# Patient Record
Sex: FEMALE | Race: BLACK OR AFRICAN AMERICAN | Employment: OTHER | URBAN - METROPOLITAN AREA
[De-identification: names, ages, dates, MRNs, and addresses within clinical notes are randomized per-mention and may not be internally consistent; named-entity substitution may affect disease eponyms.]

---

## 2019-10-18 ENCOUNTER — OFFICE VISIT (OUTPATIENT)
Dept: FAMILY MEDICINE CLINIC | Age: 79
End: 2019-10-18
Payer: MEDICARE

## 2019-10-18 VITALS
BODY MASS INDEX: 36.03 KG/M2 | RESPIRATION RATE: 20 BRPM | WEIGHT: 167 LBS | DIASTOLIC BLOOD PRESSURE: 94 MMHG | HEART RATE: 96 BPM | SYSTOLIC BLOOD PRESSURE: 160 MMHG | TEMPERATURE: 98.7 F | HEIGHT: 57 IN

## 2019-10-18 DIAGNOSIS — I10 ESSENTIAL HYPERTENSION: ICD-10-CM

## 2019-10-18 DIAGNOSIS — E11.40 TYPE 2 DIABETES MELLITUS WITH DIABETIC NEUROPATHY, WITHOUT LONG-TERM CURRENT USE OF INSULIN (HCC): Primary | ICD-10-CM

## 2019-10-18 DIAGNOSIS — F41.9 ANXIETY: ICD-10-CM

## 2019-10-18 DIAGNOSIS — I69.359 HISTORY OF HEMORRHAGIC STROKE WITH RESIDUAL HEMIPARESIS (HCC): ICD-10-CM

## 2019-10-18 DIAGNOSIS — E78.5 HYPERLIPIDEMIA, UNSPECIFIED HYPERLIPIDEMIA TYPE: ICD-10-CM

## 2019-10-18 LAB
ALBUMIN SERPL-MCNC: 4.2 G/DL (ref 3.5–5.1)
ALP BLD-CCNC: 57 U/L (ref 38–126)
ALT SERPL-CCNC: 11 U/L (ref 11–66)
ANION GAP SERPL CALCULATED.3IONS-SCNC: 14 MEQ/L (ref 8–16)
AST SERPL-CCNC: 14 U/L (ref 5–40)
AVERAGE GLUCOSE: 150 MG/DL (ref 70–126)
BASOPHILS # BLD: 1 %
BASOPHILS ABSOLUTE: 0.1 THOU/MM3 (ref 0–0.1)
BILIRUB SERPL-MCNC: 0.4 MG/DL (ref 0.3–1.2)
BUN BLDV-MCNC: 14 MG/DL (ref 7–22)
CALCIUM SERPL-MCNC: 9.7 MG/DL (ref 8.5–10.5)
CHLORIDE BLD-SCNC: 105 MEQ/L (ref 98–111)
CHOLESTEROL, TOTAL: 148 MG/DL (ref 100–199)
CO2: 22 MEQ/L (ref 23–33)
CREAT SERPL-MCNC: 1.3 MG/DL (ref 0.4–1.2)
EOSINOPHIL # BLD: 0.6 %
EOSINOPHILS ABSOLUTE: 0 THOU/MM3 (ref 0–0.4)
ERYTHROCYTE [DISTWIDTH] IN BLOOD BY AUTOMATED COUNT: 14.9 % (ref 11.5–14.5)
ERYTHROCYTE [DISTWIDTH] IN BLOOD BY AUTOMATED COUNT: 43 FL (ref 35–45)
GLUCOSE BLD-MCNC: 152 MG/DL (ref 70–108)
HBA1C MFR BLD: 7 % (ref 4.4–6.4)
HCT VFR BLD CALC: 40.8 % (ref 37–47)
HDLC SERPL-MCNC: 49 MG/DL
HEMOGLOBIN: 12.2 GM/DL (ref 12–16)
IMMATURE GRANS (ABS): 0.01 THOU/MM3 (ref 0–0.07)
IMMATURE GRANULOCYTES: 0.2 %
LDL CHOLESTEROL CALCULATED: 73 MG/DL
LYMPHOCYTES # BLD: 31.3 %
LYMPHOCYTES ABSOLUTE: 1.6 THOU/MM3 (ref 1–4.8)
MCH RBC QN AUTO: 24 PG (ref 26–33)
MCHC RBC AUTO-ENTMCNC: 29.9 GM/DL (ref 32.2–35.5)
MCV RBC AUTO: 80.3 FL (ref 81–99)
MONOCYTES # BLD: 10.8 %
MONOCYTES ABSOLUTE: 0.6 THOU/MM3 (ref 0.4–1.3)
NUCLEATED RED BLOOD CELLS: 0 /100 WBC
PLATELET # BLD: 79 THOU/MM3 (ref 130–400)
PLATELET ESTIMATE: ABNORMAL
PMV BLD AUTO: 10.9 FL (ref 9.4–12.4)
POTASSIUM SERPL-SCNC: 4.3 MEQ/L (ref 3.5–5.2)
RBC # BLD: 5.08 MILL/MM3 (ref 4.2–5.4)
SEG NEUTROPHILS: 56.1 %
SEGMENTED NEUTROPHILS ABSOLUTE COUNT: 2.9 THOU/MM3 (ref 1.8–7.7)
SODIUM BLD-SCNC: 141 MEQ/L (ref 135–145)
TOTAL PROTEIN: 8.3 G/DL (ref 6.1–8)
TRIGL SERPL-MCNC: 129 MG/DL (ref 0–199)
WBC # BLD: 5.1 THOU/MM3 (ref 4.8–10.8)

## 2019-10-18 PROCEDURE — G8400 PT W/DXA NO RESULTS DOC: HCPCS | Performed by: FAMILY MEDICINE

## 2019-10-18 PROCEDURE — 1036F TOBACCO NON-USER: CPT | Performed by: FAMILY MEDICINE

## 2019-10-18 PROCEDURE — 1090F PRES/ABSN URINE INCON ASSESS: CPT | Performed by: FAMILY MEDICINE

## 2019-10-18 PROCEDURE — 1123F ACP DISCUSS/DSCN MKR DOCD: CPT | Performed by: FAMILY MEDICINE

## 2019-10-18 PROCEDURE — G8417 CALC BMI ABV UP PARAM F/U: HCPCS | Performed by: FAMILY MEDICINE

## 2019-10-18 PROCEDURE — 90653 IIV ADJUVANT VACCINE IM: CPT | Performed by: FAMILY MEDICINE

## 2019-10-18 PROCEDURE — 99203 OFFICE O/P NEW LOW 30 MIN: CPT | Performed by: FAMILY MEDICINE

## 2019-10-18 PROCEDURE — G8427 DOCREV CUR MEDS BY ELIG CLIN: HCPCS | Performed by: FAMILY MEDICINE

## 2019-10-18 PROCEDURE — G8482 FLU IMMUNIZE ORDER/ADMIN: HCPCS | Performed by: FAMILY MEDICINE

## 2019-10-18 PROCEDURE — G0008 ADMIN INFLUENZA VIRUS VAC: HCPCS | Performed by: FAMILY MEDICINE

## 2019-10-18 PROCEDURE — 4040F PNEUMOC VAC/ADMIN/RCVD: CPT | Performed by: FAMILY MEDICINE

## 2019-10-18 RX ORDER — GLIPIZIDE 10 MG/1
10 TABLET ORAL DAILY
COMMUNITY
End: 2020-10-05 | Stop reason: SDUPTHER

## 2019-10-18 RX ORDER — BENAZEPRIL HYDROCHLORIDE 20 MG/1
20 TABLET ORAL DAILY
COMMUNITY
End: 2020-02-10 | Stop reason: SDUPTHER

## 2019-10-18 RX ORDER — GABAPENTIN 300 MG/1
300 CAPSULE ORAL 3 TIMES DAILY
Qty: 90 CAPSULE | Refills: 2 | Status: SHIPPED | OUTPATIENT
Start: 2019-10-18 | End: 2019-10-18 | Stop reason: SDUPTHER

## 2019-10-18 RX ORDER — GABAPENTIN 300 MG/1
300 CAPSULE ORAL 3 TIMES DAILY
COMMUNITY
End: 2019-10-18 | Stop reason: SDUPTHER

## 2019-10-18 RX ORDER — AMLODIPINE BESYLATE 5 MG/1
5 TABLET ORAL DAILY
COMMUNITY
End: 2020-02-10 | Stop reason: SDUPTHER

## 2019-10-18 RX ORDER — DULOXETIN HYDROCHLORIDE 60 MG/1
60 CAPSULE, DELAYED RELEASE ORAL DAILY
COMMUNITY
End: 2020-02-10 | Stop reason: SDUPTHER

## 2019-10-18 RX ORDER — CLOPIDOGREL BISULFATE 75 MG/1
75 TABLET ORAL DAILY
COMMUNITY
End: 2021-11-16 | Stop reason: SDUPTHER

## 2019-10-18 RX ORDER — CELECOXIB 100 MG/1
100 CAPSULE ORAL 2 TIMES DAILY
Qty: 60 CAPSULE | Refills: 0 | Status: SHIPPED | OUTPATIENT
Start: 2019-10-18 | End: 2019-12-03 | Stop reason: SDUPTHER

## 2019-10-18 RX ORDER — PRAVASTATIN SODIUM 40 MG
40 TABLET ORAL NIGHTLY
COMMUNITY
End: 2021-11-16 | Stop reason: SDUPTHER

## 2019-10-18 RX ORDER — GABAPENTIN 300 MG/1
300 CAPSULE ORAL 3 TIMES DAILY
Qty: 270 CAPSULE | Refills: 1 | Status: SHIPPED | OUTPATIENT
Start: 2019-10-18 | End: 2020-04-02

## 2019-10-18 SDOH — HEALTH STABILITY: MENTAL HEALTH: HOW OFTEN DO YOU HAVE A DRINK CONTAINING ALCOHOL?: NEVER

## 2019-10-21 ASSESSMENT — ENCOUNTER SYMPTOMS
EYES NEGATIVE: 1
DIARRHEA: 0
CHEST TIGHTNESS: 0
VOMITING: 0
BACK PAIN: 1
SHORTNESS OF BREATH: 0
RHINORRHEA: 0
SORE THROAT: 0
NAUSEA: 0
COUGH: 0
ABDOMINAL PAIN: 0

## 2019-11-04 ENCOUNTER — OFFICE VISIT (OUTPATIENT)
Dept: FAMILY MEDICINE CLINIC | Age: 79
End: 2019-11-04
Payer: MEDICARE

## 2019-11-04 VITALS
WEIGHT: 169.6 LBS | SYSTOLIC BLOOD PRESSURE: 126 MMHG | HEART RATE: 100 BPM | TEMPERATURE: 98 F | DIASTOLIC BLOOD PRESSURE: 76 MMHG | BODY MASS INDEX: 36.7 KG/M2 | OXYGEN SATURATION: 98 %

## 2019-11-04 DIAGNOSIS — I10 ESSENTIAL HYPERTENSION: ICD-10-CM

## 2019-11-04 DIAGNOSIS — E11.40 TYPE 2 DIABETES MELLITUS WITH DIABETIC NEUROPATHY, WITHOUT LONG-TERM CURRENT USE OF INSULIN (HCC): Primary | ICD-10-CM

## 2019-11-04 DIAGNOSIS — N18.30 CKD (CHRONIC KIDNEY DISEASE) STAGE 3, GFR 30-59 ML/MIN (HCC): ICD-10-CM

## 2019-11-04 DIAGNOSIS — R06.02 SOB (SHORTNESS OF BREATH): ICD-10-CM

## 2019-11-04 PROCEDURE — 1036F TOBACCO NON-USER: CPT | Performed by: FAMILY MEDICINE

## 2019-11-04 PROCEDURE — 4040F PNEUMOC VAC/ADMIN/RCVD: CPT | Performed by: FAMILY MEDICINE

## 2019-11-04 PROCEDURE — G8482 FLU IMMUNIZE ORDER/ADMIN: HCPCS | Performed by: FAMILY MEDICINE

## 2019-11-04 PROCEDURE — 1123F ACP DISCUSS/DSCN MKR DOCD: CPT | Performed by: FAMILY MEDICINE

## 2019-11-04 PROCEDURE — G8427 DOCREV CUR MEDS BY ELIG CLIN: HCPCS | Performed by: FAMILY MEDICINE

## 2019-11-04 PROCEDURE — G8400 PT W/DXA NO RESULTS DOC: HCPCS | Performed by: FAMILY MEDICINE

## 2019-11-04 PROCEDURE — 1090F PRES/ABSN URINE INCON ASSESS: CPT | Performed by: FAMILY MEDICINE

## 2019-11-04 PROCEDURE — G8417 CALC BMI ABV UP PARAM F/U: HCPCS | Performed by: FAMILY MEDICINE

## 2019-11-04 PROCEDURE — 99213 OFFICE O/P EST LOW 20 MIN: CPT | Performed by: FAMILY MEDICINE

## 2019-11-04 ASSESSMENT — PATIENT HEALTH QUESTIONNAIRE - PHQ9
2. FEELING DOWN, DEPRESSED OR HOPELESS: 0
1. LITTLE INTEREST OR PLEASURE IN DOING THINGS: 0
SUM OF ALL RESPONSES TO PHQ QUESTIONS 1-9: 0
SUM OF ALL RESPONSES TO PHQ9 QUESTIONS 1 & 2: 0
SUM OF ALL RESPONSES TO PHQ QUESTIONS 1-9: 0

## 2019-11-09 ASSESSMENT — ENCOUNTER SYMPTOMS
EYES NEGATIVE: 1
ABDOMINAL PAIN: 0
RHINORRHEA: 0
BACK PAIN: 0
SHORTNESS OF BREATH: 0
DIARRHEA: 0
VOMITING: 0
COUGH: 0
NAUSEA: 0
CHEST TIGHTNESS: 0
SORE THROAT: 0

## 2019-11-13 ENCOUNTER — HOSPITAL ENCOUNTER (OUTPATIENT)
Dept: GENERAL RADIOLOGY | Age: 79
Discharge: HOME | End: 2019-11-13
Payer: MEDICARE

## 2019-11-13 ENCOUNTER — HOSPITAL ENCOUNTER (OUTPATIENT)
Age: 79
Discharge: HOME | End: 2019-11-13
Payer: MEDICARE

## 2019-11-13 DIAGNOSIS — R06.02 SOB (SHORTNESS OF BREATH): ICD-10-CM

## 2019-11-13 LAB — GFR SERPL CREATININE-BSD FRML MDRD: 48 ML/MIN/1.73M2

## 2019-11-13 PROCEDURE — 71046 X-RAY EXAM CHEST 2 VIEWS: CPT

## 2019-12-03 RX ORDER — CELECOXIB 100 MG/1
CAPSULE ORAL
Qty: 60 CAPSULE | Refills: 2 | Status: SHIPPED | OUTPATIENT
Start: 2019-12-03 | End: 2020-03-16

## 2020-01-22 ENCOUNTER — TELEPHONE (OUTPATIENT)
Dept: FAMILY MEDICINE CLINIC | Age: 80
End: 2020-01-22

## 2020-02-10 ENCOUNTER — OFFICE VISIT (OUTPATIENT)
Dept: FAMILY MEDICINE CLINIC | Age: 80
End: 2020-02-10
Payer: MEDICARE

## 2020-02-10 VITALS
SYSTOLIC BLOOD PRESSURE: 136 MMHG | DIASTOLIC BLOOD PRESSURE: 84 MMHG | TEMPERATURE: 98.7 F | BODY MASS INDEX: 37.87 KG/M2 | RESPIRATION RATE: 20 BRPM | HEART RATE: 84 BPM | WEIGHT: 175 LBS

## 2020-02-10 PROCEDURE — 99214 OFFICE O/P EST MOD 30 MIN: CPT | Performed by: FAMILY MEDICINE

## 2020-02-10 PROCEDURE — 1123F ACP DISCUSS/DSCN MKR DOCD: CPT | Performed by: FAMILY MEDICINE

## 2020-02-10 PROCEDURE — G8417 CALC BMI ABV UP PARAM F/U: HCPCS | Performed by: FAMILY MEDICINE

## 2020-02-10 PROCEDURE — 4040F PNEUMOC VAC/ADMIN/RCVD: CPT | Performed by: FAMILY MEDICINE

## 2020-02-10 PROCEDURE — 1036F TOBACCO NON-USER: CPT | Performed by: FAMILY MEDICINE

## 2020-02-10 PROCEDURE — G8482 FLU IMMUNIZE ORDER/ADMIN: HCPCS | Performed by: FAMILY MEDICINE

## 2020-02-10 PROCEDURE — G8427 DOCREV CUR MEDS BY ELIG CLIN: HCPCS | Performed by: FAMILY MEDICINE

## 2020-02-10 PROCEDURE — G8400 PT W/DXA NO RESULTS DOC: HCPCS | Performed by: FAMILY MEDICINE

## 2020-02-10 PROCEDURE — 1090F PRES/ABSN URINE INCON ASSESS: CPT | Performed by: FAMILY MEDICINE

## 2020-02-10 RX ORDER — AMLODIPINE BESYLATE 5 MG/1
5 TABLET ORAL DAILY
Qty: 90 TABLET | Refills: 3 | Status: SHIPPED | OUTPATIENT
Start: 2020-02-10 | End: 2021-11-16 | Stop reason: SDUPTHER

## 2020-02-10 RX ORDER — DULOXETIN HYDROCHLORIDE 60 MG/1
60 CAPSULE, DELAYED RELEASE ORAL DAILY
Qty: 90 CAPSULE | Refills: 3 | Status: SHIPPED | OUTPATIENT
Start: 2020-02-10 | End: 2021-11-10 | Stop reason: SDUPTHER

## 2020-02-10 RX ORDER — BENAZEPRIL HYDROCHLORIDE 20 MG/1
20 TABLET ORAL DAILY
Qty: 90 TABLET | Refills: 3 | Status: SHIPPED | OUTPATIENT
Start: 2020-02-10 | End: 2021-11-16 | Stop reason: SDUPTHER

## 2020-02-16 PROBLEM — M15.9 PRIMARY OSTEOARTHRITIS INVOLVING MULTIPLE JOINTS: Status: ACTIVE | Noted: 2020-02-16

## 2020-02-16 PROBLEM — G89.4 CHRONIC PAIN SYNDROME: Status: ACTIVE | Noted: 2020-02-16

## 2020-02-16 ASSESSMENT — ENCOUNTER SYMPTOMS
DIARRHEA: 0
NAUSEA: 0
RHINORRHEA: 0
COUGH: 0
ABDOMINAL PAIN: 0
BACK PAIN: 1
EYES NEGATIVE: 1
CHEST TIGHTNESS: 0
SHORTNESS OF BREATH: 0
VOMITING: 0
SORE THROAT: 0

## 2020-02-19 ENCOUNTER — HOSPITAL ENCOUNTER (OUTPATIENT)
Age: 80
Discharge: HOME | End: 2020-02-19
Payer: MEDICARE

## 2020-02-19 ENCOUNTER — HOSPITAL ENCOUNTER (OUTPATIENT)
Dept: GENERAL RADIOLOGY | Age: 80
Discharge: HOME | End: 2020-02-19
Payer: MEDICARE

## 2020-02-19 DIAGNOSIS — E78.5 HYPERLIPIDEMIA, UNSPECIFIED HYPERLIPIDEMIA TYPE: ICD-10-CM

## 2020-02-19 DIAGNOSIS — E11.40 TYPE 2 DIABETES MELLITUS WITH DIABETIC NEUROPATHY, WITHOUT LONG-TERM CURRENT USE OF INSULIN (HCC): ICD-10-CM

## 2020-02-19 LAB
ALBUMIN SERPL-MCNC: 4.2 G/DL (ref 3.5–5.1)
ALP BLD-CCNC: 59 U/L (ref 38–126)
ALT SERPL-CCNC: 12 U/L (ref 11–66)
ANION GAP SERPL CALCULATED.3IONS-SCNC: 15 MEQ/L (ref 8–16)
AST SERPL-CCNC: 16 U/L (ref 5–40)
AVERAGE GLUCOSE: 192 MG/DL (ref 70–126)
BILIRUB SERPL-MCNC: 0.4 MG/DL (ref 0.3–1.2)
BUN BLDV-MCNC: 19 MG/DL (ref 7–22)
CALCIUM SERPL-MCNC: 9.5 MG/DL (ref 8.5–10.5)
CHLORIDE BLD-SCNC: 104 MEQ/L (ref 98–111)
CHOLESTEROL, TOTAL: 170 MG/DL (ref 100–199)
CO2: 22 MEQ/L (ref 23–33)
CREAT SERPL-MCNC: 1.5 MG/DL (ref 0.4–1.2)
GFR SERPL CREATININE-BSD FRML MDRD: 41 ML/MIN/1.73M2
GLUCOSE BLD-MCNC: 174 MG/DL (ref 70–108)
HBA1C MFR BLD: 8.4 % (ref 4.4–6.4)
HDLC SERPL-MCNC: 44 MG/DL
LDL CHOLESTEROL CALCULATED: 93 MG/DL
POTASSIUM SERPL-SCNC: 4.8 MEQ/L (ref 3.5–5.2)
SODIUM BLD-SCNC: 141 MEQ/L (ref 135–145)
TOTAL PROTEIN: 8.6 G/DL (ref 6.1–8)
TRIGL SERPL-MCNC: 164 MG/DL (ref 0–199)

## 2020-02-19 PROCEDURE — 73564 X-RAY EXAM KNEE 4 OR MORE: CPT

## 2020-02-19 PROCEDURE — 80061 LIPID PANEL: CPT

## 2020-02-19 PROCEDURE — 36415 COLL VENOUS BLD VENIPUNCTURE: CPT

## 2020-02-19 PROCEDURE — 80053 COMPREHEN METABOLIC PANEL: CPT

## 2020-02-19 PROCEDURE — 83036 HEMOGLOBIN GLYCOSYLATED A1C: CPT

## 2020-03-03 ENCOUNTER — TELEPHONE (OUTPATIENT)
Dept: FAMILY MEDICINE CLINIC | Age: 80
End: 2020-03-03

## 2020-03-03 NOTE — TELEPHONE ENCOUNTER
Xray shows severe degen disease. rec appt with ortho. Blood sugars very high, and renal function is increased. rec fu appt to discuss adjusting medication to control sugars.

## 2020-03-16 RX ORDER — CELECOXIB 100 MG/1
CAPSULE ORAL
Qty: 60 CAPSULE | Refills: 2 | Status: SHIPPED | OUTPATIENT
Start: 2020-03-16 | End: 2021-10-12

## 2020-03-30 ENCOUNTER — TELEPHONE (OUTPATIENT)
Dept: FAMILY MEDICINE CLINIC | Age: 80
End: 2020-03-30

## 2020-03-31 NOTE — TELEPHONE ENCOUNTER
Cant order cpap without knowing the settings.   Can we find out previous sleep doctor or find records

## 2020-03-31 NOTE — TELEPHONE ENCOUNTER
Daughter called back and stated sleep study and CPAP is from [place]. Daughter does not now who prescribed the machine and Annabelle Nabil does not remember. She has had the same machine for 7-8 years.  They are requesting an order for new CPAP sent to [facility]

## 2020-04-01 NOTE — TELEPHONE ENCOUNTER
Received a call back Dr Madison Castillo in [place] was who had ordered the CPAP.   The phone # [phone]

## 2020-04-02 RX ORDER — GABAPENTIN 300 MG/1
300 CAPSULE ORAL 3 TIMES DAILY
Qty: 90 CAPSULE | Refills: 2 | Status: SHIPPED | OUTPATIENT
Start: 2020-04-02 | End: 2020-04-13 | Stop reason: SDUPTHER

## 2020-04-02 NOTE — TELEPHONE ENCOUNTER
Trina Umana needs refill of   Requested Prescriptions     Pending Prescriptions Disp Refills    gabapentin (NEURONTIN) 300 MG capsule [Pharmacy Med Name: GABAPENTIN 300 MG CAPSULE] 90 capsule 2     Sig: Take 1 capsule by mouth 3 times daily for 90 days. Last Filled on:  10-18-19 #270 R-1 sent to [facility].     Last Visit Date:  2/10/2020    Next Visit Date:  5/4/2020

## 2020-04-13 RX ORDER — GABAPENTIN 300 MG/1
300 CAPSULE ORAL 3 TIMES DAILY
Qty: 90 CAPSULE | Refills: 2 | Status: SHIPPED | OUTPATIENT
Start: 2020-04-13 | End: 2020-06-30

## 2020-05-26 ENCOUNTER — TELEPHONE (OUTPATIENT)
Dept: ADMINISTRATIVE | Age: 80
End: 2020-05-26

## 2020-05-26 NOTE — TELEPHONE ENCOUNTER
Judge Lambert called from [facility], [address] [place] [address] on Aging. They are trying to get Passport started for this pt and need the document signed. It was faxed to us on the 20th. Please advise if we have not received or if there is an issue with signing it.

## 2020-06-25 ENCOUNTER — TELEPHONE (OUTPATIENT)
Dept: PHYSICAL MEDICINE AND REHAB | Age: 80
End: 2020-06-25

## 2020-06-25 ENCOUNTER — OFFICE VISIT (OUTPATIENT)
Dept: PHYSICAL MEDICINE AND REHAB | Age: 80
End: 2020-06-25
Payer: MEDICARE

## 2020-06-25 VITALS
HEART RATE: 92 BPM | BODY MASS INDEX: 407.51 KG/M2 | HEIGHT: 55 IN | DIASTOLIC BLOOD PRESSURE: 72 MMHG | SYSTOLIC BLOOD PRESSURE: 148 MMHG

## 2020-06-25 PROCEDURE — G8400 PT W/DXA NO RESULTS DOC: HCPCS | Performed by: PHYSICAL MEDICINE & REHABILITATION

## 2020-06-25 PROCEDURE — 1090F PRES/ABSN URINE INCON ASSESS: CPT | Performed by: PHYSICAL MEDICINE & REHABILITATION

## 2020-06-25 PROCEDURE — 1123F ACP DISCUSS/DSCN MKR DOCD: CPT | Performed by: PHYSICAL MEDICINE & REHABILITATION

## 2020-06-25 PROCEDURE — 99204 OFFICE O/P NEW MOD 45 MIN: CPT | Performed by: PHYSICAL MEDICINE & REHABILITATION

## 2020-06-25 PROCEDURE — 1036F TOBACCO NON-USER: CPT | Performed by: PHYSICAL MEDICINE & REHABILITATION

## 2020-06-25 PROCEDURE — G8427 DOCREV CUR MEDS BY ELIG CLIN: HCPCS | Performed by: PHYSICAL MEDICINE & REHABILITATION

## 2020-06-25 PROCEDURE — G8417 CALC BMI ABV UP PARAM F/U: HCPCS | Performed by: PHYSICAL MEDICINE & REHABILITATION

## 2020-06-25 PROCEDURE — 4040F PNEUMOC VAC/ADMIN/RCVD: CPT | Performed by: PHYSICAL MEDICINE & REHABILITATION

## 2020-06-25 RX ORDER — LIDOCAINE 50 MG/G
1 PATCH TOPICAL DAILY
Qty: 10 PATCH | Refills: 0 | Status: SHIPPED | OUTPATIENT
Start: 2020-06-25 | End: 2020-07-05

## 2020-06-25 NOTE — TELEPHONE ENCOUNTER
PA started on Lidocaine patches 5%.  Key: [id number]    ID: [id number]  BIN: [id number]  Group: [id number]  PCN: [id number]

## 2020-06-25 NOTE — PROGRESS NOTES
colon    Diabetes Father     Stroke Father        Review of Systems : All systems reviewed, all unremarkable other than HPI/subjective. Denies  fever, chills, infection or non healing wound. Physical Exam  Constitutional:       Appearance: Normal appearance. HENT:      Head: Atraumatic. Pulmonary:      Effort: Pulmonary effort is normal. No respiratory distress. Breath sounds: Normal breath sounds. Musculoskeletal:      Comments: Tenderness along the knee joint lines, right side   Neurological:      Mental Status: She is alert and oriented to person, place, and time. Psychiatric:         Behavior: Behavior normal.           Assessment and Plan:      Diagnosis Orders   1. Arthritis of knee  [place] INJECTION AA&/STRD OTHER PERIPHERAL NERVE/BRANCH       Her pain is consistent with DJD. Schedule for right genicular nerve block. Schedule injection in 2 weeks     Schedule ff up post injection virtually 2 weeks post op       I have reviewed the chief complaint and HPI including the [facility] and Vital documentation by my staff and I agree with their documentation and have added where applicable. Time spent with patient was 45  minutes. More than 50% was spent counseling/coordinating the patient's care.        Guanako Lowery MD   Spine Medicine/PM&R

## 2020-06-30 RX ORDER — GABAPENTIN 300 MG/1
300 CAPSULE ORAL 3 TIMES DAILY
Qty: 270 CAPSULE | Refills: 0 | Status: SHIPPED | OUTPATIENT
Start: 2020-06-30 | End: 2020-10-01

## 2020-06-30 NOTE — TELEPHONE ENCOUNTER
Seda [place]le (granddaughter on HIPAA) calls asking the status of the pain patches. The pharmacy told her it needed authorized. Please advise.

## 2020-06-30 NOTE — TELEPHONE ENCOUNTER
Notified that waiting on physician to respond on what to do with denial. Told granddaughter that she could get some lidocaine gels and patch over the counter at the pharmacy for the mean time. She does state that patient used to be on Fentanyl patches in the other state that she used to live in. Please advise.

## 2020-07-09 ENCOUNTER — APPOINTMENT (OUTPATIENT)
Dept: CT IMAGING | Age: 80
End: 2020-07-09
Payer: MEDICARE

## 2020-07-09 ENCOUNTER — HOSPITAL ENCOUNTER (EMERGENCY)
Age: 80
Discharge: HOME | End: 2020-07-10
Attending: EMERGENCY MEDICINE
Payer: MEDICARE

## 2020-07-09 ENCOUNTER — APPOINTMENT (OUTPATIENT)
Dept: GENERAL RADIOLOGY | Age: 80
End: 2020-07-09
Payer: MEDICARE

## 2020-07-09 LAB
ALBUMIN SERPL-MCNC: 4.2 G/DL (ref 3.5–5.1)
ALP BLD-CCNC: 51 U/L (ref 38–126)
ALT SERPL-CCNC: 11 U/L (ref 11–66)
ANION GAP SERPL CALCULATED.3IONS-SCNC: 12 MEQ/L (ref 8–16)
APTT: 29 SECONDS (ref 22–38)
AST SERPL-CCNC: 17 U/L (ref 5–40)
BILIRUB SERPL-MCNC: 0.4 MG/DL (ref 0.3–1.2)
BUN BLDV-MCNC: 30 MG/DL (ref 7–22)
CALCIUM SERPL-MCNC: 9.2 MG/DL (ref 8.5–10.5)
CHLORIDE BLD-SCNC: 106 MEQ/L (ref 98–111)
CO2: 19 MEQ/L (ref 23–33)
CREAT SERPL-MCNC: 1.8 MG/DL (ref 0.4–1.2)
EKG ATRIAL RATE: 83 BPM
EKG P AXIS: 66 DEGREES
EKG P-R INTERVAL: 142 MS
EKG Q-T INTERVAL: 400 MS
EKG QRS DURATION: 92 MS
EKG QTC CALCULATION (BAZETT): 470 MS
EKG R AXIS: -4 DEGREES
EKG T AXIS: 64 DEGREES
EKG VENTRICULAR RATE: 83 BPM
GLUCOSE BLD-MCNC: 128 MG/DL (ref 70–108)
INR BLD: 1.02 (ref 0.85–1.13)
MAGNESIUM: 1.8 MG/DL (ref 1.6–2.4)
OSMOLALITY CALCULATION: 281.6 MOSMOL/KG (ref 275–300)
POTASSIUM REFLEX MAGNESIUM: 4.9 MEQ/L (ref 3.5–5.2)
SODIUM BLD-SCNC: 137 MEQ/L (ref 135–145)
TOTAL PROTEIN: 7.9 G/DL (ref 6.1–8)
TROPONIN T: < 0.01 NG/ML

## 2020-07-09 PROCEDURE — 90715 TDAP VACCINE 7 YRS/> IM: CPT | Performed by: EMERGENCY MEDICINE

## 2020-07-09 PROCEDURE — 72170 X-RAY EXAM OF PELVIS: CPT

## 2020-07-09 PROCEDURE — 93005 ELECTROCARDIOGRAM TRACING: CPT | Performed by: EMERGENCY MEDICINE

## 2020-07-09 PROCEDURE — 90471 IMMUNIZATION ADMIN: CPT | Performed by: EMERGENCY MEDICINE

## 2020-07-09 PROCEDURE — 99285 EMERGENCY DEPT VISIT HI MDM: CPT

## 2020-07-09 PROCEDURE — 6360000002 HC RX W HCPCS: Performed by: EMERGENCY MEDICINE

## 2020-07-09 PROCEDURE — 70450 CT HEAD/BRAIN W/O DYE: CPT

## 2020-07-09 PROCEDURE — 6820000001 HC L2 TRAUMA SURGERY EVALUATION: Performed by: SURGERY

## 2020-07-09 PROCEDURE — 83735 ASSAY OF MAGNESIUM: CPT

## 2020-07-09 PROCEDURE — 85730 THROMBOPLASTIN TIME PARTIAL: CPT

## 2020-07-09 PROCEDURE — 80053 COMPREHEN METABOLIC PANEL: CPT

## 2020-07-09 PROCEDURE — 71045 X-RAY EXAM CHEST 1 VIEW: CPT

## 2020-07-09 PROCEDURE — 2580000003 HC RX 258: Performed by: EMERGENCY MEDICINE

## 2020-07-09 PROCEDURE — 6370000000 HC RX 637 (ALT 250 FOR IP): Performed by: EMERGENCY MEDICINE

## 2020-07-09 PROCEDURE — 36415 COLL VENOUS BLD VENIPUNCTURE: CPT

## 2020-07-09 PROCEDURE — 85025 COMPLETE CBC W/AUTO DIFF WBC: CPT

## 2020-07-09 PROCEDURE — 85610 PROTHROMBIN TIME: CPT

## 2020-07-09 PROCEDURE — 84484 ASSAY OF TROPONIN QUANT: CPT

## 2020-07-09 PROCEDURE — 72125 CT NECK SPINE W/O DYE: CPT

## 2020-07-09 PROCEDURE — APPSS180 APP SPLIT SHARED TIME > 60 MINUTES: Performed by: PHYSICIAN ASSISTANT

## 2020-07-09 RX ORDER — ACETAMINOPHEN 325 MG/1
650 TABLET ORAL ONCE
Status: COMPLETED | OUTPATIENT
Start: 2020-07-09 | End: 2020-07-09

## 2020-07-09 RX ORDER — 0.9 % SODIUM CHLORIDE 0.9 %
500 INTRAVENOUS SOLUTION INTRAVENOUS ONCE
Status: COMPLETED | OUTPATIENT
Start: 2020-07-09 | End: 2020-07-10

## 2020-07-09 RX ORDER — LIDOCAINE HYDROCHLORIDE AND EPINEPHRINE 10; 10 MG/ML; UG/ML
INJECTION, SOLUTION INFILTRATION; PERINEURAL
Status: COMPLETED
Start: 2020-07-09 | End: 2020-07-10

## 2020-07-09 RX ADMIN — ACETAMINOPHEN 650 MG: 325 TABLET ORAL at 22:58

## 2020-07-09 RX ADMIN — SODIUM CHLORIDE 500 ML: 9 INJECTION, SOLUTION INTRAVENOUS at 22:59

## 2020-07-09 RX ADMIN — TETANUS TOXOID, REDUCED DIPHTHERIA TOXOID AND ACELLULAR PERTUSSIS VACCINE, ADSORBED 0.5 ML: 5; 2.5; 8; 8; 2.5 SUSPENSION INTRAMUSCULAR at 22:59

## 2020-07-09 ASSESSMENT — ENCOUNTER SYMPTOMS
RHINORRHEA: 0
SHORTNESS OF BREATH: 1
DIARRHEA: 0
BACK PAIN: 0
ABDOMINAL DISTENTION: 0
NAUSEA: 0
EYE REDNESS: 0
EYE PAIN: 0
COUGH: 0
SORE THROAT: 0
CHEST TIGHTNESS: 0
EYE ITCHING: 0
STRIDOR: 0
VOMITING: 0
EYE DISCHARGE: 0
PHOTOPHOBIA: 0
WHEEZING: 0
ABDOMINAL PAIN: 0
CONSTIPATION: 0

## 2020-07-09 ASSESSMENT — PAIN SCALES - GENERAL
PAINLEVEL_OUTOF10: 7
PAINLEVEL_OUTOF10: 7

## 2020-07-09 ASSESSMENT — PAIN DESCRIPTION - PAIN TYPE: TYPE: ACUTE PAIN

## 2020-07-09 ASSESSMENT — PAIN DESCRIPTION - DESCRIPTORS: DESCRIPTORS: ACHING

## 2020-07-09 ASSESSMENT — PAIN DESCRIPTION - LOCATION: LOCATION: HEAD

## 2020-07-10 VITALS
HEIGHT: 59 IN | DIASTOLIC BLOOD PRESSURE: 105 MMHG | SYSTOLIC BLOOD PRESSURE: 136 MMHG | TEMPERATURE: 97.9 F | BODY MASS INDEX: 34.68 KG/M2 | RESPIRATION RATE: 20 BRPM | OXYGEN SATURATION: 93 % | HEART RATE: 83 BPM | WEIGHT: 172 LBS

## 2020-07-10 LAB
BASOPHILS # BLD: 1.1 %
BASOPHILS ABSOLUTE: 0 THOU/MM3 (ref 0–0.1)
EOSINOPHIL # BLD: 0.6 %
EOSINOPHILS ABSOLUTE: 0 THOU/MM3 (ref 0–0.4)
ERYTHROCYTE [DISTWIDTH] IN BLOOD BY AUTOMATED COUNT: 14.6 % (ref 11.5–14.5)
ERYTHROCYTE [DISTWIDTH] IN BLOOD BY AUTOMATED COUNT: 42.5 FL (ref 35–45)
HCT VFR BLD CALC: 38.5 % (ref 37–47)
HEMOGLOBIN: 11.5 GM/DL (ref 12–16)
IMMATURE GRANS (ABS): 0.01 THOU/MM3 (ref 0–0.07)
IMMATURE GRANULOCYTES: 0.3 %
LYMPHOCYTES # BLD: 36.8 %
LYMPHOCYTES ABSOLUTE: 1.3 THOU/MM3 (ref 1–4.8)
MCH RBC QN AUTO: 24.2 PG (ref 26–33)
MCHC RBC AUTO-ENTMCNC: 29.9 GM/DL (ref 32.2–35.5)
MCV RBC AUTO: 81.1 FL (ref 81–99)
MONOCYTES # BLD: 14.1 %
MONOCYTES ABSOLUTE: 0.5 THOU/MM3 (ref 0.4–1.3)
NUCLEATED RED BLOOD CELLS: 0 /100 WBC
PLATELET # BLD: 66 THOU/MM3 (ref 130–400)
PMV BLD AUTO: 11.9 FL (ref 9.4–12.4)
RBC # BLD: 4.75 MILL/MM3 (ref 4.2–5.4)
SCAN OF BLOOD SMEAR: NORMAL
SEG NEUTROPHILS: 47.1 %
SEGMENTED NEUTROPHILS ABSOLUTE COUNT: 1.6 THOU/MM3 (ref 1.8–7.7)
WBC # BLD: 3.5 THOU/MM3 (ref 4.8–10.8)

## 2020-07-10 PROCEDURE — 2500000003 HC RX 250 WO HCPCS

## 2020-07-10 RX ADMIN — LIDOCAINE HYDROCHLORIDE,EPINEPHRINE BITARTRATE: 10; .01 INJECTION, SOLUTION INFILTRATION; PERINEURAL at 00:45

## 2020-07-10 ASSESSMENT — ENCOUNTER SYMPTOMS
EYE REDNESS: 0
NAUSEA: 0
FACIAL SWELLING: 0
ROS SKIN COMMENTS: POSTERIOR SCALP LACERATION
BACK PAIN: 0
WHEEZING: 0
SHORTNESS OF BREATH: 0
STRIDOR: 0
ABDOMINAL PAIN: 0
VOMITING: 0

## 2020-07-10 ASSESSMENT — PAIN SCALES - GENERAL: PAINLEVEL_OUTOF10: 7

## 2020-07-10 NOTE — CONSULTS
films images reviewed. Patient did not sustain any traumatic injuries that warrants admission under trauma surgery. CT head and cervical spine with plain films of the chest and pelvis were all negative for acute traumatic injuries. Posterior scalp laceration closed in ED with 2 staples, see procedure note below. Tetanus updated in ED. Labs and vital signs reviewed. Patient remains stable from a trauma perspective for discharge home with family. Patient can follow up with primary care physician for staple removal and follow up care. Patient to have staples removed in 7-14 days. Local wound care discussed with patient at bedside. Discharge per ED physician. Case discussed with ED physician and trauma surgeon, Dr. Noel Rhodes. Review of Systems:   Review of Systems   Constitutional: Negative for diaphoresis and fever. HENT: Negative for facial swelling, mouth sores and nosebleeds. Eyes: Negative for redness and visual disturbance. Respiratory: Negative for shortness of breath, wheezing and stridor. Cardiovascular: Negative for chest pain and palpitations. Gastrointestinal: Negative for abdominal pain, nausea and vomiting. Musculoskeletal: Negative for arthralgias, back pain and neck pain. Skin: Positive for wound. Negative for rash. Posterior scalp laceration   Neurological: Positive for headaches. Negative for dizziness, light-headedness and numbness. Hematological: Bruises/bleeds easily. On plavix   Psychiatric/Behavioral: Negative for agitation, behavioral problems and confusion. Patient has no known allergies.   Past Surgical History:   Procedure Laterality Date    APPENDECTOMY      CHOLECYSTECTOMY      JOINT REPLACEMENT  1997    Left total hip    OVARY REMOVAL       Past Medical History:   Diagnosis Date    CVA [facility]cerebral vascular accident) [facility] [address] 10/2012    Depression     Diabetes mellitus ([facility] [address]     Hyperlipidemia     Hypertension     Neuropathy     Sleep apnea     Cpap /100 wbc   Comprehensive Metabolic Panel w/ Reflex to MG   Result Value Ref Range    Glucose 128 (H) 70 - 108 mg/dL    CREATININE 1.8 (H) 0.4 - 1.2 mg/dL    BUN 30 (H) 7 - 22 mg/dL    Sodium 137 135 - 145 meq/L    Potassium reflex Magnesium 4.9 3.5 - 5.2 meq/L    Chloride 106 98 - 111 meq/L    CO2 19 (L) 23 - 33 meq/L    Calcium 9.2 8.5 - 10.5 mg/dL    AST 17 5 - 40 U/L    Alkaline Phosphatase 51 38 - 126 U/L    Total Protein 7.9 6.1 - 8.0 g/dL    Alb 4.2 3.5 - 5.1 g/dL    Total Bilirubin 0.4 0.3 - 1.2 mg/dL    ALT 11 11 - 66 U/L   Troponin   Result Value Ref Range    Troponin T < 0.010 ng/ml   APTT   Result Value Ref Range    aPTT 29.0 22.0 - 38.0 seconds   Protime-INR   Result Value Ref Range    INR 1.02 0.85 - 1.13   Magnesium   Result Value Ref Range    Magnesium 1.8 1.6 - 2.4 mg/dL   Anion Gap   Result Value Ref Range    Anion Gap 12.0 8.0 - 16.0 meq/L   Glomerular Filtration Rate, Estimated   Result Value Ref Range    Est, Glom Filt Rate 33 (A) ml/min/1.73m2   Osmolality   Result Value Ref Range    Osmolality Calc 281.6 275.0 - 300 mOsmol/kg   Scan of Blood Smear   Result Value Ref Range    SCAN OF BLOOD SMEAR see below    EKG 12 Lead   Result Value Ref Range    Ventricular Rate 83 BPM    Atrial Rate 83 BPM    P-R Interval 142 ms    QRS Duration 92 ms    Q-T Interval 400 ms    QTc Calculation (Bazett) 470 ms    P Axis 66 degrees    R Axis -4 degrees    T Axis 64 degrees       Physical Exam:  Patient Vitals for the past 24 hrs:   BP Temp Temp src Pulse Resp SpO2 Height Weight   07/10/20 0003 (!) 136/105 -- -- 83 20 93 % -- --   07/09/20 2253 (!) 117/40 -- -- 78 20 95 % -- --   07/09/20 2142 (!) 150/85 97.9 °F (36.6 °C) Oral 80 20 95 % 4' 11\" (1.499 m) 172 lb (78 kg)     Primary Assessment:  Airway: Patent, trachea midline  Breathing: Breath sounds present and equal bilaterally, spontaneous, and unlabored  Circulation: Hemodynamically stable, 2+ central and peripheral pulses.   Disability: DUNAWAY x 4, following commands. GCS =15    Secondary Assessment:  General: Alert, NAD, cooperative and very pleasant on exam.  Head: Normocephalic, mid face stable, Nares patent bilaterally, no epistaxis. Mouth clear of foreign bodies, no lacerations or abrasions. 1 cm linear scalp laceration noted in the posterior left parietal scalp with minimal active blooding  Eyes: PERRLA EOMI, Nontraumatic  Neurologic: A & O x3. Following commands. Conversing appropriately. Slurred speech and minor right-sided weakness from previous CVA, at baseline per family. Neck: No cervical collar, trachea midline. Cervical spines NTTP midline, without step-offs, crepitus or deformity. Back:TL spines are NTTP midline, without step-offs, crepitus or deformity. No abrasions, contusions, or ecchymosis noted. Lungs: Clear to auscultation bilaterally. Chest Wall: Chest rise symmetrical.  Chest wall without tenderness to palpation. No crepitus, deformities, lacerations, or abrasions. Heart: RRR. Normal S1/S2. No obvious M/G/R. Abdomen:  Soft, NTTP. No guarding. Non-peritoneal.  Pelvis:  NTTP, stable to compression. Extremities: No gross deformities. PMS intact. Radial /DP/PT pulses 2+ bilaterally. Patient able to flex and extend joints of bilateral upper and lower extremities without complication. Chronic right sided weakness noted from history of CVA. No joint tenderness to palpation noted in extremities. Skin: Skin warm and dry. Normal for ethnicity. Laceration Repair  Verbal consent obtained from patient at bedside. Discussed risk, benefits, and alternatives to laceration repair. Patient would like to proceed with repair. 1 cm linear scalp laceration noted in the posterior left parietal scalp with minimal active bleeding. Laceration was cleansed with sterile saline and antiseptic spray. Wound explored, no foreign bodies visualized. Local anesthesia obtained using 1% lidocaine with epi. Laceration was closed with 2 staples.   Wound edges closely approximated. Bleeding well controlled. Patient tolerated procedure well. No immediate complications noted. Discussed wound care with patient and staples to be removed in 7-14 days. Monitor for signs and symptoms of infection. Patient updated on tetanus in ED. Radiology:     XR PELVIS (1-2 VIEWS)   Final Result    No acute fracture or dislocation. Status post left acetabular and left ischial ORIF and status post left total hip arthroplasty. Right hip joint DJD. **This report has been created using voice recognition software. It may contain minor errors which are inherent in voice recognition technology. **      Final report electronically signed by Dr. Shyam Payan on 7/10/2020 12:14 AM      XR CHEST 1 VW   Final Result      Interstitial prominence, acute versus chronic. See discussion above and correlate clinically. Moderate elevation of the right hemidiaphragm. Mild cardiomegaly. **This report has been created using voice recognition software. It may contain minor errors which are inherent in voice recognition technology. **      Final report electronically signed by Dr. Shyam Payan on 7/10/2020 12:15 AM      CT HEAD WO CONTRAST   Final Result      No acute ischemic infarct, hemorrhage, or mass effect. Moderate deep white matter hypodensities, nonspecific in nature but probably representing small vessel chronic ischemic changes. **This report has been created using voice recognition software. It may contain minor errors which are inherent in voice recognition technology. **      Final report electronically signed by Dr. Shyam Payan on 7/10/2020 12:00 AM      CT Cervical Spine WO Contrast   Final Result    No fracture. 3 mm anterolisthesis of C4 on C5 and 2 mm anterolisthesis of C5 on C6, likely degenerative. Multilevel degenerative disc disease and DJD. **This report has been created using voice recognition software.  It may contain minor errors which are inherent in voice recognition technology. **      Final report electronically signed by Dr. Mary Feliz on 7/10/2020 12:07 AM        Fast Exam: No    Electronically signed by Jimena Griggs PA-C on 7/10/2020 at 6:52 AM

## 2020-07-10 NOTE — ED TRIAGE NOTES
Pt presents to the ED from home c/o fall from a walker. Pt states she was sitting on her walker and she forgot to put the brakes on and she fell backwards onto her head. Pt is on plavix. Pt has hematoma to the back of the head with bleeding. Bleeding is controlled. Pt states she does feel dizzy. Vital signs are stable. EKG complete. PT denies chest pain. Pt pupils are equal, round and reactive bilaterally. Pt is alert and oriented x4.

## 2020-07-10 NOTE — ED NOTES
Pamela Khan RN attempting to draw blood. IV access maintained. Pt laying on cot with family at bedside. Pt respirations easy and unlabored.       Brigette Guan RN  07/09/20 [address], RN  07/09/20 [id number]

## 2020-07-10 NOTE — ED NOTES
PT and family updated on POC. Pt respirations easy and unlabored.       Kalli Belcher RN  07/10/20 0004

## 2020-07-10 NOTE — ED NOTES
[facility] at bedside for head abrasion.       Aaliyah Laboy RN  07/10/20 [address], RN  07/10/20 0004

## 2020-07-10 NOTE — ED PROVIDER NOTES
[facility] 67 [address] COMPLAINT       Chief Complaint   Patient presents with    Fall       Nurses Notes reviewed and I agreeexcept as noted in the HPI. HISTORY OF PRESENT ILLNESS    Dexter Argueta is a 68 y.o. female who presents to Emergency Department with Fall    74-year-old female with past medical history of CVA and residual weakness on the right side presents to ED after she fell when she walked on her walker. Of note patient is taking Plavix. Patient fell backwards with some bleeding from the scalp notice on back of head. She complains of moderate headache at 6/10. No LOC. Bleeding is controlled when she arrived. She has no chest pain. No abdominal pain. She has no pelvic pain. REVIEW OF SYSTEMS     Review of Systems   Constitutional: Negative for activity change, appetite change, chills, fatigue, fever and unexpected weight change. HENT: Negative for congestion, ear discharge, ear pain, hearing loss, nosebleeds, rhinorrhea and sore throat. Eyes: Negative for photophobia, pain, discharge, redness and itching. Respiratory: Positive for shortness of breath. Negative for cough, chest tightness, wheezing and stridor. Chronic SOB on CPAP at night. Cardiovascular: Negative for chest pain, palpitations and leg swelling. Gastrointestinal: Negative for abdominal distention, abdominal pain, constipation, diarrhea, nausea and vomiting. Endocrine: Negative for cold intolerance, heat intolerance, polydipsia and polyphagia. Genitourinary: Negative for dysuria, flank pain, frequency and hematuria. Musculoskeletal: Negative for arthralgias, back pain, gait problem, myalgias, neck pain and neck stiffness. Skin: Negative for pallor, rash and wound. Allergic/Immunologic: Negative for environmental allergies and food allergies. Neurological: Negative for dizziness, tremors, syncope, weakness and headaches. Psychiatric/Behavioral: Negative for agitation, behavioral problems, confusion, self-injury, sleep disturbance and suicidal ideas. PAST MEDICAL HISTORY    has a past medical history of CVA (cerebral vascular accident) ([facility] Depression, Diabetes mellitus ([facility] [address] Hyperlipidemia, Hypertension, Neuropathy, and Sleep apnea. SURGICAL HISTORY      has a past surgical history that includes joint replacement (); Appendectomy; Cholecystectomy; and Ovary removal.    CURRENT MEDICATIONS       Discharge Medication List as of 7/10/2020 12:34 AM      CONTINUE these medications which have NOT CHANGED    Details   Lidocaine 4 % GEL Apply 1 gram BID sparingly, Disp-60 g, R-3Normal      gabapentin (NEURONTIN) 300 MG capsule Take 1 capsule by mouth 3 times daily for 90 days. , Disp-270 capsule, R-0Normal      celecoxib (CELEBREX) 100 MG capsule TAKE 1 CAPSULE BY MOUTH TWICE A DAY, Disp-60 capsule, R-2Normal      benazepril (LOTENSIN) 20 MG tablet Take 1 tablet by mouth daily, Disp-90 tablet, R-3Normal      amLODIPine (NORVASC) 5 MG tablet Take 1 tablet by mouth daily, Disp-90 tablet, R-3Normal      DULoxetine (CYMBALTA) 60 MG extended release capsule Take 1 capsule by mouth daily, Disp-90 capsule, R-3Normal      clopidogrel (PLAVIX) 75 MG tablet Take 75 mg by mouth dailyHistorical Med      metFORMIN (GLUCOPHAGE) 500 MG tablet Take 500 mg by mouth daily (with breakfast)Historical Med      glipiZIDE (GLUCOTROL) 10 MG tablet Take 10 mg by mouth dailyHistorical Med      pravastatin (PRAVACHOL) 40 MG tablet Take 40 mg by mouth nightlyHistorical Med      SITagliptin (JANUVIA) 100 MG tablet Take 100 mg by mouth dailyHistorical Med             ALLERGIES     has No Known Allergies. FAMILY HISTORY     She indicated that her mother is . She indicated that her father is . family history includes Cancer in her mother; Diabetes in her father; Stroke in her father.     SOCIAL HISTORY      reports that she has never smoked. She has never used smokeless tobacco. She reports that she does not drink alcohol or use drugs. PHYSICAL EXAM     INITIAL VITALS:  height is 4' 11\" (1.499 m) and weight is 172 lb (78 kg). Her oral temperature is 97.9 °F (36.6 °C). Her blood pressure is 136/105 (abnormal) and her pulse is 83. Her respiration is 20 and oxygen saturation is 93%. Physical Exam  Vitals signs and nursing note reviewed. Constitutional:       Appearance: She is well-developed. She is not diaphoretic. HENT:      Head: Normocephalic and atraumatic. Comments: Small 1 cm posterior parietal scalp laceration no active bleeding. Nose: Nose normal.   Eyes:      General: No scleral icterus. Right eye: No discharge. Left eye: No discharge. Conjunctiva/sclera: Conjunctivae normal.      Pupils: Pupils are equal, round, and reactive to light. Neck:      Musculoskeletal: Normal range of motion and neck supple. Vascular: No JVD. Trachea: No tracheal deviation. Cardiovascular:      Rate and Rhythm: Normal rate and regular rhythm. Heart sounds: Normal heart sounds. No murmur. No friction rub. No gallop. Pulmonary:      Effort: Pulmonary effort is normal. No respiratory distress. Breath sounds: Normal breath sounds. No stridor. No wheezing or rales. Chest:      Chest wall: No tenderness. Abdominal:      General: Bowel sounds are normal. There is no distension. Palpations: Abdomen is soft. There is no mass. Tenderness: There is no abdominal tenderness. There is no guarding or rebound. Hernia: No hernia is present. Musculoskeletal:         General: No tenderness or deformity. Lymphadenopathy:      Cervical: No cervical adenopathy. Skin:     General: Skin is warm and dry. Capillary Refill: Capillary refill takes less than 2 seconds. Coloration: Skin is not pale. Findings: No erythema or rash.    Neurological:      Mental Status: She is alert and oriented to person, place, and time. Cranial Nerves: No cranial nerve deficit. Sensory: Sensory deficit present. Motor: No abnormal muscle tone. Coordination: Coordination abnormal.      Deep Tendon Reflexes: Reflexes abnormal.      Comments: Slurred speech and residual weakness from right side from previous CVA, but her base line. Psychiatric:         Behavior: Behavior normal.         Thought Content: Thought content normal.         Judgment: Judgment normal.           DIFFERENTIAL DIAGNOSIS:   Mechanical fall, scalp laceration, rule out intracranial injuries    DIAGNOSTIC RESULTS     EKG: All EKG's are interpreted by the Emergency Department Physician who either signs or Co-signsthis chart in the absence of a cardiologist.  Interpreted by me  No acute ischemic changes    RADIOLOGY: non-plain film images(s) such as CT, Ultrasound and MRI are read by the radiologist.    XR PELVIS (1-2 VIEWS)   Final Result    No acute fracture or dislocation. Status post left acetabular and left ischial ORIF and status post left total hip arthroplasty. Right hip joint DJD. **This report has been created using voice recognition software. It may contain minor errors which are inherent in voice recognition technology. **      Final report electronically signed by Dr. Nicolette Hargrove on 7/10/2020 12:14 AM      XR CHEST 1 VW   Final Result      Interstitial prominence, acute versus chronic. See discussion above and correlate clinically. Moderate elevation of the right hemidiaphragm. Mild cardiomegaly. **This report has been created using voice recognition software. It may contain minor errors which are inherent in voice recognition technology. **      Final report electronically signed by Dr. Nicolette Hargrove on 7/10/2020 12:15 AM      CT HEAD WO CONTRAST   Final Result      No acute ischemic infarct, hemorrhage, or mass effect.     Moderate deep white matter hypodensities, nonspecific in nature but probably representing small vessel chronic ischemic changes. **This report has been created using voice recognition software. It may contain minor errors which are inherent in voice recognition technology. **      Final report electronically signed by Dr. Karley Dover on 7/10/2020 12:00 AM      CT Cervical Spine WO Contrast   Final Result    No fracture. 3 mm anterolisthesis of C4 on C5 and 2 mm anterolisthesis of C5 on C6, likely degenerative. Multilevel degenerative disc disease and DJD. **This report has been created using voice recognition software. It may contain minor errors which are inherent in voice recognition technology. **      Final report electronically signed by Dr. Karley Dover on 7/10/2020 12:07 AM          []Visualized and interpreted by me   [] Radiologist's Wet Read Report Reviewed   [] Discussed with Radiologist.    Magdaleno Troy:   Results for orders placed or performed during the hospital encounter of 07/09/20   CBC Auto Differential   Result Value Ref Range    WBC 3.5 (L) 4.8 - 10.8 thou/mm3    RBC 4.75 4.20 - 5.40 mill/mm3    Hemoglobin 11.5 (L) 12.0 - 16.0 gm/dl    Hematocrit 38.5 37.0 - 47.0 %    MCV 81.1 81.0 - 99.0 fL    MCH 24.2 (L) 26.0 - 33.0 pg    MCHC 29.9 (L) 32.2 - 35.5 gm/dl    RDW-CV 14.6 (H) 11.5 - 14.5 %    RDW-SD 42.5 35.0 - 45.0 fL    Platelets 66 (L) 416 - 400 thou/mm3    MPV 11.9 9.4 - 12.4 fL    Seg Neutrophils 47.1 %    Lymphocytes 36.8 %    Monocytes 14.1 %    Eosinophils 0.6 %    Basophils 1.1 %    Immature Granulocytes 0.3 %    Segs Absolute 1.6 (L) 1.8 - 7.7 thou/mm3    Lymphocytes Absolute 1.3 1.0 - 4.8 thou/mm3    Monocytes Absolute 0.5 0.4 - 1.3 thou/mm3    Eosinophils Absolute 0.0 0.0 - 0.4 thou/mm3    Basophils Absolute 0.0 0.0 - 0.1 thou/mm3    Immature Grans (Abs) 0.01 0.00 - 0.07 thou/mm3    nRBC 0 /100 wbc   Comprehensive Metabolic Panel w/ Reflex to MG   Result Value Ref Range    Glucose 128 (H) 70 - 108 mg/dL    CREATININE 1.8 (H) 0.4 - 1.2 mg/dL    BUN 30 (H) 7 - 22 mg/dL    Sodium 137 135 - 145 meq/L    Potassium reflex Magnesium 4.9 3.5 - 5.2 meq/L    Chloride 106 98 - 111 meq/L    CO2 19 (L) 23 - 33 meq/L    Calcium 9.2 8.5 - 10.5 mg/dL    AST 17 5 - 40 U/L    Alkaline Phosphatase 51 38 - 126 U/L    Total Protein 7.9 6.1 - 8.0 g/dL    Alb 4.2 3.5 - 5.1 g/dL    Total Bilirubin 0.4 0.3 - 1.2 mg/dL    ALT 11 11 - 66 U/L   Troponin   Result Value Ref Range    Troponin T < 0.010 ng/ml   APTT   Result Value Ref Range    aPTT 29.0 22.0 - 38.0 seconds   Protime-INR   Result Value Ref Range    INR 1.02 0.85 - 1.13   Magnesium   Result Value Ref Range    Magnesium 1.8 1.6 - 2.4 mg/dL   Anion Gap   Result Value Ref Range    Anion Gap 12.0 8.0 - 16.0 meq/L   Glomerular Filtration Rate, Estimated   Result Value Ref Range    Est, Glom Filt Rate 33 (A) ml/min/1.73m2   Osmolality   Result Value Ref Range    Osmolality Calc 281.6 275.0 - 300 mOsmol/kg   Scan of Blood Smear   Result Value Ref Range    SCAN OF BLOOD SMEAR see below    EKG 12 Lead   Result Value Ref Range    Ventricular Rate 83 BPM    Atrial Rate 83 BPM    P-R Interval 142 ms    QRS Duration 92 ms    Q-T Interval 400 ms    QTc Calculation (Bazett) 470 ms    P Axis 66 degrees    R Axis -4 degrees    T Axis 64 degrees       EMERGENCY DEPARTMENT COURSE:   Vitals:    Vitals:    07/09/20 2142 07/09/20 2253 07/10/20 0003   BP: (!) 150/85 (!) 117/40 (!) 136/105   Pulse: 80 78 83   Resp: 20 20 20   Temp: 97.9 °F (36.6 °C)     TempSrc: Oral     SpO2: 95% 95% 93%   Weight: 172 lb (78 kg)     Height: 4' 11\" (1.499 m)         22:10 PM    Patient is seen and evaluated in a timely fashion. Action:     Trauma activation (she is 80, fell with plavix and scalp laceration). Labs  CTs head and cervical spine  X-rays of chest and pelvis    MedicalDecision Making    Reassessment: Stable    Patient is medicated with following medications during ED stay.    Medications   0.9 % sodium chloride bolus (0 mLs Intravenous Stopped 7/10/20 0045)   acetaminophen (TYLENOL) tablet 650 mg (650 mg Oral Given 7/9/20 2258)   Tetanus-Diphth-Acell Pertussis (BOOSTRIX) injection 0.5 mL (0.5 mLs Intramuscular Given 7/9/20 2259)   lidocaine-EPINEPHrine 1 percent-1:[id number] injection (  Given 7/10/20 0045)       Patient arrived as level 2 trauma activation. Patient is stable, trauma level was downgraded to trauma consult. Trauma team came down seeing the patient. Laceration is repaired by Pierre Samson trauma PA please refer to his separate procedure note. Labs are reassuring except for Creatinine 1.8 and she is given NS bolus in ED. CTs head and cervical spine do not reveal acute abnormality. Chest x-ray and pelvic series do not review acute fractures. No pneumonia. Patient and family are reassured and discharged with PCP follow-up in the week. CRITICAL CARE:   None    CONSULTS:  Trauma team    PROCEDURES:  None    FINAL IMPRESSION      1. Fall from ground level    2. Laceration of scalp, initial encounter    3. Acute on chronic renal insufficiency          DISPOSITION/PLAN   Home    PATIENT REFERRED TO:  Dania Cavazos MD  [place] [address]  [address] [id number] [phone]    In 3 days  ED visit follow up to check kidney functions, scalpe wound and fall.       DISCHARGE MEDICATIONS:  Discharge Medication List as of 7/10/2020 12:34 AM          (Please note that portions of this note were completed with a voice recognition program.  Efforts were made to edit the dictations but occasionally words aremis-transcribed.)    MD Katalina Martinez MD  07/10/20 1430

## 2020-07-14 ENCOUNTER — OFFICE VISIT (OUTPATIENT)
Dept: FAMILY MEDICINE CLINIC | Age: 80
End: 2020-07-14
Payer: MEDICARE

## 2020-07-14 VITALS
HEIGHT: 59 IN | HEART RATE: 92 BPM | SYSTOLIC BLOOD PRESSURE: 152 MMHG | TEMPERATURE: 97.9 F | BODY MASS INDEX: 34.11 KG/M2 | OXYGEN SATURATION: 96 % | RESPIRATION RATE: 20 BRPM | WEIGHT: 169.2 LBS | DIASTOLIC BLOOD PRESSURE: 80 MMHG

## 2020-07-14 PROCEDURE — G8417 CALC BMI ABV UP PARAM F/U: HCPCS | Performed by: FAMILY MEDICINE

## 2020-07-14 PROCEDURE — 1036F TOBACCO NON-USER: CPT | Performed by: FAMILY MEDICINE

## 2020-07-14 PROCEDURE — G8427 DOCREV CUR MEDS BY ELIG CLIN: HCPCS | Performed by: FAMILY MEDICINE

## 2020-07-14 PROCEDURE — G8400 PT W/DXA NO RESULTS DOC: HCPCS | Performed by: FAMILY MEDICINE

## 2020-07-14 PROCEDURE — 1090F PRES/ABSN URINE INCON ASSESS: CPT | Performed by: FAMILY MEDICINE

## 2020-07-14 PROCEDURE — 1123F ACP DISCUSS/DSCN MKR DOCD: CPT | Performed by: FAMILY MEDICINE

## 2020-07-14 PROCEDURE — 4040F PNEUMOC VAC/ADMIN/RCVD: CPT | Performed by: FAMILY MEDICINE

## 2020-07-14 PROCEDURE — 99213 OFFICE O/P EST LOW 20 MIN: CPT | Performed by: FAMILY MEDICINE

## 2020-07-14 PROCEDURE — 3052F HG A1C>EQUAL 8.0%<EQUAL 9.0%: CPT | Performed by: FAMILY MEDICINE

## 2020-07-14 ASSESSMENT — ENCOUNTER SYMPTOMS
CHEST TIGHTNESS: 0
SHORTNESS OF BREATH: 0
COUGH: 0
VOMITING: 0
SORE THROAT: 0
RHINORRHEA: 0
DIARRHEA: 0
ABDOMINAL PAIN: 0
EYES NEGATIVE: 1
NAUSEA: 0
BACK PAIN: 0

## 2020-07-14 ASSESSMENT — PATIENT HEALTH QUESTIONNAIRE - PHQ9
1. LITTLE INTEREST OR PLEASURE IN DOING THINGS: 0
SUM OF ALL RESPONSES TO PHQ QUESTIONS 1-9: 0
SUM OF ALL RESPONSES TO PHQ QUESTIONS 1-9: 0
2. FEELING DOWN, DEPRESSED OR HOPELESS: 0
SUM OF ALL RESPONSES TO PHQ9 QUESTIONS 1 & 2: 0

## 2020-07-24 ENCOUNTER — HOSPITAL ENCOUNTER (OUTPATIENT)
Age: 80
Discharge: HOME | End: 2020-07-24
Payer: MEDICARE

## 2020-07-24 LAB
ALBUMIN SERPL-MCNC: 4.1 G/DL (ref 3.5–5.1)
ALP BLD-CCNC: 55 U/L (ref 38–126)
ALT SERPL-CCNC: 10 U/L (ref 11–66)
ANION GAP SERPL CALCULATED.3IONS-SCNC: 8 MEQ/L (ref 8–16)
AST SERPL-CCNC: 15 U/L (ref 5–40)
AVERAGE GLUCOSE: 171 MG/DL (ref 70–126)
BILIRUB SERPL-MCNC: 0.4 MG/DL (ref 0.3–1.2)
BUN BLDV-MCNC: 22 MG/DL (ref 7–22)
CALCIUM SERPL-MCNC: 9.2 MG/DL (ref 8.5–10.5)
CHLORIDE BLD-SCNC: 104 MEQ/L (ref 98–111)
CHOLESTEROL, TOTAL: 161 MG/DL (ref 100–199)
CO2: 23 MEQ/L (ref 23–33)
CREAT SERPL-MCNC: 1.5 MG/DL (ref 0.4–1.2)
GFR SERPL CREATININE-BSD FRML MDRD: 33 ML/MIN/1.73M2
GLUCOSE BLD-MCNC: 134 MG/DL (ref 70–108)
HBA1C MFR BLD: 7.7 % (ref 4.4–6.4)
HDLC SERPL-MCNC: 41 MG/DL
LDL CHOLESTEROL CALCULATED: 75 MG/DL
POTASSIUM SERPL-SCNC: 4.5 MEQ/L (ref 3.5–5.2)
SODIUM BLD-SCNC: 135 MEQ/L (ref 135–145)
TOTAL PROTEIN: 8.3 G/DL (ref 6.1–8)
TRIGL SERPL-MCNC: 225 MG/DL (ref 0–199)

## 2020-07-24 PROCEDURE — 36415 COLL VENOUS BLD VENIPUNCTURE: CPT

## 2020-07-24 PROCEDURE — 80061 LIPID PANEL: CPT

## 2020-07-24 PROCEDURE — U0003 INFECTIOUS AGENT DETECTION BY NUCLEIC ACID (DNA OR RNA); SEVERE ACUTE RESPIRATORY SYNDROME CORONAVIRUS 2 (SARS-COV-2) (CORONAVIRUS DISEASE [COVID-19]), AMPLIFIED PROBE TECHNIQUE, MAKING USE OF HIGH THROUGHPUT TECHNOLOGIES AS DESCRIBED BY CMS-2020-01-R: HCPCS

## 2020-07-24 PROCEDURE — 83036 HEMOGLOBIN GLYCOSYLATED A1C: CPT

## 2020-07-24 PROCEDURE — 80053 COMPREHEN METABOLIC PANEL: CPT

## 2020-07-27 LAB — SARS-COV-2: NOT DETECTED

## 2020-07-28 ENCOUNTER — TELEPHONE (OUTPATIENT)
Dept: PHYSICAL MEDICINE AND REHAB | Age: 80
End: 2020-07-28

## 2020-07-29 ENCOUNTER — APPOINTMENT (OUTPATIENT)
Dept: GENERAL RADIOLOGY | Age: 80
End: 2020-07-29
Attending: PHYSICAL MEDICINE & REHABILITATION
Payer: MEDICARE

## 2020-07-29 ENCOUNTER — HOSPITAL ENCOUNTER (OUTPATIENT)
Age: 80
Setting detail: OUTPATIENT SURGERY
Discharge: HOME | End: 2020-07-29
Attending: PHYSICAL MEDICINE & REHABILITATION | Admitting: PHYSICAL MEDICINE & REHABILITATION
Payer: MEDICARE

## 2020-07-29 VITALS
DIASTOLIC BLOOD PRESSURE: 74 MMHG | RESPIRATION RATE: 16 BRPM | BODY MASS INDEX: 34.23 KG/M2 | SYSTOLIC BLOOD PRESSURE: 157 MMHG | HEART RATE: 90 BPM | HEIGHT: 59 IN | TEMPERATURE: 97.3 F | OXYGEN SATURATION: 91 % | WEIGHT: 169.8 LBS

## 2020-07-29 LAB — GLUCOSE BLD-MCNC: 219 MG/DL (ref 70–108)

## 2020-07-29 PROCEDURE — 99152 MOD SED SAME PHYS/QHP 5/>YRS: CPT | Performed by: PHYSICAL MEDICINE & REHABILITATION

## 2020-07-29 PROCEDURE — 64450 NJX AA&/STRD OTHER PN/BRANCH: CPT | Performed by: PHYSICAL MEDICINE & REHABILITATION

## 2020-07-29 PROCEDURE — 6360000002 HC RX W HCPCS: Performed by: PHYSICAL MEDICINE & REHABILITATION

## 2020-07-29 PROCEDURE — 2709999900 HC NON-CHARGEABLE SUPPLY: Performed by: PHYSICAL MEDICINE & REHABILITATION

## 2020-07-29 PROCEDURE — 7100000011 HC PHASE II RECOVERY - ADDTL 15 MIN: Performed by: PHYSICAL MEDICINE & REHABILITATION

## 2020-07-29 PROCEDURE — 3600000054 HC PAIN LEVEL 3 BASE: Performed by: PHYSICAL MEDICINE & REHABILITATION

## 2020-07-29 PROCEDURE — 3209999900 FLUORO FOR SURGICAL PROCEDURES

## 2020-07-29 PROCEDURE — 7100000010 HC PHASE II RECOVERY - FIRST 15 MIN: Performed by: PHYSICAL MEDICINE & REHABILITATION

## 2020-07-29 PROCEDURE — 82948 REAGENT STRIP/BLOOD GLUCOSE: CPT

## 2020-07-29 PROCEDURE — 2500000003 HC RX 250 WO HCPCS: Performed by: PHYSICAL MEDICINE & REHABILITATION

## 2020-07-29 RX ORDER — MIDAZOLAM HYDROCHLORIDE 1 MG/ML
INJECTION INTRAMUSCULAR; INTRAVENOUS PRN
Status: DISCONTINUED | OUTPATIENT
Start: 2020-07-29 | End: 2020-07-29 | Stop reason: ALTCHOICE

## 2020-07-29 RX ORDER — FENTANYL CITRATE 50 UG/ML
INJECTION, SOLUTION INTRAMUSCULAR; INTRAVENOUS PRN
Status: DISCONTINUED | OUTPATIENT
Start: 2020-07-29 | End: 2020-07-29 | Stop reason: ALTCHOICE

## 2020-07-29 RX ORDER — LIDOCAINE HYDROCHLORIDE 10 MG/ML
INJECTION, SOLUTION INFILTRATION; PERINEURAL PRN
Status: DISCONTINUED | OUTPATIENT
Start: 2020-07-29 | End: 2020-07-29 | Stop reason: ALTCHOICE

## 2020-07-29 RX ORDER — TRIAMCINOLONE ACETONIDE 40 MG/ML
INJECTION, SUSPENSION INTRA-ARTICULAR; INTRAMUSCULAR PRN
Status: DISCONTINUED | OUTPATIENT
Start: 2020-07-29 | End: 2020-07-29 | Stop reason: ALTCHOICE

## 2020-07-29 ASSESSMENT — PAIN - FUNCTIONAL ASSESSMENT: PAIN_FUNCTIONAL_ASSESSMENT: 0-10

## 2020-07-29 ASSESSMENT — PAIN SCALES - GENERAL: PAINLEVEL_OUTOF10: 0

## 2020-07-29 NOTE — H&P
Barbra Montesinos MD      Linda Garcia is a 68 y.o. female, who came in for a procedure,  Right genicular nerve block . Reports pain with right knee. Worse with standing. Moderate to severe pain,       Treatment done: physical therapy, anti-inflammatory medication and muscle relaxant    No Known Allergies    Social History     Socioeconomic History    Marital status:       Spouse name: Not on file    Number of children: Not on file    Years of education: Not on file    Highest education level: Not on file   Occupational History    Not on file   Social Needs    Financial resource strain: Not on file    Food insecurity     Worry: Not on file     Inability: Not on file    Transportation needs     Medical: Not on file     Non-medical: Not on file   Tobacco Use    Smoking status: Never Smoker    Smokeless tobacco: Never Used   Substance and Sexual Activity    Alcohol use: Never     Frequency: Never    Drug use: Never    Sexual activity: Not on file   Lifestyle    Physical activity     Days per week: Not on file     Minutes per session: Not on file    Stress: Not on file   Relationships    Social connections     Talks on phone: Not on file     Gets together: Not on file     Attends Church service: Not on file     Active member of club or organization: Not on file     Attends meetings of clubs or organizations: Not on file     Relationship status: Not on file    Intimate partner violence     Fear of current or ex partner: Not on file     Emotionally abused: Not on file     Physically abused: Not on file     Forced sexual activity: Not on file   Other Topics Concern    Not on file   Social History Narrative    Not on file       Past Medical History:   Diagnosis Date    [facility]    Depression     Diabetes mellitus ([facility]     Hyperlipidemia     Hypertension     Neuropathy     Sleep apnea     Cpap       Past Surgical History: Procedure Laterality Date    APPENDECTOMY      CHOLECYSTECTOMY      JOINT REPLACEMENT  1997    Left total hip    OVARY REMOVAL         Prior to Visit Medications    Medication Sig Taking? Authorizing Provider   Lidocaine 4 % GEL Apply 1 gram BID sparingly Yes Jesus Alberto Gaines MD   benazepril (LOTENSIN) 20 MG tablet Take 1 tablet by mouth daily Yes Dania Cavazos MD   amLODIPine (NORVASC) 5 MG tablet Take 1 tablet by mouth daily Yes Dania Cavazos MD   DULoxetine (CYMBALTA) 60 MG extended release capsule Take 1 capsule by mouth daily Yes Dania Cavazos MD   pravastatin (PRAVACHOL) 40 MG tablet Take 40 mg by mouth nightly Yes Historical Provider, MD   gabapentin (NEURONTIN) 300 MG capsule Take 1 capsule by mouth 3 times daily for 90 days. Annabelle Hall APRN - CNP   celecoxib (CELEBREX) 100 MG capsule TAKE 1 CAPSULE BY MOUTH TWICE A DAY  Dania Cavazos MD   clopidogrel (PLAVIX) 75 MG tablet Take 75 mg by mouth daily  Historical Provider, MD   metFORMIN (GLUCOPHAGE) 500 MG tablet Take 500 mg by mouth daily (with breakfast)  Historical Provider, MD   glipiZIDE (GLUCOTROL) 10 MG tablet Take 10 mg by mouth daily  Historical Provider, MD   SITagliptin (JANUVIA) 100 MG tablet Take 100 mg by mouth daily  Historical Provider, MD         Review of Systems : All systems reviewed, all unremarkable other than HPI. No change since last visit. Physical Exam  Constitutional:       General: She is not in acute distress. Appearance: Normal appearance. HENT:      Head: Normocephalic and atraumatic. Cardiovascular:      Rate and Rhythm: Normal rate and regular rhythm. Pulmonary:      Effort: Pulmonary effort is normal. No respiratory distress. Neurological:      General: No focal deficit present. Mental Status: She is alert and oriented to person, place, and time. Psychiatric:         Mood and Affect: Mood normal.         Behavior: Behavior normal.     :    Alert, oriented.      Cervical Spine Exam: Full ROM, without limitation     Access: Adequate mouth opening       Assessment:   Right Knee DJD       PLAN:     As advertised. Planned duration of treatment: 4 weeks. Further assessment and followup in  4 weeks for follow up post injection.        Electronically signed by Marily Rowell MD on 7/29/2020 at 8:52 AM

## 2020-07-29 NOTE — SEDATION DOCUMENTATION
Conscious Sedation Pre and Post Procedure Note    Indication: procedural pain management    Consent: I have discussed with the patient and/or the patient representative the indication, alternatives, and the possible risks and/or complications of the planned procedure and the anesthesia methods. The patient and/or patient representative appear to understand and agree to proceed. Physician Involvement: The attending physician was present and supervising this procedure. Pre-Sedation Documentation and Exam: I have personally completed a history, physical exam & review of systems for this patient (see notes). Airway Assessment: Mallampati Class II - (soft palate, fauces & uvula are visible)    Prior History of Anesthesia Complications: none    ASA Classification: Class 2 - A normal healthy patient with mild systemic disease    Sedation/ Anesthesia Plan: intravenous sedation    Medications Used: midazolam (Versed) intravenously and fentanyl intravenously    Monitoring and Safety: The patient was placed on a cardiac monitor and vital signs, pulse oximetry and level of consciousness were continuously evaluated throughout the procedure. The patient was closely monitored until recovery from the medications was complete and the patient had returned to baseline status. Respiratory therapy was on standby at all times during the procedure. (The following sections must be completed)    Post-Sedation Vital Signs: Vital signs were reviewed and were stable after the procedure (see flow sheet for vitals)            Post-Sedation Exam: Neurologically intact. No cardiovascular compromise post injection.              Complications: none

## 2020-07-29 NOTE — PROGRESS NOTES
1817-  Discharge instructions given in pre-op, no further questions. Call light within reach. No needs at this time.

## 2020-07-29 NOTE — PROGRESS NOTES
2225: Patient to phase 2 recovery room via cart. Patient arouses easily to name. Report received from surgical RN, Daisy Gruber. Patient's vitals obtained, see charting. 2743: Patient is denying pain and nausea at this time. Band aids are in place to right knee. 0919: Offered drink and snack provided to the patient. Bed is in the lowest position and call light is within reach. 0935: IV removed at this time- no complications and dressing applied. 0945: Patient wheeled to the car and discharged home in stable condition with her daughter.

## 2020-07-29 NOTE — OP NOTE
Operative Note      Patient: Lily Garland  YOB: 1940  MRN: [id number]    Date of Procedure: 7/29/2020    Pre-Op Diagnosis: Right osteoarthritis    Post-Op Diagnosis: Same       Procedure(s):  right , Genicular Nerve block    Surgeon(s):  Marily Rowell MD    Assistant:   * No surgical staff found *    Anesthesia: Moderate sedation using 2 mg IV versed & 50 mcg IV fentanyl. CONSENT:     The risks, benefits, alternatives, plan, complications, and personnel were discussed prior to the procedure. The patient understood and agreed to proceed. The patient was positioned supine. Sign-in and time-out was performed. Identifying the site, in this case,  right side knee joint. Sterile technique was done in the usual manner using chlorhexidine. This was followed by infiltration of a 9 ml of 1% preservative-free lidocaine. A 3.5 -inch, 22-gauge spinal needle was positioned under fluoroscopic guidance. Total of 3 needles positioned along the distal third medial and lateral superior aspect of the femur and along the proximal aspect of the medial aspection of tibia. This was followed by injecting solution of 40 mg of triamcinolone and  4ml of 1% preservative-free lidocaine was injected without difficulty. The patient tolerated the procedure well and went to the recovery room with stable vital signs. Estimated Blood Loss (mL): Minimal    Complications: None    Moderate Sedation Time: > 15 minutes      PLAN:     Home instructions were provided. The patient will follow up in 4 to 6 weeks or earlier if needed.        Electronically signed by Marily Rowell MD on 7/29/2020 at 9:11 AM

## 2020-08-20 ENCOUNTER — OFFICE VISIT (OUTPATIENT)
Dept: PHYSICAL MEDICINE AND REHAB | Age: 80
End: 2020-08-20
Payer: MEDICARE

## 2020-08-20 VITALS
WEIGHT: 169 LBS | DIASTOLIC BLOOD PRESSURE: 98 MMHG | TEMPERATURE: 96.8 F | HEIGHT: 59 IN | SYSTOLIC BLOOD PRESSURE: 160 MMHG | BODY MASS INDEX: 34.07 KG/M2

## 2020-08-20 LAB — GFR SERPL CREATININE-BSD FRML MDRD: 34 ML/MIN/1.73M2

## 2020-08-20 PROCEDURE — G8427 DOCREV CUR MEDS BY ELIG CLIN: HCPCS | Performed by: PHYSICAL MEDICINE & REHABILITATION

## 2020-08-20 PROCEDURE — 99214 OFFICE O/P EST MOD 30 MIN: CPT | Performed by: PHYSICAL MEDICINE & REHABILITATION

## 2020-08-20 PROCEDURE — G8417 CALC BMI ABV UP PARAM F/U: HCPCS | Performed by: PHYSICAL MEDICINE & REHABILITATION

## 2020-08-20 PROCEDURE — 1123F ACP DISCUSS/DSCN MKR DOCD: CPT | Performed by: PHYSICAL MEDICINE & REHABILITATION

## 2020-08-20 PROCEDURE — 1036F TOBACCO NON-USER: CPT | Performed by: PHYSICAL MEDICINE & REHABILITATION

## 2020-08-20 PROCEDURE — 4040F PNEUMOC VAC/ADMIN/RCVD: CPT | Performed by: PHYSICAL MEDICINE & REHABILITATION

## 2020-08-20 PROCEDURE — 1090F PRES/ABSN URINE INCON ASSESS: CPT | Performed by: PHYSICAL MEDICINE & REHABILITATION

## 2020-08-20 PROCEDURE — G8400 PT W/DXA NO RESULTS DOC: HCPCS | Performed by: PHYSICAL MEDICINE & REHABILITATION

## 2020-08-20 RX ORDER — TRAMADOL HYDROCHLORIDE 50 MG/1
50 TABLET ORAL EVERY 6 HOURS PRN
Qty: 21 TABLET | Refills: 0 | Status: SHIPPED | OUTPATIENT
Start: 2020-08-20 | End: 2020-09-30 | Stop reason: SDUPTHER

## 2020-08-20 NOTE — PROGRESS NOTES
FOLLOW UP APPOINTMENT:         Raul Robin MD    8/20/2020       Danielle Marquez is a 68 y.o. female, who came for a   post injection follow up    Post genicular nerve block, right side - minimal relief was noted. Cause of the symptom(s): degenerative (arthritis)    Current pain level: 7 on the scale of 0-10 ( 10 being worst)     Quality of Symptoms: aching and throbbing    Aggravating factors: activity    Relieving factors: rest and use of medication      No Known Allergies    Social History     Socioeconomic History    Marital status:       Spouse name: Not on file    Number of children: Not on file    Years of education: Not on file    Highest education level: Not on file   Occupational History    Not on file   Social Needs    Financial resource strain: Not on file    Food insecurity     Worry: Not on file     Inability: Not on file    Transportation needs     Medical: Not on file     Non-medical: Not on file   Tobacco Use    Smoking status: Never Smoker    Smokeless tobacco: Never Used   Substance and Sexual Activity    Alcohol use: Never     Frequency: Never    Drug use: Never    Sexual activity: Not on file   Lifestyle    Physical activity     Days per week: Not on file     Minutes per session: Not on file    Stress: Not on file   Relationships    Social connections     Talks on phone: Not on file     Gets together: Not on file     Attends Nondenominational service: Not on file     Active member of club or organization: Not on file     Attends meetings of clubs or organizations: Not on file     Relationship status: Not on file    Intimate partner violence     Fear of current or ex partner: Not on file     Emotionally abused: Not on file     Physically abused: Not on file     Forced sexual activity: Not on file   Other Topics Concern    Not on file   Social History Narrative    Not on file       Past Medical History:   Diagnosis Date    CVA (cerebral vascular accident) ([facility] 10/2012    Depression     Diabetes mellitus ([facility]     Hyperlipidemia     Hypertension     Neuropathy     Sleep apnea     Cpap       Past Surgical History:   Procedure Laterality Date    APPENDECTOMY      CHOLECYSTECTOMY      HIP SURGERY Right 7/29/2020    right , Genicular Nerve block performed by Mariaelena Aviles MD at [facility]    Left total hip    OVARY REMOVAL         Family History   Problem Relation Age of Onset    Cancer Mother         colon    Diabetes Father     Stroke Father         Prior to Visit Medications    Medication Sig Taking? Authorizing Provider   traMADol (ULTRAM) 50 MG tablet Take 1 tablet by mouth every 6 hours as needed for Pain for up to 7 days. Intended supply: 5 days. Take lowest dose possible to manage pain Yes Mariaelena Aviles MD   gabapentin (NEURONTIN) 300 MG capsule Take 1 capsule by mouth 3 times daily for 90 days. Yes [facility], APRN - [facility]   celecoxib (CELEBREX) 100 MG capsule TAKE 1 CAPSULE BY MOUTH TWICE A DAY Yes Kalen Prince MD   benazepril (LOTENSIN) 20 MG tablet Take 1 tablet by mouth daily Yes Kalen Prince MD   amLODIPine (NORVASC) 5 MG tablet Take 1 tablet by mouth daily Yes Kalen Prince MD   DULoxetine (CYMBALTA) 60 MG extended release capsule Take 1 capsule by mouth daily Yes Kalen Prince MD   clopidogrel (PLAVIX) 75 MG tablet Take 75 mg by mouth daily Yes Historical Provider, MD   metFORMIN (GLUCOPHAGE) 500 MG tablet Take 500 mg by mouth daily (with breakfast) Yes Historical Provider, MD   glipiZIDE (GLUCOTROL) 10 MG tablet Take 10 mg by mouth daily Yes Historical Provider, MD   pravastatin (PRAVACHOL) 40 MG tablet Take 40 mg by mouth nightly Yes Historical Provider, MD   SITagliptin (JANUVIA) 100 MG tablet Take 100 mg by mouth daily Yes Historical Provider, MD         Review of Systems :All systems reviewed, all unremarkable other than HPI/subjective. No change since last visit.   Denies fever, chills, infection or non healing wound. BP (!) 160/98   Temp 96.8 °F (36 °C)   Ht 4' 11\" (1.499 m)   Wt 169 lb (76.7 kg)   BMI 34.13 kg/m²       Physical Exam  Constitutional:       Appearance: Normal appearance. Pulmonary:      Effort: Pulmonary effort is normal. No respiratory distress. Musculoskeletal:         General: Tenderness present. Comments: Knee joint lines, right side     Skin:     General: Skin is dry. Neurological:      Mental Status: She is alert and oriented to person, place, and time. Motor: Weakness present. Gait: Gait abnormal.      Comments: Right dorsiflexor    Psychiatric:         Behavior: Behavior normal.       Assessment and Plan:      Diagnosis Orders   1. Arthritis of knee  Urine Drug Screen    traMADol (ULTRAM) 50 MG tablet       Trial of tramadol. UDS today     FF up 3 months       Time spent with patient was 25 minutes. More than 50% was spent counseling/coordinating the patient's care. Elsa Hassan MD   Spine Medicine/PM&R               [address] [place] [address]  [address] [facility]  Dept: [phone]  Dept Fax: [phone]: [phone]    Visit Date: 8/20/2020    Functionality Assessment/Goals Worksheet     On a scale of 0 (Does not Interfere) to 10 (Completely Interferes)     1. Which number describes how during the past week pain has interfered with       the following:  A. General Activity:  7  B. Mood: 9  C. Walking Ability:  6  D. Normal Work (Includes both work outside the home and housework):  10  E. Relations with Other People:   6  F. Sleep:   5  G. Enjoyment of Life:   7    2. Patient Prefers to Take their Pain Medications:     []  On a regular basis   []  Only when necessary    [x]  Does not take pain medications    3. What are the Patient's Goals/Expectations for Visiting Pain Management?      []  Learn about my pain    []  Receive Medication   [x]  Physical Therapy     []  Treat Depression   []  Receive Injections    []  Treat Sleep   []  Deal with Anxiety and Stress   []  Treat Opoid Dependence/Addiction   [x]  Other:  Right foot buckled this morning     Needs pain medication that I can afford

## 2020-08-24 ENCOUNTER — OFFICE VISIT (OUTPATIENT)
Dept: FAMILY MEDICINE CLINIC | Age: 80
End: 2020-08-24
Payer: MEDICARE

## 2020-08-24 ENCOUNTER — NURSE TRIAGE (OUTPATIENT)
Dept: OTHER | Facility: CLINIC | Age: 80
End: 2020-08-24

## 2020-08-24 ENCOUNTER — TELEPHONE (OUTPATIENT)
Dept: PHYSICAL MEDICINE AND REHAB | Age: 80
End: 2020-08-24

## 2020-08-24 VITALS
SYSTOLIC BLOOD PRESSURE: 132 MMHG | TEMPERATURE: 97.7 F | RESPIRATION RATE: 20 BRPM | DIASTOLIC BLOOD PRESSURE: 80 MMHG | WEIGHT: 168.4 LBS | HEART RATE: 84 BPM | OXYGEN SATURATION: 94 % | BODY MASS INDEX: 34.01 KG/M2

## 2020-08-24 LAB
BILIRUBIN, POC: ABNORMAL
BLOOD URINE, POC: ABNORMAL
CLARITY, POC: CLEAR
COLOR, POC: YELLOW
GLUCOSE URINE, POC: ABNORMAL
KETONES, POC: ABNORMAL
LEUKOCYTE EST, POC: ABNORMAL
NITRITE, POC: ABNORMAL
PH, POC: 5
PROTEIN, POC: 100
SPECIFIC GRAVITY, POC: 1.02
UROBILINOGEN, POC: 0.2

## 2020-08-24 PROCEDURE — G8417 CALC BMI ABV UP PARAM F/U: HCPCS | Performed by: NURSE PRACTITIONER

## 2020-08-24 PROCEDURE — 1036F TOBACCO NON-USER: CPT | Performed by: NURSE PRACTITIONER

## 2020-08-24 PROCEDURE — G8400 PT W/DXA NO RESULTS DOC: HCPCS | Performed by: NURSE PRACTITIONER

## 2020-08-24 PROCEDURE — G8427 DOCREV CUR MEDS BY ELIG CLIN: HCPCS | Performed by: NURSE PRACTITIONER

## 2020-08-24 PROCEDURE — 4040F PNEUMOC VAC/ADMIN/RCVD: CPT | Performed by: NURSE PRACTITIONER

## 2020-08-24 PROCEDURE — 1123F ACP DISCUSS/DSCN MKR DOCD: CPT | Performed by: NURSE PRACTITIONER

## 2020-08-24 PROCEDURE — 81003 URINALYSIS AUTO W/O SCOPE: CPT | Performed by: NURSE PRACTITIONER

## 2020-08-24 PROCEDURE — 1090F PRES/ABSN URINE INCON ASSESS: CPT | Performed by: NURSE PRACTITIONER

## 2020-08-24 PROCEDURE — 99215 OFFICE O/P EST HI 40 MIN: CPT | Performed by: NURSE PRACTITIONER

## 2020-08-24 RX ORDER — NITROFURANTOIN 25; 75 MG/1; MG/1
100 CAPSULE ORAL 2 TIMES DAILY
Qty: 10 CAPSULE | Refills: 0 | Status: SHIPPED | OUTPATIENT
Start: 2020-08-24 | End: 2020-08-29

## 2020-08-24 ASSESSMENT — ENCOUNTER SYMPTOMS
CONSTIPATION: 0
ALLERGIC/IMMUNOLOGIC NEGATIVE: 1
WHEEZING: 0
ABDOMINAL PAIN: 0
SHORTNESS OF BREATH: 0
COUGH: 0
EYE DISCHARGE: 0
RHINORRHEA: 0
SORE THROAT: 0
VOMITING: 0
EYE PAIN: 0
TROUBLE SWALLOWING: 0
EYE REDNESS: 0
BACK PAIN: 0
NAUSEA: 0
DIARRHEA: 0

## 2020-08-24 NOTE — TELEPHONE ENCOUNTER
Patient granddaughter (Ligia Nichols) called stating that the patient is bleeding like having a period. They were not sure if this was something that was a result of the procedure. Clarified that there is not bleeding coming from injection site from procedure. Informed Azalea Suazo to call the patients PCP and see what they advise. PJ.

## 2020-08-24 NOTE — PROGRESS NOTES
[address] [place] [address] [address] [address]  Dept: [phone]  Dept Fax: [phone]  Loc: [address]     Maura Hidalgo is a 68 y. o.female here with vaginal bleeding for almost 3 weeks. This started one week after going back on her blood thinner (Plavix) for a procedure. Has no dysuria but has urinary frequency. Has chronic low back pain that is not new. Also status of hemorrhagic stroke history and hemiparesis reported to be unchanged by patient and her daughter. Patient was queried in depth about her possible vaginal bleeding and the only thing she can say with confidence is that she has blood in her urine and it has been going on for 2 weeks. She denies any other type of vaginal discharge. She states she has seen a few blood clots and blood on the toilet paper when wiping. Patient Active Problem List   Diagnosis    Type 2 diabetes mellitus with diabetic neuropathy, without long-term current use of insulin ([facility]    Essential hypertension    History of hemorrhagic stroke with residual hemiparesis ([facility])    Anxiety    Hyperlipidemia    Primary osteoarthritis involving multiple joints    Chronic pain syndrome       Current Outpatient Medications   Medication Sig Dispense Refill    nitrofurantoin, macrocrystal-monohydrate, (MACROBID) 100 MG capsule Take 1 capsule by mouth 2 times daily for 5 days 10 capsule 0    traMADol (ULTRAM) 50 MG tablet Take 1 tablet by mouth every 6 hours as needed for Pain for up to 7 days. Intended supply: 5 days. Take lowest dose possible to manage pain 21 tablet 0    gabapentin (NEURONTIN) 300 MG capsule Take 1 capsule by mouth 3 times daily for 90 days.  270 capsule 0    celecoxib (CELEBREX) 100 MG capsule TAKE 1 CAPSULE BY MOUTH TWICE A DAY 60 capsule 2    benazepril (LOTENSIN) 20 MG tablet Take 1 tablet by mouth daily 90 tablet 3    amLODIPine (NORVASC) 5 MG tablet Take 1 tablet by mouth daily 90 tablet 3    DULoxetine (CYMBALTA) 60 MG extended release capsule Take 1 capsule by mouth daily 90 capsule 3    clopidogrel (PLAVIX) 75 MG tablet Take 75 mg by mouth daily      metFORMIN (GLUCOPHAGE) 500 MG tablet Take 500 mg by mouth daily (with breakfast)      glipiZIDE (GLUCOTROL) 10 MG tablet Take 10 mg by mouth daily      pravastatin (PRAVACHOL) 40 MG tablet Take 40 mg by mouth nightly      SITagliptin (JANUVIA) 100 MG tablet Take 100 mg by mouth daily       No current facility-administered medications for this visit. Review of Systems   Constitutional: Negative for activity change, fatigue and fever. HENT: Negative for congestion, ear pain, rhinorrhea, sore throat and trouble swallowing. Eyes: Negative for pain, discharge and redness. Respiratory: Negative for cough, shortness of breath and wheezing. Cardiovascular: Negative. Gastrointestinal: Negative for abdominal pain, constipation, diarrhea, nausea and vomiting. Endocrine: Negative. Genitourinary: Positive for frequency and vaginal bleeding. Negative for dysuria, menstrual problem, urgency and vaginal pain. Musculoskeletal: Negative for arthralgias, back pain and myalgias. Skin: Negative for rash. Allergic/Immunologic: Negative. Neurological: Negative for dizziness, tremors, weakness and headaches. Hematological: Negative. Psychiatric/Behavioral: Negative for dysphoric mood and sleep disturbance. The patient is not nervous/anxious. OBJECTIVE     /80   Pulse 84   Temp 97.7 °F (36.5 °C) (Temporal)   Resp 20   Wt 168 lb 6.4 oz (76.4 kg)   SpO2 94%   BMI 34.01 kg/m²     Wt Readings from Last 3 Encounters:   08/24/20 168 lb 6.4 oz (76.4 kg)   08/20/20 169 lb (76.7 kg)   07/29/20 169 lb 12.8 oz (77 kg)     Body mass index is 34.01 kg/m². Physical Exam  Exam conducted with a chaperone present. Constitutional:       General: She is not in acute distress. Diabetic: Yes      Tobacco smoker: No      Systolic Blood Pressure: 762 mmHg      Is BP treated: Yes      HDL Cholesterol: 41 mg/dL      Total Cholesterol: 161 mg/dL    Lab Results   Component Value Date     07/24/2020    K 4.5 07/24/2020     07/24/2020    CO2 23 07/24/2020    BUN 22 07/24/2020    CREATININE 1.5 (H) 07/24/2020    GLUCOSE 134 (H) 07/24/2020    CALCIUM 9.2 07/24/2020    PROT 8.3 (H) 07/24/2020    LABALBU 4.1 07/24/2020    BILITOT 0.4 07/24/2020    ALKPHOS 55 07/24/2020    AST 15 07/24/2020    ALT 10 (L) 07/24/2020    LABGLOM 34 (A) 07/24/2020       No results found for: LABMICR, AJSZ46DDG    No results found for: TSH, U4QPHGL, E1SKPIX, THYROIDAB    Lab Results   Component Value Date    WBC 3.5 (L) 07/09/2020    HGB 11.5 (L) 07/09/2020    HCT 38.5 07/09/2020    MCV 81.1 07/09/2020    PLT 66 (L) 07/09/2020       No results found for: PSA, PSADIA    Immunization History   Administered Date(s) Administered    Influenza, Triv, inactivated, subunit, adjuvanted, IM (Fluad 65 yrs and older) 10/18/2019    Tdap (Boostrix, Adacel) 07/09/2020       Health Maintenance   Topic Date Due    Shingles Vaccine (1 of 2) 02/09/1992    DEXA (modify frequency per FRAX score)  02/09/1997    Pneumococcal 65+ years Vaccine (1 of 1 - PPSV23) 02/09/2007    Annual Wellness Visit (AWV)  07/24/2020    Flu vaccine (1) 09/01/2020    Lipid screen  07/24/2021    Potassium monitoring  07/24/2021    Creatinine monitoring  07/24/2021    DTaP/Tdap/Td vaccine (2 - Td) 07/09/2030    Hepatitis A vaccine  Aged Out    Hib vaccine  Aged Out    Meningococcal (ACWY) vaccine  Aged Out       Future Appointments   Date Time Provider Sindhu Navarretei   9/15/2020  3:00 PM Modesta Garber MD [facility]   11/19/2020 11:45 AM Montez Fernandez MD [facility]         ASSESSMENT       Diagnosis Orders   1.  Acute cystitis with hematuria  nitrofurantoin, macrocrystal-monohydrate, (MACROBID) 100 MG capsule 2. History of hemorrhagic stroke with residual hemiparesis (HCC)  Stable           PLAN      Due to the nature of the complaint and the possibility of real vaginal bleeding, pelvic exam is conducted and chaperoned. Nurse practitioner student Jana Galvan completes the speculum visualization with myself in attendance, to find no sign of bleeding anywhere. Patient still has a cervix and her uterus. Urinalysis did show blood and protein. Patient has poor renal function and daughter states she only drinks pop during the day. Discussed with the patient that this could indeed be from her Plavix but it is not as likely due to her being on it for so many years. Patient will be treated for UTI and is to follow-up if she still sees blood in 1 week.     Electronically signed by ELLYN Henderson CNP on 8/24/2020 at 4:27 PM

## 2020-08-24 NOTE — TELEPHONE ENCOUNTER
Reason for Disposition   MODERATE vaginal bleeding (i.e., soaking 1 pad or tampon per hour and present > 6 hours)    Answer Assessment - Initial Assessment Questions  1. AMOUNT: \"Describe the bleeding that you are having. \" \"How much bleeding is there? \"     - SPOTTING: spotting, or pinkish / brownish mucous discharge; does not fill panti-liner or pad     - MILD:  less than 1 pad / hour; less than patient's  menstrual bleeding when she still had menstrual periods    - MODERATE: 1-2 pads / hour; small-medium blood clots (e.g., pea, grape, small coin)     - SEVERE: soaking 2 or more pads/hour for 2 or more hours; bleeding not contained by pads or continuous red blood from vagina; large blood clots (e.g., golf ball, large coin)       Moderate with clots   2. ONSET: \"When did the bleeding begin? \" \"Is it continuing now? \"      Last Wednesday   3. MENOPAUSE: \"When was your last menstrual period? \"       About 25 years   4. ABDOMINAL PAIN: \"Do you have any pain? \" \"How bad is the pain? \"  (e.g., Scale 1-10; mild, moderate, or severe)    - MILD (1-3): doesn't interfere with normal activities, abdomen soft and not tender to touch     - MODERATE (4-7): interferes with normal activities or awakens from sleep, tender to touch     - SEVERE (8-10): excruciating pain, doubled over, unable to do any normal activities       0/10  5. BLOOD THINNERS: \"Do you take any blood thinners? \" (e.g., Coumadin/warfarin, Pradaxa/dabigatran, aspirin)      Yes.   6. HORMONES: \"Are you taking any hormone medications, prescription or OTC? \" (e.g., birth control pills, estrogen)      No   7. CAUSE: \"What do you think is causing the bleeding? \" (e.g., recent gyn surgery, recent gyn procedure; known bleeding disorder, uterine cancer)        Unsure   8. HEMODYNAMIC STATUS: \"Are you weak or feeling lightheaded? \" If so, ask: \"Can you stand and walk normally? \"        No   9. OTHER SYMPTOMS: \"What other symptoms are you having with the bleeding? \" (e.g., back pain, burning with urination, fever)      Back pain    Protocols used: VAGINAL BLEEDING - [facility]    Care advice given. Instructed to call back or go to urgent care if symptoms worsen. Call from [facility]. Connected caller to [facility]. Please do not respond to the triage nurse through this encounter. Any subsequent communication should be directly with the patient.

## 2020-09-30 RX ORDER — TRAMADOL HYDROCHLORIDE 50 MG/1
50 TABLET ORAL EVERY 6 HOURS PRN
Qty: 21 TABLET | Refills: 0 | Status: SHIPPED | OUTPATIENT
Start: 2020-09-30 | End: 2020-10-07

## 2020-09-30 NOTE — TELEPHONE ENCOUNTER
OARRS reviewed. UDS: + for  Gabapentin consistent. Narcan offered: not offered  Last seen: 8/20/2020. Follow-up: No future appointments.

## 2020-10-01 RX ORDER — GABAPENTIN 300 MG/1
CAPSULE ORAL
Qty: 270 CAPSULE | Refills: 1 | Status: SHIPPED | OUTPATIENT
Start: 2020-10-01 | End: 2021-11-16 | Stop reason: SDUPTHER

## 2020-10-05 RX ORDER — GLIPIZIDE 10 MG/1
10 TABLET ORAL DAILY
Qty: 60 TABLET | Refills: 3 | Status: SHIPPED | OUTPATIENT
Start: 2020-10-05 | End: 2020-10-12 | Stop reason: DRUGHIGH

## 2020-10-12 RX ORDER — GLIPIZIDE 10 MG/1
10 TABLET, FILM COATED, EXTENDED RELEASE ORAL DAILY
Qty: 90 TABLET | Refills: 3
Start: 2020-10-12 | End: 2021-10-12

## 2020-10-21 ENCOUNTER — OFFICE VISIT (OUTPATIENT)
Dept: FAMILY MEDICINE CLINIC | Facility: CLINIC | Age: 80
End: 2020-10-21

## 2020-10-21 VITALS
WEIGHT: 168 LBS | TEMPERATURE: 98 F | SYSTOLIC BLOOD PRESSURE: 188 MMHG | BODY MASS INDEX: 33.87 KG/M2 | HEIGHT: 59 IN | HEART RATE: 99 BPM | DIASTOLIC BLOOD PRESSURE: 110 MMHG | OXYGEN SATURATION: 97 %

## 2020-10-21 DIAGNOSIS — M15.9 PRIMARY OSTEOARTHRITIS INVOLVING MULTIPLE JOINTS: ICD-10-CM

## 2020-10-21 DIAGNOSIS — F41.9 ANXIETY: ICD-10-CM

## 2020-10-21 DIAGNOSIS — Z11.59 ENCOUNTER FOR HEPATITIS C SCREENING TEST FOR LOW RISK PATIENT: ICD-10-CM

## 2020-10-21 DIAGNOSIS — I10 ESSENTIAL HYPERTENSION: ICD-10-CM

## 2020-10-21 DIAGNOSIS — E78.2 MIXED HYPERLIPIDEMIA: Primary | ICD-10-CM

## 2020-10-21 DIAGNOSIS — Z79.899 LONG-TERM USE OF HIGH-RISK MEDICATION: ICD-10-CM

## 2020-10-21 DIAGNOSIS — Z00.00 MEDICARE ANNUAL WELLNESS VISIT, SUBSEQUENT: ICD-10-CM

## 2020-10-21 DIAGNOSIS — Z12.31 VISIT FOR SCREENING MAMMOGRAM: ICD-10-CM

## 2020-10-21 DIAGNOSIS — G89.4 CHRONIC PAIN DISORDER: ICD-10-CM

## 2020-10-21 DIAGNOSIS — I69.359 HISTORY OF HEMORRHAGIC STROKE WITH RESIDUAL HEMIPARESIS (HCC): ICD-10-CM

## 2020-10-21 DIAGNOSIS — E11.40 TYPE 2 DIABETES MELLITUS WITH DIABETIC NEUROPATHY, WITHOUT LONG-TERM CURRENT USE OF INSULIN (HCC): ICD-10-CM

## 2020-10-21 DIAGNOSIS — R13.19 ESOPHAGEAL DYSPHAGIA: ICD-10-CM

## 2020-10-21 PROCEDURE — 90694 VACC AIIV4 NO PRSRV 0.5ML IM: CPT | Performed by: FAMILY MEDICINE

## 2020-10-21 PROCEDURE — G0439 PPPS, SUBSEQ VISIT: HCPCS | Performed by: FAMILY MEDICINE

## 2020-10-21 PROCEDURE — G0008 ADMIN INFLUENZA VIRUS VAC: HCPCS | Performed by: FAMILY MEDICINE

## 2020-10-21 PROCEDURE — 99202 OFFICE O/P NEW SF 15 MIN: CPT | Performed by: FAMILY MEDICINE

## 2020-10-21 PROCEDURE — 90670 PCV13 VACCINE IM: CPT | Performed by: FAMILY MEDICINE

## 2020-10-21 PROCEDURE — G0009 ADMIN PNEUMOCOCCAL VACCINE: HCPCS | Performed by: FAMILY MEDICINE

## 2020-10-21 RX ORDER — DULOXETIN HYDROCHLORIDE 60 MG/1
CAPSULE, DELAYED RELEASE ORAL
COMMUNITY
Start: 2020-10-12 | End: 2021-02-19 | Stop reason: SDUPTHER

## 2020-10-21 RX ORDER — CLOPIDOGREL BISULFATE 75 MG/1
75 TABLET ORAL
COMMUNITY
End: 2020-11-18 | Stop reason: SDUPTHER

## 2020-10-21 RX ORDER — GLIPIZIDE 10 MG/1
10 TABLET, FILM COATED, EXTENDED RELEASE ORAL
COMMUNITY
Start: 2020-10-12 | End: 2021-09-01 | Stop reason: SDUPTHER

## 2020-10-21 RX ORDER — TRAMADOL HYDROCHLORIDE 50 MG/1
TABLET ORAL
COMMUNITY
Start: 2020-09-30 | End: 2020-10-21

## 2020-10-21 RX ORDER — PRAVASTATIN SODIUM 40 MG
40 TABLET ORAL
COMMUNITY
End: 2021-02-19 | Stop reason: SDUPTHER

## 2020-10-21 RX ORDER — BENAZEPRIL HYDROCHLORIDE 20 MG/1
TABLET ORAL
COMMUNITY
Start: 2020-10-12 | End: 2021-02-19 | Stop reason: SDUPTHER

## 2020-10-21 RX ORDER — AMLODIPINE BESYLATE 5 MG/1
TABLET ORAL
COMMUNITY
Start: 2020-10-12 | End: 2021-02-19 | Stop reason: SDUPTHER

## 2020-10-21 RX ORDER — GABAPENTIN 300 MG/1
CAPSULE ORAL
COMMUNITY
Start: 2020-10-01 | End: 2021-09-01 | Stop reason: ALTCHOICE

## 2020-10-21 NOTE — PROGRESS NOTES
Subjective   Alice Muse is a 80 y.o. female.     Chief Complaint   Patient presents with   • Establish Care     new to [place]   • Medicare Wellness-subsequent   • Knee Pain     referral to Ortho- right knee   • Choking     when eating- coughing and choking       History of Present Illness   Diabetes-doing well on medication, no lows, does have neuropathy controlled with Cymbalta.    Anxiety-well-controlled with Cymbalta.    Hypertension-well-controlled on medicine.    Hyperlipidemia-well-controlled on medicine, due labs, no muscle aches    Has history of chronic pain and osteoarthritis as well as residual hemiparesis from a stroke.    The following portions of the patient's history were reviewed and updated as appropriate: allergies, current medications, past family history, past medical history, past social history, past surgical history and problem list.    No past medical history on file.    No past surgical history on file.    No family history on file.    Social History     Socioeconomic History   • Marital status:      Spouse name: Not on file   • Number of children: Not on file   • Years of education: Not on file   • Highest education level: Not on file       Review of Systems    Objective   There were no vitals taken for this visit.  There is no height or weight on file to calculate BMI.  Physical Exam      Assessment/Plan   Diagnoses and all orders for this visit:    1. Mixed hyperlipidemia (Primary)    2. Essential hypertension    3. Type 2 diabetes mellitus with diabetic neuropathy, without long-term current use of insulin (CMS/HCC)    4. Chronic pain disorder    5. Primary osteoarthritis involving multiple joints    6. Anxiety    7. History of hemorrhagic stroke with residual hemiparesis (CMS/HCC)    Other orders  -     Hepatitis C Antibody  -     Fluad Quad >65 years  -     Pneumococcal Conjugate Vaccine 13-[facility] All  -     Comprehensive Metabolic Panel  -     Lipid Panel  -      Hemoglobin A1c  -     Microalbumin / Creatinine Urine Ratio - Urine, Clean Catch

## 2020-10-21 NOTE — PROGRESS NOTES
Subsequent Medicare Wellness Visit   The ABC's of the Annual Wellness Visit    Chief Complaint   Patient presents with   • Establish Care     new to [place]   • Medicare Wellness-subsequent   • Knee Pain     referral to Ortho- right knee   • Choking     when eating- coughing and choking       HPI:  Alice Muse, -1940, is a 80 y.o. female who presents for a Subsequent Medicare Wellness Visit.    Diabetes-doing well on medication, no lows, does have neuropathy controlled with Cymbalta and gabapentin.    Anxiety-well-controlled with Cymbalta.    Hypertension-well-controlled on medicine.Missed meds today.     Hyperlipidemia-well-controlled on medicine, due labs, no muscle aches    Has history of chronic pain and osteoarthritis as well as residual hemiparesis from a stroke in .  Needing a referral to an orthopedist.  Pain medicine helps her do her activities of daily living but tramadol not helping anymore.    Having some choking and coughing when she eats for some time now.  Does have history of stroke.    Recent Hospitalizations:  No hospitalization(s) within the last year..    Current Medical Providers:  Patient Care Team:  Emily De Jesus MD as PCP - General (Family Medicine)    Health Habits and Functional and Cognitive Screening and Depression Screening:  Functional & Cognitive Status 10/21/2020   Do you have difficulty preparing food and eating? Yes   Do you have difficulty bathing yourself, getting dressed or grooming yourself? No   Do you have difficulty using the toilet? No   Do you have difficulty moving around from place to place? Yes   Do you have trouble with steps or getting out of a bed or a chair? Yes   Current Diet Well Balanced Diet   Dental Exam Up to date   Eye Exam Up to date   Exercise (times per week) 0 times per week   Do you need help using the phone?  No   Are you deaf or do you have serious difficulty hearing?  Yes   Do you need help with transportation? No   Do you need  help shopping? Yes   Do you need help preparing meals?  Yes   Do you need help with housework?  Yes   Do you need help with laundry? Yes   Do you need help taking your medications? No   Do you need help managing money? No   Do you ever drive or ride in a car without wearing a seat belt? No   Have you felt unusual stress, anger or loneliness in the last month? Yes   Who do you live with? Child   If you need help, do you have trouble finding someone available to you? No   Have you been bothered in the last four weeks by sexual problems? No   Do you have difficulty concentrating, remembering or making decisions? Yes       Compared to one year ago, the patient feels her physical health is the same and her mental health is the same.    Depression Screen:  PHQ-2/PHQ-9 Depression Screening 10/21/2020   Little interest or pleasure in doing things 0   Feeling down, depressed, or hopeless 1   Total Score 1         Past Medical/Family/Social History:  The following portions of the patient's history were reviewed and updated as appropriate: allergies, current medications, past family history, past medical history, past social history, past surgical history and problem list.    No Known Allergies      Current Outpatient Medications:   •  amLODIPine (NORVASC) 5 MG tablet, , Disp: , Rfl:   •  benazepril (LOTENSIN) 20 MG tablet, , Disp: , Rfl:   •  clopidogrel (PLAVIX) 75 MG tablet, Take 75 mg by mouth., Disp: , Rfl:   •  DULoxetine (CYMBALTA) 60 MG capsule, , Disp: , Rfl:   •  gabapentin (NEURONTIN) 300 MG capsule, TAKE 1 CAPSULE BY MOUTH 3 TIMES A DAY FOR 90 DAYS, Disp: , Rfl:   •  glipizide (GLUCOTROL XL) 10 MG 24 hr tablet, Take 10 mg by mouth., Disp: , Rfl:   •  metFORMIN (GLUCOPHAGE) 500 MG tablet, Take 500 mg by mouth., Disp: , Rfl:   •  pravastatin (PRAVACHOL) 40 MG tablet, Take 40 mg by mouth., Disp: , Rfl:   •  SITagliptin (JANUVIA) 100 MG tablet, Take 100 mg by mouth., Disp: , Rfl:   •  diclofenac (VOLTAREN) 1 % gel gel,  "Apply 4 g topically to the appropriate area as directed 4 (Four) Times a Day As Needed (pain)., Disp: 5 tube, Rfl: 1    Aspirin use counseling: Does not need ASA (and currently is not on it)    Current medication list contains no high risk medications.  No harmful drug interactions have been identified.     Family History   Problem Relation Age of Onset   • Arthritis Mother    • Hypertension Mother    • Stroke Father    • Diabetes Father        Social History     Tobacco Use   • Smoking status: Never Smoker   • Smokeless tobacco: Never Used   Substance Use Topics   • Alcohol use: Never     Frequency: Never       Past Surgical History:   Procedure Laterality Date   • APPENDECTOMY     • CHOLECYSTECTOMY     • JOINT REPLACEMENT     • TUBAL ABDOMINAL LIGATION         Patient Active Problem List   Diagnosis   • Mixed hyperlipidemia   • Essential hypertension   • Anxiety   • Chronic pain disorder   • History of hemorrhagic stroke with residual hemiparesis (CMS/[facility])   • Primary osteoarthritis involving multiple joints   • Type 2 diabetes mellitus with diabetic neuropathy, without long-term current use of insulin (CMS/[facility])   • Medicare annual wellness visit, subsequent   • Long-term use of high-risk medication       Review of Systems   Constitutional: Negative for fever.   Respiratory: Negative for shortness of breath.    Cardiovascular: Negative for chest pain.       Objective     Vitals:    10/21/20 1152   BP: (!) 188/110   BP Location: Right arm   Patient Position: Sitting   Cuff Size: Adult   Pulse: 99   Temp: 98 °F (36.7 °C)   TempSrc: Infrared   SpO2: 97%   Weight: 76.2 kg (168 lb)   Height: 149.9 cm (59\")   PainSc:   8   PainLoc: Knee       Patient's Body mass index is 33.93 kg/m². BMI is above normal parameters. Recommendations include: exercise counseling.      No exam data present    The patient has no evidence of cognitve impairment.  The patient has no evidence of cognitive impairment. 3/3 items were correctly " remembered at 5 minutes.     Physical Exam  Constitutional:       Appearance: Normal appearance. She is well-developed.   Cardiovascular:      Rate and Rhythm: Normal rate and regular rhythm.      Heart sounds: Normal heart sounds.   Pulmonary:      Effort: Pulmonary effort is normal.      Breath sounds: Normal breath sounds.   Musculoskeletal: Normal range of motion.         General: No swelling.   Skin:     General: Skin is warm and dry.      Findings: No rash.   Neurological:      General: No focal deficit present.      Mental Status: She is alert and oriented to person, place, and time.   Psychiatric:         Mood and Affect: Mood normal.         Behavior: Behavior normal.         Recent Lab Results:     Lab Results   Component Value Date    TRIG 225 (H) 07/24/2020    HDL 41 07/24/2020       Assessment/Plan   Age-appropriate Screening Schedule:  Refer to the list below for future screening recommendations based on patient's age, sex and/or medical conditions.      Health Maintenance   Topic Date Due   • URINE MICROALBUMIN  1940   • ZOSTER VACCINE (1 of 2) 02/09/1992   • INFLUENZA VACCINE  08/01/2020   • DIABETIC EYE EXAM  10/14/2020   • HEMOGLOBIN A1C  01/24/2021   • LIPID PANEL  07/24/2021   • TDAP/TD VACCINES (2 - Td) 07/09/2030       Medicare Risks and Personalized Health Plan:  Advance Directive Discussion      CMS-Preventive Services Quick Reference  Medicare Preventive Services Addressed:  Annual Wellness Visit (AWV)    Advance Care Planning:  ACP discussion was held with the patient during this visit. Patient does not have an advance directive, information provided.    Diagnoses and all orders for this visit:    1. Mixed hyperlipidemia (Primary)    2. Essential hypertension    3. Type 2 diabetes mellitus with diabetic neuropathy, without long-term current use of insulin (CMS/[facility])  -     Comprehensive Metabolic Panel  -     Lipid Panel  -     Hemoglobin A1c  -     Microalbumin / Creatinine Urine  Ratio - Urine, Clean Catch    4. Chronic pain disorder    5. Primary osteoarthritis involving multiple joints  -     Ambulatory Referral to Orthopedic Surgery    6. Anxiety    7. History of hemorrhagic stroke with residual hemiparesis (CMS/HCC)  -     Ambulatory Referral to Neurology    8. Esophageal dysphagia  -     Ambulatory Referral to Gastroenterology    9. Encounter for hepatitis C screening test for low risk patient  -     Hepatitis C Antibody    10. Medicare annual wellness visit, subsequent    11. Long-term use of high-risk medication  -     Compliance Drug Analysis, Ur - Urine, Clean Catch    12. Visit for screening mammogram  -     Mammo Screening Bilateral With CAD; Future    Other orders  -     Fluad Quad >65 years  -     Pneumococcal Conjugate Vaccine 13-Valent All  -     Cancel: DEXA Bone Density Axial; Future  -     diclofenac (VOLTAREN) 1 % gel gel; Apply 4 g topically to the appropriate area as directed 4 (Four) Times a Day As Needed (pain).  Dispense: 5 tube; Refill: 1        An After Visit Summary and PPPS with all of these plans were given to the patient.      Follow Up:  Return in about 4 weeks (around 11/18/2020) for Recheck.              Discussed risk factors and follow-up yearly as well as in 1 month for recheck blood pressure.  Discussed importance.  karen Samuel, U tox.    Go home and take BP meds, f/u if high after takes her meds today.

## 2020-10-22 LAB
ALBUMIN SERPL-MCNC: 4.3 G/DL (ref 3.7–4.7)
ALBUMIN/CREAT UR: 919 MG/G CREAT (ref 0–29)
ALBUMIN/GLOB SERPL: 1 {RATIO} (ref 1.2–2.2)
ALP SERPL-CCNC: 65 IU/L (ref 39–117)
ALT SERPL-CCNC: 11 IU/L (ref 0–32)
AST SERPL-CCNC: 16 IU/L (ref 0–40)
BILIRUB SERPL-MCNC: 0.4 MG/DL (ref 0–1.2)
BUN SERPL-MCNC: 20 MG/DL (ref 8–27)
BUN/CREAT SERPL: 11 (ref 12–28)
CALCIUM SERPL-MCNC: 9.5 MG/DL (ref 8.7–10.3)
CHLORIDE SERPL-SCNC: 103 MMOL/L (ref 96–106)
CHOLEST SERPL-MCNC: 153 MG/DL (ref 100–199)
CO2 SERPL-SCNC: 25 MMOL/L (ref 20–29)
CREAT SERPL-MCNC: 1.78 MG/DL (ref 0.57–1)
CREAT UR-MCNC: 100.2 MG/DL
GLOBULIN SER CALC-MCNC: 4.3 G/DL (ref 1.5–4.5)
GLUCOSE SERPL-MCNC: 151 MG/DL (ref 65–99)
HBA1C MFR BLD: 8.5 % (ref 4.8–5.6)
HCV AB S/CO SERPL IA: <0.1 S/CO RATIO (ref 0–0.9)
HDLC SERPL-MCNC: 42 MG/DL
LDLC SERPL CALC-MCNC: 80 MG/DL (ref 0–99)
MICROALBUMIN UR-MCNC: 921.1 UG/ML
POTASSIUM SERPL-SCNC: 5.1 MMOL/L (ref 3.5–5.2)
PROT SERPL-MCNC: 8.6 G/DL (ref 6–8.5)
SODIUM SERPL-SCNC: 141 MMOL/L (ref 134–144)
TRIGL SERPL-MCNC: 182 MG/DL (ref 0–149)
VLDLC SERPL CALC-MCNC: 31 MG/DL (ref 5–40)

## 2020-10-23 ENCOUNTER — RESULTS ENCOUNTER (OUTPATIENT)
Dept: FAMILY MEDICINE CLINIC | Facility: CLINIC | Age: 80
End: 2020-10-23

## 2020-10-23 DIAGNOSIS — R77.9 HIGH SERUM PROTEIN LEVEL: ICD-10-CM

## 2020-10-23 DIAGNOSIS — R77.9 HIGH SERUM PROTEIN LEVEL: Primary | ICD-10-CM

## 2020-10-26 LAB — DRUGS UR: NORMAL

## 2020-10-28 ENCOUNTER — RESULTS ENCOUNTER (OUTPATIENT)
Dept: FAMILY MEDICINE CLINIC | Facility: CLINIC | Age: 80
End: 2020-10-28

## 2020-10-28 ENCOUNTER — TELEPHONE (OUTPATIENT)
Dept: FAMILY MEDICINE CLINIC | Facility: CLINIC | Age: 80
End: 2020-10-28

## 2020-10-28 DIAGNOSIS — R77.9 HIGH SERUM PROTEIN LEVEL: ICD-10-CM

## 2020-11-03 ENCOUNTER — TELEPHONE (OUTPATIENT)
Dept: FAMILY MEDICINE CLINIC | Facility: CLINIC | Age: 80
End: 2020-11-03

## 2020-11-09 LAB
ALBUMIN SERPL ELPH-MCNC: NORMAL G/DL
ALBUMIN SERPL-MCNC: 4.4 G/DL (ref 3.5–5.2)
ALBUMIN/GLOB SERPL: 1.1 G/DL
ALP SERPL-CCNC: 64 U/L (ref 39–117)
ALPHA1 GLOB SERPL ELPH-MCNC: NORMAL G/DL
ALPHA2 GLOB SERPL ELPH-MCNC: NORMAL G/DL
ALT SERPL-CCNC: 13 U/L (ref 1–33)
AST SERPL-CCNC: 19 U/L (ref 1–32)
B-GLOBULIN SERPL ELPH-MCNC: NORMAL G/DL
BASOPHILS # BLD AUTO: ABNORMAL 10*3/UL
BILIRUB SERPL-MCNC: 0.4 MG/DL (ref 0–1.2)
BUN SERPL-MCNC: 27 MG/DL (ref 8–23)
BUN/CREAT SERPL: 16 (ref 7–25)
CALCIUM SERPL-MCNC: 9.4 MG/DL (ref 8.6–10.5)
CHLORIDE SERPL-SCNC: 105 MMOL/L (ref 98–107)
CO2 SERPL-SCNC: 23.8 MMOL/L (ref 22–29)
CREAT SERPL-MCNC: 1.69 MG/DL (ref 0.57–1)
DIFFERENTIAL COMMENT: ABNORMAL
EOSINOPHIL # BLD AUTO: ABNORMAL 10*3/UL
EOSINOPHIL # BLD MANUAL: 0.05 10*3/MM3 (ref 0–0.4)
EOSINOPHIL NFR BLD AUTO: ABNORMAL %
EOSINOPHIL NFR BLD MANUAL: 1.1 % (ref 0.3–6.2)
ERYTHROCYTE [DISTWIDTH] IN BLOOD BY AUTOMATED COUNT: 14.8 % (ref 12.3–15.4)
GAMMA GLOB SERPL ELPH-MCNC: NORMAL G/DL
GLOBULIN SER CALC-MCNC: 4.1 GM/DL
GLUCOSE SERPL-MCNC: 236 MG/DL (ref 65–99)
HCT VFR BLD AUTO: 43 % (ref 34–46.6)
HGB BLD-MCNC: 12.6 G/DL (ref 12–15.9)
LYMPHOCYTES # BLD AUTO: ABNORMAL 10*3/UL
LYMPHOCYTES # BLD MANUAL: 1.71 10*3/MM3 (ref 0.7–3.1)
LYMPHOCYTES NFR BLD AUTO: ABNORMAL %
LYMPHOCYTES NFR BLD MANUAL: 37.9 % (ref 19.6–45.3)
MCH RBC QN AUTO: 24.2 PG (ref 26.6–33)
MCHC RBC AUTO-ENTMCNC: 29.3 G/DL (ref 31.5–35.7)
MCV RBC AUTO: 82.7 FL (ref 79–97)
MONOCYTES # BLD MANUAL: 0.62 10*3/MM3 (ref 0.1–0.9)
MONOCYTES NFR BLD AUTO: ABNORMAL %
MONOCYTES NFR BLD MANUAL: 13.7 % (ref 5–12)
NEUTROPHILS # BLD MANUAL: 2.14 10*3/MM3 (ref 1.7–7)
NEUTROPHILS NFR BLD AUTO: ABNORMAL %
NEUTROPHILS NFR BLD MANUAL: 47.4 % (ref 42.7–76)
PLATELET # BLD AUTO: 44 10*3/MM3 (ref 140–450)
PLATELET BLD QL SMEAR: ABNORMAL
POTASSIUM SERPL-SCNC: 4.9 MMOL/L (ref 3.5–5.2)
PROT SERPL-MCNC: 8.5 G/DL (ref 6–8.5)
RBC # BLD AUTO: 5.2 10*6/MM3 (ref 3.77–5.28)
RBC MORPH BLD: ABNORMAL
SODIUM SERPL-SCNC: 138 MMOL/L (ref 136–145)
SPECIMEN STATUS: NORMAL
WBC # BLD AUTO: 4.52 10*3/MM3 (ref 3.4–10.8)

## 2020-11-12 NOTE — TELEPHONE ENCOUNTER
I called [facility] Medicare Clinical Appeals since I've not seen any faxes back yet, per Dionna, patient was approved on appeal for 500 grams per 30 days.  Effective 1/1/2020 through 11/5/2020.  Case # [id number]  Spoke to pharmacy with approvaal.

## 2020-11-18 RX ORDER — CLOPIDOGREL BISULFATE 75 MG/1
75 TABLET ORAL DAILY
Qty: 30 TABLET | Refills: 6 | Status: SHIPPED | OUTPATIENT
Start: 2020-11-18 | End: 2021-02-19 | Stop reason: SDUPTHER

## 2020-11-18 NOTE — TELEPHONE ENCOUNTER
PATIENT REQUESTED A REFILL ON clopidogrel (PLAVIX) 75 MG tablet    PATIENT CAN BE REACHED ON:[phone]    PHARMACY PREFERRED:[facility] - [address] [phone] [facility] [phone] FX       PATIENT STATES: that she has no pills left she would like for some to be sent to  [id number] - [place], [place] - [address] AT [facility] [address] &  - [phone] [facility] [phone] FX

## 2020-11-19 ENCOUNTER — OFFICE VISIT (OUTPATIENT)
Dept: ORTHOPEDIC SURGERY | Facility: CLINIC | Age: 80
End: 2020-11-19

## 2020-11-19 VITALS — WEIGHT: 168 LBS | BODY MASS INDEX: 33.87 KG/M2 | TEMPERATURE: 97.3 F | HEIGHT: 59 IN

## 2020-11-19 DIAGNOSIS — M25.561 RIGHT KNEE PAIN, UNSPECIFIED CHRONICITY: Primary | ICD-10-CM

## 2020-11-19 DIAGNOSIS — R52 PAIN: ICD-10-CM

## 2020-11-19 PROCEDURE — 73560 X-RAY EXAM OF KNEE 1 OR 2: CPT | Performed by: NURSE PRACTITIONER

## 2020-11-19 PROCEDURE — 99203 OFFICE O/P NEW LOW 30 MIN: CPT | Performed by: NURSE PRACTITIONER

## 2020-11-19 PROCEDURE — 20610 DRAIN/INJ JOINT/BURSA W/O US: CPT | Performed by: NURSE PRACTITIONER

## 2020-11-19 PROCEDURE — 73501 X-RAY EXAM HIP UNI 1 VIEW: CPT | Performed by: NURSE PRACTITIONER

## 2020-11-19 RX ORDER — METHYLPREDNISOLONE ACETATE 80 MG/ML
80 INJECTION, SUSPENSION INTRA-ARTICULAR; INTRALESIONAL; INTRAMUSCULAR; SOFT TISSUE
Status: COMPLETED | OUTPATIENT
Start: 2020-11-19 | End: 2020-11-19

## 2020-11-19 RX ADMIN — METHYLPREDNISOLONE ACETATE 80 MG: 80 INJECTION, SUSPENSION INTRA-ARTICULAR; INTRALESIONAL; INTRAMUSCULAR; SOFT TISSUE at 11:38

## 2020-11-19 NOTE — PROGRESS NOTES
Patient: Alice Muse  YOB: 1940 80 y.o. female  Medical Record Number: [id number]    Chief Complaints: Right hip pain    History of Present Illness:Alice Muse is a 80 y.o. female who presents in the office as a new patient both myself as well as to the practice with complaints of right knee pain also right hip pain.  Denies any injury.  Describes the pain as a severe pain the pain in her knee is the worst part, denies recent injury.  Describes it as a constant ache worse with swelling standing walking, only slightly better with rest    Allergies: No Known Allergies    Medications:   Current Outpatient Medications   Medication Sig Dispense Refill   • amLODIPine (NORVASC) 5 MG tablet      • benazepril (LOTENSIN) 20 MG tablet      • clopidogrel (PLAVIX) 75 MG tablet Take 1 tablet by mouth Daily. 30 tablet 6   • diclofenac (VOLTAREN) 1 % gel gel Apply 4 g topically to the appropriate area as directed 4 (Four) Times a Day As Needed (pain). 5 tube 1   • DULoxetine (CYMBALTA) 60 MG capsule      • gabapentin (NEURONTIN) 300 MG capsule TAKE 1 CAPSULE BY MOUTH 3 TIMES A DAY FOR 90 DAYS     • glipizide (GLUCOTROL XL) 10 MG 24 hr tablet Take 10 mg by mouth.     • metFORMIN (GLUCOPHAGE) 500 MG tablet Take 1 tablet by mouth 2 (Two) Times a Day With Meals. 180 tablet 1   • pravastatin (PRAVACHOL) 40 MG tablet Take 40 mg by mouth.     • SITagliptin (JANUVIA) 100 MG tablet Take 100 mg by mouth.       No current facility-administered medications for this visit.          The following portions of the patient's history were reviewed and updated as appropriate: allergies, current medications, past family history, past medical history, past social history, past surgical history and problem list.    Review of Systems:   A 14 point review of systems was performed. All systems negative except pertinent positives/negative listed in HPI above    Physical Exam:   Vitals:    11/19/20 1049   Temp: 97.3 °F (36.3 °C)  "  Weight: 76.2 kg (168 lb)   Height: 149.9 cm (59\")   PainSc:   8       General: A and O x 3, ASA, NAD    SCLERA:    Normal    DENTITION:   Normal  Skin clear no unusual lesions noted  Right knee the patient has no appreciable effusion limited range of motion, flexion 110 neutral in extension positive Nelda negative Lachman secondary to pain calf soft and nontender  Right hip the patient is nontender palpation has good range of motion with a negative Stinchfield negative logroll       Radiology:  Xrays 2 views right hip 2 views right knee were ordered and reviewed today secondary to pain the patient does have some mild arthritic changes noted right hip, one-on-bone end-stage osteoarthritis of the right knee.  No compared to views available      Assessment/Plan: End-stage osteoarthritis right knee  Osteoarthritis right hip    Patient I discussed treatment options, she would like to proceed with right knee cortisone injection, she was for to outpatient physical therapy and we will see her back as needed    Large Joint Arthrocentesis: R knee  Date/Time: 11/19/2020 11:38 AM  Consent given by: patient  Site marked: site marked  Timeout: Immediately prior to procedure a time out was called to verify the correct patient, procedure, equipment, support staff and site/side marked as required   Supporting Documentation  Indications: pain and joint swelling   Procedure Details  Location: knee - R knee  Preparation: Patient was prepped and draped in the usual sterile fashion  Needle size: 22 G  Approach: anterolateral  Medications administered: 80 mg methylPREDNISolone acetate 80 MG/ML; 2 mL lidocaine (cardiac)  Patient tolerance: patient tolerated the procedure well with no immediate complications            Mely Jones, APRN    11/19/2020  "

## 2020-11-23 ENCOUNTER — OFFICE VISIT (OUTPATIENT)
Dept: GASTROENTEROLOGY | Facility: CLINIC | Age: 80
End: 2020-11-23

## 2020-11-23 VITALS — WEIGHT: 164.6 LBS | BODY MASS INDEX: 33.25 KG/M2 | TEMPERATURE: 98 F

## 2020-11-23 DIAGNOSIS — R05.9 COUGH: ICD-10-CM

## 2020-11-23 DIAGNOSIS — R13.10 DYSPHAGIA, UNSPECIFIED TYPE: Primary | ICD-10-CM

## 2020-11-23 PROCEDURE — 99203 OFFICE O/P NEW LOW 30 MIN: CPT | Performed by: INTERNAL MEDICINE

## 2020-11-23 NOTE — PROGRESS NOTES
Chief Complaint   Patient presents with   • Difficulty Swallowing   • Cough        Alice Muse is a  80 y.o. female here for an initial visit for dysphagia, cough    HPI this 80-year-old -American female patient of Dr. Emily De Jesus presents with complaints of swallowing difficulty as well as persistent cough.  The symptoms have been an issue for the past year.  She does recount previous cerebrovascular accident 7 years ago.  No initial difficulties of this nature subsequent to her stroke.  She states difficulty with liquids, solids, and secretions.  She admits to postnasal drainage and seasonal allergies.  Symptoms seem to be worse at night according to her  with chronic coughing throughout the course of the night.  She admits to coughing up food particles as well as secretions.  She denies any specific reflux symptoms.  She is on clopidogrel.  She has had previous colonoscopy although it has been greater than 10 years past.  She states bowel pattern is routine usually 2-3 soft to loose stools per day.  No reported melena or bright red blood per rectum.  Family history is negative for colon cancer.    Past Medical History:   Diagnosis Date   • Diabetes mellitus (CMS/[facility])    • Hyperlipidemia    • Hypertension    • Stroke (CMS/[facility])        Current Outpatient Medications   Medication Sig Dispense Refill   • amLODIPine (NORVASC) 5 MG tablet      • benazepril (LOTENSIN) 20 MG tablet      • clopidogrel (PLAVIX) 75 MG tablet Take 1 tablet by mouth Daily. 30 tablet 6   • diclofenac (VOLTAREN) 1 % gel gel Apply 4 g topically to the appropriate area as directed 4 (Four) Times a Day As Needed (pain). 5 tube 1   • Diclofenac Sodium (VOLTAREN) 1 % gel gel APPLY 4 GRAMS AA QID PRN P.     • DULoxetine (CYMBALTA) 60 MG capsule      • gabapentin (NEURONTIN) 300 MG capsule TAKE 1 CAPSULE BY MOUTH 3 TIMES A DAY FOR 90 DAYS     • glipizide (GLUCOTROL XL) 10 MG 24 hr tablet Take 10 mg by mouth.     • metFORMIN  (GLUCOPHAGE) 500 MG tablet Take 1 tablet by mouth 2 (Two) Times a Day With Meals. 180 tablet 1   • pravastatin (PRAVACHOL) 40 MG tablet Take 40 mg by mouth.     • SITagliptin (JANUVIA) 100 MG tablet Take 100 mg by mouth.       No current facility-administered medications for this visit.        PRN Meds:.    No Known Allergies    Social History     Socioeconomic History   • Marital status:      Spouse name: Not on file   • Number of children: Not on file   • Years of education: Not on file   • Highest education level: Not on file   Tobacco Use   • Smoking status: Never Smoker   • Smokeless tobacco: Never Used   Substance and Sexual Activity   • Alcohol use: Never     Frequency: Never   • Drug use: Never   • Sexual activity: Defer       Family History   Problem Relation Age of Onset   • Arthritis Mother    • Hypertension Mother    • Stroke Father    • Diabetes Father        Review of Systems   Constitutional: Negative for activity change, appetite change, fatigue and unexpected weight change.   HENT: Negative for congestion, facial swelling, sore throat, trouble swallowing and voice change.    Eyes: Negative for photophobia and visual disturbance.   Respiratory: Positive for cough. Negative for choking.    Cardiovascular: Negative for chest pain.   Gastrointestinal: Negative for abdominal distention, abdominal pain, anal bleeding, blood in stool, constipation, diarrhea, nausea, rectal pain and vomiting.        Dysphagia   Endocrine: Negative for polyphagia.   Musculoskeletal: Negative for arthralgias, gait problem and joint swelling.   Skin: Negative for color change, pallor and rash.   Allergic/Immunologic: Negative for food allergies.   Neurological: Negative for speech difficulty and headaches.   Hematological: Does not bruise/bleed easily.   Psychiatric/Behavioral: Negative for agitation, confusion and sleep disturbance.       Vitals:    11/23/20 1337   Temp: 98 °F (36.7 °C)       Physical Exam  Vitals  signs reviewed.   Constitutional:       General: She is not in acute distress.     Appearance: She is well-developed. She is not diaphoretic.   HENT:      Head: Normocephalic.      Mouth/Throat:      Pharynx: No oropharyngeal exudate.   Eyes:      General: No scleral icterus.     Conjunctiva/sclera: Conjunctivae normal.   Neck:      Musculoskeletal: Normal range of motion.      Thyroid: No thyromegaly.   Cardiovascular:      Rate and Rhythm: Normal rate and regular rhythm.      Heart sounds: No murmur.   Pulmonary:      Effort: No respiratory distress.      Breath sounds: Normal breath sounds. No wheezing or rales.   Abdominal:      General: Bowel sounds are normal. There is no distension.      Palpations: Abdomen is soft. There is no mass.      Tenderness: There is no abdominal tenderness.   Musculoskeletal: Normal range of motion.         General: No tenderness.   Lymphadenopathy:      Cervical: No cervical adenopathy.   Skin:     General: Skin is warm and dry.      Findings: No erythema or rash.   Neurological:      Mental Status: She is alert and oriented to person, place, and time.   Psychiatric:         Behavior: Behavior normal.         ASSESSMENT   #1 dysphagia: Not clear if this is associated with neuromuscular dysfunction, previous CVA but 7 years past and only symptomatic for the past year.  #2 cough  #3 anticoagulant therapy      PLAN  Video fluoroscopy swallow study  Pending results may warrant endoscopic evaluation if able to stop clopidogrel, otherwise would offer esophagram.      ICD-10-CM ICD-9-CM   1. Dysphagia, unspecified type  R13.10 787.20   2. Cough  R05 786.2

## 2020-11-24 ENCOUNTER — TELEPHONE (OUTPATIENT)
Dept: GASTROENTEROLOGY | Facility: CLINIC | Age: 80
End: 2020-11-24

## 2020-11-24 DIAGNOSIS — R05.9 COUGH: ICD-10-CM

## 2020-11-24 DIAGNOSIS — R13.10 DYSPHAGIA, UNSPECIFIED TYPE: Primary | ICD-10-CM

## 2020-11-24 NOTE — TELEPHONE ENCOUNTER
Called Shanika at [facility] at [phone] and on vm advised that we have ordered the video swallow study.  Advised to call with questions.     Message sent to Dr Webb to cosign order.

## 2020-11-24 NOTE — TELEPHONE ENCOUNTER
----- Message from Asha Headley sent at 11/24/2020  2:20 PM EST -----  Regarding: orders  [facility] [phone], with outpt rehab with [facility] is calling for orders for video swallow that Dr Webb put in Pt chart

## 2020-12-01 ENCOUNTER — TRANSCRIBE ORDERS (OUTPATIENT)
Dept: ADMINISTRATIVE | Facility: HOSPITAL | Age: 80
End: 2020-12-01

## 2020-12-01 DIAGNOSIS — Z01.818 OTHER SPECIFIED PRE-OPERATIVE EXAMINATION: Primary | ICD-10-CM

## 2020-12-05 ENCOUNTER — LAB (OUTPATIENT)
Dept: LAB | Facility: HOSPITAL | Age: 80
End: 2020-12-05

## 2020-12-05 DIAGNOSIS — Z01.818 OTHER SPECIFIED PRE-OPERATIVE EXAMINATION: ICD-10-CM

## 2020-12-05 PROCEDURE — U0004 COV-19 TEST NON-CDC HGH THRU: HCPCS

## 2020-12-05 PROCEDURE — C9803 HOPD COVID-19 SPEC COLLECT: HCPCS

## 2020-12-07 LAB — SARS-COV-2 RNA RESP QL NAA+PROBE: NOT DETECTED

## 2020-12-08 ENCOUNTER — HOSPITAL ENCOUNTER (OUTPATIENT)
Dept: GENERAL RADIOLOGY | Facility: HOSPITAL | Age: 80
Discharge: HOME | End: 2020-12-08
Admitting: INTERNAL MEDICINE

## 2020-12-08 DIAGNOSIS — R13.10 DYSPHAGIA, UNSPECIFIED TYPE: Primary | ICD-10-CM

## 2020-12-08 PROCEDURE — 92611 MOTION FLUOROSCOPY/SWALLOW: CPT | Performed by: SPEECH-LANGUAGE PATHOLOGIST

## 2020-12-08 PROCEDURE — 74230 X-RAY XM SWLNG FUNCJ C+: CPT

## 2020-12-08 PROCEDURE — 63710000001 BARIUM SULFATE 40 % RECONSTITUTED SUSPENSION: Performed by: INTERNAL MEDICINE

## 2020-12-08 PROCEDURE — A9270 NON-COVERED ITEM OR SERVICE: HCPCS | Performed by: INTERNAL MEDICINE

## 2020-12-08 PROCEDURE — 63710000001 BARIUM SULFATE 98 % RECONSTITUTED SUSPENSION: Performed by: INTERNAL MEDICINE

## 2020-12-08 RX ADMIN — BARIUM SULFATE 55 ML: 0.81 POWDER, FOR SUSPENSION ORAL at 13:43

## 2020-12-08 RX ADMIN — BARIUM SULFATE 4 ML: 980 POWDER, FOR SUSPENSION ORAL at 13:44

## 2020-12-08 NOTE — MBS/VFSS/FEES
Outpatient Speech Language Pathology   Adult Swallow Initial Evaluation-VFSS  [facility]     Patient Name: Alice Muse  : 1940  MRN: [id number]  Today's Date: 2020         Visit Date: 2020     SPEECH-LANGUAGE PATHOLOGY EVALUTION - VFSS  Subjective: The patient was seen on this date for a VFSS(Videofluoroscopic Swallowing Study).  Patient was alert and cooperative.    Significant history: Pt c/o coughing choking during meals.  When asked if certain foods appeared to cause more coughing, pt stated spicy foods or gravies.  Objective: Risks/benefits were reviewed with the patient, and consent was obtained. The study was completed with SLP present and Radiologist review. The patient was seen in lateral view(s). Textures given included thin liquid, puree consistency, mechanical soft consistency and regular consistency.  Assessment: Patient demonstrates a mild oropharyngeal dysphagia characterized by inconsistent penetration of thin liquids.  Penetration typically trace and transient; however, on one occasion noted to be to the level of the vocal cords. No coughing observed immediately following penetration.  Patient was noted to cough after second swallow following deep penetration.  Premature spillage and swallow mistiming noted, but with good pharyngeal strength and movement.  No oral or pharyngeal residue. Esophageal scan performed without note of signfiicant abnormalities; however, strong cough noted during scan.  Once returned to pharynx, no barium tracing noted in trachea.   SLP Findings: Patient presents with mild oropharyngeal dysphagia.   Recommendations: Diet Textures: thin liquid, regular consistency food. Medications should be taken as tolerated   Recommended Strategies: Slow rate and Upright for PO. Pt to take small, single bites and sips of food and drink.  Discussed food selection by avoiding foods which cause greater problems.  Also discussed importance of reducing distractions  and attending to food/liquids in mouth when eating.   Other Recommended Evaluations: Consider dedicated esophageal assessment to r/o any structural or functional abnormalities.              Patient Active Problem List   Diagnosis   • Mixed hyperlipidemia   • Essential hypertension   • Anxiety   • Chronic pain disorder   • History of hemorrhagic stroke with residual hemiparesis (CMS/HCC)   • Primary osteoarthritis involving multiple joints   • Type 2 diabetes mellitus with diabetic neuropathy, without long-term current use of insulin (CMS/[facility])   • Medicare annual wellness visit, subsequent   • Long-term use of high-risk medication        Past Medical History:   Diagnosis Date   • Diabetes mellitus (CMS/HCC)    • Hyperlipidemia    • Hypertension    • Stroke (CMS/[facility])         Past Surgical History:   Procedure Laterality Date   • APPENDECTOMY     • CHOLECYSTECTOMY     • JOINT REPLACEMENT     • TUBAL ABDOMINAL LIGATION           Visit Dx:     ICD-10-CM ICD-9-CM   1. Dysphagia, unspecified type  R13.10 787.20           SLP Adult Swallow Evaluation     Row Name 12/08/20 1800       Rehab Evaluation    Document Type  evaluation  -SA    Subjective Information  complains of coughing/choking when eating or drinking  -SA    Patient Observations  alert;cooperative  -SA    Patient Effort  good  -SA    Symptoms Noted During/After Treatment  none  -SA       General Information    Patient Profile Reviewed  yes  -SA    Pertinent History Of Current Problem  c/o coughing w/ eating ~ 1 yr; h/o DM2, CVA  -SA    Current Method of Nutrition  regular textures;thin liquids  -SA    Precautions/Limitations, Vision  WFL;for purposes of eval  -SA    Precautions/Limitations, Hearing  WFL;for purposes of eval  -SA    Prior Level of Function-Communication  WFL  -SA    Prior Level of Function-Swallowing  no diet consistency restrictions  -SA    Plans/Goals Discussed with  patient;agreed upon  -SA    Barriers to Rehab  none identified  -SA     Patient's Goals for Discharge  eat/drink without coughing/choking  -SA       Pain    Additional Documentation  Pain Scale: Numbers Pre/Post-Treatment (Group)  -SA       Pain Scale: Numbers Pre/Post-Treatment    Pretreatment Pain Rating  0/10 - no pain  -SA    Posttreatment Pain Rating  0/10 - no pain  -SA       SLP Communication to Radiology    Summary Statement  Patient demonstrates mild oropharyngeal swallow characterized by inconsistent penetration of thin liquids.  Penetration typically trace and transient; however, on one occasion noted to be to the level of the vocal cords.  Premature spillage and swallow mistiming noted, but with good pharyngeal strength and movement.  No oral or pharyngeal residue.   -SA       Clinical Impression    SLP Swallowing Diagnosis  mild  -SA    Functional Impact  risk of aspiration/pneumonia  -SA    Rehab Potential/Prognosis, Swallowing  good, to achieve stated therapy goals  -SA    Swallow Criteria for Skilled Therapeutic Interventions Met  no problems identified which require skilled intervention  -SA       Recommendations    Therapy Frequency (Swallow)  evaluation only  -SA    Predicted Duration Therapy Intervention (Days)  until discharge  -SA    SLP Diet Recommendation  regular textures;thin liquids  -SA    Recommended Precautions and Strategies  upright posture during/after eating;small bites of food and sips of liquid  -SA    Oral Care Recommendations  Oral Care BID/PRN  -SA    SLP Rec. for Method of Medication Administration  as tolerated  -SA    Monitor for Signs of Aspiration  notify SLP if any concerns  -SA    Anticipated Discharge Disposition (SLP)  home  -SA      User Key  (r) = Recorded By, (t) = Taken By, (c) = Cosigned By    Initials Name Provider Type    Suzanne Ceballos MS CCC-SLP Speech and Language Pathologist                        OP SLP Education     Row Name 12/08/20 9043       Education    Barriers to Learning  No barriers identified  -SA    Education  Provided  Described results of evaluation;Patient expressed understanding of evaluation  -    Assessed  Learning needs;Learning motivation  -    Learning Motivation  Strong  -    Learning Method  Explanation  -    Teaching Response  Verbalized understanding  -      User Key  (r) = Recorded By, (t) = Taken By, (c) = Cosigned By    Initials Name Effective Dates    Suzanne Ceballos MS CCC-SLP 03/07/18 -               OP SLP Assessment/Plan - 12/08/20 1823        SLP Assessment    Functional Problems  Swallowing   -    Impact on Function: Swallowing  Risk of pneumonia;Risk of aspiration   -SA    Clinical Impression: Swallowing  Mild:   -SA    Prognosis  Good (comment)   -SA    Patient/caregiver participated in establishment of treatment plan and goals  Yes   -SA    Patient would benefit from skilled therapy intervention  No   -SA       SLP Plan    Frequency  eval only   -SA      User Key  (r) = Recorded By, (t) = Taken By, (c) = Cosigned By    Initials Name Provider Type    Suzanne Ceballos MS CCC-SLP Speech and Language Pathologist              SLP Outcome Measures (last 72 hours)      SLP Outcome Measures     Row Name 12/08/20 1800             SLP Outcome Measures    Outcome Measure Used?  Adult NOMS  -SA         Adult FCM Scores    FCM Chosen  Swallowing  -SA      Swallowing FCM Score  6  -SA        User Key  (r) = Recorded By, (t) = Taken By, (c) = Cosigned By    Initials Name Effective Dates    Suzanne Ceballos MS CCC-SLP 03/07/18 -                Time Calculation:   SLP Start Time: 1315    Therapy Charges for Today     Code Description Service Date Service Provider Modifiers Qty    [id number] HC ST MOTION FLUORO EVAL SWALLOW 6 12/8/2020 Suzanne Gama MS CCC-SLP GN 1                   Suzanne Gama MS CCC-SLP  12/8/2020

## 2020-12-09 RX ORDER — CLOPIDOGREL BISULFATE 75 MG/1
75 TABLET ORAL DAILY
Qty: 30 TABLET | Refills: 6 | Status: CANCELLED | OUTPATIENT
Start: 2020-12-09

## 2020-12-09 NOTE — TELEPHONE ENCOUNTER
Caller: Alice Muse    Relationship: Self    Best call back number: [phone]    Medication needed:   Requested Prescriptions     Pending Prescriptions Disp Refills   • clopidogrel (PLAVIX) 75 MG tablet 30 tablet 6     Sig: Take 1 tablet by mouth Daily.   • SITagliptin (JANUVIA) 100 MG tablet        Sig: Take 1 tablet by mouth.       When do you need the refill by: ASAP    What details did the patient provide when requesting the medication:     Does the patient have less than a 3 day supply:  [x] Yes  [] No    What is the patient's preferred pharmacy: [facility] - [address] [phone] [facility] [phone]

## 2020-12-17 ENCOUNTER — TELEPHONE (OUTPATIENT)
Dept: GASTROENTEROLOGY | Facility: CLINIC | Age: 80
End: 2020-12-17

## 2020-12-17 DIAGNOSIS — R13.10 DYSPHAGIA, UNSPECIFIED TYPE: Primary | ICD-10-CM

## 2020-12-17 NOTE — TELEPHONE ENCOUNTER
----- Message from Raphael DEVINE MD sent at 12/11/2020 12:33 PM EST -----  Regarding: VFSS results  Okay to call results, speech pathology recommendations to be followed.  If still symptomatic can offer an esophagram or if patient able to stop Plavix can perform upper endoscopy.  ----- Message -----  From: Interface, Rad Results [place] In  Sent: 12/8/2020   8:29 PM EST  To: Raphael DEVINE MD

## 2020-12-17 NOTE — TELEPHONE ENCOUNTER
Call to sonShan (see hipaa). Advise per Dr Webb note.  Verb understanding.     States pt does continue to have swallowing issues.  Would like to proceed with esophagram - advise [facility] will contact to arrange.    Order placed - message to Dr Webb.

## 2020-12-30 ENCOUNTER — TRANSCRIBE ORDERS (OUTPATIENT)
Dept: SLEEP MEDICINE | Facility: HOSPITAL | Age: 80
End: 2020-12-30

## 2020-12-30 DIAGNOSIS — Z01.818 OTHER SPECIFIED PRE-OPERATIVE EXAMINATION: Primary | ICD-10-CM

## 2021-01-05 ENCOUNTER — LAB (OUTPATIENT)
Dept: LAB | Facility: HOSPITAL | Age: 81
End: 2021-01-05

## 2021-01-05 DIAGNOSIS — Z01.818 OTHER SPECIFIED PRE-OPERATIVE EXAMINATION: ICD-10-CM

## 2021-01-05 PROCEDURE — U0004 COV-19 TEST NON-CDC HGH THRU: HCPCS

## 2021-01-05 PROCEDURE — C9803 HOPD COVID-19 SPEC COLLECT: HCPCS

## 2021-01-06 LAB — SARS-COV-2 RNA RESP QL NAA+PROBE: NOT DETECTED

## 2021-01-07 ENCOUNTER — HOSPITAL ENCOUNTER (OUTPATIENT)
Dept: GENERAL RADIOLOGY | Facility: HOSPITAL | Age: 81
Discharge: HOME | End: 2021-01-07
Admitting: INTERNAL MEDICINE

## 2021-01-07 DIAGNOSIS — R13.10 DYSPHAGIA, UNSPECIFIED TYPE: ICD-10-CM

## 2021-01-07 PROCEDURE — 63710000001 BARIUM SULFATE 96 % RECONSTITUTED SUSPENSION: Performed by: INTERNAL MEDICINE

## 2021-01-07 PROCEDURE — A9270 NON-COVERED ITEM OR SERVICE: HCPCS | Performed by: INTERNAL MEDICINE

## 2021-01-07 PROCEDURE — 74220 X-RAY XM ESOPHAGUS 1CNTRST: CPT

## 2021-01-07 RX ADMIN — BARIUM SULFATE 183 ML: 960 POWDER, FOR SUSPENSION ORAL at 10:50

## 2021-01-11 ENCOUNTER — TELEPHONE (OUTPATIENT)
Dept: GASTROENTEROLOGY | Facility: CLINIC | Age: 81
End: 2021-01-11

## 2021-01-11 NOTE — TELEPHONE ENCOUNTER
----- Message from Raphael DEVINE MD sent at 1/7/2021 12:58 PM EST -----  Regarding: Esophagram results  Okay to call results, hiatal hernia, moderate to severe dysmotility, question of esophageal wall thickening.  If patient continues to require Plavix will need to forego endoscopy, if she can be cleared by her prescribing MD to hold this we can entertain upper endoscopy to better define anatomic issues.  ----- Message -----  From: [facility] Rad Results [place] In  Sent: 1/7/2021  12:21 PM EST  To: Raphael DEVINE MD

## 2021-01-11 NOTE — TELEPHONE ENCOUNTER
Called pt and advised of Dr Webb's note. Verb understanding.   Pt reports that she is swallowing better at this time and does not want any further testing.    Advised will update Dr Webb.

## 2021-01-25 ENCOUNTER — OFFICE VISIT (OUTPATIENT)
Dept: FAMILY MEDICINE CLINIC | Facility: CLINIC | Age: 81
End: 2021-01-25

## 2021-01-25 VITALS
SYSTOLIC BLOOD PRESSURE: 180 MMHG | DIASTOLIC BLOOD PRESSURE: 96 MMHG | WEIGHT: 167 LBS | TEMPERATURE: 97.7 F | HEART RATE: 102 BPM | BODY MASS INDEX: 33.67 KG/M2 | HEIGHT: 59 IN | OXYGEN SATURATION: 98 %

## 2021-01-25 DIAGNOSIS — F41.9 ANXIETY: ICD-10-CM

## 2021-01-25 DIAGNOSIS — G89.4 CHRONIC PAIN DISORDER: ICD-10-CM

## 2021-01-25 DIAGNOSIS — E78.2 MIXED HYPERLIPIDEMIA: ICD-10-CM

## 2021-01-25 DIAGNOSIS — E88.09 HYPERPROTEINEMIA: Primary | ICD-10-CM

## 2021-01-25 DIAGNOSIS — M15.9 PRIMARY OSTEOARTHRITIS INVOLVING MULTIPLE JOINTS: ICD-10-CM

## 2021-01-25 DIAGNOSIS — I69.359 HISTORY OF HEMORRHAGIC STROKE WITH RESIDUAL HEMIPARESIS (HCC): ICD-10-CM

## 2021-01-25 DIAGNOSIS — E11.40 TYPE 2 DIABETES MELLITUS WITH DIABETIC NEUROPATHY, WITHOUT LONG-TERM CURRENT USE OF INSULIN (HCC): ICD-10-CM

## 2021-01-25 DIAGNOSIS — I10 ESSENTIAL HYPERTENSION: ICD-10-CM

## 2021-01-25 PROCEDURE — 99214 OFFICE O/P EST MOD 30 MIN: CPT | Performed by: FAMILY MEDICINE

## 2021-01-25 NOTE — PROGRESS NOTES
Subjective   Alice Muse is a 80 y.o. female.     Chief Complaint   Patient presents with   • Pain     has alot of pain       History of Present Illness   Diabetes-doing well on medication, no lows, does have neuropathy controlled with Cymbalta and gabapentin.  Metformin was increased at her last visit 3 months ago.      Anxiety-well-controlled with Cymbalta.     Hypertension-well-controlled on medicine.Missed meds again today. Does not take meds before she goes to an appointment. BP at home running ok per pt.      Hyperlipidemia-well-controlled on medicine, no muscle aches     Has history of chronic pain and osteoarthritis as well as residual hemiparesis from a stroke in 2012.   Pain medicine helps her do her activities of daily living but tramadol not helping anymore.  Has been referred to an orthopedic doctor. He advised PT but she refused. Voltaren gel did not help. Has been on fentanyl in the past and has been seeing pain management.      Having some choking and coughing when she eats for some time now.  Does have history of stroke.  Has been referred to GI and neurology. Pt and son inform me that she has had a swallow study and has been given diet recommendations. Has egd pending. Has not seen neurology yet.     Did have high protein and needed a protein electrophoresis test.    The following portions of the patient's history were reviewed and updated as appropriate: allergies, current medications, past family history, past medical history, past social history, past surgical history and problem list.    Past Medical History:   Diagnosis Date   • Diabetes mellitus (CMS/HCC)    • Hyperlipidemia    • Hypertension    • Stroke (CMS/HCC)        Past Surgical History:   Procedure Laterality Date   • APPENDECTOMY     • CHOLECYSTECTOMY     • JOINT REPLACEMENT     • TUBAL ABDOMINAL LIGATION         Family History   Problem Relation Age of Onset   • Arthritis Mother    • Hypertension Mother    • Stroke Father    •  "Diabetes Father        Social History     Socioeconomic History   • Marital status:      Spouse name: Not on file   • Number of children: Not on file   • Years of education: Not on file   • Highest education level: Not on file   Tobacco Use   • Smoking status: Never Smoker   • Smokeless tobacco: Never Used   Substance and Sexual Activity   • Alcohol use: Never     Frequency: Never   • Drug use: Never   • Sexual activity: Defer       Review of Systems   Constitutional: Negative for fever.   Respiratory: Negative for shortness of breath.        Objective   Visit Vitals  /96   Pulse 102   Temp 97.7 °F (36.5 °C) (Infrared)   Ht 149.9 cm (59\")   Wt 75.8 kg (167 lb)   SpO2 98%   BMI 33.73 kg/m²     Body mass index is 33.73 kg/m².  Physical Exam  Constitutional:       Appearance: Normal appearance. She is well-developed.   Cardiovascular:      Rate and Rhythm: Normal rate and regular rhythm.      Heart sounds: Normal heart sounds.   Pulmonary:      Effort: Pulmonary effort is normal.      Breath sounds: Normal breath sounds.   Musculoskeletal: Normal range of motion.         General: No swelling.   Skin:     General: Skin is warm and dry.      Findings: No rash.   Neurological:      General: No focal deficit present.      Mental Status: She is alert and oriented to person, place, and time.   Psychiatric:         Mood and Affect: Mood normal.         Behavior: Behavior normal.           Assessment/Plan   Diagnoses and all orders for this visit:    1. Hyperproteinemia (Primary)  -     CBC & Differential  -     Comprehensive Metabolic Panel  -     Hemoglobin A1c  -     Protein Elec + Interp, Serum    2. Type 2 diabetes mellitus with diabetic neuropathy, without long-term current use of insulin (CMS/[facility])  -     Comprehensive Metabolic Panel  -     Hemoglobin A1c    3. Mixed hyperlipidemia    4. Essential hypertension    5. Anxiety    6. Primary osteoarthritis involving multiple joints  -     Ambulatory Referral to " Pain Management    7. Chronic pain disorder  -     Ambulatory Referral to Pain Management    8. History of hemorrhagic stroke with residual hemiparesis (CMS/HCC)  -     Ambulatory Referral to Pain Management        Cont meds, f/u in 3 months.

## 2021-01-26 LAB
ALBUMIN SERPL ELPH-MCNC: 3.8 G/DL (ref 2.9–4.4)
ALBUMIN SERPL-MCNC: 4.5 G/DL (ref 3.7–4.7)
ALBUMIN/GLOB SERPL: 0.8 {RATIO} (ref 0.7–1.7)
ALBUMIN/GLOB SERPL: 1.2 {RATIO} (ref 1.2–2.2)
ALP SERPL-CCNC: 66 IU/L (ref 39–117)
ALPHA1 GLOB SERPL ELPH-MCNC: 0.2 G/DL (ref 0–0.4)
ALPHA2 GLOB SERPL ELPH-MCNC: 0.9 G/DL (ref 0.4–1)
ALT SERPL-CCNC: 29 IU/L (ref 0–32)
AST SERPL-CCNC: 27 IU/L (ref 0–40)
B-GLOBULIN SERPL ELPH-MCNC: 1.3 G/DL (ref 0.7–1.3)
BASOPHILS # BLD AUTO: 0.1 X10E3/UL (ref 0–0.2)
BASOPHILS NFR BLD AUTO: 1 %
BILIRUB SERPL-MCNC: 0.3 MG/DL (ref 0–1.2)
BUN SERPL-MCNC: 25 MG/DL (ref 8–27)
BUN/CREAT SERPL: 14 (ref 12–28)
CALCIUM SERPL-MCNC: 9.8 MG/DL (ref 8.7–10.3)
CHLORIDE SERPL-SCNC: 110 MMOL/L (ref 96–106)
CO2 SERPL-SCNC: 17 MMOL/L (ref 20–29)
CREAT SERPL-MCNC: 1.81 MG/DL (ref 0.57–1)
EOSINOPHIL # BLD AUTO: 0 X10E3/UL (ref 0–0.4)
EOSINOPHIL NFR BLD AUTO: 1 %
ERYTHROCYTE [DISTWIDTH] IN BLOOD BY AUTOMATED COUNT: 15.8 % (ref 11.7–15.4)
GAMMA GLOB SERPL ELPH-MCNC: 2.2 G/DL (ref 0.4–1.8)
GLOBULIN SER CALC-MCNC: 3.9 G/DL (ref 1.5–4.5)
GLOBULIN SER CALC-MCNC: 4.6 G/DL (ref 2.2–3.9)
GLUCOSE SERPL-MCNC: 105 MG/DL (ref 65–99)
HBA1C MFR BLD: 7.7 % (ref 4.8–5.6)
HCT VFR BLD AUTO: 40.3 % (ref 34–46.6)
HGB BLD-MCNC: 12.1 G/DL (ref 11.1–15.9)
IMM GRANULOCYTES # BLD AUTO: 0 X10E3/UL (ref 0–0.1)
IMM GRANULOCYTES NFR BLD AUTO: 0 %
LABORATORY COMMENT REPORT: ABNORMAL
LYMPHOCYTES # BLD AUTO: 1.3 X10E3/UL (ref 0.7–3.1)
LYMPHOCYTES NFR BLD AUTO: 31 %
M PROTEIN SERPL ELPH-MCNC: ABNORMAL G/DL
MCH RBC QN AUTO: 24.2 PG (ref 26.6–33)
MCHC RBC AUTO-ENTMCNC: 30 G/DL (ref 31.5–35.7)
MCV RBC AUTO: 81 FL (ref 79–97)
MONOCYTES # BLD AUTO: 0.5 X10E3/UL (ref 0.1–0.9)
MONOCYTES NFR BLD AUTO: 12 %
MORPHOLOGY BLD-IMP: ABNORMAL
NEUTROPHILS # BLD AUTO: 2.2 X10E3/UL (ref 1.4–7)
NEUTROPHILS NFR BLD AUTO: 55 %
PLATELET # BLD AUTO: 87 X10E3/UL (ref 150–450)
POTASSIUM SERPL-SCNC: 4.7 MMOL/L (ref 3.5–5.2)
PROT PATTERN SERPL ELPH-IMP: ABNORMAL
PROT SERPL-MCNC: 8.4 G/DL (ref 6–8.5)
RBC # BLD AUTO: 5 X10E6/UL (ref 3.77–5.28)
SODIUM SERPL-SCNC: 143 MMOL/L (ref 134–144)
WBC # BLD AUTO: 4 X10E3/UL (ref 3.4–10.8)

## 2021-01-27 ENCOUNTER — TELEPHONE (OUTPATIENT)
Dept: ONCOLOGY | Facility: CLINIC | Age: 81
End: 2021-01-27

## 2021-01-27 DIAGNOSIS — D69.6 THROMBOCYTOPENIA (HCC): Primary | ICD-10-CM

## 2021-01-27 DIAGNOSIS — D89.0 POLYCLONAL GAMMOPATHY DETERMINED BY SERUM PROTEIN ELECTROPHORESIS: ICD-10-CM

## 2021-01-27 NOTE — TELEPHONE ENCOUNTER
Caller: Shan Muse    Relationship to patient: son    Best call back number: [phone]    Type of visit: New pt appt    If rescheduling, when is the original appointment: 2/1/21    Additional notes: Son has asked to reschedule to a later time. Pt cannot make an early appt.     Appt has been rescheduled on 2/22/21    Son is also requesting to come to the appt. Pt had a stroke and has trouble speaking and walking.

## 2021-02-01 ENCOUNTER — APPOINTMENT (OUTPATIENT)
Dept: LAB | Facility: HOSPITAL | Age: 81
End: 2021-02-01
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
## 2021-02-18 ENCOUNTER — TELEPHONE (OUTPATIENT)
Dept: ORTHOPEDIC SURGERY | Facility: CLINIC | Age: 81
End: 2021-02-18

## 2021-02-18 ENCOUNTER — OFFICE VISIT (OUTPATIENT)
Dept: PAIN MEDICINE | Facility: CLINIC | Age: 81
End: 2021-02-18

## 2021-02-18 ENCOUNTER — HOSPITAL ENCOUNTER (OUTPATIENT)
Dept: GENERAL RADIOLOGY | Facility: HOSPITAL | Age: 81
Discharge: HOME | End: 2021-02-18
Admitting: ANESTHESIOLOGY

## 2021-02-18 VITALS
RESPIRATION RATE: 16 BRPM | OXYGEN SATURATION: 93 % | TEMPERATURE: 98.4 F | HEART RATE: 93 BPM | BODY MASS INDEX: 33.95 KG/M2 | HEIGHT: 59 IN | DIASTOLIC BLOOD PRESSURE: 94 MMHG | SYSTOLIC BLOOD PRESSURE: 178 MMHG | WEIGHT: 168.4 LBS

## 2021-02-18 DIAGNOSIS — G89.29 CHRONIC BILATERAL LOW BACK PAIN WITH RIGHT-SIDED SCIATICA: ICD-10-CM

## 2021-02-18 DIAGNOSIS — Z79.02 LONG TERM (CURRENT) USE OF ANTITHROMBOTICS/ANTIPLATELETS: ICD-10-CM

## 2021-02-18 DIAGNOSIS — G89.29 CHRONIC BILATERAL LOW BACK PAIN WITH RIGHT-SIDED SCIATICA: Primary | ICD-10-CM

## 2021-02-18 DIAGNOSIS — M15.9 PRIMARY OSTEOARTHRITIS INVOLVING MULTIPLE JOINTS: ICD-10-CM

## 2021-02-18 DIAGNOSIS — G89.29 CHRONIC PAIN OF RIGHT ANKLE: ICD-10-CM

## 2021-02-18 DIAGNOSIS — G89.4 CHRONIC PAIN DISORDER: ICD-10-CM

## 2021-02-18 DIAGNOSIS — M25.551 CHRONIC RIGHT HIP PAIN: ICD-10-CM

## 2021-02-18 DIAGNOSIS — M54.41 CHRONIC BILATERAL LOW BACK PAIN WITH RIGHT-SIDED SCIATICA: Primary | ICD-10-CM

## 2021-02-18 DIAGNOSIS — D69.6 THROMBOCYTOPENIA (HCC): ICD-10-CM

## 2021-02-18 DIAGNOSIS — M54.41 CHRONIC BILATERAL LOW BACK PAIN WITH RIGHT-SIDED SCIATICA: ICD-10-CM

## 2021-02-18 DIAGNOSIS — G89.29 CHRONIC PAIN OF RIGHT KNEE: ICD-10-CM

## 2021-02-18 DIAGNOSIS — M25.571 CHRONIC PAIN OF RIGHT ANKLE: ICD-10-CM

## 2021-02-18 DIAGNOSIS — M25.561 CHRONIC PAIN OF RIGHT KNEE: ICD-10-CM

## 2021-02-18 DIAGNOSIS — G89.29 CHRONIC RIGHT HIP PAIN: ICD-10-CM

## 2021-02-18 PROCEDURE — 99204 OFFICE O/P NEW MOD 45 MIN: CPT | Performed by: ANESTHESIOLOGY

## 2021-02-18 PROCEDURE — 72100 X-RAY EXAM L-S SPINE 2/3 VWS: CPT

## 2021-02-18 RX ORDER — HYDROCODONE BITARTRATE AND ACETAMINOPHEN 5; 325 MG/1; MG/1
1 TABLET ORAL EVERY 4 HOURS PRN
Qty: 18 TABLET | Refills: 0 | Status: SHIPPED | OUTPATIENT
Start: 2021-02-18 | End: 2021-04-02 | Stop reason: ALTCHOICE

## 2021-02-18 RX ORDER — CELECOXIB 100 MG/1
100 CAPSULE ORAL 2 TIMES DAILY PRN
COMMUNITY
End: 2021-02-19 | Stop reason: SDUPTHER

## 2021-02-18 NOTE — PROGRESS NOTES
Can you let Ms. Muse or her son know that her x-ray shows wear and tear, a small amount of compression at L2 (likely chronic), and arthritis changes in the low back?

## 2021-02-19 RX ORDER — BENAZEPRIL HYDROCHLORIDE 20 MG/1
20 TABLET ORAL DAILY
Qty: 90 TABLET | Refills: 1 | Status: SHIPPED | OUTPATIENT
Start: 2021-02-19 | End: 2021-05-10 | Stop reason: SDUPTHER

## 2021-02-19 RX ORDER — CLOPIDOGREL BISULFATE 75 MG/1
75 TABLET ORAL DAILY
Qty: 90 TABLET | Refills: 1 | Status: SHIPPED | OUTPATIENT
Start: 2021-02-19 | End: 2021-09-07 | Stop reason: SDUPTHER

## 2021-02-19 RX ORDER — PRAVASTATIN SODIUM 40 MG
40 TABLET ORAL DAILY
Qty: 90 TABLET | Refills: 1 | Status: SHIPPED | OUTPATIENT
Start: 2021-02-19 | End: 2021-08-02

## 2021-02-19 RX ORDER — AMLODIPINE BESYLATE 5 MG/1
5 TABLET ORAL DAILY
Qty: 90 TABLET | Refills: 1 | Status: SHIPPED | OUTPATIENT
Start: 2021-02-19 | End: 2021-09-07 | Stop reason: SDUPTHER

## 2021-02-19 RX ORDER — CELECOXIB 100 MG/1
100 CAPSULE ORAL 2 TIMES DAILY PRN
Qty: 180 CAPSULE | Refills: 1 | Status: SHIPPED | OUTPATIENT
Start: 2021-02-19 | End: 2021-09-01

## 2021-02-19 RX ORDER — DULOXETIN HYDROCHLORIDE 60 MG/1
60 CAPSULE, DELAYED RELEASE ORAL DAILY
Qty: 90 CAPSULE | Refills: 0 | Status: SHIPPED | OUTPATIENT
Start: 2021-02-19 | End: 2021-06-16

## 2021-02-19 NOTE — PROGRESS NOTES
I spoke with Ms. Muse's son Shan and he wanted to know if you recommend anything that could help with the pain.

## 2021-02-19 NOTE — PROGRESS NOTES
For the time being, lets try the medication I prescribed for her.  She may benefit from treatment for her arthritis, but we'll have to discuss that at her clinic visit.

## 2021-02-19 NOTE — TELEPHONE ENCOUNTER
Caller: CALDERON     Relationship to patient:     Best call back number: [phone]    Patient is needing: PTS DAUGHTER IN LAW CALLED BACK STATING PHARMACY DOES NOT HAVE THIS REFILL REQUEST. STATES PT IS NEEDING THIS CALLED IN TODAY

## 2021-02-19 NOTE — TELEPHONE ENCOUNTER
PATIENT'S DAUGHTER IN LAW CALDERON RAYMOND(ON [facility]) CALLED FOR MED REFILL OF     SITagliptin (JANUVIA) 100 MG tablet    SHE IS OUT OF MEDICATION FOR A WEEK. SHE WOULD LIKE A SHORT TERM SUPPLY TO GO TO     [id number] - [place], [place] - [facility] [facility] [address] - [phone] PH - [phone] FX  [phone]    SHE WANTS A 90 DAYS SUPPLY TO GO TO     [facility] - [place], [place] - [address] [place] [address] - [phone]  - [phone] FX  [phone]    IT IS MUCH CHEAPER FOR HER TO HAVE A 90 DAY SUPPLY.   SHE IS REQUESTING THAT ALL OF HER MEDICATION GO TO [facility] 90 DAY SUPPLY  SHE NEEDS   pravastatin (PRAVACHOL) 40 MG tablet    DULoxetine (CYMBALTA) 60 MG capsule    clopidogrel (PLAVIX) 75 MG tablet    celecoxib (CeleBREX) 100 MG capsule    benazepril (LOTENSIN) 20 MG tablet    amLODIPine (NORVASC) 5 MG tablet    PLEASE CALL   [facility] NUMBER [phone] FOR [facility]    CALDERON'S CALL BACK NUMBER [phone]

## 2021-02-22 ENCOUNTER — TELEPHONE (OUTPATIENT)
Dept: FAMILY MEDICINE CLINIC | Facility: CLINIC | Age: 81
End: 2021-02-22

## 2021-02-22 NOTE — TELEPHONE ENCOUNTER
PA approved Case ID  [id number]  For celecoxib 100 mg capsules from 1/20/21 - 2/19/2022.  Scanned in chart

## 2021-02-24 ENCOUNTER — LAB (OUTPATIENT)
Dept: LAB | Facility: HOSPITAL | Age: 81
End: 2021-02-24

## 2021-02-24 ENCOUNTER — CONSULT (OUTPATIENT)
Dept: ONCOLOGY | Facility: CLINIC | Age: 81
End: 2021-02-24

## 2021-02-24 VITALS
WEIGHT: 166.8 LBS | RESPIRATION RATE: 18 BRPM | HEART RATE: 100 BPM | HEIGHT: 57 IN | SYSTOLIC BLOOD PRESSURE: 172 MMHG | TEMPERATURE: 97.5 F | BODY MASS INDEX: 35.99 KG/M2 | DIASTOLIC BLOOD PRESSURE: 94 MMHG | OXYGEN SATURATION: 91 %

## 2021-02-24 DIAGNOSIS — E61.1 IRON DEFICIENCY: ICD-10-CM

## 2021-02-24 DIAGNOSIS — D89.2 HYPERGAMMAGLOBULINEMIA: ICD-10-CM

## 2021-02-24 DIAGNOSIS — D69.6 THROMBOCYTOPENIA (HCC): Primary | ICD-10-CM

## 2021-02-24 DIAGNOSIS — R53.82 CHRONIC FATIGUE: ICD-10-CM

## 2021-02-24 DIAGNOSIS — D89.0 POLYCLONAL GAMMOPATHY DETERMINED BY SERUM PROTEIN ELECTROPHORESIS: Primary | ICD-10-CM

## 2021-02-24 LAB
ALBUMIN SERPL-MCNC: 3.9 G/DL (ref 3.5–5.2)
ALBUMIN/GLOB SERPL: 1 G/DL (ref 1.1–2.4)
ALP SERPL-CCNC: 67 U/L (ref 38–116)
ALT SERPL W P-5'-P-CCNC: 17 U/L (ref 0–33)
ANION GAP SERPL CALCULATED.3IONS-SCNC: 9.9 MMOL/L (ref 5–15)
AST SERPL-CCNC: 16 U/L (ref 0–32)
BASOPHILS # BLD AUTO: 0.03 10*3/MM3 (ref 0–0.2)
BASOPHILS NFR BLD AUTO: 0.7 % (ref 0–1.5)
BILIRUB SERPL-MCNC: 0.4 MG/DL (ref 0.2–1.2)
BUN SERPL-MCNC: 23 MG/DL (ref 6–20)
BUN/CREAT SERPL: 14.1 (ref 7.3–30)
CALCIUM SPEC-SCNC: 9.8 MG/DL (ref 8.5–10.2)
CHLORIDE SERPL-SCNC: 104 MMOL/L (ref 98–107)
CHROMATIN AB SERPL-ACNC: <10 IU/ML (ref 0–14)
CO2 SERPL-SCNC: 24.1 MMOL/L (ref 22–29)
CREAT SERPL-MCNC: 1.63 MG/DL (ref 0.6–1.1)
DEPRECATED RDW RBC AUTO: 42.1 FL (ref 37–54)
EOSINOPHIL # BLD AUTO: 0.03 10*3/MM3 (ref 0–0.4)
EOSINOPHIL NFR BLD AUTO: 0.7 % (ref 0.3–6.2)
ERYTHROCYTE [DISTWIDTH] IN BLOOD BY AUTOMATED COUNT: 14.6 % (ref 12.3–15.4)
FERRITIN SERPL-MCNC: 275.3 NG/ML (ref 13–150)
FOLATE SERPL-MCNC: >20 NG/ML (ref 4.78–24.2)
GFR SERPL CREATININE-BSD FRML MDRD: 30 ML/MIN/1.73
GLOBULIN UR ELPH-MCNC: 4.1 GM/DL (ref 1.8–3.5)
GLUCOSE SERPL-MCNC: 120 MG/DL (ref 74–124)
HCT VFR BLD AUTO: 38.4 % (ref 34–46.6)
HGB BLD-MCNC: 12.1 G/DL (ref 12–15.9)
IMM GRANULOCYTES # BLD AUTO: 0.02 10*3/MM3 (ref 0–0.05)
IMM GRANULOCYTES NFR BLD AUTO: 0.5 % (ref 0–0.5)
IRON 24H UR-MRATE: 89 MCG/DL (ref 37–145)
IRON SATN MFR SERPL: 31 % (ref 14–48)
LDH SERPL-CCNC: 221 U/L (ref 99–259)
LYMPHOCYTES # BLD AUTO: 1.58 10*3/MM3 (ref 0.7–3.1)
LYMPHOCYTES NFR BLD AUTO: 35.9 % (ref 19.6–45.3)
MCH RBC QN AUTO: 25.1 PG (ref 26.6–33)
MCHC RBC AUTO-ENTMCNC: 31.5 G/DL (ref 31.5–35.7)
MCV RBC AUTO: 79.7 FL (ref 79–97)
MONOCYTES # BLD AUTO: 0.58 10*3/MM3 (ref 0.1–0.9)
MONOCYTES NFR BLD AUTO: 13.2 % (ref 5–12)
NEUTROPHILS NFR BLD AUTO: 2.16 10*3/MM3 (ref 1.7–7)
NEUTROPHILS NFR BLD AUTO: 49 % (ref 42.7–76)
NRBC BLD AUTO-RTO: 0 /100 WBC (ref 0–0.2)
PLATELET # BLD AUTO: 75 10*3/MM3 (ref 140–450)
PLATELETS.RETICULATED NFR BLD AUTO: 7 % (ref 0.9–6.5)
POTASSIUM SERPL-SCNC: 4.5 MMOL/L (ref 3.5–4.7)
PROT SERPL-MCNC: 8 G/DL (ref 6.3–8)
RBC # BLD AUTO: 4.82 10*6/MM3 (ref 3.77–5.28)
SODIUM SERPL-SCNC: 138 MMOL/L (ref 134–145)
T4 FREE SERPL-MCNC: 1.15 NG/DL (ref 0.93–1.7)
TIBC SERPL-MCNC: 284 MCG/DL (ref 249–505)
TRANSFERRIN SERPL-MCNC: 203 MG/DL (ref 200–360)
TSH SERPL DL<=0.05 MIU/L-ACNC: 1.18 UIU/ML (ref 0.27–4.2)
VIT B12 BLD-MCNC: 213 PG/ML (ref 211–946)
WBC # BLD AUTO: 4.4 10*3/MM3 (ref 3.4–10.8)

## 2021-02-24 PROCEDURE — 36415 COLL VENOUS BLD VENIPUNCTURE: CPT

## 2021-02-24 PROCEDURE — 82746 ASSAY OF FOLIC ACID SERUM: CPT | Performed by: INTERNAL MEDICINE

## 2021-02-24 PROCEDURE — 84443 ASSAY THYROID STIM HORMONE: CPT | Performed by: INTERNAL MEDICINE

## 2021-02-24 PROCEDURE — 83615 LACTATE (LD) (LDH) ENZYME: CPT | Performed by: INTERNAL MEDICINE

## 2021-02-24 PROCEDURE — 84466 ASSAY OF TRANSFERRIN: CPT | Performed by: INTERNAL MEDICINE

## 2021-02-24 PROCEDURE — 82728 ASSAY OF FERRITIN: CPT | Performed by: INTERNAL MEDICINE

## 2021-02-24 PROCEDURE — 82607 VITAMIN B-12: CPT | Performed by: INTERNAL MEDICINE

## 2021-02-24 PROCEDURE — 85055 RETICULATED PLATELET ASSAY: CPT | Performed by: INTERNAL MEDICINE

## 2021-02-24 PROCEDURE — 99204 OFFICE O/P NEW MOD 45 MIN: CPT | Performed by: INTERNAL MEDICINE

## 2021-02-24 PROCEDURE — 80053 COMPREHEN METABOLIC PANEL: CPT | Performed by: INTERNAL MEDICINE

## 2021-02-24 PROCEDURE — 84439 ASSAY OF FREE THYROXINE: CPT | Performed by: INTERNAL MEDICINE

## 2021-02-24 PROCEDURE — 85025 COMPLETE CBC W/AUTO DIFF WBC: CPT

## 2021-02-24 PROCEDURE — 86431 RHEUMATOID FACTOR QUANT: CPT | Performed by: INTERNAL MEDICINE

## 2021-02-24 PROCEDURE — 83540 ASSAY OF IRON: CPT | Performed by: INTERNAL MEDICINE

## 2021-02-24 NOTE — PROGRESS NOTES
.     REASON FOR CONSULTATION:     Provide an opinion on any further workup or treatment of elevated serum polyclonal hypergammaglobulinemia..                             REQUESTING PHYSICIAN: Emily De Jesus MD     RECORDS OBTAINED:  Records of the patient's history including those obtained from the referring provider were reviewed and summarized in detail.    HISTORY OF PRESENT ILLNESS:  The patient is a 81 y.o. year old female with history of hypertension, type 2 diabetes, stage III chronic renal insufficiency, obstructive sleep apnea who presented today for initial evaluation because of hypergammaglobulinemia discovered recently. The patient is here accompanied by her son who helped with the history. This patient previously lived in [place] and [place], and moved to [place] since 2010 to live with her son.     This patient had recent laboratory study on 01/25/2021 reported elevated serum globulin 4.6 g/dL, gamma globulin 2.2 g/dL and betaglobulin 1.3 g/dL and albumin 3.8. there was no M spike. However no immunofixation study. She had other labs on the same day reporting hemoglobin 12.1, MCV 81, MCHC 30.0, platelets 87,000 and WBC 4000 including ANC 2200, lymphocytes 1300. Chemistry lab reported hemoglobin A1C 7.7%, glucose 105, creatinine 1.81, unremarkable electrolytes including calcium 9.8 and normal liver function panel, total protein 8.4 and albumin 4.5. This patient had laboratory study in the [facility] here at [facility] dating back to 10/21/2020 which reported total protein 8.6, and albumin 4.3, hemoglobin A1C 8.5%, creatinine 1.78, normal liver function panel, and glucose 151. She had CBC results dating back 11/04/2020 including WBC 4520, lymphocytes 1710, and neutrophils 2140, platelets 44,000, and hemoglobin 12.6 with MCV 82.7. Chemistry lab also reported elevated creatinine 1.69, otherwise unremarkable electrolytes and liver function panel, elevated glucose 236.    Lab study on  10/21/2020 was reported negative for hepatitis C antibody.     The patient reports she is having sleep apnea, and uses CPAP since at least 2015 started in [place]. She also had a stroke in 2013 with right leg weakness. She uses a walker at home. Here she sits in wheelchair.     I searched the medical records from [facility] from [place]. CBC results dated back to 10/18/2019 which reported platelets 79,000, hemoglobin 12.02, MCV 80.03, MCHC 29.09 and WBC 5100 including ANC 2900 and lymphocytes 1600. The only other labs were from 07/09/2020 and reported a WBC 3500, ANC 1600, lymphocytes 1300, monocytes 500, hemoglobin 11.05, MCV 81.01, and platelets 66,000. Technically she was pancytopenic at that date. She had a normal PT/INR that date. Chemistry lab on 07/09/2020 reported a normal liver function panel, total protein 7.09 and glucose 128, creatinine 1.8 and normal electrolytes.     Reviewed peripheral blood smear today, there are significant targeted cells. No schistocytes, no sickle cells. Morphology of WBC and the platelets are normal.     Laboratory study today on 2/24/2021 in our clinic reported stable hemoglobin 12.1, with MCV 79.7, and platelets 75,000. WBC was 4400 including ANC 2160 and lymphocytes 1580.         Past Medical History:   Diagnosis Date   • Anxiety    • Depression    • Diabetes mellitus (CMS/HCC)    • Hyperlipidemia    • Hypertension    • Neuropathy    • Osteoarthritis    • Sleep apnea    • Stroke (CMS/HCC)      Past Surgical History:   Procedure Laterality Date   • APPENDECTOMY     • CHOLECYSTECTOMY     • HIP SURGERY Right 07/2020    Genicular Nerve block performed by Ran Jackson MD at [facility] OR     • JOINT REPLACEMENT     • OOPHORECTOMY     • TOTAL HIP ARTHROPLASTY Left 1997   • TUBAL ABDOMINAL LIGATION         MEDICATIONS    Current Outpatient Medications:   •  amLODIPine (NORVASC) 5 MG tablet, Take 1 tablet by mouth Daily., Disp: 90 tablet, Rfl: 1  •  benazepril  (LOTENSIN) 20 MG tablet, Take 1 tablet by mouth Daily., Disp: 90 tablet, Rfl: 1  •  celecoxib (CeleBREX) 100 MG capsule, Take 1 capsule by mouth 2 (Two) Times a Day As Needed for Mild Pain ., Disp: 180 capsule, Rfl: 1  •  clopidogrel (PLAVIX) 75 MG tablet, Take 1 tablet by mouth Daily., Disp: 90 tablet, Rfl: 1  •  diclofenac (VOLTAREN) 1 % gel gel, Apply 4 g topically to the appropriate area as directed 4 (Four) Times a Day As Needed (pain)., Disp: 5 tube, Rfl: 1  •  DULoxetine (CYMBALTA) 60 MG capsule, Take 1 capsule by mouth Daily., Disp: 90 capsule, Rfl: 0  •  glipizide (GLUCOTROL XL) 10 MG 24 hr tablet, Take 10 mg by mouth., Disp: , Rfl:   •  HYDROcodone-acetaminophen (NORCO) 5-325 MG per tablet, Take 1 tablet by mouth Every 4 (Four) Hours As Needed for Moderate Pain  or Severe Pain ., Disp: 18 tablet, Rfl: 0  •  metFORMIN (GLUCOPHAGE) 500 MG tablet, Take 1 tablet by mouth 2 (Two) Times a Day With Meals., Disp: 180 tablet, Rfl: 1  •  pravastatin (PRAVACHOL) 40 MG tablet, Take 1 tablet by mouth Daily., Disp: 90 tablet, Rfl: 1  •  SITagliptin (JANUVIA) 100 MG tablet, Take 1 tablet by mouth Daily., Disp: 90 tablet, Rfl: 1  •  gabapentin (NEURONTIN) 300 MG capsule, TAKE 1 CAPSULE BY MOUTH 3 TIMES A DAY FOR 90 DAYS, Disp: , Rfl:     ALLERGIES:   No Known Allergies    SOCIAL HISTORY:       Social History     Socioeconomic History   • Marital status:      Spouse name: Not on file   • Number of children: Not on file   • Years of education: Not on file   • Highest education level: Not on file   Occupational History     Employer: RETIRED   Tobacco Use   • Smoking status: Never Smoker   • Smokeless tobacco: Never Used   Substance and Sexual Activity   • Alcohol use: Never     Frequency: Never   • Drug use: Never   • Sexual activity: Defer         FAMILY HISTORY:  Family History   Problem Relation Age of Onset   • Arthritis Mother    • Hypertension Mother    • Colon cancer Mother    • Stroke Father    • Diabetes  "Father        REVIEW OF SYSTEMS:  Review of Systems   Constitutional: Positive for activity change and fatigue. Negative for appetite change, diaphoresis, fever and unexpected weight change.   HENT: Negative for mouth sores, nosebleeds and sore throat.    Eyes: Negative for photophobia and visual disturbance.   Respiratory: Positive for shortness of breath (Mild exertional dyspnea). Negative for cough and wheezing.    Cardiovascular: Negative for chest pain and leg swelling.   Gastrointestinal: Negative for abdominal pain, blood in stool, diarrhea and nausea.   Endocrine: Positive for heat intolerance. Negative for cold intolerance.   Genitourinary: Negative for dysuria and hematuria.   Musculoskeletal: Positive for arthralgias. Negative for joint swelling.   Skin: Negative for color change and pallor.   Allergic/Immunologic: Negative for immunocompromised state.   Neurological: Positive for weakness (Right leg weakness secondary to stroke). Negative for dizziness and headaches.   Hematological: Negative for adenopathy. Does not bruise/bleed easily.   Psychiatric/Behavioral: Negative for agitation and confusion.              Vitals:    02/24/21 1544   BP: 172/94   Pulse: 100   Resp: 18   Temp: 97.5 °F (36.4 °C)   SpO2: 91%   Weight: 75.7 kg (166 lb 12.8 oz)   Height: 145 cm (57.09\")   PainSc:   9   PainLoc: Comment: rt. hip and back     Current Status 2/24/2021   ECOG score 1      PHYSICAL EXAM:      CONSTITUTIONAL:  Vital signs reviewed.  Well-developed, elderly -American female, sitting in wheelchair.  No distress, looks comfortable.  EYES:  Conjunctiva and lids unremarkable.  PERRLA  EARS,NOSE,MOUTH,THROAT:  Patient wears mask due to the pandemic coronavirus infection.   RESPIRATORY: Mild respiratory distress.  Lungs clear to auscultation bilaterally.  No wheezing or crackles.  CARDIOVASCULAR: Regular rhythm and rate.  Normal S1, S2.  No murmurs rubs or gallops.  No significant lower extremity " edema.  GASTROINTESTINAL: Abdomen appears unremarkable.  Nontender.  Bowel sounds present.  Limited examination, patient sits in wheelchair with limited mobility.  LYMPHATIC:  No cervical, supraclavicular, axillary lymphadenopathy.  MUSCULOSKELETAL: Unremarkable digits/nails.  No cyanosis or clubbing.  SKIN:  Warm.  No rashes.  PSYCHIATRIC:  Normal judgment and insight.  Normal mood and affect.      RECENT LABS:        WBC   Date Value Ref Range Status   02/24/2021 4.40 3.40 - 10.80 10*3/mm3 Final   01/25/2021 4.0 3.4 - 10.8 x10E3/uL Final   11/04/2020 4.52 3.40 - 10.80 10*3/mm3 Final     Hemoglobin   Date Value Ref Range Status   02/24/2021 12.1 12.0 - 15.9 g/dL Final   01/25/2021 12.1 11.1 - 15.9 g/dL Final   11/04/2020 12.6 12.0 - 15.9 g/dL Final     Platelets   Date Value Ref Range Status   02/24/2021 75 (L) 140 - 450 10*3/mm3 Final   01/25/2021 87 (L) 150 - 450 x10E3/uL Final     Comment:     Platelet count verified by examination of peripheral blood smear.   11/04/2020 44 (C) 140 - 450 10*3/mm3 Final       Assessment/Plan     ASSESSMENT:   1. Hypergammaglobulinemia. Laboratory study on 01/25/2021 reported polyclonal protein but without immunofixation. I recommended to obtain comprehensive study for quantitation of immunoglobulin, free light chains, protein immunofixation together with repeating protein electrophoresis.     2.  Thrombocytopenia This is chronic dating back at least to 2019. The patient reports no bleeding or bruising. I recommended laboratory study for evaluation including IPF, vitamin B12 folate, LDH, rheumatoid disease panel, and also including serum protein immunofixation etc.     3. Patient has intermittent anemia but persistent microcytosis. Suspect the patient having iron deficiency and will check an iron study, ferritin, and will also check B12 and folate.    4. Patient complains of chronic fatigue. She also has cold sensitivity. Will obtain laboratory study for TSH, free T4 for  evaluation.     PLAN:  1. Laboratory studies as mentioned:    - Immature Platelet Fraction  - Vitamin B12  - Folate  - RADHA,PE and FLC, Serum  - Lactate Dehydrogenase  - Rheumatoid Factor, Quant  - Antinuclear Antibodies Direct  - SANIA Comprehensive Panel  - Comprehensive Metabolic Panel  - Ferritin  - Iron Profile  - Vitamin B12  - Folate  - TSH  - T4, Free   2. We will bring patient back for reevaluation in 2 weeks. We will discuss results and further management at that point.     Discussed with the patient and her son, questions were answered to their satisfaction. The son helped with the history.         Addendum:  Lab Results   Component Value Date    PEFFQIPE42 213 02/24/2021     Lab Results   Component Value Date    IRON 89 02/24/2021    TIBC 284 02/24/2021    FERRITIN 275.30 (H) 02/24/2021     Lab Results   Component Value Date    TSH 1.180 02/24/2021   Free T4 at 1.15 ng/dL    Lab studies reported vitamin B12 deficiency, however no iron deficiency and no hypothyroidism.     I called and spoke with her son, who recommended starting oral vitamin B12 at 1000 mcg daily.  He could buy over-the-counter product which is very cheap and is safe to use.  He voiced understanding.    Other labs are still pending.    MILADIS PEREYRA M.D., Ph.D.    2/24/2020.      CC:  Emily De Jesus MD

## 2021-02-25 LAB
ALBUMIN SERPL ELPH-MCNC: 3.5 G/DL (ref 2.9–4.4)
ALBUMIN/GLOB SERPL: 0.8 {RATIO} (ref 0.7–1.7)
ALPHA1 GLOB SERPL ELPH-MCNC: 0.3 G/DL (ref 0–0.4)
ALPHA2 GLOB SERPL ELPH-MCNC: 0.9 G/DL (ref 0.4–1)
ANA SER QL: POSITIVE
B-GLOBULIN SERPL ELPH-MCNC: 1.2 G/DL (ref 0.7–1.3)
CENTROMERE B AB SER-ACNC: <0.2 AI (ref 0–0.9)
CHROMATIN AB SERPL-ACNC: <0.2 AI (ref 0–0.9)
DSDNA AB SER-ACNC: 1 IU/ML (ref 0–9)
ENA JO1 AB SER-ACNC: <0.2 AI (ref 0–0.9)
ENA RNP AB SER-ACNC: <0.2 AI (ref 0–0.9)
ENA SCL70 AB SER-ACNC: <0.2 AI (ref 0–0.9)
ENA SM AB SER-ACNC: <0.2 AI (ref 0–0.9)
ENA SS-A AB SER-ACNC: 7.3 AI (ref 0–0.9)
ENA SS-B AB SER-ACNC: 0.4 AI (ref 0–0.9)
GAMMA GLOB SERPL ELPH-MCNC: 2 G/DL (ref 0.4–1.8)
GLOBULIN SER-MCNC: 4.4 G/DL (ref 2.2–3.9)
IGA SERPL-MCNC: 470 MG/DL (ref 64–422)
IGG SERPL-MCNC: 2010 MG/DL (ref 586–1602)
IGM SERPL-MCNC: 34 MG/DL (ref 26–217)
INTERPRETATION SERPL IEP-IMP: ABNORMAL
KAPPA LC FREE SER-MCNC: 94.5 MG/L (ref 3.3–19.4)
KAPPA LC FREE/LAMBDA FREE SER: 2.39 {RATIO} (ref 0.26–1.65)
LABORATORY COMMENT REPORT: ABNORMAL
LAMBDA LC FREE SERPL-MCNC: 39.6 MG/L (ref 5.7–26.3)
Lab: ABNORMAL
M PROTEIN SERPL ELPH-MCNC: ABNORMAL G/DL
PROT SERPL-MCNC: 7.9 G/DL (ref 6–8.5)

## 2021-02-26 LAB — SPECIMEN STATUS: NORMAL

## 2021-03-02 LAB
ANA SPECKLED TITR SER: ABNORMAL {TITER}
ANA TITR SER IF: POSITIVE {TITER}
Lab: ABNORMAL

## 2021-03-10 ENCOUNTER — OFFICE VISIT (OUTPATIENT)
Dept: ONCOLOGY | Facility: CLINIC | Age: 81
End: 2021-03-10

## 2021-03-10 ENCOUNTER — APPOINTMENT (OUTPATIENT)
Dept: LAB | Facility: HOSPITAL | Age: 81
End: 2021-03-10

## 2021-03-10 VITALS
SYSTOLIC BLOOD PRESSURE: 174 MMHG | TEMPERATURE: 98.2 F | HEART RATE: 91 BPM | WEIGHT: 164.8 LBS | DIASTOLIC BLOOD PRESSURE: 76 MMHG | BODY MASS INDEX: 35.55 KG/M2 | HEIGHT: 57 IN | RESPIRATION RATE: 18 BRPM | OXYGEN SATURATION: 95 %

## 2021-03-10 DIAGNOSIS — E53.8 VITAMIN B12 DEFICIENCY: ICD-10-CM

## 2021-03-10 DIAGNOSIS — D69.6 THROMBOCYTOPENIA (HCC): ICD-10-CM

## 2021-03-10 DIAGNOSIS — D89.2 HYPERGAMMAGLOBULINEMIA: ICD-10-CM

## 2021-03-10 DIAGNOSIS — R76.8 ELEVATED ANTINUCLEAR ANTIBODY (ANA) LEVEL: Primary | ICD-10-CM

## 2021-03-10 PROCEDURE — 99214 OFFICE O/P EST MOD 30 MIN: CPT | Performed by: INTERNAL MEDICINE

## 2021-03-10 NOTE — PROGRESS NOTES
.     REASON FOR CONSULTATION:     *  Provide an opinion on any further workup of elevated serum polyclonal hypergammaglobulinemia.  · Laboratory studies at on 2/24/2021 reported positive direct SANIA and SSA antibody.  · Elevated serum IgG and IgA, and the free kappa chain in the management however no monoclonal protein detected.  *  Moderate thrombocytopenia.  *  Vitamin B12 deficiency she was started on oral vitamin D 12 and 1000 mcg daily on 2/24/2021        HISTORY OF PRESENT ILLNESS:  The patient is a 81 y.o. year old female with history of hypertension, type 2 diabetes, stage III chronic renal insufficiency, obstructive sleep apnea who presented today for reevaluation to discuss the laboratory results from 2/24/2021 for the management of elevated serum gamma protein.    She was here for initial consultation on 2/24/2021.  Laboratory study on 2/24/2021 in our clinic reported stable hemoglobin 12.1, with MCV 79.7, and platelets 75,000 and IPF 7.0%. WBC was 4400 including ANC 2160 and lymphocytes 1580.  Chemistry lab reported elevated creatinine 1.63, BUN 23, normal electrolytes including calcium 9.8, normal liver function panel.  She had elevated globulin 4.1, normal albumin 3.9 and a total protein 8.0.  She had normal .  Thyroid profile was normal with TSH and free T4.  Iron study reported ferritin 275.3 ng/mL, free iron 89 TIBC 284 and iron saturation 31%.  She had supratherapeutic folate more than 20 ng/mL, however vitamin B12 deficiency at 213 pg/mL.    Further laboratory studies reported negative for serum monoclonal protein by protein immunofixation and protein electrophoresis.  Nevertheless patient noted to have elevated serum IgG 2010 mg/dL, IgA 470 mg/dL, and normal IgM 34 mg/dL.  She also had elevated serum free kappa chain 94.5 mg/L and lambda chain 39.6 mg/L with kappa/lambda ratio 2.39.  Rheumatoid study reported a positive for SANIA with a titer of 1: 160, and elevated SSA antibody of 7.3,  negative for the other comprehensive SANIA panel antibodies and negative for rheumatoid factor.    I recommended starting oral vitamin B12 at 1000 mcg daily.        Past Medical History:   Diagnosis Date   • Anxiety    • Depression    • Diabetes mellitus (CMS/HCC)    • Hyperlipidemia    • Hypertension    • Neuropathy    • Osteoarthritis    • Sleep apnea    • Stroke (CMS/HCC)      Past Surgical History:   Procedure Laterality Date   • APPENDECTOMY     • CHOLECYSTECTOMY     • HIP SURGERY Right 07/2020    Genicular Nerve block performed by Ran Jackson MD at [facility] OR     • JOINT REPLACEMENT     • OOPHORECTOMY     • TOTAL HIP ARTHROPLASTY Left 1997   • TUBAL ABDOMINAL LIGATION       HEMATOLOGY HISTORY:  The patient is a 81 y.o. year old female with history of hypertension, type 2 diabetes, stage III chronic renal insufficiency, obstructive sleep apnea who presented on 2/24/2021 for initial evaluation because of hypergammaglobulinemia discovered recently. The patient is here accompanied by her son who helped with the history. This patient previously lived in [place] and [place], and moved to [place] since 2020 to live with her son.     This patient had recent laboratory study on 01/25/2021 reported elevated serum globulin 4.6 g/dL, gamma globulin 2.2 g/dL and betaglobulin 1.3 g/dL and albumin 3.8. there was no M spike. However no immunofixation study. She had other labs on the same day reporting hemoglobin 12.1, MCV 81, MCHC 30.0, platelets 87,000 and WBC 4000 including ANC 2200, lymphocytes 1300. Chemistry lab reported hemoglobin A1C 7.7%, glucose 105, creatinine 1.81, unremarkable electrolytes including calcium 9.8 and normal liver function panel, total protein 8.4 and albumin 4.5. This patient had laboratory study in the [employer] system here at [facility] dating back to 10/21/2020 which reported total protein 8.6, and albumin 4.3, hemoglobin A1C 8.5%, creatinine 1.78, normal liver  function panel, and glucose 151. She had CBC results dating back 11/04/2020 including WBC 4520, lymphocytes 1710, and neutrophils 2140, platelets 44,000, and hemoglobin 12.6 with MCV 82.7. Chemistry lab also reported elevated creatinine 1.69, otherwise unremarkable electrolytes and liver function panel, elevated glucose 236.    Lab study on 10/21/2020 was reported negative for hepatitis C antibody.     The patient reports she is having sleep apnea, and uses CPAP since at least 2015 started in [place]. She also had a stroke in 2013 with right leg weakness. She uses a walker at home. Here she sits in wheelchair.     [facility] [place]. CBC results dated back to 10/18/2019 which reported platelets 79,000, hemoglobin 12.02, MCV 80.03, MCHC 29.09 and WBC 5100 including ANC 2900 and lymphocytes 1600. The only other labs were from 07/09/2020 and reported a WBC 3500, ANC 1600, lymphocytes 1300, monocytes 500, hemoglobin 11.05, MCV 81.01, and platelets 66,000. Technically she was pancytopenic at that date. She had a normal PT/INR that date. Chemistry lab on 07/09/2020 reported a normal liver function panel, total protein 7.09 and glucose 128, creatinine 1.8 and normal electrolytes.     Reviewed peripheral blood smear on 2/24/2021, there are significant targeted cells. No schistocytes, no sickle cells. Morphology of WBC and the platelets are normal.         MEDICATIONS    Current Outpatient Medications:   •  amLODIPine (NORVASC) 5 MG tablet, Take 1 tablet by mouth Daily., Disp: 90 tablet, Rfl: 1  •  benazepril (LOTENSIN) 20 MG tablet, Take 1 tablet by mouth Daily., Disp: 90 tablet, Rfl: 1  •  celecoxib (CeleBREX) 100 MG capsule, Take 1 capsule by mouth 2 (Two) Times a Day As Needed for Mild Pain ., Disp: 180 capsule, Rfl: 1  •  clopidogrel (PLAVIX) 75 MG tablet, Take 1 tablet by mouth Daily., Disp: 90 tablet, Rfl: 1  •  diclofenac (VOLTAREN) 1 % gel gel, Apply 4 g topically to the  appropriate area as directed 4 (Four) Times a Day As Needed (pain)., Disp: 5 tube, Rfl: 1  •  DULoxetine (CYMBALTA) 60 MG capsule, Take 1 capsule by mouth Daily., Disp: 90 capsule, Rfl: 0  •  glipizide (GLUCOTROL XL) 10 MG 24 hr tablet, Take 10 mg by mouth., Disp: , Rfl:   •  HYDROcodone-acetaminophen (NORCO) 5-325 MG per tablet, Take 1 tablet by mouth Every 4 (Four) Hours As Needed for Moderate Pain  or Severe Pain ., Disp: 18 tablet, Rfl: 0  •  metFORMIN (GLUCOPHAGE) 500 MG tablet, Take 1 tablet by mouth 2 (Two) Times a Day With Meals., Disp: 180 tablet, Rfl: 1  •  pravastatin (PRAVACHOL) 40 MG tablet, Take 1 tablet by mouth Daily., Disp: 90 tablet, Rfl: 1  •  SITagliptin (JANUVIA) 100 MG tablet, Take 1 tablet by mouth Daily., Disp: 90 tablet, Rfl: 1  •  gabapentin (NEURONTIN) 300 MG capsule, TAKE 1 CAPSULE BY MOUTH 3 TIMES A DAY FOR 90 DAYS, Disp: , Rfl:     ALLERGIES:   No Known Allergies    SOCIAL HISTORY:       Social History     Socioeconomic History   • Marital status:      Spouse name: Not on file   • Number of children: Not on file   • Years of education: Not on file   • Highest education level: Not on file   Tobacco Use   • Smoking status: Never Smoker   • Smokeless tobacco: Never Used   Substance and Sexual Activity   • Alcohol use: Never   • Drug use: Never   • Sexual activity: Defer         FAMILY HISTORY:  Family History   Problem Relation Age of Onset   • Arthritis Mother    • Hypertension Mother    • Colon cancer Mother    • Stroke Father    • Diabetes Father        REVIEW OF SYSTEMS:  Review of Systems   Constitutional: Positive for activity change and fatigue. Negative for appetite change, diaphoresis, fever and unexpected weight change.   HENT: Negative for mouth sores, nosebleeds and sore throat.    Eyes: Negative for photophobia and visual disturbance.   Respiratory: Positive for shortness of breath (Mild exertional dyspnea). Negative for cough and wheezing.    Cardiovascular: Negative  "for chest pain and leg swelling.   Gastrointestinal: Negative for abdominal pain, blood in stool, diarrhea and nausea.   Endocrine: Positive for heat intolerance. Negative for cold intolerance.   Genitourinary: Negative for dysuria and hematuria.   Musculoskeletal: Positive for arthralgias. Negative for joint swelling.   Skin: Negative for color change and pallor.   Allergic/Immunologic: Negative for immunocompromised state.   Neurological: Positive for weakness (Right leg weakness secondary to stroke). Negative for dizziness and headaches.   Hematological: Negative for adenopathy. Does not bruise/bleed easily.   Psychiatric/Behavioral: Negative for agitation and confusion.     No change of review of system 3/10/2021 compared to 2 weeks ago on 2/24/2021.         Vitals:    03/10/21 1340   BP: 174/76   Pulse: 91   Resp: 18   Temp: 98.2 °F (36.8 °C)   SpO2: 95%   Weight: 74.8 kg (164 lb 12.8 oz)   Height: 145 cm (57.09\")   PainSc:   7   PainLoc: Comment: back and rt. hip     Current Status 3/10/2021   ECOG score 1      PHYSICAL EXAM:   GENERAL:  Well-developed, well-nourished elder female, in no acute distress.  Patient sits in wheelchair.  SKIN:  Warm, dry without rashes, purpura or petechiae.  HEAD:  Normocephalic.  EYES:  Pupils equal, round.  Conjunctivae normal.  NOSE:  Patient wears mask due to the pandemic coronavirus infection.   NECK:  Supple; no thyromegaly or masses.  LYMPHATICS:  No cervical, supraclavicular adenopathy.  CHEST: Normal respiratory effort.  Lungs clear to auscultation. Good airflow.  CARDIAC:  Regular rate and rhythm without murmurs. Normal S1,S2.  ABDOMEN:  Soft, nontender with no organomegaly or masses.  Bowel sounds normal.  EXTREMITIES:  No clubbing, cyanosis or edema.  NEUROLOGICAL:  Cranial Nerves II-XII grossly intact.    PSYCHIATRIC:  Normal affect and mood.          RECENT LABS:  Lab Results   Component Value Date    WBC 4.40 02/24/2021    HGB 12.1 02/24/2021    HCT 38.4 02/24/2021 "    MCV 79.7 02/24/2021    PLT 75 (L) 02/24/2021     Lab Results   Component Value Date    NEUTROABS 2.16 02/24/2021     Lab Results   Component Value Date    FOLATE >20.00 02/24/2021     Lab Results   Component Value Date    MUSMKDIY70 213 02/24/2021     Lab Results   Component Value Date    IRON 89 02/24/2021    TIBC 284 02/24/2021    FERRITIN 275.30 (H) 02/24/2021     Glucose   Date Value Ref Range Status   02/24/2021 120 74 - 124 mg/dL Final     Sodium   Date Value Ref Range Status   02/24/2021 138 134 - 145 mmol/L Final     Potassium   Date Value Ref Range Status   02/24/2021 4.5 3.5 - 4.7 mmol/L Final     CO2   Date Value Ref Range Status   02/24/2021 24.1 22.0 - 29.0 mmol/L Final     Chloride   Date Value Ref Range Status   02/24/2021 104 98 - 107 mmol/L Final     Anion Gap   Date Value Ref Range Status   02/24/2021 9.9 5.0 - 15.0 mmol/L Final   07/24/2020 8.0 8.0 - 16.0 meq/L Final     Comment:     ANION GAP = Sodium -(Chloride + CO2)  Performed at [facility] [address] [place] [address]     Creatinine   Date Value Ref Range Status   02/24/2021 1.63 (H) 0.60 - 1.10 mg/dL Final     BUN   Date Value Ref Range Status   02/24/2021 23 (H) 6 - 20 mg/dL Final     BUN/Creatinine Ratio   Date Value Ref Range Status   02/24/2021 14.1 7.3 - 30.0 Final     Calcium   Date Value Ref Range Status   02/24/2021 9.8 8.5 - 10.2 mg/dL Final     eGFR Non  Amer   Date Value Ref Range Status   02/24/2021 30 (L) >60 mL/min/1.73 Final     Alkaline Phosphatase   Date Value Ref Range Status   02/24/2021 67 38 - 116 U/L Final     Total Protein   Date Value Ref Range Status   02/24/2021 8.0 6.3 - 8.0 g/dL Final     ALT (SGPT)   Date Value Ref Range Status   02/24/2021 17 0 - 33 U/L Final     AST (SGOT)   Date Value Ref Range Status   02/24/2021 16 0 - 32 U/L Final     Total Bilirubin   Date Value Ref Range Status   02/24/2021 0.4 0.2 - 1.2 mg/dL Final     Albumin   Date Value Ref Range Status   02/24/2021 3.5 2.9 -  4.4 g/dL Final   02/24/2021 3.90 3.50 - 5.20 g/dL Final     Globulin   Date Value Ref Range Status   02/24/2021 4.1 (H) 1.8 - 3.5 gm/dL Final     A/G Ratio   Date Value Ref Range Status   02/24/2021 0.8 0.7 - 1.7 Final     Component      Latest Ref Rng & Units 2/24/2021   IgG      586 - 1602 mg/dL 2010 (H)   IgA      64 - 422 mg/dL 470 (H)   IgM      26 - 217 mg/dL 34   Total Protein      6.0 - 8.5 g/dL 7.9   Albumin      2.9 - 4.4 g/dL 3.5   Alpha-1-Globulin      0.0 - 0.4 g/dL 0.3   Alpha-2-Globulin      0.4 - 1.0 g/dL 0.9   Beta Globulin      0.7 - 1.3 g/dL 1.2   Gamma Globulin      0.4 - 1.8 g/dL 2.0 (H)   M-Layton      Not Observed g/dL Not Observed   Globulin      2.2 - 3.9 g/dL 4.4 (H)   A/G Ratio      0.7 - 1.7 0.8   Immunofixation Reflex, Serum       Comment:   Please note       Comment   Kappa FLC      3.3 - 19.4 mg/L 94.5 (H)   Free Lambda Light Chains      5.7 - 26.3 mg/L 39.6 (H)   Kappa/Lambda Ratio      0.26 - 1.65 2.39 (H)   Anti-DNA (DS) Ab Qn      0 - 9 IU/mL 1   RNP Antibodies      0.0 - 0.9 AI <0.2   Luis Antibodies      0.0 - 0.9 AI <0.2   Antiscleroderma-70 Antibodies      0.0 - 0.9 AI <0.2   MANUEL SSA (RO) Ab      0.0 - 0.9 AI 7.3 (H)   MANUEL SSB (LA) Ab      0.0 - 0.9 AI 0.4   Antichromatin Antibodies      0.0 - 0.9 AI <0.2   BRENNEN-1 IgG      0.0 - 0.9 AI <0.2   Anti-Centromere B Antibodies      0.0 - 0.9 AI <0.2   See below:       Comment   Speckled Pattern       1:160 (H)   Note: (Reference)       Comment   LDH      99 - 259 U/L 221   Rheumatoid Factor Quantitative      0.0 - 14.0 IU/mL <10.0   SANIA Direct      Negative Positive (A)   TSH Baseline      0.270 - 4.200 uIU/mL 1.180   Free T4      0.93 - 1.70 ng/dL 1.15   SANIA       Positive (A)         Assessment/Plan     ASSESSMENT:   1. Hypergammaglobulinemia. Laboratory study on 01/25/2021 reported polyclonal protein but without immunofixation. I recommended to obtain comprehensive study for quantitation of immunoglobulin, free light chains, protein  immunofixation together with repeating protein electrophoresis.   · Laboratory studies 2/24/2021 reported negative for monoclonal protein by immunofixation and protein electrophoresis although patient had elevated serum IgG IgA, free kappa chain and free lambda chain.  Those results are likely reactive.  · No need for further evaluation.     2.  Thrombocytopenia This is chronic dating back at least to 2019. The patient reports no bleeding or bruising.   · Laboratory study on 2/24/2021 reported mildly elevated IPF, vitamin B12 deficiency, and elevation for SANIA and SSA antibodies.  LDH is normal.    · Since platelets is only moderately low patient is asymptomatic.  No need for treatment with steroids.    3. Patient has intermittent anemia but persistent microcytosis. Suspect the patient having iron deficiency and will check an iron study, ferritin, and will also check B12 and folate.  · Laboratory study showed no evidence for iron deficiency.  She had supratherapeutic folate.  She did have vitamin B12 deficiency    4. Patient complains of chronic fatigue. She also has cold sensitivity.   · Laboratory study showed no evidence for hypothyroidism.    · Patient has vitamin B12 deficiency which could have caused fatigue.      5.  Vitamin B12 deficiency.    · I asked patient to take over-the-counter oral vitamin B12 to 1000 mcg daily.    6.  Positive SANIA and SSA antibodies.  Discussed with the patient today on 3/10/2021, recommended rheumatology consultation for further evaluation.      PLAN:  1. Refer patient to rheumatology.   2. Continue oral vitamin B12 at 1000 mcg daily.  3. We will bring patient back for reevaluation in 3 months.  We will repeat laboratory studies CBC, IPF, vitamin B12 level a few days earlier prior to the next MD visit.     Discussed with the patient and her son, questions were answered to their satisfaction. The son helped with the history.       MILADIS PEREYRA M.D., Ph.D.    3/10/2021      CC:    MD Chanda Baugh MD

## 2021-03-12 ENCOUNTER — OFFICE VISIT (OUTPATIENT)
Dept: PAIN MEDICINE | Facility: CLINIC | Age: 81
End: 2021-03-12

## 2021-03-12 VITALS
HEIGHT: 57 IN | DIASTOLIC BLOOD PRESSURE: 94 MMHG | TEMPERATURE: 97.5 F | BODY MASS INDEX: 33.66 KG/M2 | SYSTOLIC BLOOD PRESSURE: 165 MMHG | RESPIRATION RATE: 20 BRPM | WEIGHT: 156 LBS | HEART RATE: 104 BPM | OXYGEN SATURATION: 95 %

## 2021-03-12 DIAGNOSIS — M54.41 CHRONIC BILATERAL LOW BACK PAIN WITH RIGHT-SIDED SCIATICA: ICD-10-CM

## 2021-03-12 DIAGNOSIS — M15.9 PRIMARY OSTEOARTHRITIS INVOLVING MULTIPLE JOINTS: Primary | ICD-10-CM

## 2021-03-12 DIAGNOSIS — D69.6 THROMBOCYTOPENIA (HCC): ICD-10-CM

## 2021-03-12 DIAGNOSIS — G89.4 CHRONIC PAIN DISORDER: ICD-10-CM

## 2021-03-12 DIAGNOSIS — G89.29 CHRONIC BILATERAL LOW BACK PAIN WITH RIGHT-SIDED SCIATICA: ICD-10-CM

## 2021-03-12 PROCEDURE — 99214 OFFICE O/P EST MOD 30 MIN: CPT | Performed by: NURSE PRACTITIONER

## 2021-03-12 RX ORDER — TIZANIDINE 2 MG/1
2 TABLET ORAL EVERY 12 HOURS PRN
Qty: 60 TABLET | Refills: 0 | Status: SHIPPED | OUTPATIENT
Start: 2021-03-12 | End: 2021-09-07

## 2021-03-12 NOTE — PROGRESS NOTES
CHIEF COMPLAINT  F/u back pain. Pt sts back pain is the same, however pt sts increased pain in right hip and right leg.     Subjective   Ailce Muse is a 81 y.o. female  who presents for follow-up.  She has a history of back pain. Office visit from 2/18/2020 with Dr. Ortiz reviewed.  Patient was referred by Dr. De Jesus for evaluation and treatment of back pain.  Patient has a history of chronic pain, osteoarthritis of multiple joints, Right hemiparesis secondary to a CVA, and neuropathy.  Patient was previously managed on fentanyl patches for approximately 30 years.  During this office visit patient states that she does not feel like she needs something daily for pain at this point and is requesting a medication that she can take on an as-needed basis.  Lumbar x-ray ordered.  Opioid risk assessment low risk short-term trial of hydrocodone 5/325 every 4 hours as needed for moderate to severe pain prescribed.    The patient presents with her Son to her office visit today who is helpful in providing history. Today her pan is 7/10VAS in severity. Her primary complaint is right sided hip, leg, and back pain.  She states the fentanyl patches helped her previously.  She reports NO relief with the Rochelle 5/325.  She was previously prescribed Tramadol and she also reports NO relief with this.  Discussed with patient and her son that with both the hydrocodone and the tramadol she should have some mild relief, with the patient denying any relief I am inclined to get a PGT to better determine what medications will help with her pain.      Additionally, the patient states she does have a CPAP, which she is non-compliant with. Patient does not have a pulmonologist in [place].  The patient's son states he is called her PCP to get a pulmonology referral.  Discussed with patient that she MUST be compliant with her CPAP.  If she is unwilling to do this we will not be able to prescribe her any opioids in the future for her  pain.     Patient remained masked during entire encounter. No cough present. I donned a mask and eye protection throughout entire visit. Prior to donning mask and eye protection, hand hygiene was performed, as well as when it was doffed.  I was closer than 6 feet, but not for an extended period of time. No obvious exposure to any bodily fluids.    Back Pain  This is a chronic problem. The current episode started more than 1 year ago. The problem occurs constantly. The problem has been gradually worsening since onset. The pain is present in the lumbar spine. The quality of the pain is described as shooting and stabbing. The pain radiates to the right thigh. The pain is at a severity of 7/10. The symptoms are aggravated by standing and position (walking). Associated symptoms include headaches (occ), numbness (right leg) and weakness (right leg). Pertinent negatives include no chest pain or fever. Risk factors include sedentary lifestyle (history of CVA with hemiparesis). She has tried analgesics (Gabapentin, OTC pain creams, Tylenol) for the symptoms. The treatment provided mild relief.      PEG Assessment   What number best describes your pain on average in the past week?7  What number best describes how, during the past week, pain has interfered with your enjoyment of life?7  What number best describes how, during the past week, pain has interfered with your general activity?  7    The following portions of the patient's history were reviewed and updated as appropriate: allergies, current medications, past family history, past medical history, past social history, past surgical history and problem list.    Review of Systems   Constitutional: Positive for fatigue (freq). Negative for activity change and fever.   HENT: Negative for congestion.    Eyes: Negative for visual disturbance.   Respiratory: Positive for shortness of breath (occ ).    Cardiovascular: Negative for chest pain.   Gastrointestinal: Negative for  "constipation, diarrhea, nausea and vomiting.   Endocrine: Negative for polyuria.   Genitourinary: Negative for difficulty urinating.   Musculoskeletal: Positive for back pain and gait problem (w/c). Negative for joint swelling.   Allergic/Immunologic: Negative for immunocompromised state.   Neurological: Positive for dizziness (occ), weakness (right leg), numbness (right leg) and headaches (occ). Negative for light-headedness.   Psychiatric/Behavioral: Positive for sleep disturbance (occ). Negative for agitation and suicidal ideas. The patient is not nervous/anxious.      --  The aforementioned information the Chief Complaint section and above subjective data including any HPI data, and also the Review of Systems data, has been personally reviewed and affirmed.  --    Vitals:    03/12/21 1351   BP: 165/94  Comment: pt sts had bp meds today   Pulse: 104   Resp: 20   Temp: 97.5 °F (36.4 °C)   SpO2: 95%   Weight: 70.8 kg (156 lb)   Height: 145 cm (57.09\")   PainSc:   7   PainLoc: Back     Objective   Physical Exam  Vitals and nursing note reviewed.   Constitutional:       Appearance: Normal appearance. She is well-developed.   HENT:      Nose: Congestion present.   Eyes:      General: Lids are normal.   Cardiovascular:      Rate and Rhythm: Normal rate.   Pulmonary:      Effort: Pulmonary effort is normal.   Musculoskeletal:      Cervical back: Normal range of motion.      Lumbar back: Tenderness and bony tenderness present. Negative right straight leg raise test and negative left straight leg raise test.      Right hip: Bony tenderness present.      Right knee: Bony tenderness present.   Neurological:      Mental Status: She is alert and oriented to person, place, and time.   Psychiatric:         Speech: Speech normal.         Behavior: Behavior normal.         Judgment: Judgment normal.       Assessment/Plan   Diagnoses and all orders for this visit:    1. Primary osteoarthritis involving multiple joints " (Primary)    2. Chronic pain disorder    3. Chronic bilateral low back pain with right-sided sciatica    4. Thrombocytopenia (CMS/HCC)      --- 39 minutes spent in chart review, face to face time with the patient, and documentation.   --- The urine drug screen confirmation from 2/18/2021 has been reviewed and the result is appropriate based on patient history and FARZANEH report  --- Patient is currently on Plavix with a significant history of thrombocytopenia which complicates our ability to offer neuroaxial interventional procedures.   --- PGT testing today   --- Tizanidine 2 mg PO BID PRN muscle spasms  --- Reviewed in detail that the patient must remain compliant with her CPAP.   --- Follow-up Dr. Ortiz in 2-3 weeks following results of PGT test     FARZANEH REPORT  FARZANEH report has been reviewed and scanned into the patient's chart.    As the clinician, I personally reviewed the FARZANEH from 3/12/2021 while the patient was in the office today.    EMR Dragon/Transcription disclaimer:   Much of this encounter note is an electronic transcription/translation of spoken language to printed text. The electronic translation of spoken language may permit erroneous, or at times, nonsensical words or phrases to be inadvertently transcribed; Although I have reviewed the note for such errors, some may still exist.

## 2021-04-02 ENCOUNTER — OFFICE VISIT (OUTPATIENT)
Dept: PAIN MEDICINE | Facility: CLINIC | Age: 81
End: 2021-04-02

## 2021-04-02 VITALS
BODY MASS INDEX: 35.6 KG/M2 | OXYGEN SATURATION: 94 % | TEMPERATURE: 96.2 F | HEART RATE: 91 BPM | RESPIRATION RATE: 20 BRPM | DIASTOLIC BLOOD PRESSURE: 94 MMHG | HEIGHT: 57 IN | WEIGHT: 165 LBS | SYSTOLIC BLOOD PRESSURE: 190 MMHG

## 2021-04-02 DIAGNOSIS — D69.6 THROMBOCYTOPENIA (HCC): ICD-10-CM

## 2021-04-02 DIAGNOSIS — M54.41 CHRONIC BILATERAL LOW BACK PAIN WITH RIGHT-SIDED SCIATICA: ICD-10-CM

## 2021-04-02 DIAGNOSIS — G89.29 CHRONIC PAIN OF RIGHT ANKLE: ICD-10-CM

## 2021-04-02 DIAGNOSIS — G89.29 CHRONIC PAIN OF RIGHT KNEE: ICD-10-CM

## 2021-04-02 DIAGNOSIS — G47.30 SLEEP APNEA, UNSPECIFIED TYPE: Primary | ICD-10-CM

## 2021-04-02 DIAGNOSIS — M25.561 CHRONIC PAIN OF RIGHT KNEE: ICD-10-CM

## 2021-04-02 DIAGNOSIS — M25.551 CHRONIC RIGHT HIP PAIN: ICD-10-CM

## 2021-04-02 DIAGNOSIS — M25.571 CHRONIC PAIN OF RIGHT ANKLE: ICD-10-CM

## 2021-04-02 DIAGNOSIS — G89.29 CHRONIC BILATERAL LOW BACK PAIN WITH RIGHT-SIDED SCIATICA: ICD-10-CM

## 2021-04-02 DIAGNOSIS — G89.29 CHRONIC RIGHT HIP PAIN: ICD-10-CM

## 2021-04-02 DIAGNOSIS — M15.9 PRIMARY OSTEOARTHRITIS INVOLVING MULTIPLE JOINTS: ICD-10-CM

## 2021-04-02 DIAGNOSIS — G89.4 CHRONIC PAIN DISORDER: ICD-10-CM

## 2021-04-02 PROCEDURE — 99214 OFFICE O/P EST MOD 30 MIN: CPT | Performed by: ANESTHESIOLOGY

## 2021-04-02 RX ORDER — HYDROCODONE BITARTRATE AND ACETAMINOPHEN 10; 325 MG/1; MG/1
1 TABLET ORAL 2 TIMES DAILY PRN
Qty: 30 TABLET | Refills: 0 | Status: SHIPPED | OUTPATIENT
Start: 2021-04-02 | End: 2021-05-05 | Stop reason: SDUPTHER

## 2021-04-21 ENCOUNTER — TRANSCRIBE ORDERS (OUTPATIENT)
Dept: SLEEP MEDICINE | Facility: HOSPITAL | Age: 81
End: 2021-04-21

## 2021-04-21 DIAGNOSIS — Z01.818 OTHER SPECIFIED PRE-OPERATIVE EXAMINATION: Primary | ICD-10-CM

## 2021-04-23 ENCOUNTER — TRANSCRIBE ORDERS (OUTPATIENT)
Dept: SLEEP MEDICINE | Facility: HOSPITAL | Age: 81
End: 2021-04-23

## 2021-04-23 DIAGNOSIS — I10 ESSENTIAL HYPERTENSION: Primary | ICD-10-CM

## 2021-04-23 DIAGNOSIS — T50.905A CENTRAL SLEEP APNEA DUE TO DRUG: ICD-10-CM

## 2021-04-23 DIAGNOSIS — G47.31 CENTRAL SLEEP APNEA DUE TO DRUG: ICD-10-CM

## 2021-04-23 DIAGNOSIS — G47.33 OSA (OBSTRUCTIVE SLEEP APNEA): ICD-10-CM

## 2021-04-30 ENCOUNTER — LAB (OUTPATIENT)
Dept: LAB | Facility: HOSPITAL | Age: 81
End: 2021-04-30

## 2021-04-30 DIAGNOSIS — Z01.818 OTHER SPECIFIED PRE-OPERATIVE EXAMINATION: ICD-10-CM

## 2021-04-30 PROCEDURE — U0004 COV-19 TEST NON-CDC HGH THRU: HCPCS

## 2021-04-30 PROCEDURE — C9803 HOPD COVID-19 SPEC COLLECT: HCPCS

## 2021-04-30 PROCEDURE — U0005 INFEC AGEN DETEC AMPLI PROBE: HCPCS

## 2021-05-01 LAB — SARS-COV-2 ORF1AB RESP QL NAA+PROBE: NOT DETECTED

## 2021-05-02 ENCOUNTER — HOSPITAL ENCOUNTER (OUTPATIENT)
Dept: SLEEP MEDICINE | Facility: HOSPITAL | Age: 81
Discharge: HOME | End: 2021-05-02
Admitting: INTERNAL MEDICINE

## 2021-05-02 DIAGNOSIS — I10 ESSENTIAL HYPERTENSION: ICD-10-CM

## 2021-05-02 DIAGNOSIS — T50.905A CENTRAL SLEEP APNEA DUE TO DRUG: ICD-10-CM

## 2021-05-02 DIAGNOSIS — G47.31 CENTRAL SLEEP APNEA DUE TO DRUG: ICD-10-CM

## 2021-05-02 DIAGNOSIS — G47.33 OSA (OBSTRUCTIVE SLEEP APNEA): ICD-10-CM

## 2021-05-02 PROCEDURE — 95811 POLYSOM 6/>YRS CPAP 4/> PARM: CPT

## 2021-05-05 ENCOUNTER — OFFICE VISIT (OUTPATIENT)
Dept: PAIN MEDICINE | Facility: CLINIC | Age: 81
End: 2021-05-05

## 2021-05-05 VITALS
HEIGHT: 57 IN | BODY MASS INDEX: 35.6 KG/M2 | WEIGHT: 165 LBS | HEART RATE: 77 BPM | RESPIRATION RATE: 18 BRPM | TEMPERATURE: 96.4 F | SYSTOLIC BLOOD PRESSURE: 156 MMHG | OXYGEN SATURATION: 96 % | DIASTOLIC BLOOD PRESSURE: 82 MMHG

## 2021-05-05 DIAGNOSIS — M25.50 CHRONIC PAIN OF MULTIPLE JOINTS: Primary | ICD-10-CM

## 2021-05-05 DIAGNOSIS — Z79.02 LONG TERM (CURRENT) USE OF ANTITHROMBOTICS/ANTIPLATELETS: ICD-10-CM

## 2021-05-05 DIAGNOSIS — M15.9 PRIMARY OSTEOARTHRITIS INVOLVING MULTIPLE JOINTS: ICD-10-CM

## 2021-05-05 DIAGNOSIS — M25.571 CHRONIC PAIN OF RIGHT ANKLE: ICD-10-CM

## 2021-05-05 DIAGNOSIS — M25.551 CHRONIC RIGHT HIP PAIN: ICD-10-CM

## 2021-05-05 DIAGNOSIS — G89.29 CHRONIC PAIN OF RIGHT KNEE: ICD-10-CM

## 2021-05-05 DIAGNOSIS — G89.4 CHRONIC PAIN DISORDER: ICD-10-CM

## 2021-05-05 DIAGNOSIS — G89.29 CHRONIC BILATERAL LOW BACK PAIN WITH RIGHT-SIDED SCIATICA: ICD-10-CM

## 2021-05-05 DIAGNOSIS — G89.29 CHRONIC PAIN OF MULTIPLE JOINTS: Primary | ICD-10-CM

## 2021-05-05 DIAGNOSIS — M25.561 CHRONIC PAIN OF RIGHT KNEE: ICD-10-CM

## 2021-05-05 DIAGNOSIS — M54.41 CHRONIC BILATERAL LOW BACK PAIN WITH RIGHT-SIDED SCIATICA: ICD-10-CM

## 2021-05-05 DIAGNOSIS — G89.29 CHRONIC PAIN OF RIGHT ANKLE: ICD-10-CM

## 2021-05-05 DIAGNOSIS — G89.29 CHRONIC RIGHT HIP PAIN: ICD-10-CM

## 2021-05-05 PROCEDURE — 99214 OFFICE O/P EST MOD 30 MIN: CPT | Performed by: NURSE PRACTITIONER

## 2021-05-05 RX ORDER — HYDROCODONE BITARTRATE AND ACETAMINOPHEN 10; 325 MG/1; MG/1
1 TABLET ORAL 2 TIMES DAILY PRN
Qty: 60 TABLET | Refills: 0 | Status: SHIPPED | OUTPATIENT
Start: 2021-05-05 | End: 2021-06-11 | Stop reason: SDUPTHER

## 2021-05-05 NOTE — PROGRESS NOTES
CHIEF COMPLAINT  Back pain is unchanged since last visit    Subjective   Alice Muse is a 81 y.o. female  who presents for follow-up.  She has a history of back pain, joint pain.    Patient has been seeing Dr. Ortiz for the past few months.  She was a new patient here to 2/18/2021.  Referred to us for her chronic back and joint pain.  Reports that she has had these issues for years.  History of use of fentanyl patches which helped her pain quite a lot.  Reported no benefit with hydrocodone 5-3 25 or with tramadol.  She is also been treated with a variety of injections with little benefit (lumbar, genicular nerve).      C/o back pain.  Pain today 7/10 in severity.  At the previous office visit, Dr. Ortiz trailed the patient on Hydrocodone 10/325.  Patient states that while taking it twice daily it really seemed to help. She was not given sufficient quantity to take bid every day and has been out of medication for a while now.  Reports that while taking the medication it was providing moderate relief of her pain. Denies any adverse reactions including somnolence or constipation.      Sleep apnea issues - Had new sleep study a few days ago. Has not received result yet, goal is to get a new more comfortable CPAP system.       [facility] - Possible that she has some type of lupus spectrum disorder.  Says she was prescribed Plaquenil which they have yet to start.      Patient remained masked during entire encounter. No cough present. I donned a mask and eye protection throughout entire visit. Prior to donning mask and eye protection, hand hygiene was performed, as well as when it was doffed.  I was closer than 6 feet, but not for an extended period of time. No obvious exposure to any bodily fluids.    Back Pain  This is a chronic problem. The current episode started more than 1 year ago. The problem occurs daily. The pain is present in the lumbar spine. The quality of the pain is described as aching and  burning. The pain does not radiate. The pain is at a severity of 7/10. The symptoms are aggravated by position and standing. Pertinent negatives include no chest pain, dysuria, headaches, numbness or weakness. Risk factors include obesity, sedentary lifestyle and lack of exercise. She has tried analgesics, heat and home exercises for the symptoms.   Joint Pain  This is a chronic problem. The current episode started more than 1 year ago. The problem occurs daily. Associated symptoms include arthralgias. Pertinent negatives include no chest pain, chills, headaches, nausea, numbness or weakness. The symptoms are aggravated by walking. She has tried oral narcotics and NSAIDs for the symptoms. The treatment provided mild relief.      PEG Assessment   What number best describes your pain on average in the past week?4  What number best describes how, during the past week, pain has interfered with your enjoyment of life?4  What number best describes how, during the past week, pain has interfered with your general activity?  5    The following portions of the patient's history were reviewed and updated as appropriate: allergies, current medications, past family history, past medical history, past social history, past surgical history and problem list.    Review of Systems   Constitutional: Negative for chills and unexpected weight change.   Respiratory: Negative for shortness of breath.    Cardiovascular: Negative for chest pain.   Gastrointestinal: Negative for constipation, diarrhea, nausea and rectal pain.   Genitourinary: Negative for difficulty urinating, dyspareunia and dysuria.   Musculoskeletal: Positive for arthralgias and back pain.   Neurological: Negative for dizziness, weakness, light-headedness, numbness and headaches.   Psychiatric/Behavioral: Negative for confusion, hallucinations, self-injury, sleep disturbance and suicidal ideas. The patient is not nervous/anxious.    All other systems reviewed and are  "negative.    I have reviewed and confirmed the accuracy of the ROS as documented by the MA/LPN/RN Lulumedhat GibsonANTONY    Vitals:    05/05/21 1530   BP: 156/82   Pulse: 77   Resp: 18   Temp: 96.4 °F (35.8 °C)   SpO2: 96%   Weight: 74.8 kg (165 lb)   Height: 145 cm (57.09\")   PainSc:   7   PainLoc: Back     Objective   Physical Exam  Vitals and nursing note reviewed.   Constitutional:       General: She is not in acute distress.     Appearance: Normal appearance. She is not ill-appearing.   Cardiovascular:      Rate and Rhythm: Normal rate.   Pulmonary:      Effort: Pulmonary effort is normal. No respiratory distress.   Musculoskeletal:      Lumbar back: Tenderness and bony tenderness present.      Right knee: Bony tenderness present.      Left knee: Bony tenderness present.   Skin:     General: Skin is warm and dry.   Neurological:      Mental Status: She is alert and oriented to person, place, and time.      Gait: Gait abnormal (wheelchair).   Psychiatric:         Mood and Affect: Mood normal.         Behavior: Behavior normal.       Assessment/Plan   Diagnoses and all orders for this visit:    1. Chronic pain of multiple joints (Primary)    2. Chronic bilateral low back pain with right-sided sciatica  -     HYDROcodone-acetaminophen (NORCO)  MG per tablet; Take 1 tablet by mouth 2 (Two) Times a Day As Needed for Moderate Pain . Doses at least 6 hours apart.  Dispense: 60 tablet; Refill: 0    3. Chronic pain disorder  -     HYDROcodone-acetaminophen (NORCO)  MG per tablet; Take 1 tablet by mouth 2 (Two) Times a Day As Needed for Moderate Pain . Doses at least 6 hours apart.  Dispense: 60 tablet; Refill: 0    4. Long term (current) use of antithrombotics/antiplatelets    5. Primary osteoarthritis involving multiple joints  -     HYDROcodone-acetaminophen (NORCO)  MG per tablet; Take 1 tablet by mouth 2 (Two) Times a Day As Needed for Moderate Pain . Doses at least 6 hours apart.  Dispense: 60 " tablet; Refill: 0    6. Chronic right hip pain  -     HYDROcodone-acetaminophen (NORCO)  MG per tablet; Take 1 tablet by mouth 2 (Two) Times a Day As Needed for Moderate Pain . Doses at least 6 hours apart.  Dispense: 60 tablet; Refill: 0    7. Chronic pain of right knee  -     HYDROcodone-acetaminophen (NORCO)  MG per tablet; Take 1 tablet by mouth 2 (Two) Times a Day As Needed for Moderate Pain . Doses at least 6 hours apart.  Dispense: 60 tablet; Refill: 0    8. Chronic pain of right ankle  -     HYDROcodone-acetaminophen (NORCO)  MG per tablet; Take 1 tablet by mouth 2 (Two) Times a Day As Needed for Moderate Pain . Doses at least 6 hours apart.  Dispense: 60 tablet; Refill: 0      --- Refill Hydrocodone. Increase quantity to #60. Patient appears stable with current regimen. No adverse effects. Regarding continuation of opioids, there is no evidence of aberrant behavior or any red flags.  The patient continues with appropriate response to opioid therapy. ADL's remain intact by self.   --- The urine drug screen confirmation from 2/18/2021 has been reviewed and the result is appropriate based on patient history and FARZANEH report  --- The patient signed an updated copy of the controlled substance agreement on UPDATE NEXT VISIT   --- Follow-up 1 month          FARZANEH REPORT  As part of the patient's treatment plan, I am prescribing controlled substances. The patient has been made aware of appropriate use of such medications, including potential risk of somnolence, limited ability to drive and/or work safely, and the potential for dependence or overdose. It has also bee made clear that these medications are for use by this patient only, without concomitant use of alcohol or other substances unless prescribed.     Patient has completed prescribing agreement detailing terms of continued prescribing of controlled substances, including monitoring FARZANEH reports, urine drug screening, and pill counts if  necessary. The patient is aware that inappropriate use will results in cessation of prescribing such medications.    FARZANEH report has been reviewed and scanned into the patient's chart.    As the clinician, I personally reviewed the FARZANEH from 5/5/2021 while the patient was in the office today.    History and physical exam exhibit continued safe and appropriate use of controlled substances.    EMR Dragon/Transcription disclaimer:   Much of this encounter note is an electronic transcription/translation of spoken language to printed text. The electronic translation of spoken language may permit erroneous, or at times, nonsensical words or phrases to be inadvertently transcribed; Although I have reviewed the note for such errors, some may still exist.

## 2021-05-10 ENCOUNTER — OFFICE VISIT (OUTPATIENT)
Dept: FAMILY MEDICINE CLINIC | Facility: CLINIC | Age: 81
End: 2021-05-10

## 2021-05-10 VITALS
HEIGHT: 57 IN | HEART RATE: 91 BPM | SYSTOLIC BLOOD PRESSURE: 154 MMHG | BODY MASS INDEX: 34.73 KG/M2 | WEIGHT: 161 LBS | OXYGEN SATURATION: 96 % | DIASTOLIC BLOOD PRESSURE: 80 MMHG | TEMPERATURE: 97.8 F

## 2021-05-10 DIAGNOSIS — M25.50 CHRONIC PAIN OF MULTIPLE JOINTS: ICD-10-CM

## 2021-05-10 DIAGNOSIS — R76.8 ELEVATED ANTINUCLEAR ANTIBODY (ANA) LEVEL: ICD-10-CM

## 2021-05-10 DIAGNOSIS — M54.41 CHRONIC BILATERAL LOW BACK PAIN WITH RIGHT-SIDED SCIATICA: ICD-10-CM

## 2021-05-10 DIAGNOSIS — E78.2 MIXED HYPERLIPIDEMIA: ICD-10-CM

## 2021-05-10 DIAGNOSIS — G89.29 CHRONIC PAIN OF MULTIPLE JOINTS: ICD-10-CM

## 2021-05-10 DIAGNOSIS — D69.6 THROMBOCYTOPENIA (HCC): ICD-10-CM

## 2021-05-10 DIAGNOSIS — I10 ESSENTIAL HYPERTENSION: Primary | ICD-10-CM

## 2021-05-10 DIAGNOSIS — D89.2 HYPERGAMMAGLOBULINEMIA: ICD-10-CM

## 2021-05-10 DIAGNOSIS — M15.9 PRIMARY OSTEOARTHRITIS INVOLVING MULTIPLE JOINTS: ICD-10-CM

## 2021-05-10 DIAGNOSIS — G89.4 CHRONIC PAIN DISORDER: ICD-10-CM

## 2021-05-10 DIAGNOSIS — G89.29 CHRONIC BILATERAL LOW BACK PAIN WITH RIGHT-SIDED SCIATICA: ICD-10-CM

## 2021-05-10 DIAGNOSIS — E11.40 TYPE 2 DIABETES MELLITUS WITH DIABETIC NEUROPATHY, WITHOUT LONG-TERM CURRENT USE OF INSULIN (HCC): ICD-10-CM

## 2021-05-10 PROCEDURE — 99214 OFFICE O/P EST MOD 30 MIN: CPT | Performed by: FAMILY MEDICINE

## 2021-05-10 RX ORDER — HYDROXYCHLOROQUINE SULFATE 200 MG/1
1.5 TABLET, FILM COATED ORAL
COMMUNITY
Start: 2021-05-03

## 2021-05-10 RX ORDER — HYDROXYCHLOROQUINE SULFATE 200 MG/1
300 TABLET, FILM COATED ORAL DAILY
COMMUNITY
Start: 2021-05-03 | End: 2021-05-10 | Stop reason: SDUPTHER

## 2021-05-10 RX ORDER — BENAZEPRIL HYDROCHLORIDE 40 MG/1
40 TABLET, FILM COATED ORAL DAILY
Qty: 90 TABLET | Refills: 3 | Status: SHIPPED | OUTPATIENT
Start: 2021-05-10 | End: 2021-08-06 | Stop reason: SDUPTHER

## 2021-05-10 NOTE — PROGRESS NOTES
Subjective   Alice Muse is a 81 y.o. female.     Chief Complaint   Patient presents with   • Follow-up       History of Present Illness   Diabetes-doing well on medication, no lows, does have neuropathy controlled with Cymbalta and gabapentin.  Last labs were ok.      Anxiety-well-controlled with Cymbalta.     Hypertension-having highs on meds.      Hyperlipidemia-well-controlled on medicine, no muscle aches     Has history of chronic pain and osteoarthritis as well as residual hemiparesis from a stroke in 2012.   Pain medicine helps her do her activities of daily living but tramadol not helping anymore.  Has been referred to an orthopedic doctor. He advised PT but she refused. Voltaren gel did not help. Has been on fentanyl in the past and has been seeing pain management.     Is being evaluated by heme/onc for platelet issues as well as high protein.     Patient has seen a rheumatologist for positive SANIA done by her hematologist and although she does not have all of the qualifying criteria he was wanting to start her on Plaquenil for a lupus-like syndrome. She is tolerating this well. She thinks its helping her but cant tell me how.     The following portions of the patient's history were reviewed and updated as appropriate: allergies, current medications, past family history, past medical history, past social history, past surgical history and problem list.    Past Medical History:   Diagnosis Date   • Anxiety    • Depression    • Diabetes mellitus (CMS/HCC)    • Hyperlipidemia    • Hypertension    • Neuropathy    • Osteoarthritis    • Sleep apnea    • Stroke (CMS/HCC)        Past Surgical History:   Procedure Laterality Date   • APPENDECTOMY     • CHOLECYSTECTOMY     • HIP SURGERY Right 07/2020    Genicular Nerve block performed by Ran Jackson MD at [facility]     • JOINT REPLACEMENT     • OOPHORECTOMY     • TOTAL HIP ARTHROPLASTY Left 1997   • TUBAL ABDOMINAL LIGATION         Family  "History   Problem Relation Age of Onset   • Arthritis Mother    • Hypertension Mother    • Colon cancer Mother    • Stroke Father    • Diabetes Father        Social History     Socioeconomic History   • Marital status:      Spouse name: Not on file   • Number of children: Not on file   • Years of education: Not on file   • Highest education level: Not on file   Tobacco Use   • Smoking status: Never Smoker   • Smokeless tobacco: Never Used   Vaping Use   • Vaping Use: Never used   Substance and Sexual Activity   • Alcohol use: Never   • Drug use: Never   • Sexual activity: Defer       Review of Systems   Constitutional: Negative for fever.   Eyes: Negative for visual disturbance.   Respiratory: Negative for shortness of breath.    Cardiovascular: Negative for chest pain.       Objective   Visit Vitals  /80 (BP Location: Left arm, Patient Position: Sitting)   Pulse 91   Temp 97.8 °F (36.6 °C)   Ht 145 cm (57.09\")   Wt 73 kg (161 lb)   SpO2 96%   BMI 34.73 kg/m²     Body mass index is 34.73 kg/m².  Physical Exam  Constitutional:       Appearance: Normal appearance. She is well-developed.   Cardiovascular:      Rate and Rhythm: Normal rate and regular rhythm.      Heart sounds: Normal heart sounds.   Pulmonary:      Effort: Pulmonary effort is normal.      Breath sounds: Normal breath sounds.   Musculoskeletal:         General: No swelling. Normal range of motion.   Skin:     General: Skin is warm and dry.      Findings: No rash.   Neurological:      General: No focal deficit present.      Mental Status: She is alert and oriented to person, place, and time.   Psychiatric:         Mood and Affect: Mood normal.         Behavior: Behavior normal.           Assessment/Plan   Diagnoses and all orders for this visit:    1. Essential hypertension (Primary)  -     benazepril (LOTENSIN) 40 MG tablet; Take 1 tablet by mouth Daily.  Dispense: 90 tablet; Refill: 3    2. Mixed hyperlipidemia    3. Thrombocytopenia " (CMS/[facility])    4. Type 2 diabetes mellitus with diabetic neuropathy, without long-term current use of insulin (CMS/[facility])  -     Comprehensive Metabolic Panel  -     Lipid Panel  -     Hemoglobin A1c    5. Hypergammaglobulinemia    6. Elevated antinuclear antibody (SANIA) level    7. Primary osteoarthritis involving multiple joints    8. Chronic pain of multiple joints    9. Chronic bilateral low back pain with right-sided sciatica    10. Chronic pain disorder             Pt will monitor BP for me and call me for highs or lows. Increased ace and f/u in 1 month.

## 2021-05-11 DIAGNOSIS — E11.40 TYPE 2 DIABETES MELLITUS WITH DIABETIC NEUROPATHY, WITHOUT LONG-TERM CURRENT USE OF INSULIN (HCC): Primary | ICD-10-CM

## 2021-05-11 DIAGNOSIS — N18.9 CHRONIC KIDNEY DISEASE, UNSPECIFIED CKD STAGE: ICD-10-CM

## 2021-05-11 LAB
ALBUMIN SERPL-MCNC: 4.2 G/DL (ref 3.5–5.2)
ALBUMIN/GLOB SERPL: 1.2 G/DL
ALP SERPL-CCNC: 69 U/L (ref 39–117)
ALT SERPL-CCNC: 13 U/L (ref 1–33)
AST SERPL-CCNC: 12 U/L (ref 1–32)
BILIRUB SERPL-MCNC: 0.4 MG/DL (ref 0–1.2)
BUN SERPL-MCNC: 24 MG/DL (ref 8–23)
BUN/CREAT SERPL: 12.8 (ref 7–25)
CALCIUM SERPL-MCNC: 9.3 MG/DL (ref 8.6–10.5)
CHLORIDE SERPL-SCNC: 107 MMOL/L (ref 98–107)
CHOLEST SERPL-MCNC: 140 MG/DL (ref 0–200)
CO2 SERPL-SCNC: 25.7 MMOL/L (ref 22–29)
CREAT SERPL-MCNC: 1.88 MG/DL (ref 0.57–1)
GLOBULIN SER CALC-MCNC: 3.5 GM/DL
GLUCOSE SERPL-MCNC: 140 MG/DL (ref 65–99)
HBA1C MFR BLD: 7.1 % (ref 4.8–5.6)
HDLC SERPL-MCNC: 45 MG/DL (ref 40–60)
LDLC SERPL CALC-MCNC: 62 MG/DL (ref 0–100)
POTASSIUM SERPL-SCNC: 4.9 MMOL/L (ref 3.5–5.2)
PROT SERPL-MCNC: 7.7 G/DL (ref 6–8.5)
SODIUM SERPL-SCNC: 140 MMOL/L (ref 136–145)
TRIGL SERPL-MCNC: 198 MG/DL (ref 0–150)
VLDLC SERPL CALC-MCNC: 33 MG/DL (ref 5–40)

## 2021-05-18 ENCOUNTER — TELEPHONE (OUTPATIENT)
Dept: SLEEP MEDICINE | Facility: HOSPITAL | Age: 81
End: 2021-05-18

## 2021-05-18 NOTE — TELEPHONE ENCOUNTER
Spoke with patients son, reviewed sleep study results, sending order to [facility], follow up scheduled 8/6/2021

## 2021-05-19 ENCOUNTER — TELEPHONE (OUTPATIENT)
Dept: SLEEP MEDICINE | Facility: HOSPITAL | Age: 81
End: 2021-05-19

## 2021-05-19 NOTE — TELEPHONE ENCOUNTER
Patient was set up on CPAP on CPAP last year through [facility]. They gave her a fixed CPAP. I spoke with [facility] and they will replace her current CPAP with Auto per Dr Dorsey prescription . Notified patients son of change , he is ok with that .

## 2021-06-02 ENCOUNTER — APPOINTMENT (OUTPATIENT)
Dept: LAB | Facility: HOSPITAL | Age: 81
End: 2021-06-02

## 2021-06-08 ENCOUNTER — TELEPHONE (OUTPATIENT)
Dept: PAIN MEDICINE | Facility: CLINIC | Age: 81
End: 2021-06-08

## 2021-06-08 DIAGNOSIS — M25.571 CHRONIC PAIN OF RIGHT ANKLE: ICD-10-CM

## 2021-06-08 DIAGNOSIS — G89.4 CHRONIC PAIN DISORDER: ICD-10-CM

## 2021-06-08 DIAGNOSIS — G89.29 CHRONIC PAIN OF RIGHT ANKLE: ICD-10-CM

## 2021-06-08 DIAGNOSIS — M25.561 CHRONIC PAIN OF RIGHT KNEE: ICD-10-CM

## 2021-06-08 DIAGNOSIS — G89.29 CHRONIC RIGHT HIP PAIN: ICD-10-CM

## 2021-06-08 DIAGNOSIS — M25.551 CHRONIC RIGHT HIP PAIN: ICD-10-CM

## 2021-06-08 DIAGNOSIS — M54.41 CHRONIC BILATERAL LOW BACK PAIN WITH RIGHT-SIDED SCIATICA: ICD-10-CM

## 2021-06-08 DIAGNOSIS — G89.29 CHRONIC BILATERAL LOW BACK PAIN WITH RIGHT-SIDED SCIATICA: ICD-10-CM

## 2021-06-08 DIAGNOSIS — M15.9 PRIMARY OSTEOARTHRITIS INVOLVING MULTIPLE JOINTS: ICD-10-CM

## 2021-06-08 DIAGNOSIS — G89.29 CHRONIC PAIN OF RIGHT KNEE: ICD-10-CM

## 2021-06-08 RX ORDER — HYDROCODONE BITARTRATE AND ACETAMINOPHEN 10; 325 MG/1; MG/1
1 TABLET ORAL 2 TIMES DAILY PRN
Qty: 60 TABLET | Refills: 0 | Status: CANCELLED | OUTPATIENT
Start: 2021-06-08

## 2021-06-08 NOTE — TELEPHONE ENCOUNTER
Caller: DELL RAYMOND     Relationship: SON    Best call back number: [phone]    Medication needed: HYDROCODONE 10-325MG    When do you need the refill by: ASAP     What additional details did the patient provide when requesting the medication:  PT IS OUT OF MEDICATION , PLEASE CALL PT KARIE SHARPE WHEN REFILL SENT IN     Does the patient have less than a 3 day supply:  [x] Yes  [] No    What is the patient's preferred pharmacy: [facility]

## 2021-06-08 NOTE — TELEPHONE ENCOUNTER
Medication Refill Request    Date of phone call: 21    Medication being requested: Norco  mg  si tab po bid prn  Qty: 60    Date of last visit: 21    Date of last refill:     AFRZANEH up to date?: no    Next Follow up?: none scheduled    Any new pertinent information? (i.e, new medication allergies, new use of medications, change in patient's health or condition, non-compliance or inconsistency with prescribing agreement?):

## 2021-06-09 ENCOUNTER — APPOINTMENT (OUTPATIENT)
Dept: LAB | Facility: HOSPITAL | Age: 81
End: 2021-06-09

## 2021-06-11 ENCOUNTER — OFFICE VISIT (OUTPATIENT)
Dept: PAIN MEDICINE | Facility: CLINIC | Age: 81
End: 2021-06-11

## 2021-06-11 VITALS
HEIGHT: 57 IN | SYSTOLIC BLOOD PRESSURE: 151 MMHG | BODY MASS INDEX: 34.73 KG/M2 | TEMPERATURE: 97.1 F | OXYGEN SATURATION: 95 % | HEART RATE: 81 BPM | DIASTOLIC BLOOD PRESSURE: 77 MMHG | RESPIRATION RATE: 18 BRPM

## 2021-06-11 DIAGNOSIS — G89.29 OTHER CHRONIC PAIN: Primary | ICD-10-CM

## 2021-06-11 DIAGNOSIS — M15.9 PRIMARY OSTEOARTHRITIS INVOLVING MULTIPLE JOINTS: ICD-10-CM

## 2021-06-11 DIAGNOSIS — G89.29 CHRONIC BILATERAL LOW BACK PAIN WITH RIGHT-SIDED SCIATICA: ICD-10-CM

## 2021-06-11 DIAGNOSIS — M54.41 CHRONIC BILATERAL LOW BACK PAIN WITH RIGHT-SIDED SCIATICA: ICD-10-CM

## 2021-06-11 DIAGNOSIS — Z79.899 HIGH RISK MEDICATION USE: ICD-10-CM

## 2021-06-11 PROCEDURE — 99214 OFFICE O/P EST MOD 30 MIN: CPT | Performed by: NURSE PRACTITIONER

## 2021-06-11 RX ORDER — HYDROCODONE BITARTRATE AND ACETAMINOPHEN 10; 325 MG/1; MG/1
1 TABLET ORAL 2 TIMES DAILY PRN
Qty: 60 TABLET | Refills: 0 | Status: SHIPPED | OUTPATIENT
Start: 2021-06-11 | End: 2021-07-15 | Stop reason: SDUPTHER

## 2021-06-11 NOTE — PROGRESS NOTES
CHIEF COMPLAINT  Follow-up for back and joint pain.    Subjective   Alice Muse is a 81 y.o. female  who presents for follow-up.  She has a history of chronic back and joint pain.  Son present for visit, he does help with history.     She was a new patient to us 2/18/2021, Dr. Ortiz.  Referred to us for her chronic back and joint pain.  Reports that she has had these issues for years.  History of use of fentanyl patches which helped her pain quite a lot.  Reported no benefit with hydrocodone 5-3 25 or with tramadol. She is also been treated with a variety of injections with little benefit (lumbar, genicular nerve).      C/o back pain.  Pain today 7/10 in severity.  Has been taking Hydrocodone 10/325 twice daily.  Patient states that while taking it twice daily it really seemed to help, she ran out of medication due to not having a follow up visit scheduled. Reports that while taking the medication it was providing moderate relief of her pain. Denies any adverse reactions including somnolence or constipation.      Sleep apnea issues - Had new sleep study. Goal is to get a new more comfortable CPAP system.       [facility] - Possible that she has some type of lupus spectrum disorder.  Says she was prescribed Plaquenil.      Patient remained masked during entire encounter. No cough present. I donned a mask and eye protection throughout entire visit. Prior to donning mask and eye protection, hand hygiene was performed, as well as when it was doffed.  I was closer than 6 feet, but not for an extended period of time. No obvious exposure to any bodily fluids.    Back Pain  This is a chronic problem. The current episode started more than 1 year ago. The problem occurs daily. The pain is present in the lumbar spine. The quality of the pain is described as aching and burning. The pain does not radiate. The pain is at a severity of 7/10. The symptoms are aggravated by position and standing. Pertinent  negatives include no chest pain, dysuria, headaches, numbness or weakness. Risk factors include obesity, sedentary lifestyle and lack of exercise. She has tried analgesics, heat and home exercises for the symptoms.   Joint Pain  This is a chronic problem. The current episode started more than 1 year ago. The problem occurs daily. Associated symptoms include arthralgias and fatigue. Pertinent negatives include no chest pain, chills, congestion, coughing, headaches, nausea, numbness or weakness. The symptoms are aggravated by walking. She has tried oral narcotics and NSAIDs for the symptoms. The treatment provided mild relief.     PEG Assessment   What number best describes your pain on average in the past week?8  What number best describes how, during the past week, pain has interfered with your enjoyment of life?9  What number best describes how, during the past week, pain has interfered with your general activity?  8    The following portions of the patient's history were reviewed and updated as appropriate: allergies, current medications, past family history, past medical history, past social history, past surgical history and problem list.    Review of Systems   Constitutional: Positive for fatigue. Negative for chills.   HENT: Negative for congestion.    Eyes: Negative for visual disturbance.   Respiratory: Positive for shortness of breath and wheezing. Negative for cough.    Cardiovascular: Positive for leg swelling. Negative for chest pain and palpitations.   Gastrointestinal: Negative for constipation, diarrhea and nausea.   Genitourinary: Negative for difficulty urinating and dysuria.   Musculoskeletal: Positive for arthralgias and back pain.   Neurological: Negative for weakness, numbness and headaches.   Psychiatric/Behavioral: Negative for sleep disturbance and suicidal ideas. The patient is nervous/anxious.      I have reviewed and confirmed the accuracy of the ROS as documented by the MA/LPN/RN  "ANTONY Caldwell    Vitals:    06/11/21 1411   BP: 151/77   Pulse: 81   Resp: 18   Temp: 97.1 °F (36.2 °C)   SpO2: 95%   Height: 145 cm (57.09\")   PainSc:   7   PainLoc: Generalized     Objective   Physical Exam  Vitals and nursing note reviewed.   Constitutional:       General: She is not in acute distress.     Appearance: Normal appearance. She is not ill-appearing.   Cardiovascular:      Rate and Rhythm: Normal rate.   Pulmonary:      Effort: Pulmonary effort is normal. No respiratory distress.   Musculoskeletal:      Lumbar back: Tenderness and bony tenderness present.      Right knee: Bony tenderness present.      Left knee: Bony tenderness present.   Skin:     General: Skin is warm and dry.   Neurological:      Mental Status: She is alert and oriented to person, place, and time.      Gait: Gait abnormal (wheelchair).   Psychiatric:         Mood and Affect: Mood normal.         Behavior: Behavior normal.       Assessment/Plan   Diagnoses and all orders for this visit:    1. Other chronic pain (Primary)    2. Primary osteoarthritis involving multiple joints  -     HYDROcodone-acetaminophen (NORCO)  MG per tablet; Take 1 tablet by mouth 2 (Two) Times a Day As Needed for Moderate Pain . Doses at least 6 hours apart.  Dispense: 60 tablet; Refill: 0    3. Chronic bilateral low back pain with right-sided sciatica  -     HYDROcodone-acetaminophen (NORCO)  MG per tablet; Take 1 tablet by mouth 2 (Two) Times a Day As Needed for Moderate Pain . Doses at least 6 hours apart.  Dispense: 60 tablet; Refill: 0    4. High risk medication use      --- Refill Hydrocodone. Patient appears stable with current regimen. No adverse effects. Regarding continuation of opioids, there is no evidence of aberrant behavior or any red flags.  The patient continues with appropriate response to opioid therapy. ADL's remain intact by self.   --- The urine drug screen confirmation from 2/18/2021 has been reviewed and the " result is appropriate based on patient history and FARZANEH report  --- The patient signed an updated copy of the controlled substance agreement on 6/11/2021  --- Follow-up 1 month        FARZANEH REPORT  As part of the patient's treatment plan, I am prescribing controlled substances. The patient has been made aware of appropriate use of such medications, including potential risk of somnolence, limited ability to drive and/or work safely, and the potential for dependence or overdose. It has also bee made clear that these medications are for use by this patient only, without concomitant use of alcohol or other substances unless prescribed.     Patient has completed prescribing agreement detailing terms of continued prescribing of controlled substances, including monitoring FARZANEH reports, urine drug screening, and pill counts if necessary. The patient is aware that inappropriate use will results in cessation of prescribing such medications.    As the clinician, I personally reviewed the FARZANEH from 6/11/2021 while the patient was in the office today.    History and physical exam exhibit continued safe and appropriate use of controlled substances.    EMR Dragon/Transcription disclaimer:   Much of this encounter note is an electronic transcription/translation of spoken language to printed text. The electronic translation of spoken language may permit erroneous, or at times, nonsensical words or phrases to be inadvertently transcribed; Although I have reviewed the note for such errors, some may still exist.

## 2021-06-16 RX ORDER — DULOXETIN HYDROCHLORIDE 60 MG/1
60 CAPSULE, DELAYED RELEASE ORAL DAILY
Qty: 90 CAPSULE | Refills: 3 | Status: SHIPPED | OUTPATIENT
Start: 2021-06-16 | End: 2022-06-14

## 2021-06-17 ENCOUNTER — TELEPHONE (OUTPATIENT)
Dept: FAMILY MEDICINE CLINIC | Facility: CLINIC | Age: 81
End: 2021-06-17

## 2021-06-17 NOTE — TELEPHONE ENCOUNTER
PATIENTS SON CALLED AND INQUIRED IF ANY MEDICATIONS HAVE BEEN DISCONTINUED.     PATIENTS SON STATED THAT ONE OF THE MEDICATIONS IS AFFECTING HER KIDNEYS.      PATIENTS LAST LAB INDICATES THAT ONE OF THE MEDICATIONS IS CAUSING A PROBLEM.    PLEASE ADVISE AND CALL THE SON    CALL BACK #:: [phone]

## 2021-06-18 ENCOUNTER — TELEPHONE (OUTPATIENT)
Dept: FAMILY MEDICINE CLINIC | Facility: CLINIC | Age: 81
End: 2021-06-18

## 2021-06-18 NOTE — TELEPHONE ENCOUNTER
Tried calling her son back and [facility] for him to return call      I have tried to call you and was not able to get in touch.  It appears that your kidneys are little worse than they have been.  I will get you an appointment with the kidney doctor.  Please stop taking the Celebrex as this can also affect your kidney function.  You can still keep taking the Voltaren gel.      Ok for hub to read

## 2021-06-23 ENCOUNTER — APPOINTMENT (OUTPATIENT)
Dept: LAB | Facility: HOSPITAL | Age: 81
End: 2021-06-23

## 2021-07-15 ENCOUNTER — TELEPHONE (OUTPATIENT)
Dept: PAIN MEDICINE | Facility: CLINIC | Age: 81
End: 2021-07-15

## 2021-07-15 DIAGNOSIS — G89.29 CHRONIC BILATERAL LOW BACK PAIN WITH RIGHT-SIDED SCIATICA: ICD-10-CM

## 2021-07-15 DIAGNOSIS — M15.9 PRIMARY OSTEOARTHRITIS INVOLVING MULTIPLE JOINTS: ICD-10-CM

## 2021-07-15 DIAGNOSIS — M54.41 CHRONIC BILATERAL LOW BACK PAIN WITH RIGHT-SIDED SCIATICA: ICD-10-CM

## 2021-07-15 RX ORDER — HYDROCODONE BITARTRATE AND ACETAMINOPHEN 10; 325 MG/1; MG/1
1 TABLET ORAL 2 TIMES DAILY PRN
Qty: 60 TABLET | Refills: 0 | Status: SHIPPED | OUTPATIENT
Start: 2021-07-15 | End: 2021-08-10 | Stop reason: SDUPTHER

## 2021-07-15 NOTE — TELEPHONE ENCOUNTER
Medication Refill Request    Date of phone call: 7/15/21    Medication being requested: norco 10/325mg si tab po bid prn   Qty: 60    Date of last visit: 21    Date of last refill:     FARZANEH up to date?:     Next Follow up?: 8/10/21    Any new pertinent information? (i.e, new medication allergies, new use of medications, change in patient's health or condition, non-compliance or inconsistency with prescribing agreement?):

## 2021-07-15 NOTE — TELEPHONE ENCOUNTER
Caller:  TAYLOR RAYMOND    Relationship: SELF    Best call back number: [phone]    Medication needed:   Requested Prescriptions     Pending Prescriptions Disp Refills   • HYDROcodone-acetaminophen (NORCO)  MG per tablet 60 tablet 0     Sig: Take 1 tablet by mouth 2 (Two) Times a Day As Needed for Moderate Pain . Doses at least 6 hours apart.       When do you need the refill by: ASAP    Does the patient have less than a 3 day supply:  [x] Yes  [] No

## 2021-08-02 RX ORDER — PRAVASTATIN SODIUM 40 MG
40 TABLET ORAL DAILY
Qty: 90 TABLET | Refills: 3 | Status: SHIPPED | OUTPATIENT
Start: 2021-08-02

## 2021-08-05 ENCOUNTER — TELEPHONE (OUTPATIENT)
Dept: FAMILY MEDICINE CLINIC | Facility: CLINIC | Age: 81
End: 2021-08-05

## 2021-08-05 NOTE — TELEPHONE ENCOUNTER
Caller: Shan Muse    Relationship: Emergency Contact    Best call back number: [phone]*    Medication needed: PATIENT'S SON, AND PATIENT IN THE BACKGROUND OF CALL COULD NOT ADVISE THE NAME OF THE MEDICATION. ONLY ADVISED THAT IT WAS A NEW BLOOD PRESSURE MEDICATION.    When do you need the refill by: ASAP    What additional details did the patient provide when requesting the medication: PATIENT HAS 1 TABLET REMAINING.    Does the patient have less than a 3 day supply:  [x] Yes  [] No    What is the patient's preferred pharmacy: [id number] [place] [id number] [facility] AT [facility] &  - [phone]  - [phone] FX

## 2021-08-06 DIAGNOSIS — I10 ESSENTIAL HYPERTENSION: ICD-10-CM

## 2021-08-06 RX ORDER — BENAZEPRIL HYDROCHLORIDE 40 MG/1
40 TABLET, FILM COATED ORAL DAILY
Qty: 90 TABLET | Refills: 3 | Status: SHIPPED | OUTPATIENT
Start: 2021-08-06

## 2021-08-10 ENCOUNTER — OFFICE VISIT (OUTPATIENT)
Dept: PAIN MEDICINE | Facility: CLINIC | Age: 81
End: 2021-08-10

## 2021-08-10 VITALS
TEMPERATURE: 96.8 F | RESPIRATION RATE: 16 BRPM | WEIGHT: 153 LBS | HEART RATE: 85 BPM | HEIGHT: 57 IN | SYSTOLIC BLOOD PRESSURE: 154 MMHG | BODY MASS INDEX: 33.01 KG/M2 | DIASTOLIC BLOOD PRESSURE: 84 MMHG | OXYGEN SATURATION: 94 %

## 2021-08-10 DIAGNOSIS — Z79.899 HIGH RISK MEDICATION USE: ICD-10-CM

## 2021-08-10 DIAGNOSIS — G89.29 CHRONIC BILATERAL LOW BACK PAIN WITH RIGHT-SIDED SCIATICA: ICD-10-CM

## 2021-08-10 DIAGNOSIS — M54.41 CHRONIC BILATERAL LOW BACK PAIN WITH RIGHT-SIDED SCIATICA: ICD-10-CM

## 2021-08-10 DIAGNOSIS — M15.9 PRIMARY OSTEOARTHRITIS INVOLVING MULTIPLE JOINTS: ICD-10-CM

## 2021-08-10 DIAGNOSIS — G89.29 OTHER CHRONIC PAIN: Primary | ICD-10-CM

## 2021-08-10 PROCEDURE — 99214 OFFICE O/P EST MOD 30 MIN: CPT | Performed by: NURSE PRACTITIONER

## 2021-08-10 RX ORDER — HYDROCODONE BITARTRATE AND ACETAMINOPHEN 10; 325 MG/1; MG/1
1 TABLET ORAL 2 TIMES DAILY PRN
Qty: 60 TABLET | Refills: 0 | Status: SHIPPED | OUTPATIENT
Start: 2021-08-10 | End: 2021-09-14 | Stop reason: SDUPTHER

## 2021-08-10 NOTE — PROGRESS NOTES
CHIEF COMPLAINT  F/u back and joint pain- patient states that her pain has improved since her last visit.     Subjective   Alice Muse is a 81 y.o. female  who presents for follow-up.  She has a history of chronic back and joint pain.  Son present for visit, he does help with history.      She has been treated with a variety of injections in the past with little benefit (lumbar, genicular nerve).       C/o back pain.  Pain today 7/10 in severity.  Has been taking Hydrocodone 10/325 twice daily.  Patient states that while taking it twice daily it really seems to help. Denies any adverse reactions including somnolence or constipation.       Sleep apnea issues - Had new sleep study. Goal is to get a new more comfortable CPAP system.        [facility] - Possible that she has some type of lupus spectrum disorder.  Says she was prescribed Plaquenil.      Patient remained masked during entire encounter. No cough present. I donned a mask and eye protection throughout entire visit. Prior to donning mask and eye protection, hand hygiene was performed, as well as when it was doffed.  I was closer than 6 feet, but not for an extended period of time. No obvious exposure to any bodily fluids.    Back Pain  This is a chronic problem. The current episode started more than 1 year ago. The problem occurs daily. The pain is present in the lumbar spine. The quality of the pain is described as aching and burning. The pain does not radiate. The pain is at a severity of 6/10. The symptoms are aggravated by position and standing. Associated symptoms include headaches. Pertinent negatives include no abdominal pain, chest pain, dysuria, fever, numbness or weakness. Risk factors include obesity, sedentary lifestyle and lack of exercise. She has tried analgesics, heat and home exercises for the symptoms.   Joint Pain  This is a chronic problem. The current episode started more than 1 year ago. The problem occurs daily.  "Associated symptoms include arthralgias and headaches. Pertinent negatives include no abdominal pain, chest pain, chills, congestion, coughing, fatigue, fever, nausea, numbness or weakness. The symptoms are aggravated by walking. She has tried oral narcotics and NSAIDs for the symptoms. The treatment provided mild relief.     PEG Assessment   What number best describes your pain on average in the past week?6  What number best describes how, during the past week, pain has interfered with your enjoyment of life?0  What number best describes how, during the past week, pain has interfered with your general activity?  7    The following portions of the patient's history were reviewed and updated as appropriate: allergies, current medications, past family history, past medical history, past social history, past surgical history and problem list.    Review of Systems   Constitutional: Negative for activity change, chills, fatigue and fever.   HENT: Negative for congestion.    Eyes: Negative for visual disturbance.   Respiratory: Negative for cough, chest tightness and shortness of breath.    Cardiovascular: Negative for chest pain.   Gastrointestinal: Negative for abdominal pain, constipation, diarrhea and nausea.   Genitourinary: Negative for difficulty urinating, dyspareunia and dysuria.   Musculoskeletal: Positive for arthralgias and back pain.   Neurological: Positive for headaches. Negative for dizziness, weakness, light-headedness and numbness.   Psychiatric/Behavioral: Positive for agitation and sleep disturbance. The patient is nervous/anxious.      I have reviewed and confirmed the accuracy of the ROS as documented by the MA/LPN/RN ANTONY Caldwell    Vitals:    08/10/21 1438   BP: 154/84   Pulse: 85   Resp: 16   Temp: 96.8 °F (36 °C)   SpO2: (!) 84%   Weight: 69.4 kg (153 lb)   Height: 144.8 cm (57\")   PainSc:   6   PainLoc: Back     Objective   Physical Exam  Vitals and nursing note reviewed. "   Constitutional:       General: She is not in acute distress.     Appearance: Normal appearance. She is not ill-appearing.   Pulmonary:      Effort: Pulmonary effort is normal. No respiratory distress.   Musculoskeletal:      Lumbar back: Tenderness and bony tenderness present.      Right knee: Bony tenderness present.      Left knee: Bony tenderness present.   Skin:     General: Skin is warm and dry.   Neurological:      Mental Status: She is alert and oriented to person, place, and time.      Gait: Gait abnormal (wheelchair).   Psychiatric:         Mood and Affect: Mood normal.         Behavior: Behavior normal.       Assessment/Plan   Diagnoses and all orders for this visit:    1. Other chronic pain (Primary)    2. Chronic bilateral low back pain with right-sided sciatica  -     HYDROcodone-acetaminophen (NORCO)  MG per tablet; Take 1 tablet by mouth 2 (Two) Times a Day As Needed for Moderate Pain . Doses at least 6 hours apart.  Dispense: 60 tablet; Refill: 0    3. Primary osteoarthritis involving multiple joints  -     HYDROcodone-acetaminophen (NORCO)  MG per tablet; Take 1 tablet by mouth 2 (Two) Times a Day As Needed for Moderate Pain . Doses at least 6 hours apart.  Dispense: 60 tablet; Refill: 0    4. High risk medication use      --- Refill Hydrocodone. Patient appears stable with current regimen. No adverse effects. Regarding continuation of opioids, there is no evidence of aberrant behavior or any red flags.  The patient continues with appropriate response to opioid therapy. ADL's remain intact by self.   --- Routine OFT in office today as part of monitoring requirements for controlled substances.  The specimen was viewed and the immunoassay result reviewed and is NOT AVAILABLE - ORAL.  This specimen will be sent to [facility] for confirmation.     --- The patient signed an updated copy of the controlled substance agreement on 6/11/2021  --- Follow-up 1 month            FARZANEH  REPORT  As part of the patient's treatment plan, I am prescribing controlled substances. The patient has been made aware of appropriate use of such medications, including potential risk of somnolence, limited ability to drive and/or work safely, and the potential for dependence or overdose. It has also bee made clear that these medications are for use by this patient only, without concomitant use of alcohol or other substances unless prescribed.     Patient has completed prescribing agreement detailing terms of continued prescribing of controlled substances, including monitoring FARZANEH reports, urine drug screening, and pill counts if necessary. The patient is aware that inappropriate use will results in cessation of prescribing such medications.    As the clinician, I personally reviewed the FARZANEH from 8/10/2021 while the patient was in the office today.    History and physical exam exhibit continued safe and appropriate use of controlled substances.     Dictated utilizing Dragon dictation.

## 2021-08-12 ENCOUNTER — TELEPHONE (OUTPATIENT)
Dept: FAMILY MEDICINE CLINIC | Facility: CLINIC | Age: 81
End: 2021-08-12

## 2021-08-12 NOTE — TELEPHONE ENCOUNTER
Caller: Shan Muse    Relationship: Emergency Contact    Best call back number: [phone] (H)    What is the best time to reach you: ANYTIME    Who are you requesting to speak with (clinical staff, provider,  specific staff member): CLINICAL STAFF    Do you know the name of the person who called:     What was the call regarding: MEDICATION QUESTION ABOUT PATIENT'S BLOOD PRESSURE MEDICATION (S). SHAN PATIENT'S SON IS ALSO WANTING A COPY OF PATIENT MEDICATION LIST AND HE WILL COME AND  ONCE READY.    Do you require a callback: YES        THANKS

## 2021-08-16 NOTE — TELEPHONE ENCOUNTER
PATIENTS KARIE SHARPE CALLING BACK TO SEE IF PATIENTS MEDICATION LIST IS READY TO BE PICKED UP.    PLEASE ADVISE  [phone]

## 2021-08-23 DIAGNOSIS — M54.41 CHRONIC BILATERAL LOW BACK PAIN WITH RIGHT-SIDED SCIATICA: ICD-10-CM

## 2021-08-23 DIAGNOSIS — M15.9 PRIMARY OSTEOARTHRITIS INVOLVING MULTIPLE JOINTS: ICD-10-CM

## 2021-08-23 DIAGNOSIS — G89.29 CHRONIC BILATERAL LOW BACK PAIN WITH RIGHT-SIDED SCIATICA: ICD-10-CM

## 2021-08-23 DIAGNOSIS — G89.29 OTHER CHRONIC PAIN: ICD-10-CM

## 2021-08-23 DIAGNOSIS — Z79.899 HIGH RISK MEDICATION USE: ICD-10-CM

## 2021-09-01 ENCOUNTER — OFFICE VISIT (OUTPATIENT)
Dept: FAMILY MEDICINE CLINIC | Facility: CLINIC | Age: 81
End: 2021-09-01

## 2021-09-01 VITALS
WEIGHT: 154 LBS | HEART RATE: 98 BPM | DIASTOLIC BLOOD PRESSURE: 84 MMHG | TEMPERATURE: 97.1 F | SYSTOLIC BLOOD PRESSURE: 156 MMHG | BODY MASS INDEX: 33.22 KG/M2 | HEIGHT: 57 IN | OXYGEN SATURATION: 98 %

## 2021-09-01 DIAGNOSIS — I69.359 HISTORY OF HEMORRHAGIC STROKE WITH RESIDUAL HEMIPARESIS (HCC): ICD-10-CM

## 2021-09-01 DIAGNOSIS — R11.14 BILIOUS VOMITING WITHOUT NAUSEA: ICD-10-CM

## 2021-09-01 DIAGNOSIS — E11.40 TYPE 2 DIABETES MELLITUS WITH DIABETIC NEUROPATHY, WITHOUT LONG-TERM CURRENT USE OF INSULIN (HCC): Primary | ICD-10-CM

## 2021-09-01 DIAGNOSIS — R55 PRE-SYNCOPE: ICD-10-CM

## 2021-09-01 PROCEDURE — 99214 OFFICE O/P EST MOD 30 MIN: CPT | Performed by: FAMILY MEDICINE

## 2021-09-01 PROCEDURE — 93000 ELECTROCARDIOGRAM COMPLETE: CPT | Performed by: FAMILY MEDICINE

## 2021-09-01 RX ORDER — GLIPIZIDE 5 MG/1
5 TABLET, FILM COATED, EXTENDED RELEASE ORAL DAILY
Qty: 90 TABLET | Refills: 3 | Status: SHIPPED | OUTPATIENT
Start: 2021-09-01 | End: 2021-09-07

## 2021-09-01 NOTE — PROGRESS NOTES
Subjective   Alice Muse is a 81 y.o. female.     Chief Complaint   Patient presents with   • Vomiting     after eating for past couple weeks    • Syncope     passed out Mon and Tues        History of Present Illness     Patient has passed out twice this week.  Once on Monday and once on Tuesday. Both in early morning. Got no warnings that she was going to pass out. Just fell over when she got up to go to the bathroom. Could hear and did not pass out completely. Just felt she was going to pass out. Did not hit her head. No palpitations, cp or soa. She just ate something and felt better in a few minutes. She has not been eating much as she is having no appetite. Throws up once a week. After she eats. No trouble swallowing. Not checking bs but she thinks it has been dropping.  History from patient and son today.  Son reports that he is not sure that his mom has been taking her medication correctly.    The following portions of the patient's history were reviewed and updated as appropriate: allergies, current medications, past family history, past medical history, past social history, past surgical history and problem list.    Past Medical History:   Diagnosis Date   • Anxiety    • Depression    • Diabetes mellitus (CMS/HCC)    • Hyperlipidemia    • Hypertension    • Neuropathy    • Osteoarthritis    • Sleep apnea    • Stroke (CMS/HCC)        Past Surgical History:   Procedure Laterality Date   • APPENDECTOMY     • CHOLECYSTECTOMY     • HIP SURGERY Right 07/2020    Genicular Nerve block performed by Ran Jackson MD at [facility] OR     • JOINT REPLACEMENT     • OOPHORECTOMY     • TOTAL HIP ARTHROPLASTY Left 1997   • TUBAL ABDOMINAL LIGATION         Family History   Problem Relation Age of Onset   • Arthritis Mother    • Hypertension Mother    • Colon cancer Mother    • Stroke Father    • Diabetes Father        Social History     Socioeconomic History   • Marital status:      Spouse name:  "Not on file   • Number of children: Not on file   • Years of education: Not on file   • Highest education level: Not on file   Tobacco Use   • Smoking status: Never Smoker   • Smokeless tobacco: Never Used   Vaping Use   • Vaping Use: Never used   Substance and Sexual Activity   • Alcohol use: Never   • Drug use: Never   • Sexual activity: Defer       Review of Systems   Constitutional: Negative for fever.   Respiratory: Negative for shortness of breath.        Objective   Visit Vitals  /84 (BP Location: Left arm, Patient Position: Sitting)   Pulse 98   Temp 97.1 °F (36.2 °C)   Ht 144.8 cm (57.01\")   Wt 69.9 kg (154 lb)   SpO2 98%   BMI 33.32 kg/m²     Body mass index is 33.32 kg/m².  Physical Exam  Constitutional:       Appearance: Normal appearance. She is well-developed.   Cardiovascular:      Rate and Rhythm: Normal rate and regular rhythm.      Heart sounds: Normal heart sounds.   Pulmonary:      Effort: Pulmonary effort is normal.      Breath sounds: Normal breath sounds.   Musculoskeletal:         General: No swelling. Normal range of motion.   Skin:     General: Skin is warm and dry.      Findings: No rash.   Neurological:      General: No focal deficit present.      Mental Status: She is alert and oriented to person, place, and time.   Psychiatric:         Mood and Affect: Mood normal.         Behavior: Behavior normal.         ECG 12 Lead    Date/Time: 9/1/2021 1:34 PM  Performed by: Emily De Jesus MD  Authorized by: Emily De Jesus MD   Comparison: not compared with previous ECG   Previous ECG: no previous ECG available  Rhythm: sinus rhythm  Other findings: non-specific ST-T wave changes    Clinical impression: normal ECG and non-specific ECG  Comments: presyncope              Assessment/Plan   Diagnoses and all orders for this visit:    1. Type 2 diabetes mellitus with diabetic neuropathy, without long-term current use of insulin (CMS/[facility]) (Primary)  -     Hemoglobin A1c  -     glipizide " (GLUCOTROL XL) 5 MG ER tablet; Take 1 tablet by mouth Daily.  Dispense: 90 tablet; Refill: 3    2. Pre-syncope  -     CBC & Differential  -     Comprehensive Metabolic Panel  -     Hemoglobin A1c  -     Ambulatory Referral to Neurology  -     Ambulatory Referral to Cardiology  -     MRI Brain Without Contrast; Future  -     CT Abdomen Pelvis Without Contrast; Future  -     ECG 12 Lead    3. History of hemorrhagic stroke with residual hemiparesis (CMS/HCC)    4. Bilious vomiting without nausea  -     CT Abdomen Pelvis Without Contrast; Future  -     Ambulatory Referral to Gastroenterology  -     Amylase  -     Lipase    Other orders  -     SCANNED EKG          I advised to make a follow-up appointment on Monday and bring her medications.  Strict ER warnings given.  Decreased her glipizide.  Advised regular meals and bland diet.  Her son is to be giving her her medicines from now on.

## 2021-09-02 LAB
ALBUMIN SERPL-MCNC: 4 G/DL (ref 3.7–4.7)
ALBUMIN/GLOB SERPL: 1 {RATIO} (ref 1.2–2.2)
ALP SERPL-CCNC: 78 IU/L (ref 48–121)
ALT SERPL-CCNC: 28 IU/L (ref 0–32)
AMYLASE SERPL-CCNC: 300 U/L (ref 31–110)
AST SERPL-CCNC: 23 IU/L (ref 0–40)
BASOPHILS # BLD AUTO: 0.1 X10E3/UL (ref 0–0.2)
BASOPHILS NFR BLD AUTO: 1 %
BILIRUB SERPL-MCNC: 0.4 MG/DL (ref 0–1.2)
BUN SERPL-MCNC: 33 MG/DL (ref 8–27)
BUN/CREAT SERPL: 20 (ref 12–28)
CALCIUM SERPL-MCNC: 9.7 MG/DL (ref 8.7–10.3)
CHLORIDE SERPL-SCNC: 106 MMOL/L (ref 96–106)
CO2 SERPL-SCNC: 19 MMOL/L (ref 20–29)
CREAT SERPL-MCNC: 1.68 MG/DL (ref 0.57–1)
EOSINOPHIL # BLD AUTO: 0 X10E3/UL (ref 0–0.4)
EOSINOPHIL NFR BLD AUTO: 0 %
ERYTHROCYTE [DISTWIDTH] IN BLOOD BY AUTOMATED COUNT: 14.9 % (ref 11.7–15.4)
GLOBULIN SER CALC-MCNC: 3.9 G/DL (ref 1.5–4.5)
GLUCOSE SERPL-MCNC: 187 MG/DL (ref 65–99)
HBA1C MFR BLD: 6.8 % (ref 4.8–5.6)
HCT VFR BLD AUTO: 40.1 % (ref 34–46.6)
HGB BLD-MCNC: 11.8 G/DL (ref 11.1–15.9)
IMM GRANULOCYTES # BLD AUTO: 0 X10E3/UL (ref 0–0.1)
IMM GRANULOCYTES NFR BLD AUTO: 0 %
LIPASE SERPL-CCNC: 39 U/L (ref 14–85)
LYMPHOCYTES # BLD AUTO: 1.8 X10E3/UL (ref 0.7–3.1)
LYMPHOCYTES NFR BLD AUTO: 33 %
MCH RBC QN AUTO: 23.6 PG (ref 26.6–33)
MCHC RBC AUTO-ENTMCNC: 29.4 G/DL (ref 31.5–35.7)
MCV RBC AUTO: 80 FL (ref 79–97)
MONOCYTES # BLD AUTO: 0.7 X10E3/UL (ref 0.1–0.9)
MONOCYTES NFR BLD AUTO: 12 %
MORPHOLOGY BLD-IMP: ABNORMAL
NEUTROPHILS # BLD AUTO: 3 X10E3/UL (ref 1.4–7)
NEUTROPHILS NFR BLD AUTO: 54 %
PLATELET # BLD AUTO: 83 X10E3/UL (ref 150–450)
POTASSIUM SERPL-SCNC: 5.5 MMOL/L (ref 3.5–5.2)
PROT SERPL-MCNC: 7.9 G/DL (ref 6–8.5)
RBC # BLD AUTO: 4.99 X10E6/UL (ref 3.77–5.28)
SODIUM SERPL-SCNC: 139 MMOL/L (ref 134–144)
WBC # BLD AUTO: 5.5 X10E3/UL (ref 3.4–10.8)

## 2021-09-07 ENCOUNTER — OFFICE VISIT (OUTPATIENT)
Dept: FAMILY MEDICINE CLINIC | Facility: CLINIC | Age: 81
End: 2021-09-07

## 2021-09-07 VITALS
SYSTOLIC BLOOD PRESSURE: 158 MMHG | WEIGHT: 154 LBS | BODY MASS INDEX: 33.22 KG/M2 | TEMPERATURE: 97.5 F | HEIGHT: 57 IN | OXYGEN SATURATION: 98 % | HEART RATE: 102 BPM | DIASTOLIC BLOOD PRESSURE: 90 MMHG

## 2021-09-07 DIAGNOSIS — R55 PRE-SYNCOPE: ICD-10-CM

## 2021-09-07 DIAGNOSIS — R11.14 BILIOUS VOMITING WITHOUT NAUSEA: ICD-10-CM

## 2021-09-07 DIAGNOSIS — I10 ESSENTIAL HYPERTENSION: Primary | ICD-10-CM

## 2021-09-07 DIAGNOSIS — E11.40 TYPE 2 DIABETES MELLITUS WITH DIABETIC NEUROPATHY, WITHOUT LONG-TERM CURRENT USE OF INSULIN (HCC): ICD-10-CM

## 2021-09-07 DIAGNOSIS — Z79.899 HIGH RISK MEDICATION USE: ICD-10-CM

## 2021-09-07 PROCEDURE — 99214 OFFICE O/P EST MOD 30 MIN: CPT | Performed by: FAMILY MEDICINE

## 2021-09-07 RX ORDER — TIZANIDINE 2 MG/1
TABLET ORAL
Qty: 60 TABLET | Refills: 1 | Status: SHIPPED | OUTPATIENT
Start: 2021-09-07

## 2021-09-07 RX ORDER — AMLODIPINE BESYLATE 5 MG/1
5 TABLET ORAL DAILY
Qty: 90 TABLET | Refills: 1 | Status: SHIPPED | OUTPATIENT
Start: 2021-09-07 | End: 2022-02-21

## 2021-09-07 RX ORDER — CLOPIDOGREL BISULFATE 75 MG/1
75 TABLET ORAL DAILY
Qty: 90 TABLET | Refills: 1 | Status: SHIPPED | OUTPATIENT
Start: 2021-09-07

## 2021-09-07 NOTE — PROGRESS NOTES
Subjective   Alice Muse is a 81 y.o. female.     Chief Complaint   Patient presents with   • Follow-up     1 week        History of Present Illness   Pt is still throwing up with breakfast but has only thrown up once since she saw me last and we decreased the glipizide. She has not checked her BS. Discussed labs with her. She does have sjrogren which explains her amylase. She also is telling me today that her symptoms started after she started her plaquenil.  She has not passed out since she saw me last.  The Plaquenil could possibly have side effects that drop her sugars and make her throw up and potentially could make her feel like she was going to pass out.  In looking through the notes it does look like that she started this medication around the same time as her symptoms started.  I had her bring all her medications with her today in a bag.  I went through the bag.  It looks like she is not taking her amlodipine or Plavix and I advised her to go back on that.  She was also not taking Januvia.  I advised her to not restart it and just completely stop her glipizide.  Discussed importance of following up with specialists and made sure she knew who her rheumatologist was and advised her to stop her Plaquenil and follow-up with them.  History from patient and her son.  Patient also showed me a random pillbox with all sorts of pills mixed in that she did not know what they were but that she took sometimes.  I advised her and her son to throw that box away and only take pills from the bottle.  This could have also contributed to her symptoms.  Her son will take over administrating the pills from now on.    The following portions of the patient's history were reviewed and updated as appropriate: allergies, current medications, past family history, past medical history, past social history, past surgical history and problem list.    Past Medical History:   Diagnosis Date   • Anxiety    • Depression    • Diabetes  "mellitus (CMS/HCC)    • Hyperlipidemia    • Hypertension    • Neuropathy    • Osteoarthritis    • Sleep apnea    • Stroke (CMS/HCC)        Past Surgical History:   Procedure Laterality Date   • APPENDECTOMY     • CHOLECYSTECTOMY     • HIP SURGERY Right 07/2020    Genicular Nerve block performed by Ran Jackson MD at [facility] OR     • JOINT REPLACEMENT     • OOPHORECTOMY     • TOTAL HIP ARTHROPLASTY Left 1997   • TUBAL ABDOMINAL LIGATION         Family History   Problem Relation Age of Onset   • Arthritis Mother    • Hypertension Mother    • Colon cancer Mother    • Stroke Father    • Diabetes Father        Social History     Socioeconomic History   • Marital status:      Spouse name: Not on file   • Number of children: Not on file   • Years of education: Not on file   • Highest education level: Not on file   Tobacco Use   • Smoking status: Never Smoker   • Smokeless tobacco: Never Used   Vaping Use   • Vaping Use: Never used   Substance and Sexual Activity   • Alcohol use: Never   • Drug use: Never   • Sexual activity: Defer       Review of Systems   Constitutional: Negative for fever.   Respiratory: Negative for shortness of breath.    Cardiovascular: Negative for chest pain.       Objective   Visit Vitals  /90 (BP Location: Left arm, Patient Position: Sitting)   Pulse 102   Temp 97.5 °F (36.4 °C)   Ht 144.8 cm (57.01\")   Wt 69.9 kg (154 lb)   SpO2 98%   BMI 33.32 kg/m²     Body mass index is 33.32 kg/m².  Physical Exam  Constitutional:       Appearance: Normal appearance. She is well-developed.   Cardiovascular:      Rate and Rhythm: Normal rate and regular rhythm.      Heart sounds: Normal heart sounds.   Pulmonary:      Effort: Pulmonary effort is normal.      Breath sounds: Normal breath sounds.   Musculoskeletal:         General: No swelling. Normal range of motion.   Skin:     General: Skin is warm and dry.      Findings: No rash.   Neurological:      General: No focal deficit " present.      Mental Status: She is alert and oriented to person, place, and time.   Psychiatric:         Mood and Affect: Mood normal.         Behavior: Behavior normal.           Assessment/Plan   Diagnoses and all orders for this visit:    1. Essential hypertension (Primary)  -     amLODIPine (NORVASC) 5 MG tablet; Take 1 tablet by mouth Daily.  Dispense: 90 tablet; Refill: 1  -     Comprehensive Metabolic Panel    2. Type 2 diabetes mellitus with diabetic neuropathy, without long-term current use of insulin (CMS/[facility])    3. High risk medication use    4. Bilious vomiting without nausea    5. Pre-syncope    Other orders  -     clopidogrel (PLAVIX) 75 MG tablet; Take 1 tablet by mouth Daily.  Dispense: 90 tablet; Refill: 1             Patient is to take her medicine is as prescribed.  Hold the Plaquenil for now until she talks to her rheumatologist.  Patient is to keep a blood sugar log and follow-up with me in 1 month.  The ER warnings still are relevant.  I still want patient to follow-up with the cardiologist and the neurologist etc.

## 2021-09-08 DIAGNOSIS — N18.9 CHRONIC KIDNEY DISEASE, UNSPECIFIED CKD STAGE: Primary | ICD-10-CM

## 2021-09-08 LAB
ALBUMIN SERPL-MCNC: 4.2 G/DL (ref 3.7–4.7)
ALBUMIN/GLOB SERPL: 1.1 {RATIO} (ref 1.2–2.2)
ALP SERPL-CCNC: 78 IU/L (ref 48–121)
ALT SERPL-CCNC: 14 IU/L (ref 0–32)
AST SERPL-CCNC: 18 IU/L (ref 0–40)
BILIRUB SERPL-MCNC: 0.4 MG/DL (ref 0–1.2)
BUN SERPL-MCNC: 31 MG/DL (ref 8–27)
BUN/CREAT SERPL: 15 (ref 12–28)
CALCIUM SERPL-MCNC: 9.1 MG/DL (ref 8.7–10.3)
CHLORIDE SERPL-SCNC: 106 MMOL/L (ref 96–106)
CO2 SERPL-SCNC: 21 MMOL/L (ref 20–29)
CREAT SERPL-MCNC: 2.02 MG/DL (ref 0.57–1)
GLOBULIN SER CALC-MCNC: 3.7 G/DL (ref 1.5–4.5)
GLUCOSE SERPL-MCNC: 134 MG/DL (ref 65–99)
POTASSIUM SERPL-SCNC: 5.5 MMOL/L (ref 3.5–5.2)
PROT SERPL-MCNC: 7.9 G/DL (ref 6–8.5)
SODIUM SERPL-SCNC: 141 MMOL/L (ref 134–144)

## 2021-09-12 ENCOUNTER — HOSPITAL ENCOUNTER (EMERGENCY)
Facility: HOSPITAL | Age: 81
Discharge: HOME | End: 2021-09-12
Attending: EMERGENCY MEDICINE | Admitting: EMERGENCY MEDICINE

## 2021-09-12 VITALS
OXYGEN SATURATION: 97 % | TEMPERATURE: 98.4 F | RESPIRATION RATE: 16 BRPM | DIASTOLIC BLOOD PRESSURE: 94 MMHG | HEART RATE: 75 BPM | SYSTOLIC BLOOD PRESSURE: 175 MMHG

## 2021-09-12 DIAGNOSIS — E16.2 HYPOGLYCEMIA: Primary | ICD-10-CM

## 2021-09-12 LAB
ALBUMIN SERPL-MCNC: 3.7 G/DL (ref 3.5–5.2)
ALBUMIN/GLOB SERPL: 1.1 G/DL
ALP SERPL-CCNC: 63 U/L (ref 39–117)
ALT SERPL W P-5'-P-CCNC: 15 U/L (ref 1–33)
ANION GAP SERPL CALCULATED.3IONS-SCNC: 9.6 MMOL/L (ref 5–15)
AST SERPL-CCNC: 22 U/L (ref 1–32)
BASOPHILS # BLD AUTO: 0.04 10*3/MM3 (ref 0–0.2)
BASOPHILS NFR BLD AUTO: 0.9 % (ref 0–1.5)
BILIRUB SERPL-MCNC: 0.2 MG/DL (ref 0–1.2)
BUN SERPL-MCNC: 21 MG/DL (ref 8–23)
BUN/CREAT SERPL: 12.5 (ref 7–25)
CALCIUM SPEC-SCNC: 8.9 MG/DL (ref 8.6–10.5)
CHLORIDE SERPL-SCNC: 108 MMOL/L (ref 98–107)
CO2 SERPL-SCNC: 22.4 MMOL/L (ref 22–29)
CREAT SERPL-MCNC: 1.68 MG/DL (ref 0.57–1)
DEPRECATED RDW RBC AUTO: 42.5 FL (ref 37–54)
EOSINOPHIL # BLD AUTO: 0.02 10*3/MM3 (ref 0–0.4)
EOSINOPHIL NFR BLD AUTO: 0.5 % (ref 0.3–6.2)
ERYTHROCYTE [DISTWIDTH] IN BLOOD BY AUTOMATED COUNT: 14.9 % (ref 12.3–15.4)
GFR SERPL CREATININE-BSD FRML MDRD: 29 ML/MIN/1.73
GLOBULIN UR ELPH-MCNC: 3.5 GM/DL
GLUCOSE BLDC GLUCOMTR-MCNC: 102 MG/DL (ref 70–130)
GLUCOSE BLDC GLUCOMTR-MCNC: 128 MG/DL (ref 70–130)
GLUCOSE BLDC GLUCOMTR-MCNC: 140 MG/DL (ref 70–130)
GLUCOSE BLDC GLUCOMTR-MCNC: 44 MG/DL (ref 70–130)
GLUCOSE SERPL-MCNC: 78 MG/DL (ref 65–99)
HCT VFR BLD AUTO: 36.9 % (ref 34–46.6)
HGB BLD-MCNC: 10.9 G/DL (ref 12–15.9)
LYMPHOCYTES # BLD AUTO: 1.61 10*3/MM3 (ref 0.7–3.1)
LYMPHOCYTES NFR BLD AUTO: 36.4 % (ref 19.6–45.3)
MCH RBC QN AUTO: 23.4 PG (ref 26.6–33)
MCHC RBC AUTO-ENTMCNC: 29.5 G/DL (ref 31.5–35.7)
MCV RBC AUTO: 79.2 FL (ref 79–97)
MONOCYTES # BLD AUTO: 0.66 10*3/MM3 (ref 0.1–0.9)
MONOCYTES NFR BLD AUTO: 14.9 % (ref 5–12)
NEUTROPHILS NFR BLD AUTO: 2.08 10*3/MM3 (ref 1.7–7)
NEUTROPHILS NFR BLD AUTO: 47.1 % (ref 42.7–76)
PLATELET # BLD AUTO: 40 10*3/MM3 (ref 140–450)
POTASSIUM SERPL-SCNC: 5.5 MMOL/L (ref 3.5–5.2)
PROT SERPL-MCNC: 7.2 G/DL (ref 6–8.5)
RBC # BLD AUTO: 4.66 10*6/MM3 (ref 3.77–5.28)
SODIUM SERPL-SCNC: 140 MMOL/L (ref 136–145)
WBC # BLD AUTO: 4.42 10*3/MM3 (ref 3.4–10.8)

## 2021-09-12 PROCEDURE — 82962 GLUCOSE BLOOD TEST: CPT

## 2021-09-12 PROCEDURE — 80053 COMPREHEN METABOLIC PANEL: CPT | Performed by: NURSE PRACTITIONER

## 2021-09-12 PROCEDURE — 99283 EMERGENCY DEPT VISIT LOW MDM: CPT

## 2021-09-12 PROCEDURE — 36415 COLL VENOUS BLD VENIPUNCTURE: CPT

## 2021-09-12 PROCEDURE — 85025 COMPLETE CBC W/AUTO DIFF WBC: CPT | Performed by: NURSE PRACTITIONER

## 2021-09-12 NOTE — ED NOTES
This RN wearing all appropriate PPE during patient encounter. Hand hygiene performed before and during entering room.       Evi Gusman, RN  09/12/21 8011

## 2021-09-12 NOTE — DISCHARGE INSTRUCTIONS
Follow-up with your primary care doctor.  You may need further adjustment of your medication.  Ensure that you eat all of your meals.  Do not skip any meals.

## 2021-09-12 NOTE — ED PROVIDER NOTES
EMERGENCY DEPARTMENT ENCOUNTER    Room Number:  09/09  Date of encounter:  9/12/2021  PCP: Emily De Jesus MD  Patient Care Team:  Emily De Jesus MD as PCP - General (Family Medicine)  Emily De Jesus MD as Referring Physician (Family Medicine)  Siena Gonzalez MD PhD as Consulting Physician (Hematology and Oncology)   Historian: EMS, patient    HPI:  Chief Complaint: Hypoglycemia  A complete HPI/ROS/PMH/PSH/SH/FH are unobtainable due to: Nothing    Context: Alice Muse is a 81 y.o. female who presents to the ED c/o having a hypoglycemic episode today.  EMS found her with a sugar of 60 and gave her D10.  Her blood sugar him up to 145 and she became unresponsive.  She states she has been eating normally.  She reports that she has had a recent drops in her blood sugar.  She states she has had recurrent falls.  She denies injury.  Nothing makes her symptoms worse or better.  She is not on insulin.  She has not had any falls in the last few days.    Prior record review: Primary care note 9/7/2021.  She is holding the Plaquenil.  She is to keep a blood sugar log and follow-up with her primary care doctor in 1 month.  Her glipizide was stopped at that time.  There seems to be confusion about which medication she is actually taking.    PAST MEDICAL HISTORY  Active Ambulatory Problems     Diagnosis Date Noted   • Mixed hyperlipidemia 10/21/2020   • Essential hypertension 10/21/2020   • Anxiety 10/18/2019   • Other chronic pain 02/16/2020   • History of hemorrhagic stroke with residual hemiparesis (CMS/[facility]) 10/18/2019   • Primary osteoarthritis involving multiple joints 02/16/2020   • Type 2 diabetes mellitus with diabetic neuropathy, without long-term current use of insulin (CMS/[facility]) 10/18/2019   • Long term (current) use of antithrombotics/antiplatelets 10/21/2020   • Long-term use of high-risk medication 10/21/2020   • Thrombocytopenia (CMS/[facility]) 02/24/2021   • Iron deficiency 02/24/2021   • Chronic  fatigue 02/24/2021   • 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   • Elevated antinuclear antibody (SANIA) level 03/10/2021   • Vitamin B12 deficiency 03/10/2021   • Chronic bilateral low back pain with right-sided sciatica 03/12/2021   • Chronic pain of multiple joints 05/05/2021   • High risk medication use 06/11/2021   • Pre-syncope 09/01/2021   • Bilious vomiting without nausea 09/01/2021     Resolved Ambulatory Problems     Diagnosis Date Noted   • No Resolved Ambulatory Problems     Past Medical History:   Diagnosis Date   • Depression    • Diabetes mellitus (CMS/HCC)    • Hyperlipidemia    • Hypertension    • Neuropathy    • Osteoarthritis    • Sleep apnea    • Stroke (CMS/HCC)        The patient qualifies to receive the vaccine, but they have not yet received it.    PAST SURGICAL HISTORY  Past Surgical History:   Procedure Laterality Date   • APPENDECTOMY     • CHOLECYSTECTOMY     • HIP SURGERY Right 07/2020    Genicular Nerve block performed by Ran Jackson MD at [facility] OR     • JOINT REPLACEMENT     • OOPHORECTOMY     • TOTAL HIP ARTHROPLASTY Left 1997   • TUBAL ABDOMINAL LIGATION           FAMILY HISTORY  Family History   Problem Relation Age of Onset   • Arthritis Mother    • Hypertension Mother    • Colon cancer Mother    • Stroke Father    • Diabetes Father          SOCIAL HISTORY  Social History     Socioeconomic History   • Marital status:      Spouse name: Not on file   • Number of children: Not on file   • Years of education: Not on file   • Highest education level: Not on file   Tobacco Use   • Smoking status: Never Smoker   • Smokeless tobacco: Never Used   Vaping Use   • Vaping Use: Never used   Substance and Sexual Activity   • Alcohol use: Never   • Drug use: Never   • Sexual activity: Defer         ALLERGIES  Patient has no known allergies.        REVIEW OF SYSTEMS  Review of Systems   No chest pain, no shortness of breath, no nausea, no vomiting, no fever, no chills  All  systems reviewed and negative except for those discussed in HPI.       PHYSICAL EXAM    I have reviewed the triage vital signs and nursing notes.    ED Triage Vitals [09/12/21 0215]   Temp Heart Rate Resp BP SpO2   98.4 °F (36.9 °C) 78 18 (!) 188/77 99 %      Temp src Heart Rate Source Patient Position BP Location FiO2 (%)   -- -- -- -- --       Physical Exam  GENERAL: Awake, alert, no acute distress  SKIN: Warm, dry  HENT: Normocephalic, atraumatic  EYES: no scleral icterus  CV: regular rhythm, regular rate  RESPIRATORY: normal effort, lungs clear  ABDOMEN: soft, nontender, nondistended  MUSCULOSKELETAL: no deformity  NEURO: alert, moves all extremities, follows commands          LAB RESULTS  Recent Results (from the past 24 hour(s))   POC Glucose Once    Collection Time: 09/12/21  3:12 AM    Specimen: Blood   Result Value Ref Range    Glucose 140 (H) 70 - 130 mg/dL   CBC Auto Differential    Collection Time: 09/12/21  5:12 AM    Specimen: Blood   Result Value Ref Range    WBC 4.42 3.40 - 10.80 10*3/mm3    RBC 4.66 3.77 - 5.28 10*6/mm3    Hemoglobin 10.9 (L) 12.0 - 15.9 g/dL    Hematocrit 36.9 34.0 - 46.6 %    MCV 79.2 79.0 - 97.0 fL    MCH 23.4 (L) 26.6 - 33.0 pg    MCHC 29.5 (L) 31.5 - 35.7 g/dL    RDW 14.9 12.3 - 15.4 %    RDW-SD 42.5 37.0 - 54.0 fl    Platelets 40 (C) 140 - 450 10*3/mm3    Neutrophil % 47.1 42.7 - 76.0 %    Lymphocyte % 36.4 19.6 - 45.3 %    Monocyte % 14.9 (H) 5.0 - 12.0 %    Eosinophil % 0.5 0.3 - 6.2 %    Basophil % 0.9 0.0 - 1.5 %    Neutrophils, Absolute 2.08 1.70 - 7.00 10*3/mm3    Lymphocytes, Absolute 1.61 0.70 - 3.10 10*3/mm3    Monocytes, Absolute 0.66 0.10 - 0.90 10*3/mm3    Eosinophils, Absolute 0.02 0.00 - 0.40 10*3/mm3    Basophils, Absolute 0.04 0.00 - 0.20 10*3/mm3   POC Glucose Once    Collection Time: 09/12/21  5:37 AM    Specimen: Blood   Result Value Ref Range    Glucose 44 (C) 70 - 130 mg/dL   POC Glucose Once    Collection Time: 09/12/21  6:04 AM    Specimen: Blood    Result Value Ref Range    Glucose 102 70 - 130 mg/dL   Comprehensive Metabolic Panel    Collection Time: 09/12/21  6:12 AM    Specimen: Blood   Result Value Ref Range    Glucose 78 65 - 99 mg/dL    BUN 21 8 - 23 mg/dL    Creatinine 1.68 (H) 0.57 - 1.00 mg/dL    Sodium 140 136 - 145 mmol/L    Potassium 5.5 (H) 3.5 - 5.2 mmol/L    Chloride 108 (H) 98 - 107 mmol/L    CO2 22.4 22.0 - 29.0 mmol/L    Calcium 8.9 8.6 - 10.5 mg/dL    Total Protein 7.2 6.0 - 8.5 g/dL    Albumin 3.70 3.50 - 5.20 g/dL    ALT (SGPT) 15 1 - 33 U/L    AST (SGOT) 22 1 - 32 U/L    Alkaline Phosphatase 63 39 - 117 U/L    Total Bilirubin 0.2 0.0 - 1.2 mg/dL    eGFR Non African Amer 29 (L) >60 mL/min/1.73    Globulin 3.5 gm/dL    A/G Ratio 1.1 g/dL    BUN/Creatinine Ratio 12.5 7.0 - 25.0    Anion Gap 9.6 5.0 - 15.0 mmol/L       Ordered the above labs and independently reviewed the results.        RADIOLOGY  No Radiology Exams Resulted Within Past 24 Hours    I ordered the above noted radiological studies. Reviewed by me and discussed with radiologist.  See dictation for official radiology interpretation.      PROCEDURES    Procedures      MEDICATIONS GIVEN IN ER    Medications - No data to display      PROGRESS, DATA ANALYSIS, CONSULTS, AND MEDICAL DECISION MAKING    All labs have been independently reviewed by me.  All radiology studies have been reviewed by me and discussed with radiologist dictating the report.   EKG's independently viewed and interpreted by me.  Discussion below represents my analysis of pertinent findings related to patient's condition, differential diagnosis, treatment plan and final disposition.    Differential diagnosis includes but is not limited to hypoglycemia, renal failure, liver failure, sepsis.    ED Course as of Sep 12 0656   Sun Sep 12, 2021   0606 Her sugar is back up to 106 with orange juice.  Providing her with a meal.    [TR]   0621 Platelets(!!): 40 [TR]   0637 At or better than baseline   Creatinine(!): 1.68  [TR]   0656 The patient remains awake and alert with normal blood sugar.  Plan discharge home.    [TR]      ED Course User Index  [TR] Christopher Miller MD           PPE: Both the patient and I wore a surgical mask throughout the entire patient encounter. I wore protective goggles.       AS OF 06:56 EDT VITALS:    BP - (!) 184/91  HR - 75  TEMP - 98.4 °F (36.9 °C)  O2 SATS - 95%        DIAGNOSIS  Final diagnoses:   Hypoglycemia         DISPOSITION  ED Disposition     ED Disposition Condition Comment    Discharge Stable                 Christopher Miller MD  09/12/21 0656

## 2021-09-12 NOTE — ED NOTES
pt to ER via ems from home c/o hypoclycemic episode initial BS was 60, pt was lethargix. EMS gave 25 D10W to pt IV and sugar went to 145 and she became responsive to her baseline, pt has had hypoglycemic episodes more frequent since June as well as reoccuring falls. pt is on Plavix. Pt in mask in triage  Triage in appropriate PPE      Temi Corbin RN  09/12/21 0214

## 2021-09-13 ENCOUNTER — PATIENT OUTREACH (OUTPATIENT)
Dept: CASE MANAGEMENT | Facility: OTHER | Age: 81
End: 2021-09-13

## 2021-09-13 NOTE — OUTREACH NOTE
Patient Outreach    Ambulatory Case Management Note    Brief call to pt answered by her son Shan. He states his mother is doing very well and her BS is now staying normalized. He is taking over her medications and will be staying with her. No questions, concerns or needs regarding health wellness. Pt given number for 24/7 hotliine. Pt appreciative of phone call and declined to participate in  Case Management Program. No needs identified.         Zandra Soares RN  Ambulatory Case Management    9/13/2021, 15:04 EDT

## 2021-09-14 ENCOUNTER — TELEPHONE (OUTPATIENT)
Dept: PAIN MEDICINE | Facility: CLINIC | Age: 81
End: 2021-09-14

## 2021-09-14 DIAGNOSIS — G89.29 CHRONIC BILATERAL LOW BACK PAIN WITH RIGHT-SIDED SCIATICA: ICD-10-CM

## 2021-09-14 DIAGNOSIS — M54.41 CHRONIC BILATERAL LOW BACK PAIN WITH RIGHT-SIDED SCIATICA: ICD-10-CM

## 2021-09-14 DIAGNOSIS — M15.9 PRIMARY OSTEOARTHRITIS INVOLVING MULTIPLE JOINTS: ICD-10-CM

## 2021-09-14 NOTE — TELEPHONE ENCOUNTER
Caller: DELL RAYMOND    Relationship: SON    Best call back number:   Medication needed:   Requested Prescriptions     Pending Prescriptions Disp Refills   • HYDROcodone-acetaminophen (NORCO)  MG per tablet 60 tablet 0     Sig: Take 1 tablet by mouth 2 (Two) Times a Day As Needed for Moderate Pain . Doses at least 6 hours apart.       When do you need the refill by: ASAP    What additional details did the patient provide when requesting the medication:     Does the patient have less than a 3 day supply:  [x] Yes  [] No    What is the patient's preferred pharmacy:      [id number] - [place], [place] - [address] [facility] AT [facility] &  - [phone]  - [phone]   [phone]

## 2021-09-15 RX ORDER — HYDROCODONE BITARTRATE AND ACETAMINOPHEN 10; 325 MG/1; MG/1
1 TABLET ORAL 2 TIMES DAILY PRN
Qty: 60 TABLET | Refills: 0 | Status: SHIPPED | OUTPATIENT
Start: 2021-09-15

## 2021-09-20 ENCOUNTER — OFFICE VISIT (OUTPATIENT)
Dept: CARDIOLOGY | Facility: CLINIC | Age: 81
End: 2021-09-20

## 2021-09-20 VITALS
BODY MASS INDEX: 30.76 KG/M2 | SYSTOLIC BLOOD PRESSURE: 170 MMHG | HEART RATE: 78 BPM | WEIGHT: 152.6 LBS | DIASTOLIC BLOOD PRESSURE: 88 MMHG | HEIGHT: 59 IN

## 2021-09-20 DIAGNOSIS — R55 NEAR SYNCOPE: Primary | ICD-10-CM

## 2021-09-20 PROCEDURE — 99204 OFFICE O/P NEW MOD 45 MIN: CPT | Performed by: INTERNAL MEDICINE

## 2021-09-20 NOTE — PROGRESS NOTES
[facility]      Patient Name: Alice Muse  :1940  Age: 81 y.o.  Encounter Provider:  Niko García Jr, MD      Chief Complaint:   Chief Complaint   Patient presents with   • Shortness of Breath   • Hypertension         HPI  Alice Muse is a 81 y.o. female very pleasant female with past medical history of diabetes, hypertension, dyslipidemia and likely mild to moderate dementia who presents for initial evaluation.  She was seen in the ER on 912 for near syncope.  The patient became minimally responsive when found in her room by son and family members.  EMS arrived and her glucose was found to be 60.  They stated that when she was given D5 intravenously that she became more responsive after approximately 30 minutes.  No loss of bowel bladder continence.  Her son does not believe that she truly lost consciousness.  She is a poor historian and majority of historical details are taken from him at time of interview.  She has been noncompliant with medications and he is taken over her medication dosing.  She is on Plavix for unknown reasons although he states he believes she had a stroke.  I note that in the electronic record she has an MRI that is ordered.  Blood pressure is very poorly controlled on last few visits within the epic system.  She denies chest pain or shortness of air.  No orthopnea, PND or edema.  No previous history of syncope.  She is a lifelong non-smoker denies alcohol or illicit drug use.  Family history of mother with heart attack in her 60s.      The following portions of the patient's history were reviewed and updated as appropriate: allergies, current medications, past family history, past medical history, past social history, past surgical history and problem list.      Review of Systems   Constitutional: Negative for chills and fever.   HENT: Negative for hoarse voice and sore throat.    Eyes: Negative for double vision and photophobia.   Cardiovascular:  "Positive for near-syncope. Negative for chest pain, leg swelling, orthopnea, palpitations, paroxysmal nocturnal dyspnea and syncope.   Respiratory: Negative for cough and wheezing.    Skin: Negative for poor wound healing and rash.   Musculoskeletal: Negative for arthritis and joint swelling.   Gastrointestinal: Negative for bloating, abdominal pain, hematemesis and hematochezia.   Neurological: Negative for dizziness and focal weakness.   Psychiatric/Behavioral: Negative for depression and suicidal ideas.       OBJECTIVE:   Vital Signs  Vitals:    09/20/21 1418   BP: 170/88   Pulse: 78     Estimated body mass index is 30.82 kg/m² as calculated from the following:    Height as of this encounter: 149.9 cm (59\").    Weight as of this encounter: 69.2 kg (152 lb 9.6 oz).    Vitals reviewed.   Constitutional:       Appearance: Healthy appearance. Not in distress.   Neck:      Vascular: No JVR. JVD normal.   Pulmonary:      Effort: Pulmonary effort is normal.      Breath sounds: No wheezing. No rhonchi. No rales.   Chest:      Chest wall: Not tender to palpatation.   Cardiovascular:      PMI at left midclavicular line. Normal rate. Regular rhythm. Normal S1. Normal S2.      Murmurs: There is no murmur.      No gallop. No click. No rub.   Pulses:     Intact distal pulses.   Edema:     Peripheral edema absent.   Abdominal:      General: Bowel sounds are normal.      Palpations: Abdomen is soft.      Tenderness: There is no abdominal tenderness.   Musculoskeletal: Normal range of motion.         General: No tenderness. Skin:     General: Skin is warm and dry.   Neurological:      Mental Status: Exhibits altered mental status and a cognitive deficit.           ECG 12 Lead    Date/Time: 9/22/2021 8:56 AM  Performed by: Niko García Jr., MD  Authorized by: Niko García Jr., MD   Comparison: not compared with previous ECG   Previous ECG: no previous ECG available  Rhythm: sinus rhythm  Q waves: V1 and V2    Other findings: " left ventricular hypertrophy with strain    Clinical impression: abnormal EKG                  ASSESSMENT:     Near syncope  Abnormal EKG  Hypertensive heart disease  Uncontrolled hypertension  Diabetes  Dementia possible vascular etiology  Dyslipidemia      PLAN OF CARE:     1. Near syncope -in the setting of mild to moderate hypoglycemia.  If the patient does have dementia dysautonomia can be a part of the syndrome.  Blood pressure is very poorly controlled.  Twice daily blood pressure log.  Sodium restricted diet.  We will check Holter monitor and echocardiogram given abnormal EKG.  No angina or heart failure at this time.  2. Abnormal EKG -as above  3. Uncontrolled hypertension -as above  4. Diabetes  5. Questionable history of CVA -will discuss with PCP  6. Dyslipidemia    Return to clinic 3 months           Discharge Medications          Accurate as of September 20, 2021  2:21 PM. If you have any questions, ask your nurse or doctor.            Continue These Medications      Instructions Start Date   amLODIPine 5 MG tablet  Commonly known as: NORVASC   5 mg, Oral, Daily      benazepril 40 MG tablet  Commonly known as: LOTENSIN   40 mg, Oral, Daily      clopidogrel 75 MG tablet  Commonly known as: PLAVIX   75 mg, Oral, Daily      diclofenac 1 % gel gel  Commonly known as: VOLTAREN   4 g, Topical, 4 Times Daily PRN      DULoxetine 60 MG capsule  Commonly known as: CYMBALTA   60 mg, Oral, Daily      HYDROcodone-acetaminophen  MG per tablet  Commonly known as: NORCO   1 tablet, Oral, 2 Times Daily PRN, Doses at least 6 hours apart.      metFORMIN 500 MG tablet  Commonly known as: GLUCOPHAGE   500 mg, Oral, 2 Times Daily With Meals      Plaquenil 200 MG tablet  Generic drug: hydroxychloroquine   1.5 tablets, Oral      pravastatin 40 MG tablet  Commonly known as: PRAVACHOL   40 mg, Oral, Daily      tiZANidine 2 MG tablet  Commonly known as: ZANAFLEX   TAKE 1 TABLET BY MOUTH EVERY 12 HOURS AS NEEDED FOR MUSCLE  SPASMS             Thank you for allowing me to participate in the care of your patient,      Sincerely,   Niko García MD  [facility]  09/20/21  14:21 EDT

## 2021-09-22 PROCEDURE — 93000 ELECTROCARDIOGRAM COMPLETE: CPT | Performed by: INTERNAL MEDICINE

## 2021-10-12 ENCOUNTER — OFFICE VISIT (OUTPATIENT)
Dept: FAMILY MEDICINE CLINIC | Age: 81
End: 2021-10-12
Payer: MEDICARE

## 2021-10-12 VITALS
HEIGHT: 58 IN | RESPIRATION RATE: 20 BRPM | WEIGHT: 150 LBS | BODY MASS INDEX: 31.49 KG/M2 | DIASTOLIC BLOOD PRESSURE: 80 MMHG | TEMPERATURE: 98.6 F | HEART RATE: 88 BPM | SYSTOLIC BLOOD PRESSURE: 158 MMHG | OXYGEN SATURATION: 98 %

## 2021-10-12 DIAGNOSIS — M06.09 RHEUMATOID ARTHRITIS OF MULTIPLE SITES WITH NEGATIVE RHEUMATOID FACTOR (HCC): ICD-10-CM

## 2021-10-12 DIAGNOSIS — E11.40 TYPE 2 DIABETES MELLITUS WITH DIABETIC NEUROPATHY, WITHOUT LONG-TERM CURRENT USE OF INSULIN (HCC): ICD-10-CM

## 2021-10-12 DIAGNOSIS — M54.16 CHRONIC RADICULAR LUMBAR PAIN: ICD-10-CM

## 2021-10-12 DIAGNOSIS — Z00.00 ANNUAL PHYSICAL EXAM: Primary | ICD-10-CM

## 2021-10-12 DIAGNOSIS — G89.29 CHRONIC RADICULAR LUMBAR PAIN: ICD-10-CM

## 2021-10-12 DIAGNOSIS — R13.10 DYSPHAGIA, UNSPECIFIED TYPE: ICD-10-CM

## 2021-10-12 LAB — HBA1C MFR BLD: 6.3 %

## 2021-10-12 PROCEDURE — G8484 FLU IMMUNIZE NO ADMIN: HCPCS | Performed by: NURSE PRACTITIONER

## 2021-10-12 PROCEDURE — 83036 HEMOGLOBIN GLYCOSYLATED A1C: CPT | Performed by: NURSE PRACTITIONER

## 2021-10-12 PROCEDURE — G0438 PPPS, INITIAL VISIT: HCPCS | Performed by: NURSE PRACTITIONER

## 2021-10-12 SDOH — ECONOMIC STABILITY: FOOD INSECURITY: WITHIN THE PAST 12 MONTHS, YOU WORRIED THAT YOUR FOOD WOULD RUN OUT BEFORE YOU GOT MONEY TO BUY MORE.: NEVER TRUE

## 2021-10-12 SDOH — ECONOMIC STABILITY: FOOD INSECURITY: WITHIN THE PAST 12 MONTHS, THE FOOD YOU BOUGHT JUST DIDN'T LAST AND YOU DIDN'T HAVE MONEY TO GET MORE.: NEVER TRUE

## 2021-10-12 ASSESSMENT — ENCOUNTER SYMPTOMS
VOMITING: 0
DIARRHEA: 0
EYE REDNESS: 0
SHORTNESS OF BREATH: 0
COUGH: 0
CONSTIPATION: 0
TROUBLE SWALLOWING: 0
ALLERGIC/IMMUNOLOGIC NEGATIVE: 1
ABDOMINAL PAIN: 0
SORE THROAT: 0
WHEEZING: 0
EYE PAIN: 0
EYE DISCHARGE: 0
RHINORRHEA: 0
NAUSEA: 0
BACK PAIN: 1

## 2021-10-12 ASSESSMENT — SOCIAL DETERMINANTS OF HEALTH (SDOH): HOW HARD IS IT FOR YOU TO PAY FOR THE VERY BASICS LIKE FOOD, HOUSING, MEDICAL CARE, AND HEATING?: NOT HARD AT ALL

## 2021-10-12 ASSESSMENT — PATIENT HEALTH QUESTIONNAIRE - PHQ9
SUM OF ALL RESPONSES TO PHQ QUESTIONS 1-9: 0
SUM OF ALL RESPONSES TO PHQ QUESTIONS 1-9: 0
SUM OF ALL RESPONSES TO PHQ9 QUESTIONS 1 & 2: 0
1. LITTLE INTEREST OR PLEASURE IN DOING THINGS: 0
2. FEELING DOWN, DEPRESSED OR HOPELESS: 0
SUM OF ALL RESPONSES TO PHQ QUESTIONS 1-9: 0

## 2021-10-12 NOTE — PROGRESS NOTES
[address] [address] [address] [place] [address]  [facility] [address]  Dept: [phone]  Dept Fax: [phone]  Loc: [phone]     Visit Date:  10/12/2021      Patient:  Wesly Esquivel  YOB: 1940    HPI:     Chief Complaint   Patient presents with    Referral - General     would like pain management referral for lower back and right hip pain. She also has a hard time swallowing food without choking and would like a GI referral.    Flu Vaccine       Patient here to be reestablished as she just moved back from being in [place] for 1 year. Her daughter is with her today. Patient has current request for pain management referral for her chronic lumbar pain, and GI referral for swallowing problems. Patient does have history of CVA and requires a walker to get around, has some speech thickening although she has normal mentation. Medications    Current Outpatient Medications:     metFORMIN (GLUCOPHAGE) 500 MG tablet, Take 1 tablet by mouth daily (with breakfast), Disp: 60 tablet, Rfl: 3    benazepril (LOTENSIN) 20 MG tablet, Take 1 tablet by mouth daily, Disp: 90 tablet, Rfl: 3    amLODIPine (NORVASC) 5 MG tablet, Take 1 tablet by mouth daily, Disp: 90 tablet, Rfl: 3    DULoxetine (CYMBALTA) 60 MG extended release capsule, Take 1 capsule by mouth daily, Disp: 90 capsule, Rfl: 3    clopidogrel (PLAVIX) 75 MG tablet, Take 75 mg by mouth daily, Disp: , Rfl:     pravastatin (PRAVACHOL) 40 MG tablet, Take 40 mg by mouth nightly, Disp: , Rfl:     gabapentin (NEURONTIN) 300 MG capsule, TAKE 1 CAPSULE BY MOUTH 3 TIMES A DAY FOR 90 DAYS, Disp: 270 capsule, Rfl: 1    The patient has No Known Allergies. Past Medical History  Columba Dorado  has a past medical history of Depression, Hyperlipidemia, Neuropathy, and Sleep apnea. Subjective:      Review of Systems   Constitutional: Negative for activity change, fatigue and fever.    HENT: Negative for congestion, ear pain, rhinorrhea, sore throat and trouble swallowing. Eyes: Negative for pain, discharge and redness. Respiratory: Negative for cough, shortness of breath and wheezing. Cardiovascular: Negative. Gastrointestinal: Negative for abdominal pain, constipation, diarrhea, nausea and vomiting. Endocrine: Negative. Genitourinary: Negative for dysuria, frequency and urgency. Musculoskeletal: Positive for back pain. Negative for arthralgias and myalgias. Skin: Negative for rash. Allergic/Immunologic: Negative. Neurological: Negative for dizziness, tremors, weakness and headaches. Hematological: Negative. Psychiatric/Behavioral: Negative for dysphoric mood and sleep disturbance. The patient is not nervous/anxious. Objective:     BP (!) 158/80   Pulse 88   Temp 98.6 °F (37 °C) (Oral)   Resp 20   Ht 4' 9.5\" (1.461 m)   Wt 150 lb (68 kg)   SpO2 98%   BMI 31.90 kg/m²     Physical Exam  Constitutional:       General: She is not in acute distress. Appearance: Normal appearance. She is well-developed. She is not diaphoretic. HENT:      Right Ear: Hearing and external ear normal.      Left Ear: Hearing and external ear normal.      Nose: Nose normal. No mucosal edema or rhinorrhea. Mouth/Throat:      Mouth: Mucous membranes are moist.      Dentition: Normal dentition. Pharynx: Uvula midline. No posterior oropharyngeal erythema. Tonsils: No tonsillar exudate. 0 on the right. 0 on the left. Eyes:      General: Lids are normal.         Right eye: No discharge. Left eye: No discharge. Conjunctiva/sclera: Conjunctivae normal.      Right eye: Right conjunctiva is not injected. No chemosis. Left eye: No chemosis. Pupils: Pupils are equal, round, and reactive to light. Neck:      Vascular: No JVD. Trachea: Trachea normal.   Cardiovascular:      Rate and Rhythm: Normal rate and regular rhythm. Heart sounds: Normal heart sounds.  No murmur heard. Pulmonary:      Effort: Pulmonary effort is normal. No respiratory distress. Breath sounds: Normal breath sounds. No stridor. Abdominal:      General: Abdomen is flat. Bowel sounds are normal. There is no distension. Palpations: Abdomen is soft. Tenderness: There is no abdominal tenderness. Musculoskeletal:         General: No tenderness or deformity. Normal range of motion. Cervical back: Full passive range of motion without pain and normal range of motion. Skin:     General: Skin is warm. Capillary Refill: Capillary refill takes less than 2 seconds. Findings: No rash. Neurological:      Mental Status: She is alert and oriented to person, place, and time. GCS: GCS eye subscore is 4. GCS verbal subscore is 5. GCS motor subscore is 6. Cranial Nerves: No cranial nerve deficit. Coordination: Coordination normal.      Gait: Gait normal.   Psychiatric:         Mood and Affect: Mood is not anxious or depressed. Speech: Speech normal.         Behavior: Behavior normal. Behavior is not withdrawn or hyperactive. Behavior is cooperative. Thought Content: Thought content normal.         Judgment: Judgment normal.         Assessment/Plan:      Columba Dorado was seen today for referral - general and flu vaccine. Diagnoses and all orders for this visit:    Annual physical exam  -     INFLUENZA, QUADV, ADJUVANTED, 72 YRS =, IM, PF, PREFILL SYR, 0.5ML (FLUAD)  -     Basic Metabolic Panel; Future  -     CBC Auto Differential; Future  -     Lipid, Fasting; Future  -     Hepatic Function Panel; Future  -     Microalbumin / Creatinine Urine Ratio;  Future    Type 2 diabetes mellitus with diabetic neuropathy, without long-term current use of insulin (HCC)  -     POCT glycosylated hemoglobin (Hb A1C)    Chronic radicular lumbar pain  -     [facility] - Sujit Simmons DO, Pain Medicine, Eugene Gonzalez    Dysphagia, unspecified type  -     Dominique Em MD, Gastroenterology, Renee Reid    Rheumatoid arthritis of multiple sites with negative rheumatoid factor ([facility] [address]    Exam is unremarkable today. Reviewed patient medications and she is not in need of any refills. A1c is checked and found to be 6.7 today. Return in about 3 months (around 1/12/2022). Patient instructions given andrewend.         Electronically signed by ELLYN Pinto CNP on 10/12/2021 at 3:38 PM

## 2021-11-08 ENCOUNTER — TELEPHONE (OUTPATIENT)
Dept: FAMILY MEDICINE CLINIC | Age: 81
End: 2021-11-08

## 2021-11-08 NOTE — TELEPHONE ENCOUNTER
Daughter Latoya Aleman called. Patient saw you on 10/12. She was referred to Pain Clinic but they are booking out about 4-5 months. Daughter says patient needs something for chronic pain. There are encounters with Pain Management in her chart from when she lived in [address] [place] [address] [address] Please advise.

## 2021-11-08 NOTE — TELEPHONE ENCOUNTER
I am confused by the notes from the patient's daughter. First of all, earlier today she stated she could not the patient into pain management for 4 to 5 months. Now we see she has an upcoming appointment. Secondly I do not see any order for lumbar x-rays and I cannot tell for sure if they are asking us to put orders for lumbar x-rays and or not.

## 2021-11-08 NOTE — TELEPHONE ENCOUNTER
Joshua Andujar is scheduled with Dr Geovanna Burns on 12/27/21. Per note in her referral: Need radiology of lumbar spine.     Daughter, Senthil Wynn is aware that her mom needs an x-ray and that I'm sending a request

## 2021-11-10 RX ORDER — DULOXETIN HYDROCHLORIDE 60 MG/1
60 CAPSULE, DELAYED RELEASE ORAL DAILY
Qty: 90 CAPSULE | Refills: 3 | Status: SHIPPED | OUTPATIENT
Start: 2021-11-10 | End: 2022-02-15 | Stop reason: ALTCHOICE

## 2021-11-10 NOTE — TELEPHONE ENCOUNTER
----- Message from Jazmín Stratton sent at 11/9/2021  8:50 AM EST -----  Subject: Refill Request    QUESTIONS  Name of Medication? DULoxetine (CYMBALTA) 60 MG extended release capsule  Patient-reported dosage and instructions? 60 MG capsule   How many days do you have left? 0  Preferred Pharmacy? [facility] phone number (if available)? [phone]  Additional Information for Provider? pt granddaughter called about this   medication being refilled. Her last appointment was 10/12/2021 the   granddaughter is a little confused as to why she needs to have an   appointment when she has to be seen every 3 months   ---------------------------------------------------------------------------  --------------  CALL BACK INFO  What is the best way for the office to contact you? OK to leave message on   voicemail  Preferred Call Back Phone Number?  [phone]

## 2021-11-10 NOTE — TELEPHONE ENCOUNTER
Left VM with granddaughter that he would refill the Cymbalta, but she will need to be seen for pain management. She is already scheduled for this. Told to call back if there was an issue.

## 2021-11-16 ENCOUNTER — OFFICE VISIT (OUTPATIENT)
Dept: FAMILY MEDICINE CLINIC | Age: 81
End: 2021-11-16
Payer: MEDICARE

## 2021-11-16 VITALS
SYSTOLIC BLOOD PRESSURE: 130 MMHG | BODY MASS INDEX: 31.47 KG/M2 | TEMPERATURE: 98.2 F | OXYGEN SATURATION: 96 % | HEART RATE: 84 BPM | RESPIRATION RATE: 16 BRPM | DIASTOLIC BLOOD PRESSURE: 72 MMHG | WEIGHT: 148 LBS

## 2021-11-16 DIAGNOSIS — M54.16 CHRONIC RADICULAR LUMBAR PAIN: Primary | ICD-10-CM

## 2021-11-16 DIAGNOSIS — N18.32 STAGE 3B CHRONIC KIDNEY DISEASE (HCC): ICD-10-CM

## 2021-11-16 DIAGNOSIS — I10 ESSENTIAL HYPERTENSION: ICD-10-CM

## 2021-11-16 DIAGNOSIS — E78.2 MIXED HYPERLIPIDEMIA: ICD-10-CM

## 2021-11-16 DIAGNOSIS — Z23 FLU VACCINE NEED: ICD-10-CM

## 2021-11-16 DIAGNOSIS — E11.40 TYPE 2 DIABETES MELLITUS WITH DIABETIC NEUROPATHY, WITHOUT LONG-TERM CURRENT USE OF INSULIN (HCC): ICD-10-CM

## 2021-11-16 DIAGNOSIS — G89.29 CHRONIC RADICULAR LUMBAR PAIN: Primary | ICD-10-CM

## 2021-11-16 DIAGNOSIS — I69.359 HISTORY OF HEMORRHAGIC STROKE WITH RESIDUAL HEMIPARESIS (HCC): ICD-10-CM

## 2021-11-16 PROBLEM — N18.31 STAGE 3A CHRONIC KIDNEY DISEASE (HCC): Status: ACTIVE | Noted: 2021-11-16

## 2021-11-16 PROCEDURE — 90694 VACC AIIV4 NO PRSRV 0.5ML IM: CPT | Performed by: NURSE PRACTITIONER

## 2021-11-16 PROCEDURE — 1090F PRES/ABSN URINE INCON ASSESS: CPT | Performed by: NURSE PRACTITIONER

## 2021-11-16 PROCEDURE — G8400 PT W/DXA NO RESULTS DOC: HCPCS | Performed by: NURSE PRACTITIONER

## 2021-11-16 PROCEDURE — G8427 DOCREV CUR MEDS BY ELIG CLIN: HCPCS | Performed by: NURSE PRACTITIONER

## 2021-11-16 PROCEDURE — 1036F TOBACCO NON-USER: CPT | Performed by: NURSE PRACTITIONER

## 2021-11-16 PROCEDURE — 4040F PNEUMOC VAC/ADMIN/RCVD: CPT | Performed by: NURSE PRACTITIONER

## 2021-11-16 PROCEDURE — G8417 CALC BMI ABV UP PARAM F/U: HCPCS | Performed by: NURSE PRACTITIONER

## 2021-11-16 PROCEDURE — G0008 ADMIN INFLUENZA VIRUS VAC: HCPCS | Performed by: NURSE PRACTITIONER

## 2021-11-16 PROCEDURE — 99214 OFFICE O/P EST MOD 30 MIN: CPT | Performed by: NURSE PRACTITIONER

## 2021-11-16 PROCEDURE — G8484 FLU IMMUNIZE NO ADMIN: HCPCS | Performed by: NURSE PRACTITIONER

## 2021-11-16 PROCEDURE — 1123F ACP DISCUSS/DSCN MKR DOCD: CPT | Performed by: NURSE PRACTITIONER

## 2021-11-16 RX ORDER — AMLODIPINE BESYLATE 5 MG/1
5 TABLET ORAL DAILY
Qty: 90 TABLET | Refills: 3 | Status: SHIPPED | OUTPATIENT
Start: 2021-11-16 | End: 2022-02-15 | Stop reason: ALTCHOICE

## 2021-11-16 RX ORDER — HYDROCODONE BITARTRATE AND ACETAMINOPHEN 7.5; 325 MG/1; MG/1
TABLET ORAL
COMMUNITY
Start: 2021-10-25 | End: 2021-11-16 | Stop reason: ALTCHOICE

## 2021-11-16 RX ORDER — PRAVASTATIN SODIUM 40 MG
40 TABLET ORAL NIGHTLY
Qty: 90 TABLET | Refills: 0 | Status: SHIPPED | OUTPATIENT
Start: 2021-11-16 | End: 2022-02-15 | Stop reason: SDUPTHER

## 2021-11-16 RX ORDER — BENAZEPRIL HYDROCHLORIDE 20 MG/1
20 TABLET ORAL DAILY
Qty: 90 TABLET | Refills: 3 | Status: SHIPPED | OUTPATIENT
Start: 2021-11-16 | End: 2022-02-15 | Stop reason: HOSPADM

## 2021-11-16 RX ORDER — CLOPIDOGREL BISULFATE 75 MG/1
75 TABLET ORAL DAILY
Qty: 90 TABLET | Refills: 0 | Status: SHIPPED | OUTPATIENT
Start: 2021-11-16 | End: 2022-02-15 | Stop reason: SDUPTHER

## 2021-11-16 RX ORDER — GABAPENTIN 300 MG/1
CAPSULE ORAL
Qty: 270 CAPSULE | Refills: 1 | Status: SHIPPED | OUTPATIENT
Start: 2021-11-16 | End: 2022-02-15 | Stop reason: ALTCHOICE

## 2021-11-16 RX ORDER — HYDROCODONE BITARTRATE AND ACETAMINOPHEN 10; 325 MG/1; MG/1
1 TABLET ORAL EVERY 12 HOURS PRN
Qty: 14 TABLET | Refills: 0 | Status: SHIPPED | OUTPATIENT
Start: 2021-11-16 | End: 2021-12-06 | Stop reason: SDUPTHER

## 2021-11-16 RX ORDER — HYDROCODONE BITARTRATE AND ACETAMINOPHEN 7.5; 325 MG/1; MG/1
1 TABLET ORAL EVERY 6 HOURS PRN
Qty: 120 TABLET | Refills: 0 | Status: CANCELLED | OUTPATIENT
Start: 2021-11-16 | End: 2021-12-16

## 2021-11-16 ASSESSMENT — ENCOUNTER SYMPTOMS
COUGH: 0
SHORTNESS OF BREATH: 0
SORE THROAT: 0
EYE REDNESS: 0
CONSTIPATION: 0
ABDOMINAL PAIN: 0
EYE PAIN: 0
ALLERGIC/IMMUNOLOGIC NEGATIVE: 1
NAUSEA: 0
DIARRHEA: 0
WHEEZING: 0
VOMITING: 0
BACK PAIN: 0
RHINORRHEA: 0
EYE DISCHARGE: 0
TROUBLE SWALLOWING: 0

## 2021-11-16 NOTE — PROGRESS NOTES
Immunizations Administered     Name Date Dose Route    Influenza, Quadv, adjuvanted, 65 yrs +, IM, PF (Fluad) 11/16/2021 0.5 mL Intramuscular    Site: Deltoid- Right    Lot: [id number]    NDC: 68237-151-62

## 2021-11-16 NOTE — PROGRESS NOTES
[address] [address] [address]  [facility] [address]  Dept: [phone]  Dept Fax: [phone]  Loc: [phone]     Visit Date:  11/16/2021      Patient:  Phil Cowden  YOB: 1940    HPI:     Chief Complaint   Patient presents with    1 Month Follow-Up     med refill     Flu Vaccine       Patient is here with her daughter for 1 month follow-up on her new establishment as a new patient. Patient needs multiple medication refills and needs to be established for pain management until she is able to get to formal pain management referral for her chronic lumbar pain. Medications    Current Outpatient Medications:     gabapentin (NEURONTIN) 300 MG capsule, TAKE 1 CAPSULE BY MOUTH 3 TIMES A DAY FOR 90 DAYS, Disp: 270 capsule, Rfl: 1    amLODIPine (NORVASC) 5 MG tablet, Take 1 tablet by mouth daily, Disp: 90 tablet, Rfl: 3    clopidogrel (PLAVIX) 75 MG tablet, Take 1 tablet by mouth daily, Disp: 90 tablet, Rfl: 0    pravastatin (PRAVACHOL) 40 MG tablet, Take 1 tablet by mouth nightly, Disp: 90 tablet, Rfl: 0    metFORMIN (GLUCOPHAGE) 500 MG tablet, Take 1 tablet by mouth daily (with breakfast), Disp: 60 tablet, Rfl: 3    benazepril (LOTENSIN) 20 MG tablet, Take 1 tablet by mouth daily, Disp: 90 tablet, Rfl: 3    HYDROcodone-acetaminophen (NORCO)  MG per tablet, Take 1 tablet by mouth every 12 hours as needed for Pain for up to 7 days. Intended supply: 30 days, Disp: 14 tablet, Rfl: 0    DULoxetine (CYMBALTA) 60 MG extended release capsule, Take 1 capsule by mouth daily, Disp: 90 capsule, Rfl: 3    The patient has No Known Allergies. Past Medical History  Aundra Denver  has a past medical history of Depression, Hyperlipidemia, Neuropathy, and Sleep apnea. Subjective:      Review of Systems   Constitutional: Negative for activity change, fatigue and fever.    HENT: Negative for congestion, ear pain, rhinorrhea, sore throat and trouble swallowing. Eyes: Negative for pain, discharge and redness. Respiratory: Negative for cough, shortness of breath and wheezing. Cardiovascular: Negative. Gastrointestinal: Negative for abdominal pain, constipation, diarrhea, nausea and vomiting. Endocrine: Negative. Genitourinary: Negative for dysuria, frequency and urgency. Musculoskeletal: Negative for arthralgias, back pain and myalgias. Skin: Negative for rash. Allergic/Immunologic: Negative. Neurological: Negative for dizziness, tremors, weakness and headaches. Hematological: Negative. Psychiatric/Behavioral: Negative for dysphoric mood and sleep disturbance. The patient is not nervous/anxious. Objective:     /72   Pulse 84   Temp 98.2 °F (36.8 °C) (Oral)   Resp 16   Wt 148 lb (67.1 kg)   SpO2 96%   BMI 31.47 kg/m²     Physical Exam  Constitutional:       General: She is not in acute distress. Appearance: Normal appearance. She is well-developed. She is not diaphoretic. HENT:      Right Ear: Hearing and external ear normal.      Left Ear: Hearing and external ear normal.      Nose: Nose normal. No mucosal edema or rhinorrhea. Mouth/Throat:      Mouth: Mucous membranes are moist.      Dentition: Normal dentition. Pharynx: Uvula midline. No posterior oropharyngeal erythema. Tonsils: No tonsillar exudate. 0 on the right. 0 on the left. Eyes:      General: Lids are normal.         Right eye: No discharge. Left eye: No discharge. Conjunctiva/sclera: Conjunctivae normal.      Right eye: Right conjunctiva is not injected. No chemosis. Left eye: No chemosis. Pupils: Pupils are equal, round, and reactive to light. Neck:      Vascular: No JVD. Trachea: Trachea normal.   Cardiovascular:      Rate and Rhythm: Normal rate and regular rhythm. Heart sounds: Normal heart sounds. No murmur heard.       Pulmonary:      Effort: Pulmonary effort is normal. No respiratory distress. Breath sounds: Normal breath sounds. No stridor. Musculoskeletal:         General: No tenderness or deformity. Normal range of motion. Cervical back: Full passive range of motion without pain and normal range of motion. Skin:     General: Skin is warm. Capillary Refill: Capillary refill takes less than 2 seconds. Findings: No rash. Neurological:      Mental Status: She is alert and oriented to person, place, and time. GCS: GCS eye subscore is 4. GCS verbal subscore is 5. GCS motor subscore is 6. Cranial Nerves: No cranial nerve deficit. Coordination: Coordination normal.      Gait: Gait normal.   Psychiatric:         Mood and Affect: Mood is not anxious or depressed. Speech: Speech normal.         Behavior: Behavior normal. Behavior is not withdrawn or hyperactive. Behavior is cooperative. Thought Content: Thought content normal.         Judgment: Judgment normal.         Assessment/Plan:      Carlos Patricia was seen today for 1 month follow-up and flu vaccine. Diagnoses and all orders for this visit:    Chronic radicular lumbar pain  -     gabapentin (NEURONTIN) 300 MG capsule; TAKE 1 CAPSULE BY MOUTH 3 TIMES A DAY FOR 90 DAYS  -     HYDROcodone-acetaminophen (NORCO)  MG per tablet; Take 1 tablet by mouth every 12 hours as needed for Pain for up to 7 days. Intended supply: 30 days    Type 2 diabetes mellitus with diabetic neuropathy, without long-term current use of insulin ([facility])  -     metFORMIN (GLUCOPHAGE) 500 MG tablet; Take 1 tablet by mouth daily (with breakfast)    History of hemorrhagic stroke with residual hemiparesis ([facility])    Stage 3b chronic kidney disease ([facility])    Essential hypertension  -     amLODIPine (NORVASC) 5 MG tablet; Take 1 tablet by mouth daily  -     benazepril (LOTENSIN) 20 MG tablet;  Take 1 tablet by mouth daily    Flu vaccine need  -     INFLUENZA, QUADV, ADJUVANTED, 65 YRS =, IM, PF, PREFILL SYR, 0.5ML

## 2021-11-24 DIAGNOSIS — E11.40 TYPE 2 DIABETES MELLITUS WITH DIABETIC NEUROPATHY, WITHOUT LONG-TERM CURRENT USE OF INSULIN (HCC): ICD-10-CM

## 2021-12-06 DIAGNOSIS — M54.16 CHRONIC RADICULAR LUMBAR PAIN: ICD-10-CM

## 2021-12-06 DIAGNOSIS — G89.29 CHRONIC RADICULAR LUMBAR PAIN: ICD-10-CM

## 2021-12-06 RX ORDER — HYDROCODONE BITARTRATE AND ACETAMINOPHEN 10; 325 MG/1; MG/1
1 TABLET ORAL EVERY 12 HOURS PRN
Qty: 60 TABLET | Refills: 0 | Status: SHIPPED | OUTPATIENT
Start: 2021-12-06 | End: 2022-02-15 | Stop reason: SDUPTHER

## 2021-12-14 NOTE — PROGRESS NOTES
H&P (Admission H&P at [facility])      NAME: Lorin Veronika  DATE: 12/15/21  ROOM #: 28-1  CODE STATUS: FULL CODE  REASON FOR ADMISSION: Weakness after hospitalization  : 1940  ADMISSION DATE: 2021  SKILLED PATIENT: Yes    History obtained from chart review, the patient and nursing staff. SUBJECTIVE:  HPI: Lorin Banda is a 80 y.o. female. Pt seen and examined at bedside. Patient admitted to [facility] from  to  for issues as noted below. D/c summary per hospital team:  \"Discharge Date:  21    Reason For Visit: NSTEMI    Hospital Course    - Discharge Diagnoses  (1) C. difficile diarrhea  Status: Acute  Home Going Treatment Plan:  GI panel had confirmed C. difficile and therefore patient is being managed with p.o.  vancomycin. Lipase and lactic acid were normal.  We will continue with current  management. We will add PPI for the abdominal pain. (2) Thalamic infarction  Status: Chronic  Home Going Treatment Plan:  MRI indicating remote strokes and therefore no acute stroke. We will continue with  secondary stroke prevention regimen. (3) NSTEMI (non-ST elevated myocardial infarction)  Status: Acute  Home Going Treatment Plan:  Remains on heparin drip with plans for left heart catheterization by cardiology consult  whenever cleared by nephrology. (4) Hypomagnesemia  Status: Acute  Home Going Treatment Plan:  Corrected, continue to monitor, maintain on p.o. MagDelay. (5) Microcytic anemia  Status: Acute  Home Going Treatment Plan:  Patient has iron deficiency anemia and will be given a dose of Venofer will subsequently  require p.o. ferrous sulfate      (6) PARIS (acute kidney injury)  Status: Acute  Home Going Treatment Plan:  Nephrology consult following with further recommendations. Avoid nephrotoxins, renally  dose all medications, maintain on renal diet.       (7) Thrombocytopenia  Status: Acute  Home Going Treatment Plan:  Need to be monitored closely especially in the setting of heparin to prevent any bleeding. May need to be seen by oncologist upon discharge. (8) DM2 (diabetes mellitus, type 2)  Status: Chronic  Qualifiers:     Diabetes mellitus long term insulin use: without long term use   Diabetes mellitus  complication status: without complication   Qualified Code(s): E11.9 - Type 2 diabetes  mellitus without complications  Home Going Treatment Plan:  Continue with home regimen, utilize SSI as needed to optimize blood sugar control,  maintain on ADA diet. (9) Essential (primary) hypertension  Status: Chronic  Home Going Treatment Plan:  Optimize blood pressure control after 48 hours of permissive hypertension due to the acute  stroke per the CT scan of the head. (10) History of CVA with residual deficit  Status: Chronic  Home Going Treatment Plan:  Patient has a chronic stroke with right-sided weakness which is stable. We will continue  with secondary stroke prevention. Neurology is to follow with further recommendations  especially with the new finding. (11) HLD (hyperlipidemia)  Status: Chronic  Qualifiers:     Hyperlipidemia type: unspecified   Qualified Code(s): E78.5 - Hyperlipidemia,  unspecified  Home Going Treatment Plan:  Continue with high-dose statin for secondary stroke prevention. (12) Metabolic acidosis  Status: Resolved  Home Going Treatment Plan:  Resolved      (13) Hypocalcemia  Status: Acute  Home Going Treatment Plan: This is secondary to vitamin D deficiency and being replaced per protocol. (14) Ischemic cardiomyopathy  Status: Acute  Home Going Treatment Plan:  EF of 30-35% as per recent 2D echocardiogram.  LifeVest as per cardiology consult. (15) Vitamin B12 deficiency (dietary) anemia  Status: Acute    (16) Vitamin D deficiency disease  Status: Acute    - Hospital Course  81-year-old female has h/o HTN, DM2,CVA with residual right-sided weakness.  She presents  to the ER with complaints of right leg pain. Patient states that she has a history of a  CVA with right-sided deficits and that she typically has weakness on the right side. Patient states that her right leg has been more painful and reports pain with ambulation. Patient denies any new injury. Patient febrile upon arrival but denies any CP, dyspnea,  or any other symptoms. Patient states that she received a pneumonia vaccine but did not  receive a Covid vaccine. Initially admitted as above, managed for UTI, however CT brain as per the request of  patient's daughter has shown an acute lacunar right thalamic infarct. Patient was seen by  neuro consult, MRI showed a chronic stroke and therefore patient was maintained on  secondary stroke prevention protocol. She did have acute kidney injury on CKD and was  seen by nephrology consult. She was also diagnosed with NSTEMI, but because of patient's  kidney function and initially the question of an acute stroke, ischemic work-up was  deferred to the outpatient. She did have electrolyte imbalances including hypomagnesemia  which this morning was given 4 g of magnesium sulfate and is being discharged on MagDelay. Patient also had hypocalcemia which is secondary to vitamin D deficiency and being  replaced. There was also vitamin B12 deficiency and patient will be given an IM dose of  1000 mcg and will be maintained on 1000 mg p.o. daily. Patient is being discharged back  to the ECF in a stable state. COVID-19 rapid testing was negative. \"    Since admission:  Doing well thus far  Making progress with therapy  Denies CP/SOB  Needs OP cardio f/u, as above, to complete ischemic w/u  Life fest has not fired. Denies bleeding, melena or hematochezia. Is on b12 for iron and got iron fusion, it appears, in the hospital  Diarrhea is improving, on po vanco for C. Diff. Not mentioned in d/c summary   Renal fxn improved on labs on labs at d/c      -DM2: on Januvia and glipizide.  Sugars have been appropriate since admission    -Hx of CVA: residual right sided weakness. On DAPT, BP control and pravastatin. No new stroke like sxs    -HTN/CKD: on coreg, hydralazine and norvasc. BP's since admission have been <140/90    -HLD: on pravastatin. Tolerating well    -Vitamin D deficiency: on supplementation. Tolerating well    -GERD: On Famotidine. sxs controlled. Denies dysphagia     -DDD Lumbar: on norco as OP. OAARS reviewed. Pain controled. Working with PT/OT      Allergies and Medications were reviewed through the [facility] EMR. All medications reviewed and reconciled, including OTC and herbal medications.        Patient Active Problem List    Diagnosis Date Noted    Stage 3b chronic kidney disease ([address] 11/16/2021    Chronic radicular lumbar pain 11/16/2021    Rheumatoid arthritis of multiple sites with negative rheumatoid factor ([address] 10/12/2021    Primary osteoarthritis involving multiple joints 02/16/2020    Chronic pain syndrome 02/16/2020    Type 2 diabetes mellitus with diabetic neuropathy, without long-term current use of insulin ([address] 10/18/2019    Essential hypertension 10/18/2019    History of hemorrhagic stroke with residual hemiparesis ([address] 10/18/2019    Anxiety 10/18/2019    Hyperlipidemia 10/18/2019       Past Medical History:   Diagnosis Date    Depression     Hyperlipidemia     Neuropathy     Sleep apnea     Cpap       Past Surgical History:   Procedure Laterality Date    APPENDECTOMY      CHOLECYSTECTOMY      HIP SURGERY Right 7/29/2020    right , Genicular Nerve block performed by Jose Khan MD [facility]    Left total hip    OVARY REMOVAL         No Known Allergies    Social History     Tobacco Use    Smoking status: Never Smoker    Smokeless tobacco: Never Used   Substance Use Topics    Alcohol use: Never        Family History   Problem Relation Age of Onset    Cancer Mother         colon    Diabetes Father     Stroke lesions noted  Neck: supple and non-tender without mass, no thyromegaly or thyroid nodules, no cervical lymphadenopathy  Pulmonary/Chest: clear to auscultation bilaterally- no wheezes, rales or rhonchi, normal air movement, no respiratory distress or retractions  Cardiovascular: normal rate, regular rhythm, normal S1 and S2, no murmurs, rubs, clicks, or gallops, distal pulses intact  Abdomen: soft, non-tender, non-distended, bowel sounds physiologic,  no rebound or guarding, no masses or hernias noted. Liver and spleen without enlargement.    Extremities: no cyanosis, clubbing or edema of the lower extremities  Musculoskeletal: No joint swelling or gross deformity   Neuro:  Alert, 5/5 strength globally and symmetrically, 2+ patellar reflexes b/l,  normal speech, no focal findings or movement disorder noted  Psych:  Normal affect without evidence of depression or anxiety, insight and judgement are appropriate, memory appears intact  Skin: warm and dry, no rash or erythema  Lymph:  No cervical, auricular or supraclavicular lymph nodes palpated      LABS/IMAGING    CBC 12 DEC 2021  CBC Without Differential      WBC                              5.3                        4.4-10.5 th/cmm      RBC                              3.89                 L     4.00-5.10 mil/cmm      Hemoglobin                       9.0                  L     12.0-15.0 gm/dL      Hematocrit                       29.2                 L     35.0-44.0 %      MCV                              75.2                 L     80-97 CU ELVIE      MCH                              23.2                 L     27.5-33.0 PG      MCHC                             30.9                 L     33.0-36.0 gm/dL      RDW                              13.8                       12.0-16.0 %      Platelet Count                    95                  L     150-400 th/cmm      CMP 12 DEC 2021  Comprehensive Metabolic Panel      Sodium                           140 135-145 mEq/L      Potassium                        3.6                        3.6-5.0 mEq/L      Chloride                         110                        101-111 mEq/L      Carbon Dioxide                    22                        21-32 mEq/L      Anion Gap                         8                         4-12      Glucose                          153                  H      mg/dL      BUN                               12                        7-20 mg/dL      Creatinine                       1.56                 H     0.60-1.30 mg/dL      GFR Calculation                   39                  L                           AGE(years)       AVERAGE GFR                            70+             75 ml/min/1.73 square meters                           Note:This result is normalized to 1.73 square meter                                body surface area. Height and weight are not                                factored.                               Chronic Kidney Disease stages by NKDF                           Stage       eGFR                           --------------------------                            I           >90                            II          60-89                            III         30-59                            IV          15-29                            V           <15 or dialysis      AST/SGOT                          19                        15-41 IU/L      Alk Phos                          48                         IU/L      Bili,Total                       0.4                        0.2-1.0 mg/dL      Calcium                          8.70                 L     8.8-10.5 mg/dL      Albumin                          3.1                  L     3.5-5.0 gm/dL      Total Protein                    6.3                        6.2-8.0 g/dL      Alb/Glob Ratio                   1.0                  L     1.5-2.5      Phosphorus                       3.3 2.4-4.7 mg/dL      ALT/SGPT                          13                        10-40 IU/L    Lipase                              29                        21-51 IU/L    Magnesium                          1.6                  L     1.8-2.5 mg/dL      ECHO 07 DEC 2021   Ejection fraction is visually estimated at 30-35%. Dilated left ventricle   Apical, mid to distal anteroseptal and inferoseptal hypokineses is noted   Normal right ventricular size and function. RV S' measures 14 cm/s. TAPSE measures 2.4 cm. Normal size left atrium. No evidence of atrial septal defect. Normal size right atrium. The aortic valve is trileaflet with good leaflet separation. No evidence of aortic stenosis or regurgitation. Mitral valve leaflet mobility is normal .   Mitral leaflets are mildly sclerotic. Mild mitral regurgitation is present. Estimated right ventricular systolic pressure is estimated at 35 mmHg. Mild tricuspid regurgitation. Due to poor acoustic windows, Definity contrast was used to assess for   wall motion abnormalities and/or thrombus. IVC appears normal.        Exam Date: 12/08/21  Accession #:  [id number]  Exam:  US   US Carotid & Vertebrals  Result: See Report    STUDY:  CAROTID DUPLEX ULTRASONOGRAPHY AND DOPPLER SPECTRAL ANALYSIS     REASON FOR EXAM:   Female, 80years old. cva     TECHNIQUE:   Duplex scan of the carotid arteries was performed. Spectral   Doppler analysis, in addition to color flow and gray-scale imaging was   performed. COMPARISON:   None.   ___________________________________     FINDINGS:     RIGHT CAROTID ARTERIES     Common Carotid Artery (CCA): There is a normal distal right common   carotid artery, without intimal thickening or atherosclerotic plaque   formation. The peak systolic flow velocity (PSV) is 54 cm/sec. Carotid Bulb:  Eccentric smooth echogenic plaque. Internal Carotid Artery (ICA):  Eccentric smooth echogenic plaque.   The   peak systolic flow velocity (PSV) is 73 cm/sec. The end diastolic flow   velocity (EDV) is 19 cm/sec. ICA/CCA ratio is 1.36. There is a normal   wave form with a broad systolic peak and well-maintained diastolic flow. External Carotid Artery (ECA): Atherosclerosis with mild stenosis. Vertebral Artery:  There is antegrade flow in the right vertebral artery. LEFT CAROTID ARTERIES     Common Carotid Artery (CCA): There is a normal distal left common carotid   artery, without intimal thickening or atherosclerotic plaque formation. The peak systolic flow velocity (PSV) is 61 cm/sec. Carotid Bulb: There is no intimal wall thickening or demonstrated   atherosclerotic plaque formation. Internal Carotid Artery (ICA):  Eccentric smooth echogenic plaque. The   peak systolic flow velocity (PSV) is 103 cm/sec. The end diastolic flow   velocity (EDV) is 24 cm/sec. ICA/CCA ratio is 1.69. There is a normal   wave form with a broad systolic peak and well-maintained diastolic flow. External Carotid Artery (ECA): Minor atherosclerosis with only minimal   luminal narrowing. Vertebral Artery:  There is antegrade flow in the left vertebral artery. ___________________________________     IMPRESSION:   Bilateral carotid atherosclerosis; less than 50% stenosis. Exam Date: 12/08/21  Accession #:  [id number]  Exam:  MRI   MRI Brain Without Contrast  Result: See Report    STUDY:   MRI BRAIN WITHOUT CONTRAST     REASON FOR EXAM:   Female, 80years old. AMS     TECHNIQUE:   Standardized multiplanar fat and water weighted pulse   sequences were obtained. Multiplanar multiecho noncontrast imaging   obtained. COMPARISON:   CT examination of 12/8/2021   ___________________________________     FINDINGS:     HEMISPHERES, CEREBELLUM AND BRAINSTEM:   1. The cerebral parenchyma, ventricular system, subarachnoid spaces have   normal configuration.   Diffuse involutional changes and moderate to   extensive chronic microvascular deep white matter disease is noted. 2.  No areas of fluid restriction or acute ischemic change. No mass or   acute infarct noted. Remote lacunar infarct present within the RIGHT   thalamus. 3.  There are several punctate areas of susceptibility artifact within the   lenticular nucleus bilaterally greater on the RIGHT than LEFT. Additional   subtle punctate areas noted within the subcortical white matter of LEFT   occipital lobe. These are consistent with focal punctate areas of   hemosiderin deposition and sequelae of amyloid angiopathy is a   consideration. 4.  No mass, hemorrhage, or acute territorial infarct noted. 5.  The cerebellum, brainstem, basilar and suprasellar cisterns have   normal configuration. Punctate areas of chronic white matter disease also   noted within the brainstem particularly the susan. Several areas of remote   lacunar infarct suspected in the LEFT susan/brachium pontis. .  No   associated fluid restriction noted. 6.  No Chiari malformation. PITUITARY: Infundibulum and pituitary have normal configuration. Midline   structures appear normal.     CSF SPACES: Appropriate for age. No hydrocephalus. Basal cisterns are   patent. VESSELS:   1. There are normal flow voids noted in the great vessels at the skull   base     ORBITS AND PARANASAL SINUSES:   1. Both globes, extraocular muscles, optic nerves and retrobulbar fat   appear unremarkable. 2.  Paranasal sinuses are clear. BONY ELEMENTS: Bony elements of the cranial vault, facial skeleton and   skull base have normal appearance. SCALP AND SOFT TISSUES: Normal appearance of the soft tissues of the scalp   and the visualized face     OTHER: None     IMPRESSION:     1. Diffuse involutional changes, chronic microvascular deep white matter   disease without mass, hemorrhage, or acute territorial infarct.    2.  Chronic deep white matter disease and areas suspicious of remote   lacunar infarct in the LEFT side of the susan and middle cerebellar   peduncle. Additional area of remote lacunar infarct present within the   RIGHT thalamus. 3.  Punctate areas of susceptibility artifact as detailed which can be   seen in the setting of amyloid angiopathy. Electronically Signed:   Shivam Aiken MD   2021/12/08 at 23:17 EST   Tel [phone], Service support  [phone], Fax [phone]    ASSESSMENT & PLAN   Diagnosis Orders   1. Physical deconditioning     2. NSTEMI (non-ST elevated myocardial infarction) ([facility]     3. Heart failure with reduced ejection fraction ([facility]     4. Microcytic anemia     5. Thrombocytopenia ([address]     6. Iron deficiency     7. B12 deficiency     8. Clostridium difficile diarrhea     9. PARIS (acute kidney injury) ([facility]     10. Type 2 diabetes mellitus with stage 3b chronic kidney disease, without long-term current use of insulin ([facility]     11. History of CVA with residual deficit     12. Hypertension, essential     13. Stage 3b chronic kidney disease ([facility]     14. Dyslipidemia     15. Vitamin D deficiency     16. Gastroesophageal reflux disease, unspecified whether esophagitis present     17. DDD (degenerative disc disease), lumbar       -Physical deconditioning: improving, con't PT/OT. -NSTEMI/HFrEF: stable since admission, con't DAPT, Coreg, BP control, Life Vest and pravachol. Daily wts. Start diuresis if needed, did not d/c to us on any diuretics. Will need OP cardio f/u for ischemic w/u, order placed for staff to get this setup.   -Microcytic anemia/Iron deficiency/B12 deficiency: acute on chronic, con't b12. Did not d/c to us on iron tabs, but did get IV iron infusion. Will add oral daily iron and trend labs,   -C. Diff diarrhea: improving, finish PO Vanco.   -PARIS: improved at d/c, f/u labs ordered for tomorrow  -DM2: stable, con't glipizide and Januvia. -Hx of CVA: stable, con't DAPT, BP control and Pravastatin. -HTN/CKD3b: stable, con't Coreg, Hydralazine, and Norvasc. Monitor labs  -HLD: stable, con't Pravachol.   -Vit D deficiency: stable, con't supplementation. -GERD: stable, con't famotidine. -DDD lumbar: stable, con't prn norco.       Disposition: FULL CODE. Con't PT/OT. Reassess 30 days, sooner prn. Resident has multiple chronic conditions including CAD, HTN and DM, and they are expected to last 12 or more months. These chronic conditions place the resident at a significant risk of death, acute exacerbation or functional decline. The patients comprehensive plan was monitored today. I spent 20 minutes reviewing the plan.        Future Appointments   Date Time Provider Sindhu Ascencio   12/27/2021  3:20 PM DO DENZEL Asher [facility]   2/15/2022  3:00 PM ELLYN Rosales - CNP [facility]       Electronically signed by Harish Muñiz DO on 12/15/2021 at 3:01 PM

## 2021-12-15 ENCOUNTER — OUTSIDE SERVICES (OUTPATIENT)
Dept: FAMILY MEDICINE CLINIC | Age: 81
End: 2021-12-15
Payer: MEDICARE

## 2021-12-15 VITALS
RESPIRATION RATE: 18 BRPM | BODY MASS INDEX: 29.64 KG/M2 | TEMPERATURE: 97.9 F | DIASTOLIC BLOOD PRESSURE: 82 MMHG | HEIGHT: 60 IN | HEART RATE: 84 BPM | WEIGHT: 151 LBS | SYSTOLIC BLOOD PRESSURE: 100 MMHG

## 2021-12-15 DIAGNOSIS — E78.5 DYSLIPIDEMIA: ICD-10-CM

## 2021-12-15 DIAGNOSIS — E11.22 TYPE 2 DIABETES MELLITUS WITH STAGE 3B CHRONIC KIDNEY DISEASE, WITHOUT LONG-TERM CURRENT USE OF INSULIN (HCC): ICD-10-CM

## 2021-12-15 DIAGNOSIS — E53.8 B12 DEFICIENCY: ICD-10-CM

## 2021-12-15 DIAGNOSIS — R53.81 PHYSICAL DECONDITIONING: Primary | ICD-10-CM

## 2021-12-15 DIAGNOSIS — I21.4 NSTEMI (NON-ST ELEVATED MYOCARDIAL INFARCTION) (HCC): ICD-10-CM

## 2021-12-15 DIAGNOSIS — E61.1 IRON DEFICIENCY: ICD-10-CM

## 2021-12-15 DIAGNOSIS — I69.30 HISTORY OF CVA WITH RESIDUAL DEFICIT: ICD-10-CM

## 2021-12-15 DIAGNOSIS — N18.32 STAGE 3B CHRONIC KIDNEY DISEASE (HCC): ICD-10-CM

## 2021-12-15 DIAGNOSIS — N17.9 AKI (ACUTE KIDNEY INJURY) (HCC): ICD-10-CM

## 2021-12-15 DIAGNOSIS — D50.9 MICROCYTIC ANEMIA: ICD-10-CM

## 2021-12-15 DIAGNOSIS — E55.9 VITAMIN D DEFICIENCY: ICD-10-CM

## 2021-12-15 DIAGNOSIS — K21.9 GASTROESOPHAGEAL REFLUX DISEASE, UNSPECIFIED WHETHER ESOPHAGITIS PRESENT: ICD-10-CM

## 2021-12-15 DIAGNOSIS — N18.32 TYPE 2 DIABETES MELLITUS WITH STAGE 3B CHRONIC KIDNEY DISEASE, WITHOUT LONG-TERM CURRENT USE OF INSULIN (HCC): ICD-10-CM

## 2021-12-15 DIAGNOSIS — I10 HYPERTENSION, ESSENTIAL: ICD-10-CM

## 2021-12-15 DIAGNOSIS — D69.6 THROMBOCYTOPENIA (HCC): ICD-10-CM

## 2021-12-15 DIAGNOSIS — M51.36 DDD (DEGENERATIVE DISC DISEASE), LUMBAR: ICD-10-CM

## 2021-12-15 DIAGNOSIS — A04.72 CLOSTRIDIUM DIFFICILE DIARRHEA: ICD-10-CM

## 2021-12-15 DIAGNOSIS — I50.20 HEART FAILURE WITH REDUCED EJECTION FRACTION (HCC): ICD-10-CM

## 2021-12-15 PROCEDURE — 99305 1ST NF CARE MODERATE MDM 35: CPT | Performed by: FAMILY MEDICINE

## 2021-12-15 PROCEDURE — 99490 CHRNC CARE MGMT STAFF 1ST 20: CPT | Performed by: FAMILY MEDICINE

## 2021-12-20 ENCOUNTER — CLINICAL DOCUMENTATION (OUTPATIENT)
Dept: FAMILY MEDICINE CLINIC | Age: 81
End: 2021-12-20

## 2021-12-20 DIAGNOSIS — I21.4 NON-ST ELEVATION (NSTEMI) MYOCARDIAL INFARCTION (HCC): ICD-10-CM

## 2021-12-20 NOTE — PROGRESS NOTES
Nursing staff updates that patient refused Life Vest last night. Today, she agrees to wear the Life Vest.  She states understanding of the importance of compliance of wearing the Life Vest.    I have updated Nursing staff. Nursing will update CNP/Physician if patient refuses Life Vest in the future.

## 2021-12-21 ENCOUNTER — OUTSIDE SERVICES (OUTPATIENT)
Dept: FAMILY MEDICINE CLINIC | Age: 81
End: 2021-12-21
Payer: MEDICARE

## 2021-12-21 DIAGNOSIS — S00.93XA TRAUMATIC HEMATOMA OF HEAD, INITIAL ENCOUNTER: ICD-10-CM

## 2021-12-21 PROCEDURE — 99309 SBSQ NF CARE MODERATE MDM 30: CPT | Performed by: NURSE PRACTITIONER

## 2021-12-21 NOTE — PROGRESS NOTES
[facility] Progress Note    NAME: Wesly Esquivel  DATE: 21  ROOM #: B 20-2  CODE STATUS: Full Code  CHIEF COMPLAINT:  Acute visit to assess hematoma s/p fall. : 1940    History obtained from chart review, the patient and staff. SUBJECTIVE:  HPI: Wesly Esquivel is a 80 y.o. female. PMH documented below. Patient admitted to [facility] from [facility] for PT/OT services. She remains in contact isolation for clostridium difficile. Today, she is seen at bedside s/p fall in her room. She has 2 hematomas on her anterior forehead, and I've asked staff to apply ice as tolerates for comfort. She reports falling when she was walking back from the bathroom. She appears stable. Assessment and plan documented below. She is encouraged to use call light for assistance with ambulating in the room. Nursing provides updates. Allergies and Medications were reviewed through the [facility] EMR. All medications reviewed and reconciled, including OTC and herbal medications.      Patient Active Problem List    Diagnosis Date Noted    Traumatic hematoma of head 2021    Non-ST elevation (NSTEMI) myocardial infarction ([address] 2021    Stage 3b chronic kidney disease ([address] 2021    Chronic radicular lumbar pain 2021    Rheumatoid arthritis of multiple sites with negative rheumatoid factor ([address] 10/12/2021    Primary osteoarthritis involving multiple joints 2020    Chronic pain syndrome 2020    Type 2 diabetes mellitus with diabetic neuropathy, without long-term current use of insulin ([address] 10/18/2019    Essential hypertension 10/18/2019    History of hemorrhagic stroke with residual hemiparesis ([address] 10/18/2019    Anxiety 10/18/2019    Hyperlipidemia 10/18/2019       Past Medical History:   Diagnosis Date    Depression     Hyperlipidemia     Neuropathy     Sleep apnea     Cpap       Past Surgical History:   Procedure Laterality Date    APPENDECTOMY      The right lateral hematoma has an excoriated area that is not bleeding but is beginning to scab. Eyes: pupils equal, round, and reactive to light, conjunctivae and eye lids without erythema  ENT: external ear and ear canal normal bilaterally, nose without deformity, nasal mucosa and turbinates normal without polyps, oropharynx normal, dentition is normal for age, no lip or gum lesions noted  Neck: supple and non-tender without mass, no thyromegaly or thyroid nodules, no cervical lymphadenopathy  Pulmonary/Chest: clear to auscultation bilaterally- no wheezes, rales or rhonchi, normal air movement, no respiratory distress or retractions. Requires no supplemental oxygen at time of assessment. Cardiovascular: normal rate, regular rhythm, normal S1 and S2, no murmurs, rubs, clicks, or gallops, distal pulses intact  Abdomen: soft, non-tender, non-distended, bowel sounds physiologic,  no rebound or guarding, no masses or hernias noted. Liver and spleen without enlargement. Extremities: no cyanosis, clubbing or edema of the lower extremities  Musculoskeletal: No joint swelling or gross deformity   Neuro:  Alert and oriented to name, . She is confused to location and current year but reorients easily. 4/5 strength globally and symmetrically. HG equal.  PPP equal.  No arm drift. Normal speech, no focal findings or movement disorder noted  Psych:  Normal affect without evidence of depression or anxiety, insight and judgement are mildly impaired, memory appears mildly impaired. Skin: pink, warm and dry, no rash or erythema  Lymph:  No cervical, auricular or supraclavicular lymph nodes palpated    LABS/IMAGING    21:  Na 140, K 4.6, chl 105, CO2 24, BUN 18, Creat 2.1, GFR 23, calcium 8.3.  21:  WBC 8.0, Hgb 8.1, platelets 894. ASSESSMENT & PLAN    1. Traumatic hematoma of head, initial encounter  Neuro status is at baseline. She appears stable.   It is noted that patient continues to take Plavix 75 mg once daily and ASA 81 mg po once daily. She c/o headache and dizziness. Pt declines to go to ER and requests to stay in-house for monitoring and treatment. Continue ice to forehead as tolerates; pt reports \"this feels good\". Initiating Tylenol 650 mg po every 8 hours prn for discomfort. Asking for VS and neuro checks every 1 hour. Asking Nursing staff to call me at 16:00 and 20:00 with status update. Asking for CBC on 12/22/21. Disposition:  Assessment and plan as stated above. Follow closely. Plan of care reviewed with Dr. Buzz Enrique DO.   Electronically signed by ELLYN Loya NP on 12/21/2021 at 12:58 PM

## 2021-12-21 NOTE — PROGRESS NOTES
H&P (Readmission H&P at [facility])      NAME: Wesly Esquivel  DATE: 21  ROOM #: 28-1  CODE STATUS: FULL CODE  REASON FOR ADMISSION: Weakness after hospitalization  : 1940  ADMISSION DATE: 2021  SKILLED PATIENT: Yes    History obtained from chart review, the patient and nursing staff. SUBJECTIVE:  HPI: Wesly Esquivel is a 80 y.o. female. Pt seen and examined at bedside. Patient admitted to [facility] from  to  for issues as noted below. D/c summary:  [facility] Hospital Course  72-year-old female with a past medical history of myocardial infarction, hypertension,  hyperlipidemia, diabetes, CVA/TIA, total left hip replacement presented from the [facility] with  complaints of altered mental status per nursing staff at [facility] facility. Patient had acute  kidney injury on lab work and was receiving IV fluids and patient removed IV due to she  had a go to the bathroom. No associating symptoms at this time. Patient is alert and  oriented to name and place. Daughter at bedside endorsing patient is at baseline. Patient was recently admitted for C. difficile and has been on vancomycin p.o. since  discharge. Stop date vancomycin would be 2021. Patient denies any dizziness,  lightheadedness, chest pain, nausea, vomiting, dysuria, hematuria, melena, fever or  chills. Patient does have some notable shortness of breath with exertion, and states  chokes with eating foods. Patient was recently found to have ischemic cardiomyopathy and  was placed with a LifeVest.  Patient will be admitted overnight with nephrology consult.   Patient seen and examined.  []  Awake and alert  Vitals stable  Answers questions  Potassium 4.4, BUN 19, creatinine 1.99  Nephrology following  Check urine studies  Continue Bicarb drip which close attention to respiratory status  Bumex 1 mg IV x1  Decreased rate of IV fluids to 50 ml/hr  Renal diet  Recommend cardiac evaluation again      Sitting in a chair  No symptoms  Eating well  Labs normal  We will change Bumex to p.o. 1 mg daily  DC IV fluids  Discharge to SNF\"    Since readmission:  Had fall yesterday, hit head with bad HA. Unclear what happened  See in ER after fall. Neg w/u, including CT  Other than that, doing well. Sitting up in chair, awake, alert and able to answer questions easily  Appetite ok, keeping up with fluids  MS is at baseline        -LAST VISIT:  Patient admitted to [facility] from 12/7 to 12/12 for issues as noted below. D/c summary per hospital team:  \"Discharge Date:  12/12/21    Reason For Visit: NSTEMI    Hospital Course    - Discharge Diagnoses  (1) C. difficile diarrhea  Status: Acute  Home Going Treatment Plan:  GI panel had confirmed C. difficile and therefore patient is being managed with p.o.  vancomycin. Lipase and lactic acid were normal.  We will continue with current  management. We will add PPI for the abdominal pain. (2) Thalamic infarction  Status: Chronic  Home Going Treatment Plan:  MRI indicating remote strokes and therefore no acute stroke. We will continue with  secondary stroke prevention regimen. (3) NSTEMI (non-ST elevated myocardial infarction)  Status: Acute  Home Going Treatment Plan:  Remains on heparin drip with plans for left heart catheterization by cardiology consult  whenever cleared by nephrology. (4) Hypomagnesemia  Status: Acute  Home Going Treatment Plan:  Corrected, continue to monitor, maintain on p.o. MagDelay. (5) Microcytic anemia  Status: Acute  Home Going Treatment Plan:  Patient has iron deficiency anemia and will be given a dose of Venofer will subsequently  require p.o. ferrous sulfate      (6) PARIS (acute kidney injury)  Status: Acute  Home Going Treatment Plan:  Nephrology consult following with further recommendations. Avoid nephrotoxins, renally  dose all medications, maintain on renal diet.       (7) Thrombocytopenia  Status: Acute  Home Going Treatment Plan:  Need to be monitored closely especially in the setting of heparin to prevent any bleeding. May need to be seen by oncologist upon discharge. (8) DM2 (diabetes mellitus, type 2)  Status: Chronic  Qualifiers:     Diabetes mellitus long term insulin use: without long term use   Diabetes mellitus  complication status: without complication   Qualified Code(s): E11.9 - Type 2 diabetes  mellitus without complications  Home Going Treatment Plan:  Continue with home regimen, utilize SSI as needed to optimize blood sugar control,  maintain on ADA diet. (9) Essential (primary) hypertension  Status: Chronic  Home Going Treatment Plan:  Optimize blood pressure control after 48 hours of permissive hypertension due to the acute  stroke per the CT scan of the head. (10) History of CVA with residual deficit  Status: Chronic  Home Going Treatment Plan:  Patient has a chronic stroke with right-sided weakness which is stable. We will continue  with secondary stroke prevention. Neurology is to follow with further recommendations  especially with the new finding. (11) HLD (hyperlipidemia)  Status: Chronic  Qualifiers:     Hyperlipidemia type: unspecified   Qualified Code(s): E78.5 - Hyperlipidemia,  unspecified  Home Going Treatment Plan:  Continue with high-dose statin for secondary stroke prevention. (12) Metabolic acidosis  Status: Resolved  Home Going Treatment Plan:  Resolved      (13) Hypocalcemia  Status: Acute  Home Going Treatment Plan: This is secondary to vitamin D deficiency and being replaced per protocol. (14) Ischemic cardiomyopathy  Status: Acute  Home Going Treatment Plan:  EF of 30-35% as per recent 2D echocardiogram.  LifeVest as per cardiology consult. (15) Vitamin B12 deficiency (dietary) anemia  Status: Acute    (16) Vitamin D deficiency disease  Status: Acute    - Hospital Course  51-year-old female has h/o HTN, DM2,CVA with residual right-sided weakness.  She presents  to the ER with complaints of right leg pain. Patient states that she has a history of a  CVA with right-sided deficits and that she typically has weakness on the right side. Patient states that her right leg has been more painful and reports pain with ambulation. Patient denies any new injury. Patient febrile upon arrival but denies any CP, dyspnea,  or any other symptoms. Patient states that she received a pneumonia vaccine but did not  receive a Covid vaccine. Initially admitted as above, managed for UTI, however CT brain as per the request of  patient's daughter has shown an acute lacunar right thalamic infarct. Patient was seen by  neuro consult, MRI showed a chronic stroke and therefore patient was maintained on  secondary stroke prevention protocol. She did have acute kidney injury on CKD and was  seen by nephrology consult. She was also diagnosed with NSTEMI, but because of patient's  kidney function and initially the question of an acute stroke, ischemic work-up was  deferred to the outpatient. She did have electrolyte imbalances including hypomagnesemia  which this morning was given 4 g of magnesium sulfate and is being discharged on MagDelay. Patient also had hypocalcemia which is secondary to vitamin D deficiency and being  replaced. There was also vitamin B12 deficiency and patient will be given an IM dose of  1000 mcg and will be maintained on 1000 mg p.o. daily. Patient is being discharged back  to the ECF in a stable state. COVID-19 rapid testing was negative. \"    Since admission:  Doing well thus far  Making progress with therapy  Denies CP/SOB  Needs OP cardio f/u, as above, to complete ischemic w/u  Life fest has not fired. Denies bleeding, melena or hematochezia. Is on b12 for iron and got iron fusion, it appears, in the hospital  Diarrhea is improving, on po vanco for C. Diff.  Not mentioned in d/c summary   Renal fxn improved on labs on labs at d/c    UPDATE TODAY:   Doing well  Working SURGERY Right 7/29/2020    right , Genicular Nerve block performed by Ronny Deluca MD at [facility]    Left total hip    OVARY REMOVAL         No Known Allergies    Social History     Tobacco Use    Smoking status: Never Smoker    Smokeless tobacco: Never Used   Substance Use Topics    Alcohol use: Never        Family History   Problem Relation Age of Onset    Cancer Mother         colon    Diabetes Father     Stroke Father          I have reviewed the patient's past medical history, past surgical history, allergies, medications, social and family history and I have made updates where appropriate.       Review of Systems  Positive responses are highlighted in bold    Constitutional:  Fever, Chills, Night Sweats, Fatigue, Unexpected changes in weight  Eyes:  Eye discharge, Eye pain, Eye redness, Visual disturbances   HENT:  Ear pain, Tinnitus, Nosebleeds, Trouble swallowing, Hearing loss, Sore throat  Cardiovascular:  Chest Pain, Palpitations, Orthopnea, Paroxysmal Nocturnal Dyspnea  Respiratory:  Cough, Wheezing, Shortness of breath, Chest tightness, Apnea  Gastrointestinal:  Nausea, Vomiting, Diarrhea, Constipation, Heartburn, Blood in stool  Genitourinary:  Difficulty or painful urination, Flank pain, Change in frequency, Urgency  Skin:  Color change, Rash, Itching, Wound  Psychiatric:  Hallucinations, Anxiety, Depression, Suicidal ideation  Hematological:  Enlarged glands, Easy bleeding, Easily bruising  Musculoskeletal:  Joint pain, Back pain, Gait problems, Joint swelling, Myalgias  Neurological:  Dizziness, Headaches, Presyncope, Numbness, Seizures, Tremors  Allergy:  Environmental allergies, Food allergies  Endocrine:  Heat Intolerance, Cold Intolerance, Polydipsia, Polyphagia, Polyuria      PHYSICAL EXAM:  Vitals:    12/22/21 1530   BP: 117/75   Pulse: 77   Resp: 18   Temp: 98 °F (36.7 °C)   Weight: 149 lb (67.6 kg)   Height: 5' (1.524 m)     Body mass index is 29.1 kg/m². Pain Score:   2 (headache)    VS Reviewed  General Appearance: well developed and well- nourished, in no acute distress  Head: normocephalic and atraumatic  Eyes: pupils equal, round, and reactive to light, conjunctivae and eye lids without erythema  ENT: external ear and ear canal normal bilaterally, nose without deformity, nasal mucosa and turbinates normal without polyps, oropharynx normal, dentition is normal for age, no lip or gum lesions noted  Neck: supple and non-tender without mass, no thyromegaly or thyroid nodules, no cervical lymphadenopathy  Pulmonary/Chest: clear to auscultation bilaterally- no wheezes, rales or rhonchi, normal air movement, no respiratory distress or retractions  Cardiovascular: normal rate, regular rhythm, normal S1 and S2, no murmurs, rubs, clicks, or gallops, distal pulses intact  Abdomen: soft, non-tender, non-distended, bowel sounds physiologic,  no rebound or guarding, no masses or hernias noted. Liver and spleen without enlargement.    Extremities: no cyanosis, clubbing or edema of the lower extremities  Musculoskeletal: No joint swelling or gross deformity   Neuro:  Alert, 5/5 strength globally and symmetrically, 2+ patellar reflexes b/l,  normal speech, no focal findings or movement disorder noted  Psych:  Normal affect without evidence of depression or anxiety, insight and judgement are appropriate, memory appears intact  Skin: warm and dry, no rash or erythema  Lymph:  No cervical, auricular or supraclavicular lymph nodes palpated      LABS/IMAGING    CBC 12/21/2021  CBC with Differential      WBC                              9.1                        4.4-10.5 th/cmm      RBC                              3.73                 L     4.00-5.10 mil/cmm      Hemoglobin                       8.8                  L     12.0-15.0 gm/dL      Hematocrit                       28.5                 L     35.0-44.0 %      MCV                              76.5 L     80-97 CU ELVIE      MCH                              23.6                 L     27.5-33.0 PG      MCHC                             30.8                 L     33.0-36.0 gm/dL      RDW                              14.2                       12.0-16.0 %      Platelet Count                   159                        150-400 th/cmm      [facility] 60/39/3265  Basic Metabolic,Non-Fasting      Sodium                           137                        135-145 mEq/L      Potassium                        4.8                        3.6-5.0 mEq/L      Chloride                         107                        101-111 mEq/L      Carbon Dioxide                    24                        21-32 mEq/L      Anion Gap                         6                         4-12      Glucose                          169                  H      mg/dL      BUN                               25                  H     7-20 mg/dL      Creatinine                       2.33                 H     0.60-1.30 mg/dL      GFR Calculation                   24                  L      Renal Function 12/19/2021   Renal Function Panel      Sodium                           137                        135-145 mEq/L      Potassium                        4.4                        3.6-5.0 mEq/L      Chloride                         108                        101-111 mEq/L      Carbon Dioxide                    25                        21-32 mEq/L      Anion Gap                         4                         4-12      Glucose                          122                  H      mg/dL      BUN                               18                        7-20 mg/dL      Creatinine                       1.96                 H     0.60-1.30 mg/dL    CBC 12/18/2021  CBC with Differential      WBC                              7.6                        4.4-10.5 th/cmm      RBC                              3.58                 L     4.00-5.10 mil/cmm      Hemoglobin                       8.6                  L     12.0-15.0 gm/dL      Hematocrit                       27.3                 L     35.0-44.0 %      MCV                              76.1                 L     80-97 CU ELVIE      MCH                              23.9                 L     27.5-33.0 PG      MCHC                             31.4                 L     33.0-36.0 gm/dL      RDW                              14.3                       12.0-16.0 %      Platelet Count                   168                        150-400 th/cmm      ECHO 07 DEC 2021   Ejection fraction is visually estimated at 30-35%. Dilated left ventricle   Apical, mid to distal anteroseptal and inferoseptal hypokineses is noted   Normal right ventricular size and function. RV S' measures 14 cm/s. TAPSE measures 2.4 cm. Normal size left atrium. No evidence of atrial septal defect. Normal size right atrium. The aortic valve is trileaflet with good leaflet separation. No evidence of aortic stenosis or regurgitation. Mitral valve leaflet mobility is normal .   Mitral leaflets are mildly sclerotic. Mild mitral regurgitation is present. Estimated right ventricular systolic pressure is estimated at 35 mmHg. Mild tricuspid regurgitation. Due to poor acoustic windows, Definity contrast was used to assess for   wall motion abnormalities and/or thrombus. IVC appears normal.      CT HEAD 12/21/2021  Exam Date: 12/21/21  Accession #:  [id number]  Exam:  CT   CT Cerv Spine W/O Cont [id number]  Result: See Report    STUDY:  CT HEAD AND CERVICAL SPINE WITHOUT CONTRAST     REASON FOR EXAM:   Female, 80years old status post trauma. RADIATION DOSAGE (If Supplied By Facility):  CTDIvol = ( 14.82 ) mGy, DLP   = ( 1068) mGycm     TECHNIQUE:   High resolution transaxial imaging was performed without   contrast material. Sagittal and coronal images were reconstructed.      Individualized dose optimization techniques were at nursing home, initial encounter     5. Closed head injury, initial encounter     6. Physical deconditioning     7. NSTEMI (non-ST elevated myocardial infarction) ([address]     8. Heart failure with reduced ejection fraction ([facility]     9. Microcytic anemia     10. Thrombocytopenia ([facility]     11. Iron deficiency     12. B12 deficiency     13. Clostridium difficile diarrhea     14. Type 2 diabetes mellitus with stage 3b chronic kidney disease, without long-term current use of insulin ([facility]     15. History of CVA with residual deficit     16. Hypertension, essential     17. Stage 3b chronic kidney disease ([facility]     18. Dyslipidemia     19. Vitamin D deficiency     20. Gastroesophageal reflux disease, unspecified whether esophagitis present     21. DDD (degenerative disc disease), lumbar       -PARIS/Metabolic acidosis: improved at d/c, f/u labs pending for today. con't to monitor. -AMS: improved, at baseline. Unclear trigger. Neg w/u. Will monitor.   -Fall/Closed head injury: neg w/u in ED. Normal exam today. Fall precautions. Monitor.   -Physical deconditioning: improving, con't PT/OT. -NSTEMI/HFrEF: stable since readmission, con't DAPT, Coreg, BP control, Life Vest and pravachol. Daily wts. Start diuresis if needed, did not d/c to us on any diuretics and so far has not needed. Will need OP cardio f/u for ischemic w/u, order placed for staff to get this setup.   -Microcytic anemia/Iron deficiency/B12 deficiency: acute on chronic, con't b12. Did not d/c to us on iron tabs, but did get IV iron infusion. con't oral iron added last wk and trend labs. -C. Diff diarrhea: improving, done with PO vanco, monitor. -DM2: stable, con't glipizide and Januvia. -Hx of CVA: stable, con't DAPT, BP control and Pravastatin. -HTN/CKD3b: stable, con't Coreg, Hydralazine, and Norvasc. Monitor labs  -HLD: stable, con't Pravachol.   -Vit D deficiency: stable, con't supplementation. -GERD: stable, con't famotidine.    -DDD lumbar: stable, con't prn tylenol. Add back OP norco if needed      Disposition: FULL CODE. Con't PT/OT. Reassess 30 days, sooner prn.        Future Appointments   Date Time Provider Sindhu Ascencio   12/27/2021  3:20 PM DO DENZEL Hard[facility] [address]   2/15/2022  3:00 PM ELLYN Cantrell - CNP SRPX LINDSAY MEHDI [facility] [address]       Electronically signed by Marissa Flores DO on 12/22/2021 at 3:47 PM

## 2021-12-22 ENCOUNTER — OUTSIDE SERVICES (OUTPATIENT)
Dept: FAMILY MEDICINE CLINIC | Age: 81
End: 2021-12-22
Payer: MEDICARE

## 2021-12-22 VITALS
HEIGHT: 60 IN | DIASTOLIC BLOOD PRESSURE: 75 MMHG | TEMPERATURE: 98 F | HEART RATE: 77 BPM | SYSTOLIC BLOOD PRESSURE: 117 MMHG | WEIGHT: 149 LBS | RESPIRATION RATE: 18 BRPM | BODY MASS INDEX: 29.25 KG/M2

## 2021-12-22 DIAGNOSIS — D69.6 THROMBOCYTOPENIA (HCC): ICD-10-CM

## 2021-12-22 DIAGNOSIS — E78.5 DYSLIPIDEMIA: ICD-10-CM

## 2021-12-22 DIAGNOSIS — R53.81 PHYSICAL DECONDITIONING: ICD-10-CM
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
## 2021-12-22 DIAGNOSIS — E61.1 IRON DEFICIENCY: ICD-10-CM
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
## 2021-12-22 DIAGNOSIS — M51.36 DDD (DEGENERATIVE DISC DISEASE), LUMBAR: ICD-10-CM

## 2021-12-22 DIAGNOSIS — I21.4 NSTEMI (NON-ST ELEVATED MYOCARDIAL INFARCTION) (HCC): ICD-10-CM

## 2021-12-22 DIAGNOSIS — A04.72 CLOSTRIDIUM DIFFICILE DIARRHEA: ICD-10-CM

## 2021-12-22 DIAGNOSIS — E11.22 TYPE 2 DIABETES MELLITUS WITH STAGE 3B CHRONIC KIDNEY DISEASE, WITHOUT LONG-TERM CURRENT USE OF INSULIN (HCC): ICD-10-CM

## 2021-12-22 DIAGNOSIS — N18.32 STAGE 3B CHRONIC KIDNEY DISEASE (HCC): ICD-10-CM

## 2021-12-22 DIAGNOSIS — I10 HYPERTENSION, ESSENTIAL: ICD-10-CM

## 2021-12-22 DIAGNOSIS — K21.9 GASTROESOPHAGEAL REFLUX DISEASE, UNSPECIFIED WHETHER ESOPHAGITIS PRESENT: ICD-10-CM

## 2021-12-22 DIAGNOSIS — I50.20 HEART FAILURE WITH REDUCED EJECTION FRACTION (HCC): ICD-10-CM

## 2021-12-22 DIAGNOSIS — N17.9 AKI (ACUTE KIDNEY INJURY) (HCC): Primary | ICD-10-CM

## 2021-12-22 DIAGNOSIS — I69.30 HISTORY OF CVA WITH RESIDUAL DEFICIT: ICD-10-CM

## 2021-12-22 DIAGNOSIS — E87.20 METABOLIC ACIDOSIS: ICD-10-CM

## 2021-12-22 DIAGNOSIS — S09.90XA CLOSED HEAD INJURY, INITIAL ENCOUNTER: ICD-10-CM

## 2021-12-22 DIAGNOSIS — D50.9 MICROCYTIC ANEMIA: ICD-10-CM

## 2021-12-22 DIAGNOSIS — E55.9 VITAMIN D DEFICIENCY: ICD-10-CM

## 2021-12-22 DIAGNOSIS — N18.32 TYPE 2 DIABETES MELLITUS WITH STAGE 3B CHRONIC KIDNEY DISEASE, WITHOUT LONG-TERM CURRENT USE OF INSULIN (HCC): ICD-10-CM

## 2021-12-22 DIAGNOSIS — E53.8 B12 DEFICIENCY: ICD-10-CM

## 2021-12-22 DIAGNOSIS — R41.82 ALTERED MENTAL STATUS, UNSPECIFIED ALTERED MENTAL STATUS TYPE: ICD-10-CM

## 2021-12-22 PROCEDURE — 99305 1ST NF CARE MODERATE MDM 35: CPT | Performed by: FAMILY MEDICINE

## 2022-01-04 ENCOUNTER — OUTSIDE SERVICES (OUTPATIENT)
Dept: FAMILY MEDICINE CLINIC | Age: 82
End: 2022-01-04
Payer: MEDICARE

## 2022-01-04 DIAGNOSIS — D69.6 THROMBOCYTOPENIA, UNSPECIFIED (HCC): ICD-10-CM

## 2022-01-04 DIAGNOSIS — D70.9 NEUTROPENIA, UNSPECIFIED TYPE (HCC): ICD-10-CM

## 2022-01-04 PROCEDURE — 99308 SBSQ NF CARE LOW MDM 20: CPT | Performed by: NURSE PRACTITIONER

## 2022-01-04 NOTE — PROGRESS NOTES
[facility] Progress Note    NAME: Nissa Meza  DATE: 22  ROOM #: B 20-2  CODE STATUS: Full Code  CHIEF COMPLAINT:  Acute visit to assess acute neutropenia and acute thrombocytopenia  : 1940    History obtained from chart review, the patient and staff. SUBJECTIVE:  HPI: Nissa Meza is a 80 y.o. female. PMH documented below. Pt is seen at bedside this afternoon for acute neutropenia and acute thrombocytopenia and is noted to be in no acute distress. She is sitting up at bedside and has just finished lunch. She reports feeling well and has no questions or concerns. Staff provide updates. Assessment and plan documented below. Allergies and Medications were reviewed through the [facility] EMR. All medications reviewed and reconciled, including OTC and herbal medications.      Patient Active Problem List    Diagnosis Date Noted    Neutropenia, unspecified ([address] 2022    Thrombocytopenia, unspecified ([address] 2022    Traumatic hematoma of head 2021    Non-ST elevation (NSTEMI) myocardial infarction ([address] 2021    Stage 3b chronic kidney disease ([address] 2021    Chronic radicular lumbar pain 2021    Rheumatoid arthritis of multiple sites with negative rheumatoid factor ([address] 10/12/2021    Primary osteoarthritis involving multiple joints 2020    Chronic pain syndrome 2020    Type 2 diabetes mellitus with diabetic neuropathy, without long-term current use of insulin ([address] 10/18/2019    Essential hypertension 10/18/2019    History of hemorrhagic stroke with residual hemiparesis ([address] 10/18/2019    Anxiety 10/18/2019    Hyperlipidemia 10/18/2019       Past Medical History:   Diagnosis Date    Depression     Hyperlipidemia     Neuropathy     Sleep apnea     Cpap       Past Surgical History:   Procedure Laterality Date    APPENDECTOMY      CHOLECYSTECTOMY      HIP SURGERY Right 2020    right , Genicular Nerve block performed [facility] Marcell [facility] at [facility]    Left total hip    OVARY REMOVAL         No Known Allergies    Social History     Tobacco Use    Smoking status: Never Smoker    Smokeless tobacco: Never Used   Substance Use Topics    Alcohol use: Never        Family History   Problem Relation Age of Onset    Cancer Mother         colon    Diabetes Father     Stroke Father        Review of Systems  Positive responses are highlighted in bold    Constitutional:  Fever, Chills, Night Sweats, Fatigue, Unexpected changes in weight  Eyes:  Eye discharge, Eye pain, Eye redness, Visual disturbances   HENT:  Ear pain, Tinnitus, Nosebleeds, Trouble swallowing, Hearing loss, Sore throat  Cardiovascular:  Chest Pain, Palpitations, Orthopnea, Paroxysmal Nocturnal Dyspnea  Respiratory:  Cough, Wheezing, Shortness of breath, Chest tightness, Apnea  Gastrointestinal:  Nausea, Vomiting, Diarrhea, Constipation, Heartburn, Blood in stool  Genitourinary:  Difficulty or painful urination, Flank pain, Change in frequency, Urgency  Skin:  Color change, Rash, Itching, Wound  Psychiatric:  Hallucinations, Anxiety, Depression, Suicidal ideation  Hematological:  Enlarged glands, Easy bleeding, Easily bruising  Musculoskeletal:  Joint pain, Back pain, Gait problems, Joint swelling, Myalgias  Neurological:  Dizziness, Headaches, Presyncope, Numbness, Seizures, Tremors  Allergy:  Environmental allergies, Food allergies  Endocrine:  Heat Intolerance, Cold Intolerance, Polydipsia, Polyphagia, Polyuria    PHYSICAL EXAM:  VS:  152/65, 97.5, 73, 16, 152 blood glucose, 98% on room air  Weight in pounds:  151.1  Pain: Denies. VS Reviewed  General Appearance: Debilitated. Appears stated age.   Head: normocephalic and atraumatic  Eyes: pupils equal, round, and reactive to light, conjunctivae and eye lids without erythema  ENT: external ear and ear canal normal bilaterally, nose without deformity, nasal mucosa warranted. Plan of care reviewed with Dr. Alma Eubanks DO.   Electronically signed by ELLYN Escalante NP on 1/4/2022 at 4:17 PM

## 2022-01-10 NOTE — TELEPHONE ENCOUNTER
----- Message from Emily De Jesus MD sent at 10/23/2020 11:11 AM EDT -----  Your A1c is a little high.  Please increase your metformin from once a day to twice a day.  I called in the prescription to your local pharmacy.  Your protein is high.  This can indicate some serious conditions.  Please make a lab only appointment to get further lab testing to rule out multiple myeloma.  
Spoke with pt, she had spoken with someone earlier and made lab appointment for tomorrow.   
lmtcb  
Admission

## 2022-02-15 ENCOUNTER — OFFICE VISIT (OUTPATIENT)
Dept: FAMILY MEDICINE CLINIC | Age: 82
End: 2022-02-15
Payer: MEDICARE

## 2022-02-15 VITALS
HEART RATE: 88 BPM | TEMPERATURE: 99.2 F | SYSTOLIC BLOOD PRESSURE: 144 MMHG | WEIGHT: 137 LBS | BODY MASS INDEX: 26.76 KG/M2 | OXYGEN SATURATION: 97 % | RESPIRATION RATE: 20 BRPM | DIASTOLIC BLOOD PRESSURE: 80 MMHG

## 2022-02-15 DIAGNOSIS — I25.119 ATHEROSCLEROSIS OF NATIVE CORONARY ARTERY OF NATIVE HEART WITH ANGINA PECTORIS (HCC): ICD-10-CM

## 2022-02-15 DIAGNOSIS — E53.8 VITAMIN B12 DEFICIENCY: ICD-10-CM

## 2022-02-15 DIAGNOSIS — I25.2 HISTORY OF MI (MYOCARDIAL INFARCTION): ICD-10-CM

## 2022-02-15 DIAGNOSIS — K59.00 CONSTIPATION, UNSPECIFIED CONSTIPATION TYPE: ICD-10-CM

## 2022-02-15 DIAGNOSIS — N18.32 TYPE 2 DIABETES MELLITUS WITH STAGE 3B CHRONIC KIDNEY DISEASE, WITHOUT LONG-TERM CURRENT USE OF INSULIN (HCC): ICD-10-CM

## 2022-02-15 DIAGNOSIS — M06.09 RHEUMATOID ARTHRITIS OF MULTIPLE SITES WITH NEGATIVE RHEUMATOID FACTOR (HCC): ICD-10-CM

## 2022-02-15 DIAGNOSIS — E78.2 MIXED HYPERLIPIDEMIA: ICD-10-CM

## 2022-02-15 DIAGNOSIS — M54.16 CHRONIC RADICULAR LUMBAR PAIN: ICD-10-CM

## 2022-02-15 DIAGNOSIS — K21.9 GASTROESOPHAGEAL REFLUX DISEASE, UNSPECIFIED WHETHER ESOPHAGITIS PRESENT: ICD-10-CM

## 2022-02-15 DIAGNOSIS — I50.20 HEART FAILURE WITH REDUCED EJECTION FRACTION (HCC): Primary | ICD-10-CM

## 2022-02-15 DIAGNOSIS — N18.32 STAGE 3B CHRONIC KIDNEY DISEASE (HCC): ICD-10-CM

## 2022-02-15 DIAGNOSIS — G89.29 CHRONIC RADICULAR LUMBAR PAIN: ICD-10-CM

## 2022-02-15 DIAGNOSIS — I10 ESSENTIAL HYPERTENSION: ICD-10-CM

## 2022-02-15 DIAGNOSIS — E11.22 TYPE 2 DIABETES MELLITUS WITH STAGE 3B CHRONIC KIDNEY DISEASE, WITHOUT LONG-TERM CURRENT USE OF INSULIN (HCC): ICD-10-CM

## 2022-02-15 DIAGNOSIS — I69.359 HISTORY OF HEMORRHAGIC STROKE WITH RESIDUAL HEMIPARESIS (HCC): ICD-10-CM

## 2022-02-15 PROCEDURE — 1123F ACP DISCUSS/DSCN MKR DOCD: CPT | Performed by: NURSE PRACTITIONER

## 2022-02-15 PROCEDURE — 99214 OFFICE O/P EST MOD 30 MIN: CPT | Performed by: NURSE PRACTITIONER

## 2022-02-15 PROCEDURE — G8400 PT W/DXA NO RESULTS DOC: HCPCS | Performed by: NURSE PRACTITIONER

## 2022-02-15 PROCEDURE — 1036F TOBACCO NON-USER: CPT | Performed by: NURSE PRACTITIONER

## 2022-02-15 PROCEDURE — 4040F PNEUMOC VAC/ADMIN/RCVD: CPT | Performed by: NURSE PRACTITIONER

## 2022-02-15 PROCEDURE — G8427 DOCREV CUR MEDS BY ELIG CLIN: HCPCS | Performed by: NURSE PRACTITIONER

## 2022-02-15 PROCEDURE — G8484 FLU IMMUNIZE NO ADMIN: HCPCS | Performed by: NURSE PRACTITIONER

## 2022-02-15 PROCEDURE — 1090F PRES/ABSN URINE INCON ASSESS: CPT | Performed by: NURSE PRACTITIONER

## 2022-02-15 PROCEDURE — G8417 CALC BMI ABV UP PARAM F/U: HCPCS | Performed by: NURSE PRACTITIONER

## 2022-02-15 RX ORDER — PRAVASTATIN SODIUM 40 MG
40 TABLET ORAL NIGHTLY
Qty: 90 TABLET | Refills: 0 | Status: SHIPPED | OUTPATIENT
Start: 2022-02-15 | End: 2022-04-27

## 2022-02-15 RX ORDER — AMOXICILLIN 250 MG
1 CAPSULE ORAL EVERY 12 HOURS PRN
Qty: 90 TABLET | Refills: 1 | Status: SHIPPED | OUTPATIENT
Start: 2022-02-15 | End: 2022-04-27

## 2022-02-15 RX ORDER — AMLODIPINE BESYLATE 10 MG/1
10 TABLET ORAL DAILY
Qty: 90 TABLET | Refills: 1 | Status: SHIPPED | OUTPATIENT
Start: 2022-02-15 | End: 2022-08-08

## 2022-02-15 RX ORDER — FAMOTIDINE 10 MG
10 TABLET ORAL 2 TIMES DAILY
COMMUNITY
End: 2022-02-15 | Stop reason: SDUPTHER

## 2022-02-15 RX ORDER — ASPIRIN 81 MG/1
81 TABLET ORAL DAILY
COMMUNITY
End: 2022-04-27

## 2022-02-15 RX ORDER — GLIPIZIDE 5 MG/1
5 TABLET ORAL DAILY
COMMUNITY
End: 2022-02-15 | Stop reason: SDUPTHER

## 2022-02-15 RX ORDER — CARVEDILOL 12.5 MG/1
12.5 TABLET ORAL 2 TIMES DAILY WITH MEALS
COMMUNITY
End: 2022-02-15 | Stop reason: SDUPTHER

## 2022-02-15 RX ORDER — FERROUS FUMARATE 324(106)MG
TABLET ORAL DAILY
COMMUNITY
End: 2022-02-15 | Stop reason: SDUPTHER

## 2022-02-15 RX ORDER — HYDROCODONE BITARTRATE AND ACETAMINOPHEN 10; 325 MG/1; MG/1
1 TABLET ORAL EVERY 12 HOURS PRN
Qty: 60 TABLET | Refills: 0 | Status: SHIPPED | OUTPATIENT
Start: 2022-02-15 | End: 2022-03-17

## 2022-02-15 RX ORDER — LANOLIN ALCOHOL/MO/W.PET/CERES
1000 CREAM (GRAM) TOPICAL DAILY
COMMUNITY
End: 2022-02-15 | Stop reason: SDUPTHER

## 2022-02-15 RX ORDER — ERGOCALCIFEROL 1.25 MG/1
CAPSULE ORAL
COMMUNITY
End: 2022-04-27

## 2022-02-15 RX ORDER — HYDRALAZINE HYDROCHLORIDE 25 MG/1
12.5 TABLET, FILM COATED ORAL 2 TIMES DAILY
Qty: 90 TABLET | Refills: 1 | Status: SHIPPED | OUTPATIENT
Start: 2022-02-15 | End: 2022-05-25

## 2022-02-15 RX ORDER — GLIPIZIDE 5 MG/1
5 TABLET ORAL DAILY
Qty: 90 TABLET | Refills: 0 | Status: SHIPPED | OUTPATIENT
Start: 2022-02-15 | End: 2022-05-11

## 2022-02-15 RX ORDER — FAMOTIDINE 10 MG
10 TABLET ORAL 2 TIMES DAILY
Qty: 180 TABLET | Refills: 0 | Status: SHIPPED | OUTPATIENT
Start: 2022-02-15 | End: 2022-04-27 | Stop reason: ALTCHOICE

## 2022-02-15 RX ORDER — CARVEDILOL 12.5 MG/1
12.5 TABLET ORAL 2 TIMES DAILY WITH MEALS
Qty: 180 TABLET | Refills: 1 | Status: ON HOLD | OUTPATIENT
Start: 2022-02-15 | End: 2022-04-27

## 2022-02-15 RX ORDER — CLOPIDOGREL BISULFATE 75 MG/1
75 TABLET ORAL DAILY
Qty: 90 TABLET | Refills: 0 | Status: SHIPPED | OUTPATIENT
Start: 2022-02-15 | End: 2022-05-11

## 2022-02-15 RX ORDER — AMOXICILLIN 250 MG
1 CAPSULE ORAL EVERY 12 HOURS PRN
COMMUNITY
End: 2022-02-15 | Stop reason: SDUPTHER

## 2022-02-15 RX ORDER — LANOLIN ALCOHOL/MO/W.PET/CERES
1000 CREAM (GRAM) TOPICAL DAILY
Qty: 90 TABLET | Refills: 1 | Status: SHIPPED | OUTPATIENT
Start: 2022-02-15 | End: 2022-05-25

## 2022-02-15 RX ORDER — SENNOSIDES 8.6 MG
650 CAPSULE ORAL EVERY 8 HOURS PRN
COMMUNITY
End: 2022-04-27

## 2022-02-15 RX ORDER — FERROUS FUMARATE 324(106)MG
1 TABLET ORAL DAILY
Qty: 90 TABLET | Refills: 0 | Status: SHIPPED | OUTPATIENT
Start: 2022-02-15 | End: 2022-05-25

## 2022-02-15 RX ORDER — ISOSORBIDE MONONITRATE 30 MG/1
30 TABLET, EXTENDED RELEASE ORAL DAILY
Qty: 90 TABLET | Refills: 1 | Status: SHIPPED | OUTPATIENT
Start: 2022-02-15 | End: 2022-08-08

## 2022-02-15 RX ORDER — ISOSORBIDE MONONITRATE 30 MG/1
30 TABLET, EXTENDED RELEASE ORAL DAILY
COMMUNITY
End: 2022-02-15 | Stop reason: SDUPTHER

## 2022-02-15 RX ORDER — NITROGLYCERIN 0.4 MG/1
0.4 TABLET SUBLINGUAL EVERY 5 MIN PRN
COMMUNITY
End: 2022-04-27

## 2022-02-15 RX ORDER — HYDRALAZINE HYDROCHLORIDE 25 MG/1
12.5 TABLET, FILM COATED ORAL 2 TIMES DAILY
COMMUNITY
End: 2022-02-15 | Stop reason: SDUPTHER

## 2022-02-15 RX ORDER — ELECTROLYTES/DEXTROSE
1 SOLUTION, ORAL ORAL DAILY
COMMUNITY
End: 2022-04-27

## 2022-02-15 ASSESSMENT — ENCOUNTER SYMPTOMS
EYE DISCHARGE: 0
COUGH: 0
EYE REDNESS: 0
CONSTIPATION: 0
TROUBLE SWALLOWING: 0
DIARRHEA: 0
SORE THROAT: 0
BACK PAIN: 1
EYE PAIN: 0
ABDOMINAL PAIN: 0
NAUSEA: 0
RHINORRHEA: 0
WHEEZING: 0
VOMITING: 0
ALLERGIC/IMMUNOLOGIC NEGATIVE: 1
SHORTNESS OF BREATH: 0

## 2022-02-15 NOTE — PROGRESS NOTES
[address] [address] [address]  [facility] [address]  Dept: [phone]  Dept Fax: [phone]  Loc: [phone]     Visit Date:  2/15/2022      Patient:  Nissa Meza  YOB: 1940    HPI:     Chief Complaint   Patient presents with    3 Month Follow-Up     DM, Stroke, HTN, Hyperlipidemia, Chronic Radicular lumbar pain, CKD. Daughter states she had  MI in December at [facility].  Medication Refill       Patient for 3-month follow-up and updating her [facility] diagnoses. Patient has been in 2 different nursing homes since the last time she has been here in the office. Patient is here with her daughter whom she now lives with again. Daughter states patient needs all of her medications refilled as she only was given a couple days worth when they left the nursing home. Patient states she feels really great, continues to have her chronic back pain that is not well treated with Tylenol. No constipation or diarrhea, no fever chills sleep is not too bad. No urinary issues or incontinence. Medications    Current Outpatient Medications:     aspirin 81 MG EC tablet, Take 81 mg by mouth daily, Disp: , Rfl:     Magnesium Chloride 64 MG TABS, Take 1 tablet by mouth daily, Disp: , Rfl:     nitroGLYCERIN (NITROSTAT) 0.4 MG SL tablet, Place 0.4 mg under the tongue every 5 minutes as needed for Chest pain up to max of 3 total doses.  If no relief after 1 dose, call 911., Disp: , Rfl:     acetaminophen (TYLENOL 8 HOUR) 650 MG extended release tablet, Take 650 mg by mouth every 8 hours as needed for Pain, Disp: , Rfl:     vitamin D (ERGOCALCIFEROL) 1.25 MG (80201 UT) CAPS capsule, 1250mcg every week, Disp: , Rfl:     amLODIPine (NORVASC) 10 MG tablet, Take 1 tablet by mouth daily, Disp: 90 tablet, Rfl: 1    pravastatin (PRAVACHOL) 40 MG tablet, Take 1 tablet by mouth nightly, Disp: 90 tablet, Rfl: 0    clopidogrel (PLAVIX) 75 MG tablet, Take 1 tablet by mouth daily, Disp: 90 tablet, Rfl: 0    SITagliptin (JANUVIA) 50 MG tablet, Take 1 tablet by mouth daily, Disp: 90 tablet, Rfl: 1    glipiZIDE (GLUCOTROL) 5 MG tablet, Take 1 tablet by mouth daily, Disp: 90 tablet, Rfl: 0    hydrALAZINE (APRESOLINE) 25 MG tablet, Take 0.5 tablets by mouth 2 times daily, Disp: 90 tablet, Rfl: 1    Ferrous Fumarate 324 (106 Fe) MG TABS, Take 1 tablet by mouth daily, Disp: 90 tablet, Rfl: 0    famotidine (PEPCID) 10 MG tablet, Take 1 tablet by mouth 2 times daily, Disp: 180 tablet, Rfl: 0    carvedilol (COREG) 12.5 MG tablet, Take 1 tablet by mouth 2 times daily (with meals), Disp: 180 tablet, Rfl: 1    isosorbide mononitrate (IMDUR) 30 MG extended release tablet, Take 1 tablet by mouth daily, Disp: 90 tablet, Rfl: 1    senna-docusate (PERICOLACE) 8.6-50 MG per tablet, Take 1 tablet by mouth every 12 hours as needed for Constipation, Disp: 90 tablet, Rfl: 1    vitamin B-12 (CYANOCOBALAMIN) 1000 MCG tablet, Take 1 tablet by mouth daily, Disp: 90 tablet, Rfl: 1    HYDROcodone-acetaminophen (NORCO)  MG per tablet, Take 1 tablet by mouth every 12 hours as needed for Pain for up to 30 days. Intended supply: 30 days, Disp: 60 tablet, Rfl: 0    The patient has No Known Allergies. Past Medical History  Jade Orellana  has a past medical history of Depression, Hyperlipidemia, Neuropathy, and Sleep apnea. Subjective:      Review of Systems   Constitutional: Negative for activity change, fatigue and fever. HENT: Negative for congestion, ear pain, rhinorrhea, sore throat and trouble swallowing. Eyes: Negative for pain, discharge and redness. Respiratory: Negative for cough, shortness of breath and wheezing. Cardiovascular: Negative. Gastrointestinal: Negative for abdominal pain, constipation, diarrhea, nausea and vomiting. Endocrine: Negative. Genitourinary: Negative for dysuria, frequency and urgency.    Musculoskeletal: Positive for back back: Full passive range of motion without pain and normal range of motion. Skin:     General: Skin is warm. Capillary Refill: Capillary refill takes less than 2 seconds. Findings: No rash. Neurological:      Mental Status: She is alert and oriented to person, place, and time. GCS: GCS eye subscore is 4. GCS verbal subscore is 5. GCS motor subscore is 6. Cranial Nerves: No cranial nerve deficit. Coordination: Coordination normal.      Gait: Gait normal.   Psychiatric:         Mood and Affect: Mood is not anxious or depressed. Speech: Speech normal.         Behavior: Behavior normal. Behavior is not withdrawn or hyperactive. Behavior is cooperative. Thought Content: Thought content normal.         Judgment: Judgment normal.         Assessment/Plan:      Mina Mendoza was seen today for 3 month follow-up and medication refill. Diagnoses and all orders for this visit:    Heart failure with reduced ejection fraction (HCC)  -     carvedilol (COREG) 12.5 MG tablet; Take 1 tablet by mouth 2 times daily (with meals)  -     isosorbide mononitrate (IMDUR) 30 MG extended release tablet; Take 1 tablet by mouth daily    History of hemorrhagic stroke with residual hemiparesis ([facility])    Rheumatoid arthritis of multiple sites with negative rheumatoid factor ([facility]    Type 2 diabetes mellitus with stage 3b chronic kidney disease, without long-term current use of insulin ([facility])  -     SITagliptin (JANUVIA) 50 MG tablet; Take 1 tablet by mouth daily  -     glipiZIDE (GLUCOTROL) 5 MG tablet; Take 1 tablet by mouth daily    Stage 3b chronic kidney disease ([facility]    Essential hypertension  -     amLODIPine (NORVASC) 10 MG tablet; Take 1 tablet by mouth daily  -     hydrALAZINE (APRESOLINE) 25 MG tablet; Take 0.5 tablets by mouth 2 times daily    Mixed hyperlipidemia  -     pravastatin (PRAVACHOL) 40 MG tablet; Take 1 tablet by mouth nightly  -     clopidogrel (PLAVIX) 75 MG tablet;  Take 1 tablet by mouth daily    Gastroesophageal reflux disease, unspecified whether esophagitis present  -     famotidine (PEPCID) 10 MG tablet; Take 1 tablet by mouth 2 times daily    History of MI (myocardial infarction)  -     carvedilol (COREG) 12.5 MG tablet; Take 1 tablet by mouth 2 times daily (with meals)    Constipation, unspecified constipation type  -     senna-docusate (PERICOLACE) 8.6-50 MG per tablet; Take 1 tablet by mouth every 12 hours as needed for Constipation    Vitamin B12 deficiency  -     Ferrous Fumarate 324 (106 Fe) MG TABS; Take 1 tablet by mouth daily  -     vitamin B-12 (CYANOCOBALAMIN) 1000 MCG tablet; Take 1 tablet by mouth daily    Atherosclerosis of native coronary artery of native heart with angina pectoris (HCC)    Chronic radicular lumbar pain  -     HYDROcodone-acetaminophen (NORCO)  MG per tablet; Take 1 tablet by mouth every 12 hours as needed for Pain for up to 30 days. Intended supply: 30 days        Return in about 3 months (around 5/15/2022). Patient instructions given yamilka.         Electronically signed by ELLYN Campbell CNP on 2/15/2022 at 4:06 PM

## 2022-02-15 NOTE — PATIENT INSTRUCTIONS
Patient Education        ACE Inhibitors: Care Instructions  Your Care Instructions     ACE (angiotensin-converting enzyme) inhibitors lower blood pressure. They also treat heart failure and prevent heart attacks and strokes. They block an enzyme that makes blood vessels narrow. As a result, the blood vessels relax and widen. This lowers blood pressure. These medicines also put more water and salt into the urine. This lowers blood pressure too. These medicines are a good choice for people with diabetes. They don't affect blood sugar levels, and they may protect the kidneys. Examples include:  · Benazepril (Lotensin). · Lisinopril (Prinivil, Zestril). · Ramipril (Altace). Before you start taking this medicine, make sure your doctor knows if you take other medicines, especially water pills (diuretics) or potassium tablets. And tell your doctor if you use a salt substitute. You should not take an ACE inhibitor if you are pregnant or planning to become pregnant. You may need regular blood tests. Follow-up care is a key part of your treatment and safety. Be sure to make and go to all appointments, and call your doctor if you are having problems. It's also a good idea to know your test results and keep a list of the medicines you take. How can you care for yourself at home? · Take your medicines exactly as prescribed. Be sure to take your medicine every day. Call your doctor if you think you are having a problem with your medicine. · ACE inhibitors can cause a dry cough. If the cough is bad, talk to your doctor. You may need to try a different medicine. · ACE inhibitors can cause swelling of your lips, tongue, or face. If the swelling is severe, you may need treatment right away. Severe swelling can make it hard to breathe, but this is very rare. · Check with your doctor or pharmacist before you use any other medicines. This includes over-the-counter medicines.  Make sure your doctor knows all of the medicines, vitamins, herbal products, and supplements you take. Taking some medicines together can cause problems. When should you call for help? Call your doctor now or seek immediate medical care if:    · You have swelling of your lips, tongue, or face.     · You think you are having problems with your medicine. Watch closely for changes in your health, and be sure to contact your doctor if you have any problems. Where can you learn more? Go to https://PeerSpacepeTheMobileGamer (TMG).Airborne Technology. org and sign in to your made.com account. Enter V609 in the ContactUs.com box to learn more about \"ACE Inhibitors: Care Instructions. \"     If you do not have an account, please click on the \"Sign Up Now\" link. Current as of: April 29, 2021               Content Version: 13.1  © 6655-8939 [employer]. Care instructions adapted under license by [facility] ([facility]). If you have questions about a medical condition or this instruction, always ask your healthcare professional. [place] [address] any warranty or liability for your use of this information. Patient Education        Learning About ARBs  Introduction     ARBs (angiotensin II receptor blockers) block a hormone that makes blood vessels narrow. As a result, the blood vessels relax and widen. This lowers blood pressure. ARBs also put more water and salt into the urine. This also lowers blood pressure. ARBs can treat:  · High blood pressure. · Coronary artery disease. · Heart failure. They also may be used to help your kidneys when you have diabetes. Examples  · candesartan (Atacand)  · irbesartan (Avapro)  · losartan (Cozaar)  · olmesartan (Benicar)  · valsartan (Diovan)  This is not a complete list of all ARBs. Possible side effects  Side effects may include:  · Low blood pressure. You may feel dizzy and weak. · High potassium levels. You may have other side effects or reactions not listed here.  Check the information that comes with your medicine. What to know about taking this medicine  · ARBs may be used if you had a cough when you tried to take an ACE inhibitor. ARBs are less likely to cause a cough. · You may need regular blood tests. · Take your medicines exactly as prescribed. Call your doctor if you think you are having a problem with your medicine. · Tell your doctor or pharmacist all the medicines you take. This includes over-the-counter medicines, vitamins, herbal products, and supplements. Taking some medicines together can cause problems. · You should not take ARBs if you are pregnant or planning to become pregnant. Where can you learn more? Go to https://RouterSharepepiceweb.CellVir. org and sign in to your OneSun account. Enter K212 in the HashTip box to learn more about \"Learning About ARBs. \"     If you do not have an account, please click on the \"Sign Up Now\" link. Current as of: April 29, 2021               Content Version: 13.1  © 2006-2021 [employer]. Care instructions adapted under license by [facility] ([facility]). If you have questions about a medical condition or this instruction, always ask your healthcare professional. [place] [address] any warranty or liability for your use of this information. Patient Education        Acute High Blood Pressure: Care Instructions  Your Care Instructions     Acute high blood pressure is very high blood pressure. It's a serious problem. Very high blood pressure can damage your brain, heart, eyes, and kidneys. You may have been given medicines to lower your blood pressure. You may have gotten them as pills or through a needle in one of your veins. This is called an IV. And maybe you were given other medicines too. These can be needed when high blood pressure causes other problems. To keep your blood pressure at a lower level, you may need to make healthy lifestyle changes. And you will probably need to take medicines.   Be sure to follow up with your doctor about your blood pressure and what you can do about it. Follow-up care is a key part of your treatment and safety. Be sure to make and go to all appointments, and call your doctor if you are having problems. It's also a good idea to know your test results and keep a list of the medicines you take. How can you care for yourself at home? · See your doctor as often as he or she recommends. This is to make sure your blood pressure is under control. · Take your blood pressure medicine exactly as prescribed. You may take one or more types. They include diuretics, beta-blockers, ACE inhibitors, calcium channel blockers, and angiotensin II receptor blockers. Call your doctor if you think you are having a problem with your medicine. · If you take blood pressure medicine, talk to your doctor before you take decongestants or anti-inflammatory medicine, such as ibuprofen. These can raise blood pressure. · Learn how to check your blood pressure at home. Check it often. · Ask your doctor if it's okay to drink alcohol. · Talk to your doctor about lifestyle changes that can help blood pressure. These include being active and managing your weight. · Don't smoke. Smoking increases your risk for heart attack and stroke. When should you call for help? Call 911  anytime you think you may need emergency care. This may mean having symptoms that suggest that your blood pressure is causing a serious heart or blood vessel problem. Your blood pressure may be over 180/120. For example, call 911 if:    · You have symptoms of a heart attack. These may include:  ? Chest pain or pressure, or a strange feeling in the chest.  ? Sweating. ? Shortness of breath. ? Nausea or vomiting. ? Pain, pressure, or a strange feeling in the back, neck, jaw, or upper belly or in one or both shoulders or arms. ? Lightheadedness or sudden weakness. ? A fast or irregular heartbeat.     · You have symptoms of a stroke. These may include:  ? Sudden numbness, tingling, weakness, or loss of movement in your face, arm, or leg, especially on only one side of your body. ? Sudden vision changes. ? Sudden trouble speaking. ? Sudden confusion or trouble understanding simple statements. ? Sudden problems with walking or balance. ? A sudden, severe headache that is different from past headaches.     · You have severe back or belly pain. Do not wait until your blood pressure comes down on its own. Get help right away. Call your doctor now or seek immediate care if:    · Your blood pressure is much higher than normal (such as 180/120 or higher), but you don't have symptoms.     · You think high blood pressure is causing symptoms, such as:  ? Severe headache.  ? Blurry vision. Watch closely for changes in your health, and be sure to contact your doctor if:    · Your blood pressure measures higher than your doctor recommends at least 2 times. That means the top number is higher or the bottom number is higher, or both.     · You think you may be having side effects from your blood pressure medicine. Where can you learn more? Go to https://Zikk Software Ltd.peSolarte Health.InOpen. org and sign in to your WRG Creative Communication account. Enter T765 in the Replication Medical box to learn more about \"Acute High Blood Pressure: Care Instructions. \"     If you do not have an account, please click on the \"Sign Up Now\" link. Current as of: April 29, 2021               Content Version: 13.1  © 4546-2835 Healthwise, Incorporated. Care instructions adapted under license by [facility] ([facility]). If you have questions about a medical condition or this instruction, always ask your healthcare professional. [place] [address] any warranty or liability for your use of this information.

## 2022-02-21 DIAGNOSIS — I10 ESSENTIAL HYPERTENSION: ICD-10-CM

## 2022-02-21 RX ORDER — AMLODIPINE BESYLATE 5 MG/1
TABLET ORAL
Qty: 90 TABLET | Refills: 3 | Status: SHIPPED | OUTPATIENT
Start: 2022-02-21

## 2022-04-13 ENCOUNTER — OFFICE VISIT (OUTPATIENT)
Dept: PHYSICAL MEDICINE AND REHAB | Age: 82
End: 2022-04-13
Payer: MEDICARE

## 2022-04-13 VITALS
WEIGHT: 143 LBS | DIASTOLIC BLOOD PRESSURE: 78 MMHG | BODY MASS INDEX: 28.07 KG/M2 | SYSTOLIC BLOOD PRESSURE: 146 MMHG | HEIGHT: 60 IN

## 2022-04-13 DIAGNOSIS — G89.29 OTHER CHRONIC PAIN: ICD-10-CM

## 2022-04-13 DIAGNOSIS — R53.81 DEBILITY: ICD-10-CM

## 2022-04-13 DIAGNOSIS — M79.2 NEUROPATHIC PAIN: Primary | ICD-10-CM

## 2022-04-13 DIAGNOSIS — I69.959 HEMIPARESIS AS LATE EFFECT OF CEREBROVASCULAR DISEASE, UNSPECIFIED CEREBROVASCULAR DISEASE TYPE, UNSPECIFIED LATERALITY (HCC): ICD-10-CM

## 2022-04-13 PROCEDURE — 99214 OFFICE O/P EST MOD 30 MIN: CPT | Performed by: ANESTHESIOLOGY

## 2022-04-13 PROCEDURE — 1036F TOBACCO NON-USER: CPT | Performed by: ANESTHESIOLOGY

## 2022-04-13 PROCEDURE — 1090F PRES/ABSN URINE INCON ASSESS: CPT | Performed by: ANESTHESIOLOGY

## 2022-04-13 PROCEDURE — G8427 DOCREV CUR MEDS BY ELIG CLIN: HCPCS | Performed by: ANESTHESIOLOGY

## 2022-04-13 PROCEDURE — G8400 PT W/DXA NO RESULTS DOC: HCPCS | Performed by: ANESTHESIOLOGY

## 2022-04-13 PROCEDURE — 4040F PNEUMOC VAC/ADMIN/RCVD: CPT | Performed by: ANESTHESIOLOGY

## 2022-04-13 PROCEDURE — G8417 CALC BMI ABV UP PARAM F/U: HCPCS | Performed by: ANESTHESIOLOGY

## 2022-04-13 PROCEDURE — 1123F ACP DISCUSS/DSCN MKR DOCD: CPT | Performed by: ANESTHESIOLOGY

## 2022-04-13 RX ORDER — PREGABALIN 75 MG/1
75 CAPSULE ORAL EVERY EVENING
Qty: 30 CAPSULE | Refills: 2 | Status: SHIPPED | OUTPATIENT
Start: 2022-04-13 | End: 2022-06-22 | Stop reason: ALTCHOICE

## 2022-04-13 RX ORDER — GABAPENTIN 300 MG/1
CAPSULE ORAL
COMMUNITY
Start: 2022-04-07 | End: 2022-04-13

## 2022-04-13 NOTE — PROGRESS NOTES
Chronic Pain/PM&R Clinic Note     Encounter Date: 4/13/22    Subjective:   Chief Complaint:   Chief Complaint   Patient presents with    New Patient     lumbar pain       History of Present Illness:   Chales Bosworth is a [de-identified] y.o. female seen in the clinic initially on 04/13/22 upon request from ELLYN Franz (PCP) for her history of chronic pain. She has medical history of previous left-sided stroke with resultant right-sided hemiparesis (in [place] ~7-8 years ago), T2DM, and CKD. She states that since her stroke in [place] she has been experiencing right-sided whole body pain. She also has a lot of issues with weakness and spasticity on the right side of her body that impairs her ability to function. She states that she is ambulate with the use of a walker due to her pain and weakness in the right side of her body. She states that she does live with her daughter at this point in time and has a first-floor set up. She rates her pain is a constant 5-7/10 stabbing, dull ache pain on the entire right side of her body including her face, arm, and leg. She denies any associated saddle anesthesia or bowel/bladder incontinence. She states that she has a tough time taking medications due to her kidney disease. She states that currently she is managing her pain with use of gabapentin and Tylenol. She does not feel like this is very effective.       Current Treatment Medications:   Gabapentin 300 mg daily  Tylenol as needed    Past Medical History:   Diagnosis Date    Depression     Heart attack ([address]     [place]     Hyperlipidemia     Neuropathy     Sleep apnea     Cpap       Past Surgical History:   Procedure Laterality Date    APPENDECTOMY      CHOLECYSTECTOMY      HIP SURGERY Right 7/29/2020    right , Genicular Nerve block performed by Jessica Keating [facility] at [facility]    Left total hip    OVARY REMOVAL         Family History   Problem Relation Age of Abdomen: Non-protruded   Musculoskeletal: Muscle bulk symmetric, no atrophy, no gross deformities   Neurological: Cranial nerves II-XII grossly intact. Spastic right-sided hemiparesis. Evidence of hyperesthesia and allodynia in right upper and right lower limbs. · Gait -spastic hemiplegia gait. Ambulates with assistive device. Skin: No rashes or lesions visible in limbs   Psychological: Cooperative, no exaggerated pain behaviors     Assessment:    Diagnosis Orders   1. Neuropathic pain  pregabalin (LYRICA) 75 MG capsule   2. Hemiparesis as late effect of cerebrovascular disease, unspecified cerebrovascular disease type, unspecified laterality ([address]     3. Other chronic pain     4. Penny Quiroz is a [de-identified] y. o.female presenting to the pain clinic for evaluation of chronic right-sided body pain in the setting of left-sided CVA with residual right-sided spastic hemiparesis. Her clinical history and physical examination are consistent with central post stroke pain. I have started her on Lyrica and stopped her gabapentin for better neuropathic pain coverage. Plan: The following treatment recommendations and plan were discussed in detail with Leanne Almaguer. Imaging/Studies/Labs:   I will look into obtaining records from [place] for her MRI imaging after her stroke. 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 I advised patient to stop her gabapentin. Side effect profile discussed in detail with the patient. Patient was proceed with medication adjustment.     Interventions:   None     Anticoagulation/NPO Recommendations:   None    Multidisciplinary Pain Management:   In the presence of complex, chronic, and multi-factorial pain, the importance of a multidisciplinary approach to pain management in the patients management regimen was emphasized and discussed in great detail.      Referrals:  None    Prescriptions Written This Visit:   Lyrica 75 mg     Follow-up: 6 weeks      Wyatt Jimenez DO  Interventional Pain Management/PM&R   [place]

## 2022-04-27 ENCOUNTER — HOSPITAL ENCOUNTER (INPATIENT)
Age: 82
LOS: 4 days | Discharge: NURSING, REHAB OR LONG-TERM CARE | DRG: 682 | End: 2022-05-01
Attending: EMERGENCY MEDICINE
Payer: MEDICARE

## 2022-04-27 ENCOUNTER — APPOINTMENT (OUTPATIENT)
Dept: GENERAL RADIOLOGY | Age: 82
DRG: 682 | End: 2022-04-27
Payer: MEDICARE

## 2022-04-27 DIAGNOSIS — N18.9 ACUTE KIDNEY INJURY SUPERIMPOSED ON CHRONIC KIDNEY DISEASE (HCC): ICD-10-CM

## 2022-04-27 DIAGNOSIS — I69.30 HISTORY OF CEREBROVASCULAR ACCIDENT (CVA) WITH RESIDUAL DEFICIT: ICD-10-CM

## 2022-04-27 DIAGNOSIS — M54.16 LUMBAR BACK PAIN WITH RADICULOPATHY AFFECTING RIGHT LOWER EXTREMITY: ICD-10-CM

## 2022-04-27 DIAGNOSIS — E78.2 MIXED HYPERLIPIDEMIA: ICD-10-CM

## 2022-04-27 DIAGNOSIS — I25.2 HISTORY OF MI (MYOCARDIAL INFARCTION): ICD-10-CM

## 2022-04-27 DIAGNOSIS — N17.9 ACUTE KIDNEY INJURY SUPERIMPOSED ON CHRONIC KIDNEY DISEASE (HCC): ICD-10-CM

## 2022-04-27 DIAGNOSIS — I50.20 HEART FAILURE WITH REDUCED EJECTION FRACTION (HCC): ICD-10-CM

## 2022-04-27 DIAGNOSIS — E87.5 HYPERKALEMIA: Primary | ICD-10-CM

## 2022-04-27 DIAGNOSIS — R13.10 DYSPHAGIA, UNSPECIFIED TYPE: ICD-10-CM

## 2022-04-27 DIAGNOSIS — M48.061 DEGENERATIVE LUMBAR SPINAL STENOSIS: ICD-10-CM

## 2022-04-27 LAB
ALBUMIN SERPL-MCNC: 3.4 G/DL (ref 3.5–5.1)
ALLEN TEST: POSITIVE
ALP BLD-CCNC: 49 U/L (ref 38–126)
ALT SERPL-CCNC: 8 U/L (ref 11–66)
ANION GAP SERPL CALCULATED.3IONS-SCNC: 10 MEQ/L (ref 8–16)
AST SERPL-CCNC: 11 U/L (ref 5–40)
BASE EXCESS (CALCULATED): -6.3 MMOL/L (ref -2.5–2.5)
BASOPHILS # BLD: 0.4 %
BASOPHILS ABSOLUTE: 0 THOU/MM3 (ref 0–0.1)
BILIRUB SERPL-MCNC: 0.3 MG/DL (ref 0.3–1.2)
BUN BLDV-MCNC: 72 MG/DL (ref 7–22)
CALCIUM SERPL-MCNC: 8.7 MG/DL (ref 8.5–10.5)
CHLORIDE BLD-SCNC: 104 MEQ/L (ref 98–111)
CO2: 18 MEQ/L (ref 23–33)
COLLECTED BY:: ABNORMAL
CREAT SERPL-MCNC: 3 MG/DL (ref 0.4–1.2)
CRENATED RBC'S: ABNORMAL
DEVICE: ABNORMAL
EKG ATRIAL RATE: 64 BPM
EKG P AXIS: 46 DEGREES
EKG P-R INTERVAL: 136 MS
EKG Q-T INTERVAL: 394 MS
EKG QRS DURATION: 90 MS
EKG QTC CALCULATION (BAZETT): 406 MS
EKG R AXIS: 35 DEGREES
EKG T AXIS: 79 DEGREES
EKG VENTRICULAR RATE: 64 BPM
EOSINOPHIL # BLD: 1.5 %
EOSINOPHILS ABSOLUTE: 0.1 THOU/MM3 (ref 0–0.4)
ERYTHROCYTE [DISTWIDTH] IN BLOOD BY AUTOMATED COUNT: 16.4 % (ref 11.5–14.5)
ERYTHROCYTE [DISTWIDTH] IN BLOOD BY AUTOMATED COUNT: 47.1 FL (ref 35–45)
GLUCOSE BLD-MCNC: 200 MG/DL (ref 70–108)
GLUCOSE BLD-MCNC: 237 MG/DL (ref 70–108)
HCO3: 19 MMOL/L (ref 23–28)
HCT VFR BLD CALC: 33.7 % (ref 37–47)
HEMOGLOBIN: 9.6 GM/DL (ref 12–16)
HYPOCHROMIA: PRESENT
IFIO2: 21
IMMATURE GRANS (ABS): 0.02 THOU/MM3 (ref 0–0.07)
IMMATURE GRANULOCYTES: 0.3 %
LIPASE: 37.5 U/L (ref 5.6–51.3)
LYMPHOCYTES # BLD: 17.5 %
LYMPHOCYTES ABSOLUTE: 1.3 THOU/MM3 (ref 1–4.8)
MAGNESIUM: 1.9 MG/DL (ref 1.6–2.4)
MCH RBC QN AUTO: 23 PG (ref 26–33)
MCHC RBC AUTO-ENTMCNC: 28.5 GM/DL (ref 32.2–35.5)
MCV RBC AUTO: 80.8 FL (ref 81–99)
MONOCYTES # BLD: 14.8 %
MONOCYTES ABSOLUTE: 1.1 THOU/MM3 (ref 0.4–1.3)
NUCLEATED RED BLOOD CELLS: 0 /100 WBC
O2 SATURATION: 80 %
OSMOLALITY CALCULATION: 293.4 MOSMOL/KG (ref 275–300)
PCO2: 36 MMHG (ref 35–45)
PH BLOOD GAS: 7.33 (ref 7.35–7.45)
PLATELET # BLD: 107 THOU/MM3 (ref 130–400)
PMV BLD AUTO: ABNORMAL FL (ref 9.4–12.4)
PO2: 47 MMHG (ref 71–104)
POTASSIUM SERPL-SCNC: 6.1 MEQ/L (ref 3.5–5.2)
POTASSIUM SERPL-SCNC: 6.4 MEQ/L (ref 3.5–5.2)
PRO-BNP: 2627 PG/ML (ref 0–1800)
RBC # BLD: 4.17 MILL/MM3 (ref 4.2–5.4)
REASON FOR REJECTION: NORMAL
REASON FOR REJECTION: NORMAL
REJECTED TEST: NORMAL
REJECTED TEST: NORMAL
SCAN OF BLOOD SMEAR: NORMAL
SEG NEUTROPHILS: 65.5 %
SEGMENTED NEUTROPHILS ABSOLUTE COUNT: 4.8 THOU/MM3 (ref 1.8–7.7)
SODIUM BLD-SCNC: 132 MEQ/L (ref 135–145)
SOURCE, BLOOD GAS: ABNORMAL
TARGET CELLS: ABNORMAL
TOTAL PROTEIN: 7.5 G/DL (ref 6.1–8)
TROPONIN T: 0.2 NG/ML
WBC # BLD: 7.3 THOU/MM3 (ref 4.8–10.8)

## 2022-04-27 PROCEDURE — 96372 THER/PROPH/DIAG INJ SC/IM: CPT

## 2022-04-27 PROCEDURE — 99223 1ST HOSP IP/OBS HIGH 75: CPT | Performed by: INTERNAL MEDICINE

## 2022-04-27 PROCEDURE — 2580000003 HC RX 258: Performed by: EMERGENCY MEDICINE

## 2022-04-27 PROCEDURE — 82803 BLOOD GASES ANY COMBINATION: CPT

## 2022-04-27 PROCEDURE — 72100 X-RAY EXAM L-S SPINE 2/3 VWS: CPT

## 2022-04-27 PROCEDURE — 93005 ELECTROCARDIOGRAM TRACING: CPT | Performed by: EMERGENCY MEDICINE

## 2022-04-27 PROCEDURE — 36600 WITHDRAWAL OF ARTERIAL BLOOD: CPT

## 2022-04-27 PROCEDURE — 84132 ASSAY OF SERUM POTASSIUM: CPT

## 2022-04-27 PROCEDURE — 2580000003 HC RX 258: Performed by: INTERNAL MEDICINE

## 2022-04-27 PROCEDURE — 71046 X-RAY EXAM CHEST 2 VIEWS: CPT

## 2022-04-27 PROCEDURE — 84484 ASSAY OF TROPONIN QUANT: CPT

## 2022-04-27 PROCEDURE — 72170 X-RAY EXAM OF PELVIS: CPT

## 2022-04-27 PROCEDURE — 36415 COLL VENOUS BLD VENIPUNCTURE: CPT

## 2022-04-27 PROCEDURE — 85025 COMPLETE CBC W/AUTO DIFF WBC: CPT

## 2022-04-27 PROCEDURE — 2500000003 HC RX 250 WO HCPCS: Performed by: EMERGENCY MEDICINE

## 2022-04-27 PROCEDURE — 80053 COMPREHEN METABOLIC PANEL: CPT

## 2022-04-27 PROCEDURE — 6370000000 HC RX 637 (ALT 250 FOR IP): Performed by: EMERGENCY MEDICINE

## 2022-04-27 PROCEDURE — 83735 ASSAY OF MAGNESIUM: CPT

## 2022-04-27 PROCEDURE — 99285 EMERGENCY DEPT VISIT HI MDM: CPT

## 2022-04-27 PROCEDURE — 83880 ASSAY OF NATRIURETIC PEPTIDE: CPT

## 2022-04-27 PROCEDURE — 93010 ELECTROCARDIOGRAM REPORT: CPT | Performed by: INTERNAL MEDICINE

## 2022-04-27 PROCEDURE — 83690 ASSAY OF LIPASE: CPT

## 2022-04-27 PROCEDURE — 96365 THER/PROPH/DIAG IV INF INIT: CPT

## 2022-04-27 PROCEDURE — 6360000002 HC RX W HCPCS: Performed by: EMERGENCY MEDICINE

## 2022-04-27 PROCEDURE — 96375 TX/PRO/DX INJ NEW DRUG ADDON: CPT

## 2022-04-27 PROCEDURE — 82948 REAGENT STRIP/BLOOD GLUCOSE: CPT

## 2022-04-27 PROCEDURE — 1200000003 HC TELEMETRY R&B

## 2022-04-27 RX ORDER — CLOPIDOGREL BISULFATE 75 MG/1
75 TABLET ORAL DAILY
Status: DISCONTINUED | OUTPATIENT
Start: 2022-04-27 | End: 2022-05-01 | Stop reason: HOSPADM

## 2022-04-27 RX ORDER — SODIUM CHLORIDE 0.9 % (FLUSH) 0.9 %
10 SYRINGE (ML) INJECTION PRN
Status: DISCONTINUED | OUTPATIENT
Start: 2022-04-27 | End: 2022-05-01 | Stop reason: HOSPADM

## 2022-04-27 RX ORDER — ENOXAPARIN SODIUM 100 MG/ML
30 INJECTION SUBCUTANEOUS EVERY 24 HOURS
Status: DISCONTINUED | OUTPATIENT
Start: 2022-04-27 | End: 2022-04-27 | Stop reason: SDUPTHER

## 2022-04-27 RX ORDER — ENOXAPARIN SODIUM 100 MG/ML
30 INJECTION SUBCUTANEOUS EVERY 24 HOURS
Status: DISCONTINUED | OUTPATIENT
Start: 2022-04-27 | End: 2022-04-27 | Stop reason: CLARIF

## 2022-04-27 RX ORDER — DEXTROSE MONOHYDRATE 25 G/50ML
25 INJECTION, SOLUTION INTRAVENOUS ONCE
Status: DISCONTINUED | OUTPATIENT
Start: 2022-04-27 | End: 2022-04-27

## 2022-04-27 RX ORDER — AMLODIPINE BESYLATE 10 MG/1
10 TABLET ORAL DAILY
Status: DISCONTINUED | OUTPATIENT
Start: 2022-04-28 | End: 2022-05-01 | Stop reason: HOSPADM

## 2022-04-27 RX ORDER — PREGABALIN 75 MG/1
75 CAPSULE ORAL EVERY EVENING
Status: DISCONTINUED | OUTPATIENT
Start: 2022-04-27 | End: 2022-05-01 | Stop reason: HOSPADM

## 2022-04-27 RX ORDER — POLYETHYLENE GLYCOL 3350 17 G/17G
17 POWDER, FOR SOLUTION ORAL DAILY PRN
Status: DISCONTINUED | OUTPATIENT
Start: 2022-04-27 | End: 2022-05-01 | Stop reason: HOSPADM

## 2022-04-27 RX ORDER — MORPHINE SULFATE 2 MG/ML
0.5 INJECTION, SOLUTION INTRAMUSCULAR; INTRAVENOUS EVERY 4 HOURS PRN
Status: DISCONTINUED | OUTPATIENT
Start: 2022-04-27 | End: 2022-05-01 | Stop reason: HOSPADM

## 2022-04-27 RX ORDER — HEPARIN SODIUM 5000 [USP'U]/ML
5000 INJECTION, SOLUTION INTRAVENOUS; SUBCUTANEOUS 3 TIMES DAILY
Status: DISCONTINUED | OUTPATIENT
Start: 2022-04-27 | End: 2022-05-01 | Stop reason: HOSPADM

## 2022-04-27 RX ORDER — ONDANSETRON 2 MG/ML
4 INJECTION INTRAMUSCULAR; INTRAVENOUS EVERY 6 HOURS PRN
Status: DISCONTINUED | OUTPATIENT
Start: 2022-04-27 | End: 2022-05-01 | Stop reason: HOSPADM

## 2022-04-27 RX ORDER — SODIUM CHLORIDE 0.9 % (FLUSH) 0.9 %
5-40 SYRINGE (ML) INJECTION EVERY 12 HOURS SCHEDULED
Status: DISCONTINUED | OUTPATIENT
Start: 2022-04-27 | End: 2022-05-01 | Stop reason: HOSPADM

## 2022-04-27 RX ORDER — PRAVASTATIN SODIUM 40 MG
40 TABLET ORAL NIGHTLY
Status: DISCONTINUED | OUTPATIENT
Start: 2022-04-27 | End: 2022-04-28

## 2022-04-27 RX ORDER — ACETAMINOPHEN 650 MG/1
650 SUPPOSITORY RECTAL EVERY 6 HOURS PRN
Status: DISCONTINUED | OUTPATIENT
Start: 2022-04-27 | End: 2022-05-01 | Stop reason: HOSPADM

## 2022-04-27 RX ORDER — ONDANSETRON 4 MG/1
4 TABLET, ORALLY DISINTEGRATING ORAL EVERY 8 HOURS PRN
Status: DISCONTINUED | OUTPATIENT
Start: 2022-04-27 | End: 2022-05-01 | Stop reason: HOSPADM

## 2022-04-27 RX ORDER — ASCORBIC ACID 500 MG
500 TABLET ORAL DAILY
COMMUNITY

## 2022-04-27 RX ORDER — ACETAMINOPHEN 325 MG/1
650 TABLET ORAL EVERY 6 HOURS PRN
Status: DISCONTINUED | OUTPATIENT
Start: 2022-04-27 | End: 2022-05-01 | Stop reason: HOSPADM

## 2022-04-27 RX ORDER — ASPIRIN 81 MG/1
81 TABLET ORAL DAILY
Status: DISCONTINUED | OUTPATIENT
Start: 2022-04-27 | End: 2022-04-28

## 2022-04-27 RX ORDER — ALBUTEROL SULFATE 2.5 MG/3ML
2.5 SOLUTION RESPIRATORY (INHALATION) EVERY 6 HOURS PRN
Status: DISCONTINUED | OUTPATIENT
Start: 2022-04-27 | End: 2022-05-01 | Stop reason: HOSPADM

## 2022-04-27 RX ORDER — SODIUM CHLORIDE 9 MG/ML
INJECTION, SOLUTION INTRAVENOUS CONTINUOUS
Status: DISCONTINUED | OUTPATIENT
Start: 2022-04-27 | End: 2022-04-28

## 2022-04-27 RX ORDER — SODIUM CHLORIDE 9 MG/ML
INJECTION, SOLUTION INTRAVENOUS PRN
Status: DISCONTINUED | OUTPATIENT
Start: 2022-04-27 | End: 2022-05-01 | Stop reason: HOSPADM

## 2022-04-27 RX ORDER — HYDROCODONE BITARTRATE AND ACETAMINOPHEN 5; 325 MG/1; MG/1
1 TABLET ORAL EVERY 6 HOURS PRN
Status: DISCONTINUED | OUTPATIENT
Start: 2022-04-27 | End: 2022-05-01 | Stop reason: HOSPADM

## 2022-04-27 RX ORDER — METHYLPREDNISOLONE ACETATE 80 MG/ML
80 INJECTION, SUSPENSION INTRA-ARTICULAR; INTRALESIONAL; INTRAMUSCULAR; SOFT TISSUE ONCE
Status: COMPLETED | OUTPATIENT
Start: 2022-04-27 | End: 2022-04-27

## 2022-04-27 RX ORDER — CALCIUM GLUCONATE 10 MG/ML
1000 INJECTION, SOLUTION INTRAVENOUS ONCE
Status: COMPLETED | OUTPATIENT
Start: 2022-04-27 | End: 2022-04-27

## 2022-04-27 RX ADMIN — Medication 10 UNITS: at 18:59

## 2022-04-27 RX ADMIN — SODIUM CHLORIDE: 9 INJECTION, SOLUTION INTRAVENOUS at 21:11

## 2022-04-27 RX ADMIN — CALCIUM GLUCONATE 1000 MG: 10 INJECTION, SOLUTION INTRAVENOUS at 19:32

## 2022-04-27 RX ADMIN — DEXTROSE MONOHYDRATE 250 ML: 100 INJECTION, SOLUTION INTRAVENOUS at 19:04

## 2022-04-27 RX ADMIN — METHYLPREDNISOLONE ACETATE 80 MG: 80 INJECTION, SUSPENSION INTRA-ARTICULAR; INTRALESIONAL; INTRAMUSCULAR; SOFT TISSUE at 16:32

## 2022-04-27 RX ADMIN — SODIUM CHLORIDE: 9 INJECTION, SOLUTION INTRAVENOUS at 17:28

## 2022-04-27 RX ADMIN — SODIUM CHLORIDE, PRESERVATIVE FREE 10 ML: 5 INJECTION INTRAVENOUS at 21:12

## 2022-04-27 ASSESSMENT — ENCOUNTER SYMPTOMS
SORE THROAT: 0
TROUBLE SWALLOWING: 0
CONSTIPATION: 0
SINUS PRESSURE: 0
NAUSEA: 0
ABDOMINAL PAIN: 0
VOICE CHANGE: 0
BACK PAIN: 1
WHEEZING: 0
CHEST TIGHTNESS: 0
VOMITING: 0
DIARRHEA: 0
COUGH: 0
RHINORRHEA: 0
SHORTNESS OF BREATH: 0

## 2022-04-27 ASSESSMENT — PAIN SCALES - GENERAL
PAINLEVEL_OUTOF10: 0
PAINLEVEL_OUTOF10: 9
PAINLEVEL_OUTOF10: 0

## 2022-04-27 ASSESSMENT — PAIN DESCRIPTION - ORIENTATION: ORIENTATION: RIGHT

## 2022-04-27 ASSESSMENT — PAIN DESCRIPTION - LOCATION: LOCATION: FLANK

## 2022-04-27 ASSESSMENT — PAIN - FUNCTIONAL ASSESSMENT: PAIN_FUNCTIONAL_ASSESSMENT: 0-10

## 2022-04-27 NOTE — ED NOTES
Pt medicated per MAR. Pt tolerated well. Respirations unlabored. Pt and family updated on POC.       Roney PIEDRA RN  04/27/22 9495

## 2022-04-27 NOTE — ED NOTES
Pt medicated per MAR. Pt tolerated well. Respirations unlabored. Pt updated on poc.       Luiz CHAUDHARY RN  04/27/22 1940

## 2022-04-27 NOTE — PROGRESS NOTES
Pharmacy Medication History Note      List of current medications patient is taking is complete. Source of information: MAR    Changes made to medication list:  Medications removed (include reason, ex. therapy complete or physician discontinued):  Tylenol  Aspirin  Elderberry  Famotidine  Magnesium  Nitroglycerine  Senna-docusate  Vitamin D  Pravastatin    Medications added/doses adjusted: Added Vitamin C 500 mg daily  Changed Januvia from 50 mg to 100 mg daily    Other notes (ex. Recent course of antibiotics, Coumadin dosing):    Denies use of other OTC or herbal medications.       Allergies reviewed      Electronically signed by Mariam Billingsley, [address] [place] [address] on 4/27/2022 at 7:37 PM

## 2022-04-27 NOTE — ED PROVIDER NOTES
[facility]        Room # 01/001A    CHIEF COMPLAINT    Chief Complaint   Patient presents with    Flank Pain       Nurses Notes reviewed and I agree except as noted in the HPI. HPI    Eden Haines is a [de-identified] y.o. female who presents for evaluation of back pain since 4/22/2022. The patient has been having pain on the right foot and has not been walking since then. Patient daughter states relates that she had a COVID shot 2 days earlier(4/20/22) and has not been walking since then. The daughter relates that the foot has been painful and tender and swollen. The patient was taken to the emergency room at [facility] 4/23/2022, x-rays were taken including laboratories and ultrasound to rule out DVT. The patient was sent home to the nursing home for a physical therapy. The daughter admits that the patient had been having low back pain for the past 3 weeks. She had a fall twice the last time was 2 weeks ago while in the bathroom at home. Patient also had a history of CVA 7 years ago while in [place] with right-sided weakness. Today the patient had more work pain on the right foot and everywhere in the body for which the patient was sent here to be evaluated from the nursing home. Patient also had history of AMI last 12/21/2021 and was sent to the nursing home however she did fine and was sent home subsequently. Patient also has a history of chronic kidney disease         REVIEW OF SYSTEMS    Review of Systems   Constitutional: Negative for appetite change, chills, diaphoresis, fatigue and fever. HENT: Negative for congestion, ear pain, postnasal drip, rhinorrhea, sinus pressure, sneezing, sore throat, trouble swallowing and voice change. Respiratory: Negative for cough, chest tightness, shortness of breath and wheezing. Cardiovascular: Negative for chest pain, palpitations and leg swelling.    Gastrointestinal: Negative for abdominal pain, constipation, diarrhea, nausea and vomiting. Musculoskeletal: Positive for arthralgias, back pain and gait problem. Negative for joint swelling, myalgias, neck pain and neck stiffness. Neurological: Negative for dizziness, syncope, weakness, light-headedness, numbness and headaches. History of right-sided weakness secondary to CVA       PAST MEDICAL HISTORY     has a past medical history of Depression, Heart attack ([address] Hyperlipidemia, Neuropathy, and Sleep apnea. SURGICAL HISTORY   has a past surgical history that includes joint replacement (1997); Appendectomy; Cholecystectomy; Ovary removal; and hip surgery (Right, 7/29/2020). CURRENT MEDICATIONS    Previous Medications    ACETAMINOPHEN (TYLENOL 8 HOUR) 650 MG EXTENDED RELEASE TABLET    Take 650 mg by mouth every 8 hours as needed for Pain    AMLODIPINE (NORVASC) 10 MG TABLET    Take 1 tablet by mouth daily    ASPIRIN 81 MG EC TABLET    Take 81 mg by mouth daily    CARVEDILOL (COREG) 12.5 MG TABLET    Take 1 tablet by mouth 2 times daily (with meals)    CLOPIDOGREL (PLAVIX) 75 MG TABLET    Take 1 tablet by mouth daily    ELDERBERRY PO    Take by mouth    FAMOTIDINE (PEPCID) 10 MG TABLET    Take 1 tablet by mouth 2 times daily    FERROUS FUMARATE 324 (106 FE) MG TABS    Take 1 tablet by mouth daily    GLIPIZIDE (GLUCOTROL) 5 MG TABLET    Take 1 tablet by mouth daily    HYDRALAZINE (APRESOLINE) 25 MG TABLET    Take 0.5 tablets by mouth 2 times daily    ISOSORBIDE MONONITRATE (IMDUR) 30 MG EXTENDED RELEASE TABLET    Take 1 tablet by mouth daily    MAGNESIUM CHLORIDE 64 MG TABS    Take 1 tablet by mouth daily    NITROGLYCERIN (NITROSTAT) 0.4 MG SL TABLET    Place 0.4 mg under the tongue every 5 minutes as needed for Chest pain up to max of 3 total doses. If no relief after 1 dose, call 911.     PRAVASTATIN (PRAVACHOL) 40 MG TABLET    Take 1 tablet by mouth nightly    PREGABALIN (LYRICA) 75 MG CAPSULE    Take 1 capsule by mouth every evening for 30 days. SENNA-DOCUSATE (PERICOLACE) 8.6-50 MG PER TABLET    Take 1 tablet by mouth every 12 hours as needed for Constipation    SITAGLIPTIN (JANUVIA) 50 MG TABLET    Take 1 tablet by mouth daily    VITAMIN B-12 (CYANOCOBALAMIN) 1000 MCG TABLET    Take 1 tablet by mouth daily    VITAMIN D (ERGOCALCIFEROL) 1.25 MG (73653 UT) CAPS CAPSULE    1250mcg every week       ALLERGIES    has No Known Allergies. FAMILY HISTORY    She indicated that her mother is . She indicated that her father is . family history includes Cancer in her mother; Diabetes in her father; Stroke in her father. SOCIAL HISTORY     reports that she has never smoked. She has never used smokeless tobacco. She reports that she does not drink alcohol and does not use drugs. PHYSICAL EXAM      INITIAL VITALS: BP (!) 145/52   Pulse 65   Temp 98 °F (36.7 °C) (Oral)   Resp 23   SpO2 96% Estimated body mass index is 29.29 kg/m² as calculated from the following:    Height as of an earlier encounter on 22: 5' (1.524 m). Weight as of an earlier encounter on 22: 150 lb (68 kg). Physical Exam  Vitals reviewed. Constitutional:       Appearance: She is well-developed. HENT:      Head: Normocephalic and atraumatic. Right Ear: External ear normal.      Left Ear: External ear normal.      Nose: Nose normal.   Eyes:      General: No scleral icterus. Conjunctiva/sclera: Conjunctivae normal.      Pupils: Pupils are equal, round, and reactive to light. Neck:      Thyroid: No thyromegaly. Vascular: No JVD. Cardiovascular:      Rate and Rhythm: Normal rate and regular rhythm. Heart sounds: No murmur heard. No friction rub. Pulmonary:      Effort: Pulmonary effort is normal.      Breath sounds: Normal breath sounds. No wheezing or rales. Chest:      Chest wall: No tenderness. Abdominal:      General: Bowel sounds are normal.      Palpations: Abdomen is soft.  There is no mass.      Tenderness: There is no abdominal tenderness. There is right CVA tenderness and left CVA tenderness. Musculoskeletal:         General: Tenderness (Lower lumbar spine and paraspinal muscle , and SI joint) present. Cervical back: Normal range of motion and neck supple. Lymphadenopathy:      Cervical: No cervical adenopathy. Skin:     Findings: No rash. Neurological:      Mental Status: She is alert and oriented to person, place, and time. Cranial Nerves: Cranial nerve deficit present. Motor: Weakness (3/5 right upper extremity and right lower extremity. 5/5 in the left upper extremity and left lower extremity) present. Psychiatric:         Behavior: Behavior is cooperative. MEDICAL DECISION MAKING    DIFFERENTIAL DIAGNOSIS:  Back pain, spinal stenosis sciatica, fall injury compression fracture pyelonephritis, history of chronic kidney disease and stroke with right sided weakness      DIAGNOSTIC RESULTS    EKG   Interpreted by Blue Bejarano MD      Rhythm: normal sinus   Rate: normal  Axis: normal  Ectopy: none  Conduction: normal  ST Segments: no acute change  T Waves: no acute change  Q Waves: none    Clinical Impression: Normal sinus rhythm with nonspecific ST-T wave abnormality, abnormal EKG      Blue Bejarano MD      RADIOLOGY:  I have reviewed radiologic plain film image(s). The plain films will be read or overread by the radiologist.All other non-plain film images(s) such as CT, Ultrasound and MRI have been read by the radiologist.  XR CHEST (2 VW)   Final Result   1. Suboptimal inflation of the lungs. Mild cardiomegaly. 2. Questionable minimal atelectasis/pneumonia right parahilar region and retrocardiac region left lung base. **This report has been created using voice recognition software. It may contain minor errors which are inherent in voice recognition technology. **      Final report electronically signed by Dr. Muna Lozano on 4/27/2022 2:58 PM      XR LUMBAR SPINE (2-3 VIEWS)   Final Result   1. Severe degenerative facet arthropathy L4-5 and L5/S1 bilaterally. Moderate vertebral body spondylosis scattered in the lumbar spine. 2. There are mild superior endplate depression fractures L1 and L2, of questionable acuity although the appearance favors a chronic process. 3. Sacroiliac joints unremarkable. **This report has been created using voice recognition software. It may contain minor errors which are inherent in voice recognition technology. **      Final report electronically signed by Dr. Tyler Singh on 4/27/2022 3:07 PM      XR PELVIS (1-2 VIEWS)   Final Result   No acute findings. **This report has been created using voice recognition software. It may contain minor errors which are inherent in voice recognition technology. **      Final report electronically signed by Dr. Tyler Singh on 4/27/2022 3:08 PM          LABS:   Labs Reviewed   CBC WITH AUTO DIFFERENTIAL - Abnormal; Notable for the following components:       Result Value    RBC 4.17 (*)     Hemoglobin 9.6 (*)     Hematocrit 33.7 (*)     MCV 80.8 (*)     MCH 23.0 (*)     MCHC 28.5 (*)     RDW-CV 16.4 (*)     RDW-SD 47.1 (*)     Platelets 586 (*)     All other components within normal limits   COMPREHENSIVE METABOLIC PANEL - Abnormal; Notable for the following components:    Glucose 237 (*)     CREATININE 3.0 (*)     BUN 72 (*)     Sodium 132 (*)     Potassium 6.4 (*)     CO2 18 (*)     Albumin 3.4 (*)     ALT 8 (*)     All other components within normal limits   BRAIN NATRIURETIC PEPTIDE - Abnormal; Notable for the following components:    Pro-BNP 2627.0 (*)     All other components within normal limits   TROPONIN - Abnormal; Notable for the following components:    Troponin T 0.200 (*)     All other components within normal limits   GLOMERULAR FILTRATION RATE, ESTIMATED - Abnormal; Notable for the following components:    Est, Glom Filt Rate 18 (*)     All other components within normal limits   SCAN OF BLOOD SMEAR   SPECIMEN REJECTION   SPECIMEN REJECTION   LIPASE   MAGNESIUM   ANION GAP   OSMOLALITY   URINALYSIS WITH REFLEX TO CULTURE     All other unresulted laboratory test above are normal:    Vitals:    Vitals:    04/27/22 1520 04/27/22 1649 04/27/22 1750 04/27/22 1850   BP: 126/64 115/78 128/71 (!) 145/52   Pulse: 72 73 68 65   Resp: 24 25 27 23   Temp:       TempSrc:       SpO2: 97% 94% 96% 96%       EMERGENCY DEPARTMENT COURSE:    Medications   0.9 % sodium chloride infusion ( IntraVENous New Bag 4/27/22 1728)   dextrose bolus (hypoglycemia) 10% 250 mL (250 mLs IntraVENous New Bag 4/27/22 1904)   calcium gluconate 1000 mg in sodium chloride 100 mL (has no administration in time range)   methylPREDNISolone acetate (DEPO-MEDROL) injection 80 mg (80 mg IntraMUSCular Given 4/27/22 1632)   insulin regular (HUMULIN R;NOVOLIN R) injection 10 Units (10 Units IntraVENous Given 4/27/22 1859)       The pt was seen and evaluated by me. Within the department, I observed the pt's vitalsigns to be within acceptable range. Laboratory and Radiological studies were performed, results were reviewed with the patient. Within the department, the pt was treated with initially Depo-Medrol for the back pain however laboratory that was recollected many times because of the more this is showed hyperkalemia of 6.4 and creatinine of 3.0. Patient was placed on insulin in the 50, and calcium gluconate. The case was referred to the hospitalist for admission. Dr Suzette Parry. I observed the pt's condition to be hemodynamically stable during the duration of their stay. I explained my proposed course of treatment to the pt, and they were amenable to my decision. The patient's will be admitted by the hospitalist      CRITICAL CARE:   None. CONSULTS:  Hospitalist Dr. Suzette Parry for admission      PROCEDURES:  None. FINAL IMPRESSION       1. Hyperkalemia    2.  Acute kidney injury superimposed on chronic kidney disease ([facility] [address]    3. Lumbar back pain with radiculopathy affecting right lower extremity    4. Degenerative lumbar spinal stenosis    5. History of cerebrovascular accident (CVA) with residual deficit          DISPOSITION/PLAN  PATIENT REFERRED TO: Patient is admitted to the hospitalist services        (Please note that portions of this note were completed with a voice recognition program and electronically transcribed. Efforts were [facility] edit the dictations but occasionally words are mis-transcribed . The transcription may contain errors not detected in proofreading.   This transcription was electronically signed.)     04/27/22 7:10 PM      Dorie Rojas MD      Emergency room physician            Dorie Rojas MD  04/27/22 1402

## 2022-04-27 NOTE — ED NOTES
Pt brief changed, pt given box lunch and pt medicated. Pt family at bedside. Respirations unlabored.       Briana CLEMENTE, RN  04/27/22 5844

## 2022-04-27 NOTE — ED NOTES
ED to inpatient nurses report    Chief Complaint   Patient presents with    Flank Pain      Present to ED from nursing home  LOC: alert and orientated to name, place, date  Vital signs   Vitals:    04/27/22 1649 04/27/22 1750 04/27/22 1850 04/27/22 1935   BP: 115/78 128/71 (!) 145/52 (!) 150/72   Pulse: 73 68 65 70   Resp: 25 27 23 20   Temp:       TempSrc:       SpO2: 94% 96% 96% 95%      Oxygen Baseline room air    Current needs required room air Bipap/Cpap No  LDAs:   Peripheral IV 04/27/22 Right Hand (Active)   Line Status Brisk blood return;Specimen collected;Normal saline locked 04/27/22 1403   Dressing Intervention New 04/27/22 1403     Mobility: Fully dependent  Pending ED orders: none  Present condition: pt resting on cot with unlabored respirations.      Electronically signed by Alyssa Loyola RN on 4/27/2022 at 7:40 PM       Rock Hodgkins, RN  04/27/22 1941

## 2022-04-28 ENCOUNTER — APPOINTMENT (OUTPATIENT)
Dept: INTERVENTIONAL RADIOLOGY/VASCULAR | Age: 82
DRG: 682 | End: 2022-04-28
Payer: MEDICARE

## 2022-04-28 LAB
ALBUMIN SERPL-MCNC: 3.2 G/DL (ref 3.5–5.1)
ALLEN TEST: POSITIVE
ALP BLD-CCNC: 47 U/L (ref 38–126)
ALT SERPL-CCNC: 8 U/L (ref 11–66)
ANION GAP SERPL CALCULATED.3IONS-SCNC: 12 MEQ/L (ref 8–16)
ANION GAP SERPL CALCULATED.3IONS-SCNC: 9 MEQ/L (ref 8–16)
AST SERPL-CCNC: 11 U/L (ref 5–40)
BACTERIA: ABNORMAL /HPF
BASE EXCESS (CALCULATED): -6.1 MMOL/L (ref -2.5–2.5)
BASOPHILS # BLD: 0.4 %
BASOPHILS ABSOLUTE: 0 THOU/MM3 (ref 0–0.1)
BILIRUB SERPL-MCNC: 0.4 MG/DL (ref 0.3–1.2)
BILIRUBIN URINE: NEGATIVE
BLOOD, URINE: ABNORMAL
BUN BLDV-MCNC: 72 MG/DL (ref 7–22)
BUN BLDV-MCNC: 73 MG/DL (ref 7–22)
CALCIUM SERPL-MCNC: 8.8 MG/DL (ref 8.5–10.5)
CALCIUM SERPL-MCNC: 9 MG/DL (ref 8.5–10.5)
CASTS 2: ABNORMAL /LPF
CASTS UA: ABNORMAL /LPF
CHARACTER, URINE: CLEAR
CHLORIDE BLD-SCNC: 105 MEQ/L (ref 98–111)
CHLORIDE BLD-SCNC: 109 MEQ/L (ref 98–111)
CO2: 15 MEQ/L (ref 23–33)
CO2: 19 MEQ/L (ref 23–33)
COLLECTED BY:: ABNORMAL
COLOR: YELLOW
CREAT SERPL-MCNC: 2.7 MG/DL (ref 0.4–1.2)
CREAT SERPL-MCNC: 2.7 MG/DL (ref 0.4–1.2)
CRYSTALS, UA: ABNORMAL
DEVICE: ABNORMAL
EKG ATRIAL RATE: 59 BPM
EKG ATRIAL RATE: 63 BPM
EKG P AXIS: 54 DEGREES
EKG P AXIS: 59 DEGREES
EKG P-R INTERVAL: 154 MS
EKG P-R INTERVAL: 154 MS
EKG Q-T INTERVAL: 416 MS
EKG Q-T INTERVAL: 424 MS
EKG QRS DURATION: 102 MS
EKG QRS DURATION: 94 MS
EKG QTC CALCULATION (BAZETT): 419 MS
EKG QTC CALCULATION (BAZETT): 425 MS
EKG R AXIS: 20 DEGREES
EKG R AXIS: 21 DEGREES
EKG T AXIS: 52 DEGREES
EKG T AXIS: 56 DEGREES
EKG VENTRICULAR RATE: 59 BPM
EKG VENTRICULAR RATE: 63 BPM
EOSINOPHIL # BLD: 0.5 %
EOSINOPHILS ABSOLUTE: 0 THOU/MM3 (ref 0–0.4)
EPITHELIAL CELLS, UA: ABNORMAL /HPF
ERYTHROCYTE [DISTWIDTH] IN BLOOD BY AUTOMATED COUNT: 15.1 % (ref 11.5–14.5)
ERYTHROCYTE [DISTWIDTH] IN BLOOD BY AUTOMATED COUNT: 44.2 FL (ref 35–45)
GLUCOSE BLD-MCNC: 187 MG/DL (ref 70–108)
GLUCOSE BLD-MCNC: 205 MG/DL (ref 70–108)
GLUCOSE BLD-MCNC: 213 MG/DL (ref 70–108)
GLUCOSE BLD-MCNC: 214 MG/DL (ref 70–108)
GLUCOSE BLD-MCNC: 236 MG/DL (ref 70–108)
GLUCOSE BLD-MCNC: 243 MG/DL (ref 70–108)
GLUCOSE BLD-MCNC: 276 MG/DL (ref 70–108)
GLUCOSE URINE: NEGATIVE MG/DL
HCO3: 20 MMOL/L (ref 23–28)
HCT VFR BLD CALC: 32 % (ref 37–47)
HEMOGLOBIN: 9 GM/DL (ref 12–16)
HYPOCHROMIA: PRESENT
IFIO2: 80
IMMATURE GRANS (ABS): 0.02 THOU/MM3 (ref 0–0.07)
IMMATURE GRANULOCYTES: 0.4 %
KETONES, URINE: NEGATIVE
LEUKOCYTE ESTERASE, URINE: ABNORMAL
LYMPHOCYTES # BLD: 16.1 %
LYMPHOCYTES ABSOLUTE: 0.9 THOU/MM3 (ref 1–4.8)
MCH RBC QN AUTO: 22.6 PG (ref 26–33)
MCHC RBC AUTO-ENTMCNC: 28.1 GM/DL (ref 32.2–35.5)
MCV RBC AUTO: 80.4 FL (ref 81–99)
MISCELLANEOUS 2: ABNORMAL
MONOCYTES # BLD: 9.3 %
MONOCYTES ABSOLUTE: 0.5 THOU/MM3 (ref 0.4–1.3)
MRSA SCREEN RT-PCR: NEGATIVE
NITRITE, URINE: NEGATIVE
NUCLEATED RED BLOOD CELLS: 0 /100 WBC
O2 SATURATION: 100 %
PCO2: 41 MMHG (ref 35–45)
PH BLOOD GAS: 7.3 (ref 7.35–7.45)
PH UA: 5 (ref 5–9)
PIP: 10 CMH2O
PLATELET # BLD: 110 THOU/MM3 (ref 130–400)
PMV BLD AUTO: 12.6 FL (ref 9.4–12.4)
PO2: 293 MMHG (ref 71–104)
POTASSIUM REFLEX MAGNESIUM: 7 MEQ/L (ref 3.5–5.2)
POTASSIUM SERPL-SCNC: 6.6 MEQ/L (ref 3.5–5.2)
PROTEIN UA: NEGATIVE
RBC # BLD: 3.98 MILL/MM3 (ref 4.2–5.4)
RBC URINE: ABNORMAL /HPF
RENAL EPITHELIAL, UA: ABNORMAL
SEG NEUTROPHILS: 73.3 %
SEGMENTED NEUTROPHILS ABSOLUTE COUNT: 4 THOU/MM3 (ref 1.8–7.7)
SET PEEP: 5 MMHG
SODIUM BLD-SCNC: 133 MEQ/L (ref 135–145)
SODIUM BLD-SCNC: 136 MEQ/L (ref 135–145)
SOURCE, BLOOD GAS: ABNORMAL
SPECIFIC GRAVITY, URINE: 1.01 (ref 1–1.03)
TOTAL PROTEIN: 7.3 G/DL (ref 6.1–8)
UROBILINOGEN, URINE: 0.2 EU/DL (ref 0–1)
VANCOMYCIN RESISTANT ENTEROCOCCUS: POSITIVE
WBC # BLD: 5.5 THOU/MM3 (ref 4.8–10.8)
WBC UA: ABNORMAL /HPF
YEAST: ABNORMAL

## 2022-04-28 PROCEDURE — 2700000000 HC OXYGEN THERAPY PER DAY

## 2022-04-28 PROCEDURE — 87641 MR-STAPH DNA AMP PROBE: CPT

## 2022-04-28 PROCEDURE — 99223 1ST HOSP IP/OBS HIGH 75: CPT | Performed by: INTERNAL MEDICINE

## 2022-04-28 PROCEDURE — 82803 BLOOD GASES ANY COMBINATION: CPT

## 2022-04-28 PROCEDURE — 93970 EXTREMITY STUDY: CPT

## 2022-04-28 PROCEDURE — 2580000003 HC RX 258: Performed by: INTERNAL MEDICINE

## 2022-04-28 PROCEDURE — 94761 N-INVAS EAR/PLS OXIMETRY MLT: CPT

## 2022-04-28 PROCEDURE — 6360000002 HC RX W HCPCS: Performed by: INTERNAL MEDICINE

## 2022-04-28 PROCEDURE — 85025 COMPLETE CBC W/AUTO DIFF WBC: CPT

## 2022-04-28 PROCEDURE — 2500000003 HC RX 250 WO HCPCS: Performed by: INTERNAL MEDICINE

## 2022-04-28 PROCEDURE — 6370000000 HC RX 637 (ALT 250 FOR IP): Performed by: INTERNAL MEDICINE

## 2022-04-28 PROCEDURE — 94660 CPAP INITIATION&MGMT: CPT

## 2022-04-28 PROCEDURE — 99233 SBSQ HOSP IP/OBS HIGH 50: CPT | Performed by: FAMILY MEDICINE

## 2022-04-28 PROCEDURE — 97166 OT EVAL MOD COMPLEX 45 MIN: CPT

## 2022-04-28 PROCEDURE — 36415 COLL VENOUS BLD VENIPUNCTURE: CPT

## 2022-04-28 PROCEDURE — 93010 ELECTROCARDIOGRAM REPORT: CPT | Performed by: INTERNAL MEDICINE

## 2022-04-28 PROCEDURE — 97535 SELF CARE MNGMENT TRAINING: CPT

## 2022-04-28 PROCEDURE — 2060000000 HC ICU INTERMEDIATE R&B

## 2022-04-28 PROCEDURE — 94640 AIRWAY INHALATION TREATMENT: CPT

## 2022-04-28 PROCEDURE — 87086 URINE CULTURE/COLONY COUNT: CPT

## 2022-04-28 PROCEDURE — 87500 VANOMYCIN DNA AMP PROBE: CPT

## 2022-04-28 PROCEDURE — 87077 CULTURE AEROBIC IDENTIFY: CPT

## 2022-04-28 PROCEDURE — 82948 REAGENT STRIP/BLOOD GLUCOSE: CPT

## 2022-04-28 PROCEDURE — 93005 ELECTROCARDIOGRAM TRACING: CPT | Performed by: FAMILY MEDICINE

## 2022-04-28 PROCEDURE — 36600 WITHDRAWAL OF ARTERIAL BLOOD: CPT

## 2022-04-28 PROCEDURE — 93307 TTE W/O DOPPLER COMPLETE: CPT

## 2022-04-28 PROCEDURE — 93005 ELECTROCARDIOGRAM TRACING: CPT | Performed by: INTERNAL MEDICINE

## 2022-04-28 PROCEDURE — 81001 URINALYSIS AUTO W/SCOPE: CPT

## 2022-04-28 PROCEDURE — 87186 SC STD MICRODIL/AGAR DIL: CPT

## 2022-04-28 PROCEDURE — 80053 COMPREHEN METABOLIC PANEL: CPT

## 2022-04-28 RX ORDER — DEXTROSE MONOHYDRATE 25 G/50ML
25 INJECTION, SOLUTION INTRAVENOUS ONCE
Status: DISCONTINUED | OUTPATIENT
Start: 2022-04-28 | End: 2022-04-28 | Stop reason: SDUPTHER

## 2022-04-28 RX ORDER — INSULIN LISPRO 100 [IU]/ML
0-6 INJECTION, SOLUTION INTRAVENOUS; SUBCUTANEOUS NIGHTLY
Status: DISCONTINUED | OUTPATIENT
Start: 2022-04-28 | End: 2022-05-01 | Stop reason: HOSPADM

## 2022-04-28 RX ORDER — NICOTINE POLACRILEX 4 MG
15 LOZENGE BUCCAL PRN
Status: DISCONTINUED | OUTPATIENT
Start: 2022-04-28 | End: 2022-04-28 | Stop reason: SDUPTHER

## 2022-04-28 RX ORDER — DEXTROSE MONOHYDRATE 25 G/50ML
12.5 INJECTION, SOLUTION INTRAVENOUS PRN
Status: DISCONTINUED | OUTPATIENT
Start: 2022-04-28 | End: 2022-04-28 | Stop reason: SDUPTHER

## 2022-04-28 RX ORDER — FUROSEMIDE 10 MG/ML
40 INJECTION INTRAMUSCULAR; INTRAVENOUS ONCE
Status: DISCONTINUED | OUTPATIENT
Start: 2022-04-28 | End: 2022-04-28

## 2022-04-28 RX ORDER — BUMETANIDE 0.25 MG/ML
1 INJECTION, SOLUTION INTRAMUSCULAR; INTRAVENOUS ONCE
Status: COMPLETED | OUTPATIENT
Start: 2022-04-28 | End: 2022-04-28

## 2022-04-28 RX ORDER — INSULIN LISPRO 100 [IU]/ML
0-12 INJECTION, SOLUTION INTRAVENOUS; SUBCUTANEOUS
Status: DISCONTINUED | OUTPATIENT
Start: 2022-04-28 | End: 2022-05-01 | Stop reason: HOSPADM

## 2022-04-28 RX ORDER — DEXTROSE MONOHYDRATE 50 MG/ML
100 INJECTION, SOLUTION INTRAVENOUS PRN
Status: DISCONTINUED | OUTPATIENT
Start: 2022-04-28 | End: 2022-04-28 | Stop reason: SDUPTHER

## 2022-04-28 RX ORDER — DEXTROSE MONOHYDRATE 50 MG/ML
100 INJECTION, SOLUTION INTRAVENOUS PRN
Status: DISCONTINUED | OUTPATIENT
Start: 2022-04-28 | End: 2022-05-01 | Stop reason: HOSPADM

## 2022-04-28 RX ADMIN — INSULIN HUMAN 10 UNITS: 100 INJECTION, SOLUTION PARENTERAL at 09:18

## 2022-04-28 RX ADMIN — HEPARIN SODIUM 5000 UNITS: 5000 INJECTION INTRAVENOUS; SUBCUTANEOUS at 17:03

## 2022-04-28 RX ADMIN — PREGABALIN 75 MG: 75 CAPSULE ORAL at 17:03

## 2022-04-28 RX ADMIN — AMLODIPINE BESYLATE 10 MG: 10 TABLET ORAL at 11:57

## 2022-04-28 RX ADMIN — SODIUM BICARBONATE: 84 INJECTION, SOLUTION INTRAVENOUS at 09:56

## 2022-04-28 RX ADMIN — SODIUM ZIRCONIUM CYCLOSILICATE 10 G: 5 POWDER, FOR SUSPENSION ORAL at 09:23

## 2022-04-28 RX ADMIN — SODIUM ZIRCONIUM CYCLOSILICATE 10 G: 5 POWDER, FOR SUSPENSION ORAL at 15:13

## 2022-04-28 RX ADMIN — DEXTROSE MONOHYDRATE 250 ML: 100 INJECTION, SOLUTION INTRAVENOUS at 09:20

## 2022-04-28 RX ADMIN — ACETAMINOPHEN 650 MG: 325 TABLET ORAL at 18:42

## 2022-04-28 RX ADMIN — CLOPIDOGREL BISULFATE 75 MG: 75 TABLET ORAL at 11:57

## 2022-04-28 RX ADMIN — INSULIN LISPRO 2 UNITS: 100 INJECTION, SOLUTION INTRAVENOUS; SUBCUTANEOUS at 20:25

## 2022-04-28 RX ADMIN — PREGABALIN 75 MG: 75 CAPSULE ORAL at 02:24

## 2022-04-28 RX ADMIN — INSULIN LISPRO 4 UNITS: 100 INJECTION, SOLUTION INTRAVENOUS; SUBCUTANEOUS at 13:16

## 2022-04-28 RX ADMIN — INSULIN LISPRO 4 UNITS: 100 INJECTION, SOLUTION INTRAVENOUS; SUBCUTANEOUS at 17:04

## 2022-04-28 RX ADMIN — HEPARIN SODIUM 5000 UNITS: 5000 INJECTION INTRAVENOUS; SUBCUTANEOUS at 02:25

## 2022-04-28 RX ADMIN — CLOPIDOGREL BISULFATE 75 MG: 75 TABLET ORAL at 02:23

## 2022-04-28 RX ADMIN — HYDROCODONE BITARTRATE AND ACETAMINOPHEN 1 TABLET: 5; 325 TABLET ORAL at 20:24

## 2022-04-28 RX ADMIN — BUMETANIDE 1 MG: 0.25 INJECTION INTRAMUSCULAR; INTRAVENOUS at 09:17

## 2022-04-28 RX ADMIN — SODIUM BICARBONATE: 84 INJECTION, SOLUTION INTRAVENOUS at 18:45

## 2022-04-28 RX ADMIN — ALBUTEROL SULFATE 10 MG: 2.5 SOLUTION RESPIRATORY (INHALATION) at 09:27

## 2022-04-28 ASSESSMENT — PAIN DESCRIPTION - PAIN TYPE: TYPE: CHRONIC PAIN

## 2022-04-28 ASSESSMENT — ENCOUNTER SYMPTOMS
BACK PAIN: 0
SHORTNESS OF BREATH: 0
COUGH: 0
VOMITING: 0
WHEEZING: 0
NAUSEA: 0
ABDOMINAL PAIN: 0
BACK PAIN: 1
CONSTIPATION: 0
COLOR CHANGE: 0
STRIDOR: 0
EYE PAIN: 0
SORE THROAT: 0
EYES NEGATIVE: 1
DIARRHEA: 0

## 2022-04-28 ASSESSMENT — PAIN SCALES - GENERAL
PAINLEVEL_OUTOF10: 0
PAINLEVEL_OUTOF10: 0
PAINLEVEL_OUTOF10: 2
PAINLEVEL_OUTOF10: 7
PAINLEVEL_OUTOF10: 6

## 2022-04-28 ASSESSMENT — PAIN DESCRIPTION - ORIENTATION: ORIENTATION: RIGHT

## 2022-04-28 ASSESSMENT — PAIN DESCRIPTION - LOCATION
LOCATION: HEAD
LOCATION: LEG;FOOT

## 2022-04-28 ASSESSMENT — PAIN DESCRIPTION - DESCRIPTORS
DESCRIPTORS: ACHING
DESCRIPTORS: ACHING

## 2022-04-28 ASSESSMENT — PAIN - FUNCTIONAL ASSESSMENT: PAIN_FUNCTIONAL_ASSESSMENT: PREVENTS OR INTERFERES SOME ACTIVE ACTIVITIES AND ADLS

## 2022-04-28 NOTE — CONSULTS
[facility]          [facility]        [address]        [id number]           Inpatient Initial consult note         4/28/2022 8:19 AM    Patient Name:   Jamarcus Onofre  YOB: 1940  Primary Care Physician:  ELLYN Blankenship CNP     History Obtained From:  patient, electronic medical record     Consultation requested by : Trice Hairston MD    requested for  : Evaluation of  worsening renal function and hyperkalemia     History of presentingillness   Jamarcus Onofre is a [de-identified] y.o.   female with Past Medical History as stated below presented with a chief complaint of Flank Pain   on 4/27/2022 . Patient is somewhat of a poor historian, says she does not know why she is accurately in the hospital.  As per the electronic medical record patient was been having chronic lumbar pain and has been having increasing recurrent falls and some nonspecific lower extremity swelling presented with increasing weakness. She denies any other complaints. She denies any recent antibiotics or medication changes. She does say that she was recently in [facility] 3 weeks ago.     On arrival to the ER patient was found to have hyperkalemia and acute kidney injury she has received a cocktail of potassium lowering drugs but the potassium has risen further so nephrology has been consulted for further evaluation and management     Past History      Past Medical History:   Diagnosis Date    Depression     Heart attack ([facility] [address]     [facility]     Hyperlipidemia     Neuropathy     Sleep apnea     Cpap     Past Surgical History:   Procedure Laterality Date    APPENDECTOMY      CHOLECYSTECTOMY      HIP SURGERY Right 7/29/2020    right , Genicular Nerve block performed by Henri Dietz MD at [facility]    Left total hip    OVARY REMOVAL       Social History     Socioeconomic History    Marital status:      Spouse name: Not on file    Number of children: Not on file    Years of education: Not on file    Highest education level: Not on file   Occupational History    Not on file   Tobacco Use    Smoking status: Never Smoker    Smokeless tobacco: Never Used   Vaping Use    Vaping Use: Never used   Substance and Sexual Activity    Alcohol use: Never    Drug use: Never    Sexual activity: Not on file   Other Topics Concern    Not on file   Social History Narrative    Not on file     Social Determinants of Health     Financial Resource Strain: Low Risk     Difficulty of Paying Living Expenses: Not hard at all   Food Insecurity: No Food Insecurity    Worried About Running Out of Food in the Last Year: Never true    [address] in the Last Year: Never true   Transportation Needs:     Lack of Transportation (Medical): Not on file    Lack of Transportation (Non-Medical):  Not on file   Physical Activity:     Days of Exercise per Week: Not on file    Minutes of Exercise per Session: Not on file   Stress:     Feeling of Stress : Not on file   Social Connections:     Frequency of Communication with Friends and Family: Not on file    Frequency of Social Gatherings with Friends and Family: Not on file    Attends Latter day Services: Not on file    Active Member of [address] [place] [address] [employer] or Organizations: Not on file    Attends Club or Organization Meetings: Not on file    Marital Status: Not on file   Intimate Partner Violence:     Fear of Current or Ex-Partner: Not on file    Emotionally Abused: Not on file    Physically Abused: Not on file    Sexually Abused: Not on file   Housing Stability:     Unable to Pay for Housing in the Last Year: Not on file    Number of [place] in the Last Year: Not on file    Unstable Housing in the Last Year: Not on file     Family History   Problem Relation Age of Onset    Cancer Mother         colon    Diabetes Father     Stroke Father Medications & Allergies      Prior to Admission medications    Medication Sig Start Date End Date Taking? Authorizing Provider   vitamin C (ASCORBIC ACID) 500 MG tablet Take 500 mg by mouth daily   Yes Historical Provider, MD   pregabalin (LYRICA) 75 MG capsule Take 1 capsule by mouth every evening for 30 days. 4/13/22 5/13/22  Sujit Simmons DO   amLODIPine (NORVASC) 10 MG tablet Take 1 tablet by mouth daily 2/15/22   Sharda Lunch, APRN - CNP   clopidogrel (PLAVIX) 75 MG tablet Take 1 tablet by mouth daily 2/15/22   Sharda Lunch, APRN - CNP   SITagliptin (JANUVIA) 50 MG tablet Take 1 tablet by mouth daily  Patient taking differently: Take 100 mg by mouth daily  2/15/22 5/16/22  Sharda Lunch, APRN - CNP   glipiZIDE (GLUCOTROL) 5 MG tablet Take 1 tablet by mouth daily 2/15/22 5/16/22  Sharda Lunch, APRN - CNP   hydrALAZINE (APRESOLINE) 25 MG tablet Take 0.5 tablets by mouth 2 times daily  Patient taking differently: Take 25 mg by mouth 2 times daily  2/15/22 5/16/22  Sharda Lunch, APRN - CNP   Ferrous Fumarate 324 (106 Fe) MG TABS Take 1 tablet by mouth daily 2/15/22 5/16/22  Sharda Lunch, APRN - CNP   isosorbide mononitrate (IMDUR) 30 MG extended release tablet Take 1 tablet by mouth daily 2/15/22 5/16/22  Sharda Lunch, APRN - CNP   vitamin B-12 (CYANOCOBALAMIN) 1000 MCG tablet Take 1 tablet by mouth daily 2/15/22 5/16/22  Sharda Lunch, APRN - CNP     Allergies: Patient has no known allergies.   IP meds : Scheduled Meds:   insulin lispro  0-12 Units SubCUTAneous TID     insulin lispro  0-6 Units SubCUTAneous Nightly    albuterol  10 mg Nebulization Once    furosemide  40 mg IntraVENous Once    sodium zirconium cyclosilicate  10 g Oral Once    insulin regular  10 Units IntraVENous Once    And    dextrose bolus (hypoglycemia)  250 mL IntraVENous Once    amLODIPine  10 mg Oral Daily    aspirin  81 mg Oral Daily    clopidogrel  75 mg Oral Daily    pravastatin  40 mg Oral Nightly    pregabalin  75 mg Oral QPM    sodium chloride flush  5-40 mL IntraVENous 2 times per day    heparin (porcine)  5,000 Units SubCUTAneous TID     Continuous Infusions:   dextrose      sodium chloride      sodium chloride 75 mL/hr at 04/28/22 0600     Review of Systems Physical Exam   Review of Systems   Constitutional: Positive for activity change and fatigue. Negative for appetite change, chills and fever. HENT: Negative for ear pain and sore throat. Eyes: Negative. Negative for pain. Respiratory: Negative for cough and shortness of breath. Cardiovascular: Positive for leg swelling. Negative for chest pain. Gastrointestinal: Negative for abdominal pain, diarrhea, nausea and vomiting. Genitourinary: Negative for dysuria, frequency and hematuria. Musculoskeletal: Negative for back pain and neck pain. Skin: Negative for rash and wound. Neurological: Positive for weakness. Negative for dizziness, light-headedness and headaches. Psychiatric/Behavioral: Negative for agitation and confusion. Physical Exam  Vitals reviewed. Constitutional:       General: She is not in acute distress. Appearance: Normal appearance. She is not diaphoretic. HENT:      Head: Normocephalic and atraumatic. Right Ear: External ear normal.      Left Ear: External ear normal.      Nose: Nose normal.      Mouth/Throat:      Mouth: Mucous membranes are dry. Eyes:      General: No scleral icterus. Right eye: No discharge. Left eye: No discharge. Conjunctiva/sclera: Conjunctivae normal.   Neck:      Thyroid: No thyromegaly. Vascular: No JVD. Cardiovascular:      Rate and Rhythm: Normal rate and regular rhythm. Heart sounds: Normal heart sounds. No murmur heard. Pulmonary:      Effort: Pulmonary effort is normal. No respiratory distress. Breath sounds: Normal breath sounds. No stridor. No wheezing or rales. Chest:      Chest wall: No tenderness.    Abdominal: General: Bowel sounds are normal. There is no distension. Palpations: Abdomen is soft. Tenderness: There is no abdominal tenderness. Musculoskeletal:         General: No swelling or tenderness. Cervical back: Normal range of motion and neck supple. Right lower leg: Edema present. Left lower leg: Edema present. Skin:     General: Skin is warm and dry. Findings: No erythema or rash. Neurological:      General: No focal deficit present. Mental Status: She is alert and oriented to person, place, and time. Psychiatric:         Mood and Affect: Mood normal.         Behavior: Behavior normal.           Vitals:    04/28/22 0720   BP: (!) 146/68   Pulse: 61   Resp: 16   Temp: 97.9 °F (36.6 °C)   SpO2:      Labs, Radiology and Tests       Recent Labs     04/27/22  1350 04/28/22  0454   WBC 7.3 5.5   RBC 4.17* 3.98*   HGB 9.6* 9.0*   HCT 33.7* 32.0*   MCV 80.8* 80.4*   MCH 23.0* 22.6*   MCHC 28.5* 28.1*   * 110*     Recent Labs     04/27/22  1625 04/27/22  2126 04/28/22  0454   *  --  136   K 6.4* 6.1* 7.0*     --  109   CO2 18*  --  15*   BUN 72*  --  72*   CREATININE 3.0*  --  2.7*   CALCIUM 8.7  --  9.0   PROT 7.5  --  7.3   LABALBU 3.4*  --  3.2*   BILITOT 0.3  --  0.4   ALKPHOS 49  --  47   AST 11  --  11   ALT 8*  --  8*       Radiology : Chest x-ray reviewed by me shows mild cardiomegaly no significant congestion or consolidation    Other : Old lab data have been reviewed and noted patient's baseline creatinine has been running anywhere between 1.5-1.7 within the last couple of years    Assessment    1 Renal -acute kidney injury on chronic kidney disease, exact etiology not clear  ? Possibly some component of prerenal slight improvement with IV fluids  ? We will continue aggressive hydration.   Kidney ultrasound scan from December was normal  ? We will aggressively hydrate the patient send urine lites    2 Electrolytes -hyperkalemia which appears to be slightly worsening will be getting insulin, dextrose, Lokelma we will give 1 mg of Bumex IV and also change IV fluids to bicarbonate drip if the potassium does not improve in the next few hours we may have to consider renal replacement therapy  3 Acid-base status-patient has some nongap metabolic acidosis most likely due to renal failure start IV bicarbonate infusion  4 Essential hypertension stable  5 Meds reviewed and discussed with patient and nursing staff. **This report has been created using voice recognition software. It maycontain minor  errors which are inherent in voice recognition technology. **    Rayna Mcnamara MD,M.D  [facility].

## 2022-04-28 NOTE — CARE COORDINATION
4/28/22, 12:46 PM EDT  DISCHARGE PLANNING EVALUATION:    Manual Slicker       Admitted: 4/27/2022/ Ok 162 day: 1   Location: [facility] Reason for admit: Hyperkalemia [E87.5]  Degenerative lumbar spinal stenosis [M48.061]  Lumbar back pain with radiculopathy affecting right lower extremity [M54.16]  History of cerebrovascular accident (CVA) with residual deficit [I69.30]  Acute kidney injury superimposed on chronic kidney disease ([facility] [address] [N17.9, N18.9]   PMH:  has a past medical history of Depression, Heart attack ([facility] [address] Hyperlipidemia, Neuropathy, and Sleep apnea. Barriers to Discharge:   From [facility]. Hyperkalemia/PARIS. K+ 7, Creatinine 2.7; monitor. Bicarb gtt, IV Diuresing, Oxygen 4L v 40% FIO2 BIPAP continued. Urine Output = 350 ml/24h; monitor. ECHO planned today  PCP: ELLYN Childs CNP  Readmission Risk Score: 17.5 ( )%    Patient Goals/Plan/Treatment Preferences: plans return [place] [employer]; therapy following; has walker, CPAP, WC  Transportation/Food Security/Housekeeping Addressed:  No issues identified.

## 2022-04-28 NOTE — PROGRESS NOTES
PROGRESS NOTE      Patient:  Karyle Gammons      Unit/Bed:4K-13/013-A    YOB: 1940    MRN: [id number]       Acct: [de-identified]     PCP: ELLYN Brown CNP    Date of Admission: 4/27/2022      Assessment/Plan:    Anticipated Discharge in : pending    Active Hospital Problems    Diagnosis Date Noted    Hyperkalemia [E87.5] 04/27/2022     Priority: Medium       1. Hyperkalemia - PT has received D50 insulin and is receiving calcium gluconate for cardioprotection initial EKG on arrival does not seem to show peaked T waves would be more concerned with possible arrhythmia for serum potassium was well over 8. Currently downtrend, Nephrology consulted and on board, defer management to Nephro who recommend starting lokelma, 1m bumex Iv, Insulin + dextrose + Bicarb IVF at this time, possible RRT if continues per Nephro. 2. PARIS on CKD -  Unclear etiology possible prerenal?, baseline creatinine ranges between 1.3-2.04, creatinine 4/22/2022 was approximately 1.77,  currently 2.7 and downtrend, nephro onboard  3. Acute on chronic lumbar pain- patient appears to have prior fractures L2 and degenerative joint disease L4 and L5 but seems to have gross movements intact in lower extremities we will continue medications for pain prn.  4. Dehydration - See hyperkalemia and PARIS as above, see CHF below. 5. Metabolic acidosis - likely 2/2 dehydration and renal PARIS , Nephrology on board continue IV fluids per Nephro currently plan to start Bicarb. 6. acute hypoxia, - after floor transfer patient was noted to have increased work of breathing, pulse oximetry at bedside showed O2 sats 94% on room air however ABG drawn did show a depressed PO2 at or near 41 mmHg, not on O2 at home was on BiPAP, low suspecet for PE at this time, Venous Doppler negative B/L For DVT.   7. Chronic systolic heart failure - chart review yields echocardiogram performed at [facility] showing an ef of 35-40% and anterseptal and apical hypokinesis,, there may be a component of mild fluid overload as was hypoxic as above/ will need to watch as was on Bipap as above. Repeat echo today noted 50% EF. Strict I/O, Daily weights. Chief Complaint:  Generalized Weakness, PARIS    Hospital Course:     Per initial H&P  \"The patient is a [de-identified] y.o. female who presents with chronic lumbar pain according to the chart patient has also had increased recurrent falls and nonspecific lower extremity swelling, for which she has had evaluation at an outside hospital lower extremity Dopplers apparently negative for DVT and fracture, it appears she is have primarily increased weakness the patient herself states symptomatically she feels well she appears to have a certain degree of chronic dysarthria but based on ER documentation the patient was brought to ER secondary to worsening a lumbar pain and increased weakness, and ER she was found to have hyperkalemia and laboratory values consistent with acute kidney injury she also appeared to have modest dehydration with elevated BUN to creatinine ratio physically dry and physical survey with a sinus rhythm on telemetry have received cardioprotective drugs including calcium gluconate secondary to her hyperkalemia she is being admitted for dehydration with mild metabolic acidosis chronic lumbar pain acute kidney injury and hyperkalemia, ECG on admission does not show peaked T waves and infectious normal sinus rhythm  At bedside she is in sinus rhythm on telemetry she is on room air and her blood pressure is 150/72\" Pt was noted to be hypoxic on ABG and was placed on Bipap. Subjective:  Pt seen and examined in AM, off BIpap on 2L NC and sating well, Nephrology on board and seen, fluid management, slightly however will be aggressive IVF in setting of hyperK and Cr per nephro. Will need to monitor volume status for overload in AM tomorrow. Otherwise stable on cardiac tele with slight Sbrady.     Review of Systems Constitutional: Negative for chills, diaphoresis, fatigue and fever. Respiratory: Negative for cough, shortness of breath, wheezing and stridor. Cardiovascular: Negative for chest pain, palpitations and leg swelling. Gastrointestinal: Negative for abdominal pain, constipation, diarrhea, nausea and vomiting. Genitourinary: Negative for decreased urine volume, difficulty urinating, dysuria, frequency, hematuria and urgency. Musculoskeletal: Positive for back pain. Skin: Negative for color change, pallor and rash. Neurological: Positive for weakness. Negative for dizziness, seizures, light-headedness and headaches. Psychiatric/Behavioral: Negative for confusion and decreased concentration. Medications:  Reviewed    Infusion Medications    dextrose      sodium bicarbonate infusion 125 mL/hr at 04/28/22 1845    sodium chloride       Scheduled Medications    insulin lispro  0-12 Units SubCUTAneous TID WC    insulin lispro  0-6 Units SubCUTAneous Nightly    amLODIPine  10 mg Oral Daily    clopidogrel  75 mg Oral Daily    pregabalin  75 mg Oral QPM    sodium chloride flush  5-40 mL IntraVENous 2 times per day    heparin (porcine)  5,000 Units SubCUTAneous TID     PRN Meds: perflutren lipid microspheres, glucagon (rDNA), dextrose, dextrose bolus (hypoglycemia) **OR** dextrose bolus (hypoglycemia), glucose, sodium chloride flush, sodium chloride, ondansetron **OR** ondansetron, polyethylene glycol, acetaminophen **OR** acetaminophen, HYDROcodone 5 mg - acetaminophen, morphine, albuterol      Intake/Output Summary (Last 24 hours) at 4/28/2022 1945  Last data filed at 4/28/2022 1845  Gross per 24 hour   Intake 1160.33 ml   Output 1050 ml   Net 110.33 ml       Diet:  ADULT DIET;  Regular; 4 carb choices (60 gm/meal)    Exam:  /60   Pulse 63   Temp 98.3 °F (36.8 °C) (Oral)   Resp 18   Wt 160 lb 7.9 oz (72.8 kg)   SpO2 100%   BMI 31.34 kg/m²     Physical Exam  Constitutional: General: She is not in acute distress. Appearance: Normal appearance. She is not ill-appearing or diaphoretic. Interventions: Nasal cannula in place. Comments: 2 L NC   HENT:      Head: Normocephalic and atraumatic. Nose: Nose normal. No congestion or rhinorrhea. Mouth/Throat:      Mouth: Mucous membranes are dry. Pharynx: Oropharynx is clear. No oropharyngeal exudate or posterior oropharyngeal erythema. Eyes:      General: No scleral icterus. Right eye: No discharge. Left eye: No discharge. Extraocular Movements: Extraocular movements intact. Conjunctiva/sclera: Conjunctivae normal.      Pupils: Pupils are equal, round, and reactive to light. Cardiovascular:      Rate and Rhythm: Regular rhythm. Bradycardia present. Pulses: Normal pulses. Heart sounds: Normal heart sounds. No murmur heard. No friction rub. No gallop. Pulmonary:      Effort: Pulmonary effort is normal. No respiratory distress. Breath sounds: Rhonchi (faint) present. No wheezing or rales. Abdominal:      General: Abdomen is flat. Bowel sounds are normal. There is no distension. Palpations: Abdomen is soft. Tenderness: There is no abdominal tenderness. There is no guarding. Musculoskeletal:         General: No swelling, tenderness or signs of injury. Right lower leg: Edema (trace) present. Left lower leg: Edema (trace) present. Skin:     General: Skin is warm and dry. Capillary Refill: Capillary refill takes less than 2 seconds. Coloration: Skin is not jaundiced or pale. Findings: No erythema or rash. Neurological:      General: No focal deficit present. Mental Status: She is alert and oriented to person, place, and time.          Labs:   Recent Labs     04/27/22  1350 04/28/22  0454   WBC 7.3 5.5   HGB 9.6* 9.0*   HCT 33.7* 32.0*   * 110*     Recent Labs     04/27/22  1625 04/27/22  1625 04/27/22  2126 04/28/22  0454 04/28/22  1255   *  --   --  136 133*   K 6.4*   < > 6.1* 7.0* 6.6*     --   --  109 105   CO2 18*  --   --  15* 19*   BUN 72*  --   --  72* 73*   CREATININE 3.0*  --   --  2.7* 2.7*   CALCIUM 8.7  --   --  9.0 8.8    < > = values in this interval not displayed. Recent Labs     04/27/22  1625 04/28/22  0454   AST 11 11   ALT 8* 8*   BILITOT 0.3 0.4   ALKPHOS 49 47     No results for input(s): INR in the last 72 hours. No results for input(s): Suhail [place] in the last 72 hours. Urinalysis:      Lab Results   Component Value Date    NITRU NEGATIVE 04/28/2022    WBCUA 25-50 04/28/2022    WBCUA 0-5 04/13/2022    BACTERIA NONE SEEN 04/28/2022    RBCUA 3-5 04/28/2022    BLOODU TRACE 04/28/2022    SPECGRAV 1.025 08/24/2020    GLUCOSEU NEGATIVE 04/28/2022       Radiology:  VL DUP LOWER EXTREMITY VENOUS BILATERAL   Final Result   No evidence of a DVT. **This report has been created using voice recognition software. It may contain minor errors which are inherent in voice recognition technology. **      Final report electronically signed by Dr Kenton Rodriguez on 4/28/2022 8:47 AM      XR CHEST (2 VW)   Final Result   1. Suboptimal inflation of the lungs. Mild cardiomegaly. 2. Questionable minimal atelectasis/pneumonia right parahilar region and retrocardiac region left lung base. **This report has been created using voice recognition software. It may contain minor errors which are inherent in voice recognition technology. **      Final report electronically signed by Dr. Muna Lozano on 4/27/2022 2:58 PM      XR LUMBAR SPINE (2-3 VIEWS)   Final Result   1. Severe degenerative facet arthropathy L4-5 and L5/S1 bilaterally. Moderate vertebral body spondylosis scattered in the lumbar spine. 2. There are mild superior endplate depression fractures L1 and L2, of questionable acuity although the appearance favors a chronic process. 3. Sacroiliac joints unremarkable. **This report has been created using voice recognition software. It may contain minor errors which are inherent in voice recognition technology. **      Final report electronically signed by Dr. Daniel Lewis on 4/27/2022 3:07 PM      XR PELVIS (1-2 VIEWS)   Final Result   No acute findings. **This report has been created using voice recognition software. It may contain minor errors which are inherent in voice recognition technology. **      Final report electronically signed by Dr. Daniel Lewis on 4/27/2022 3:08 PM          Diet: ADULT DIET; Regular; 4 carb choices (60 gm/meal)    DVT prophylaxis: [x] Lovenox                                 [] SCDs                                 [] SQ Heparin                                 [] Encourage ambulation           [] Already on Anticoagulation     Disposition:    [x] Home       [] TCU       [] Rehab       [] Psych       [] SNF       [] [place]       [] Other-    Code Status: Full Code    PT/OT Eval Status: pending      Electronically signed by Kyle Mendoza MD on 4/28/2022 at 7:45 PM    This note was electronically signed. Parts of this note may have been dictated by use of voice recognition software and electronically transcribed. The note may contain errors not detected in proofreading. Please refer to my supervising physician's documentation if my documentation differs.

## 2022-04-28 NOTE — PLAN OF CARE
Problem: Discharge Planning  Goal: Discharge to home or other facility with appropriate resources  Outcome: Progressing   SW consult received and completed. See SW note.

## 2022-04-28 NOTE — PROGRESS NOTES
04/27/22    2228- Notified Jane Martínez NP, of a potassium of 6.1. Asked if she would like to start any new interventions. 200- Dr. Maricarmen Diez spoke with this RN via telephone. No new orders. [address], NP, of patients fast and labored respirations. Explained that lungs were diminished throughout. This RN asked if we could get ABGs ordered. 2335- Dr. Maricarmen Diez saw pt face to face. 04/28/22    0015- This RN sent results of ABGs to Jane Martínez NP.     Severa Mouse- Dr. Maricarmen Diez saw pt face to face. New orders placed. 2167- This RN called report to [facility], [address] [place] [address], RN, was made aware of pts arrival to [facility].     80- Spoke with patient's daughter, Earl Shah, via telephone. Therena updated on patients transfer to .

## 2022-04-28 NOTE — PROGRESS NOTES
[facility] 62  INPATIENT OCCUPATIONAL THERAPY  STR ICU STEPDOWN TELEMETRY 4K  EVALUATION    Time:    Time In: 1521  Time Out: 1600  Timed Code Treatment Minutes: 24 Minutes  Minutes: 39          Date: 2022  Patient Name: Kyle Mcgill,   Gender: female      MRN: [id number]  : 1940  ([de-identified] y.o.)  Referring Practitioner: Keegan Linda  Diagnosis: hyperkalemia  Additional Pertinent Hx: Per ER 2022:82 y.o. female who presents for evaluation of back pain since 2022. The patient has been having pain on the right foot and has not been walking since then. Patient daughter states relates that she had a COVID shot 2 days earlier(22) and has not been walking since then. The daughter relates that the foot has been painful and tender and swollen. The patient was taken to the emergency room at [facility] 2022, x-rays were taken including laboratories and ultrasound to rule out DVT. The patient was sent home to the nursing home for a physical therapy. The daughter admits that the patient had been having low back pain for the past 3 weeks. She had a fall twice the last time was 2 weeks ago while in the bathroom at home. Patient also had a history of CVA 7 years ago while in [place] with right-sided weakness. Today the patient had more work pain on the right foot and everywhere in the body for which the patient was sent here to be evaluated from the nursing home.     Restrictions/Precautions:  Restrictions/Precautions: Fall Risk  Position Activity Restriction  Other position/activity restrictions: 3L O2    Subjective  Chart Reviewed: Yes,Orders,Progress Notes,History and Physical  Patient assessed for rehabilitation services?: Yes  Family / Caregiver Present: Yes (grand daughter)    Subjective: cooperative, confusion noted    Pain: 0/10: no c/o pain     Vitals: Oxygen: 100%    Social/Functional History:  Lives With: Daughter  Type of Home: House  Home Layout: One level  Home Access: Stairs to enter with rails (1)  Home Equipment: Reacher,Roll About   Bathroom Shower/Tub: Tub/Shower unit  Bathroom Toilet: Handicap height  Bathroom Equipment: Tub transfer bench       ADL Assistance: Needs assistance  Ambulation Assistance: Needs assistance  Transfer Assistance: Needs assistance          Additional Comments: [address] daughter reports Pt was ambulatory with RW 4/22/2022 last. Pt was living at home with daughter prior to admission to [facility]. Daughter was providing A with mobility & ADLs. VISION:Corrected    HEARING:  WFL    COGNITION: Decreased Insight, Decreased Problem Solving and Decreased Safety Awareness    RANGE OF MOTION:  R shoulder flexion to 45 degrees, L shoulder flexion to 90 degrees; distal WFL    STRENGTH:  R shoulder 3-/5, elbow 4-/5; L 4-/5 grossly    SENSATION:   Reports tingling in RUE    ADL:   Lower Extremity Dressing: Dependent. for donning slipper socks  Toileting: Dependent. For wiping after BM on [facility]  Toilet Transfer: Moderate Assistance and X 1. min A x 1; CORNELIO GERONIMO to [facility] [place] [place]. BALANCE:  Sitting Balance:  Contact Guard Assistance. Standing Balance: Minimal Assistance, X 2. in [address] [place]    BED MOBILITY:  Supine to Sit: Maximum Assistance, X 1    Sit to supine; mod A x 2      TRANSFERS:  Sit to Stand: Moderate Assistance, X 1. with LE buckling in standing then completed sit-stand from EOB onto [address] with min A x 2  Stand to Sit: Minimal Assistance. x 1    FUNCTIONAL MOBILITY:  Assistive Device: not safe to attempt at this time d/t buckling of LE with weight shifts    Activity Tolerance:  Patient tolerance of  treatment: fair. Fatigued quickly      Assessment:  Assessment: Pt demo decreased ADL & functional mobility status over PLOF d/t decreased balance, endurance, strength c/p admission with hyperkalemia.  Continued OT recommended to educate Pt on safety & adaptive strategies as well as faciliate improvements in these performance areas.  Performance deficits / Impairments: Decreased functional mobility ,Decreased endurance,Decreased ADL status,Decreased safe awareness,Decreased balance  Prognosis: Fair  REQUIRES OT FOLLOW-UP: Yes  Decision Making: Medium Complexity    Treatment Initiated: Treatment and education initiated within context of evaluation. Evaluation time included review of current medical information, gathering information related to past medical, social and functional history, completion of standardized testing, formal and informal observation of tasks, assessment of data and development of plan of care and goals. Treatment time included skilled education and facilitation of tasks to increase safety and independence with ADL's for improved functional independence and quality of life. Discharge Recommendations:  Subacute/Skilled Nursing Facility    Patient Education:     Patient Education  Education Given To: Patient  Education Provided: Role of [address] Strategies,Transfer Training  Education Method: Demonstration  Barriers to Learning: Cognition  Education Outcome: Continued education needed    Equipment Recommendations: Other: Monitor pending progress    Plan:  Times per Week: 3-5x  Current Treatment Recommendations: Functional mobility training,Balance training,Strengthening,Endurance training,Safety education & training,Self-Care / ADL,Patient/Caregiver education & training. See long-term goal time frame for expected duration of plan of care. If no long-term goals established, a short length of stay is anticipated.     Goals:  Patient goals : get stronger  Short Term Goals  Time Frame for Short term goals: until discharge  Short Term Goal 1: Pt will complete sit-stand t/fs with min A x 1 for increased ease of BSC t/fs  Short Term Goal 2: Pt will tolerate standing 2 min with CGA for increased ease of toileting routine  Short Term Goal 3: Pt will complete 8-10 min EOB ADL task with S for balance  Short Term Goal 4: Pt will complete BUE AROM exercises to increase UB endurance for BADL         Following session, patient left in safe position with all fall risk precautions in place.

## 2022-04-28 NOTE — H&P
History & Physical    Patient:  Florina Allen  YOB: 1940  Date of Service: 4/27/2022  MRN: [id number]   Acct:  [de-identified]   Primary Care Physician: ELLYN Sutherland CNP    Chief Complaint: generalized weakness, олег    History of Present Illness:   History obtained from the patient.     The patient is a [de-identified] y.o. female who presents with chronic lumbar pain according to the chart patient has also had increased recurrent falls and nonspecific lower extremity swelling, for which she has had evaluation at an outside hospital lower extremity Dopplers apparently negative for DVT and fracture, it appears she is have primarily increased weakness the patient herself states symptomatically she feels well she appears to have a certain degree of chronic dysarthria but based on ER documentation the patient was brought to ER secondary to worsening a lumbar pain and increased weakness, and ER she was found to have hyperkalemia and laboratory values consistent with acute kidney injury she also appeared to have modest dehydration with elevated BUN to creatinine ratio physically dry and physical survey with a sinus rhythm on telemetry have received cardioprotective drugs including calcium gluconate secondary to her hyperkalemia she is being admitted for dehydration with mild metabolic acidosis chronic lumbar pain acute kidney injury and hyperkalemia, ECG on admission does not show peaked T waves and infectious normal sinus rhythm  At bedside she is in sinus rhythm on telemetry she is on room air and her blood pressure is 150/72    Past Medical History:        Diagnosis Date    Depression     Heart attack ([address]     [facility]     Hyperlipidemia     Neuropathy     Sleep apnea     Cpap   NSTEMI  C. difficile diarrhea  Diabetes mellitus type 2  Ischemic stroke approximately 7 years ago with residual right upper extremity weakness  Ischemic cardiomyopathy prior EF of 30 to 35%  Chronic lumbar pain followed by chronic pain management  Recurrent urinary tract infections    Past Surgical History:        Procedure Laterality Date    APPENDECTOMY      CHOLECYSTECTOMY      HIP SURGERY Right 7/29/2020    right , Genicular Nerve block performed by Guy Moore MD at [facility]    Left total hip    OVARY REMOVAL         Home Medications:   No current facility-administered medications on file prior to encounter. Current Outpatient Medications on File Prior to Encounter   Medication Sig Dispense Refill    vitamin C (ASCORBIC ACID) 500 MG tablet Take 500 mg by mouth daily      pregabalin (LYRICA) 75 MG capsule Take 1 capsule by mouth every evening for 30 days. 30 capsule 2    amLODIPine (NORVASC) 10 MG tablet Take 1 tablet by mouth daily 90 tablet 1    clopidogrel (PLAVIX) 75 MG tablet Take 1 tablet by mouth daily 90 tablet 0    SITagliptin (JANUVIA) 50 MG tablet Take 1 tablet by mouth daily (Patient taking differently: Take 100 mg by mouth daily ) 90 tablet 1    glipiZIDE (GLUCOTROL) 5 MG tablet Take 1 tablet by mouth daily 90 tablet 0    hydrALAZINE (APRESOLINE) 25 MG tablet Take 0.5 tablets by mouth 2 times daily (Patient taking differently: Take 25 mg by mouth 2 times daily ) 90 tablet 1    Ferrous Fumarate 324 (106 Fe) MG TABS Take 1 tablet by mouth daily 90 tablet 0    carvedilol (COREG) 12.5 MG tablet Take 1 tablet by mouth 2 times daily (with meals) (Patient taking differently: Take 25 mg by mouth 2 times daily (with meals) ) 180 tablet 1    isosorbide mononitrate (IMDUR) 30 MG extended release tablet Take 1 tablet by mouth daily 90 tablet 1    vitamin B-12 (CYANOCOBALAMIN) 1000 MCG tablet Take 1 tablet by mouth daily 90 tablet 1       Allergies:  Patient has no known allergies. Social History:    reports that she has never smoked. She has never used smokeless tobacco. She reports that she does not drink alcohol and does not use drugs.     Family History: Problem Relation Age of Onset    Cancer Mother         colon    Diabetes Father     Stroke Father        Review of systems:  Constitutional: no fever, no night sweats, no fatigue  Head: no headache, no head injury, no migranes. Eye: no blurring of vision, no double vision. Ears: no hearing difficulty, no tinnitus  Mouth/throat: no ulceration, dental caries, dysphagia  Lungs: no cough, no shortness of breath, no wheeze  CVS: no palpitation, no chest pain, no shortness of breath  GI: no abdominal pain, no nausea , no vomiting, no constipation  TARUN: no dysuria, frequency and urgency, no hematuria, no kidney stones  Musculoskeletal: no joint pain, swelling , stiffness  Endocrine: no polyuria, polydypsia, no cold or heat intolerence  Hematology: no anemia, no easy brusing or bleeding, no hx of clotting disorder  Dermatology: no skin rash, no eczema, no prurities,  Psychiatry: no depression, no anxiety,no panic attacks, no suicide ideation  Neurology: no syncope, no seizures, no numbness or tingling of hands, no numbness or tingling of feet, no paresis    14 point review of systems completed, all other than noted above are negative. Vitals:   Vitals:    04/27/22 1935   BP: (!) 150/72   Pulse: 70   Resp: 20   Temp:    SpO2: 95%      BMI: There is no height or weight on file to calculate BMI. Exam:  Physical Examination: General appearance - alert, awake appears to be in no acute distress  HEENT: Atraumatic normocephalic,no JVD, trachea is midline  Neck - supple, no significant adenopathy, no JVD, or carotid bruits  Chest - Bilateral air entry, no wheezes, crackles or rhonchi. Non tender to palpation of chest wall  Heart - S1S2 RRR, no murmurs or gallops  Abdomen - Soft, non tender non distended.   Normoactive bowel sounds  Neurological -chronic right upper motor and right lower motor strength weakness no facial symmetry appears to have chronic dysarthria with about 50% intelligible speech  Musculoskeletal -as documented above  Extremities - no edema, no cyanosis or clubbing  Skin - normal coloration and turgor, no rashes, no suspicious skin lesions noted      Review of Labs and Diagnostic Testing:  CBC:   Recent Labs     04/27/22  1350   WBC 7.3   HGB 9.6*   *     BMP:    Recent Labs     04/27/22  1625   *   K 6.4*      CO2 18*   BUN 72*   CREATININE 3.0*   GLUCOSE 237*     Calcium:  Recent Labs     04/27/22  1625   CALCIUM 8.7     Ionized Calcium:No results for input(s): IONCA in the last 72 hours. Magnesium:  Recent Labs     04/27/22  1625   MG 1.9     Phosphorus:No results for input(s): PHOS in the last 72 hours. BNP:No results for input(s): BNP in the last 72 hours. Glucose:  Recent Labs     04/27/22  1902   POCGLU 200*     HgbA1C: No results for input(s): LABA1C in the last 72 hours. INR: No results for input(s): INR in the last 72 hours. Hepatic:   Recent Labs     04/27/22  1625   ALKPHOS 49   ALT 8*   AST 11   PROT 7.5   BILITOT 0.3   LABALBU 3.4*     Amylase and Lipase:No results for input(s): LACTA, AMYLASE in the last 72 hours. Lactic Acid: No results for input(s): LACTA in the last 72 hours. Troponin:   Recent Labs     04/27/22  1625   TROPONINT 0.200*     BNP: No results for input(s): BNP in the last 72 hours. Lipids: No results for input(s): CHOL, TRIG, HDL, LDL, LDLCALC in the last 72 hours. ABGs: No results found for: PH, PCO2, PO2, HCO3, O2SAT    Radiology:     XR CHEST (2 VW)    Result Date: 4/27/2022  PROCEDURE: XR CHEST (2 VW) CLINICAL INFORMATION: back pain COMPARISON: 7/9/2020 TECHNIQUE: PA and lateral views of the chest were obtained. 1. Suboptimal inflation of the lungs. Mild cardiomegaly. 2. Questionable minimal atelectasis/pneumonia right parahilar region and retrocardiac region left lung base. **This report has been created using voice recognition software.   It may contain minor errors which are inherent in voice recognition technology. ** Final report electronically signed by Dr. Christin Jay on 4/27/2022 2:58 PM    XR LUMBAR SPINE (2-3 VIEWS)    Result Date: 4/27/2022  LUMBAR SPINE 2 VIEWS: CLINICAL INFORMATION: back pain and R leg pain COMPARISON: No prior study. TECHNIQUE: Standard AP and crossfire lateral views of lumbar spine were obtained. 1. Severe degenerative facet arthropathy L4-5 and L5/S1 bilaterally. Moderate vertebral body spondylosis scattered in the lumbar spine. 2. There are mild superior endplate depression fractures L1 and L2, of questionable acuity although the appearance favors a chronic process. 3. Sacroiliac joints unremarkable. **This report has been created using voice recognition software. It may contain minor errors which are inherent in voice recognition technology. ** Final report electronically signed by Dr. Christin Jay on 4/27/2022 3:07 PM    XR PELVIS (1-2 VIEWS)    Result Date: 4/27/2022  PROCEDURE: XR PELVIS (1-2 VIEWS) CLINICAL INFORMATION: back pain and R leg pain COMPARISON: 7/9/2020 TECHNIQUE: A single AP view of the pelvis was obtained FINDINGS: Bones are diffusely demineralized, consistent with osteoporosis. Left hip prosthesis. Metallic hardware in the pelvis left side, apparently for stabilization of an old acetabular fracture. No acute fracture seen. No acute findings. **This report has been created using voice recognition software. It may contain minor errors which are inherent in voice recognition technology. ** Final report electronically signed by Dr. Christin Jay on 4/27/2022 3:08 PM         EKG: normal sinus rhythm, no blocks or conduction defects, no ischemic changes      Principal Problem:    Hyperkalemia  Resolved Problems:    * No resolved hospital problems. *      Assessment and Plan:  1.  Hyperkalemia patient has received D50 insulin and is receiving calcium gluconate for cardioprotection initial EKG on arrival does not seem to show peaked T waves would be more concerned with possible arrhythmia for serum potassium was well over 8 there may be some component of dehydration as well contributing to her presentation we will repeat BMP this evening  2. Acute kidney injury on chronic kidney disease patient's baseline creatinine ranges between 1.3-2.04, creatinine 4/22/2022 was approximately 1.77, currently her serum creatinine is 3 mg/dL she requires hydration and monitor as to what the cause of acute kidney injury is we will have to review her home medications and adjust them and may require bladder ultrasound as well I suspect she has chronic bladder incontinence  3. Acute on chronic lumbar pain patient appears to have prior fractures L2 and degenerative joint disease L4 and L5 but seems to have gross movements intact in lower extremities we will continue medications for pain  4. Dehydration with profound elevated BUN/creatinine ratio continue IV isotonic fluids depending on chart review may require repeat echocardiogram to assess her EF  5. Metabolic acidosis secondary to dehydration continue IV fluids  6. Addendum acute hypoxia, after floor transfer patient was noted to have increased work of breathing, pulse oximetry at bedside showed O2 sats 94% on room air however ABG drawn did show a depressed PO2 at or near 41 mmHg, based on cursory chart review she does not require home oxygen, I have requested BiPAP and transfer to intermediate care unit, differential I must consider pulm embolism however with elevated creatinine I cannot obtain a CT of the chest at this time therefore like to obtain lower extremity Dopplers to help at least exclude deep venous thrombosis  7.  Chronic systolic heart failure chart review yields echocardiogram performed at [facility] showing an ef of 35-40% and anterseptal and apical hypokinesis,, there may be a component of mild fluid overload however in light of prior #1 anterior we will continue low volume of his tonic fluids      DVT prophylaxis: [x] Lovenox                                 [] SCDs                                 [] SQ Heparin                                 [] Encourage ambulation, low risk for DVT, no chemical or mechanical prophylaxis necessary              [] Already on Anticoagulation                Anticipated Disposition upon discharge: [] Home                                                                         [] Home with Home Health                                                                         [x] Mikel Jerry                                                                         [] [address]          Electronically signed by Earl Hardy MD on 4/27/2022 at 8:17 PM

## 2022-04-28 NOTE — DISCHARGE INSTR - COC
Continuity of Care Form    Patient Name: Jorge Sutherland   :  1940  MRN:  [id number]    [facility] date:  2022  Discharge date:  22    Code Status Order: Full Code   Advance Directives:      Admitting Physician:  Fani Garcia MD  PCP: ELLYN Childress CNP    Discharging Nurse: Mihir Yeager RN  [address] Unit/Room#: 4K-13/013-A  Discharging Unit Phone Number: [phone]    Emergency Contact:   Extended Emergency Contact Information  Primary Emergency Contact: Ela Terry  Home Phone: [phone]  Relation: Child  Secondary Emergency Contact: [address] Phone: [phone]  Relation: Grandchild    Past Surgical History:  Past Surgical History:   Procedure Laterality Date    APPENDECTOMY      CHOLECYSTECTOMY      HIP SURGERY Right 2020    right , Genicular Nerve block performed by Myles Crespo MD at [facility]    Left total hip    OVARY REMOVAL         Immunization History:   Immunization History   Administered Date(s) Administered    Influenza Virus Vaccine 10/06/2020, 10/21/2020    Influenza, Quadv, adjuvanted, 65 yrs +, IM, PF (Fluad) 2021    Influenza, Triv, inactivated, subunit, adjuvanted, IM (Fluad 65 yrs and older) 10/18/2019    Pneumococcal Conjugate 13-valent (Vediwvf39) 10/21/2020    Pneumococcal Polysaccharide (Qylylwjfp43) 2019    Tdap (Boostrix, Adacel) 2020       Active Problems:  Patient Active Problem List   Diagnosis Code    Type 2 diabetes mellitus with diabetic neuropathy, without long-term current use of insulin ([facility] E11.40    Essential hypertension I10    History of hemorrhagic stroke with residual hemiparesis ([facility]) I69.359    Anxiety F41.9    Hyperlipidemia E78.5    Primary osteoarthritis involving multiple joints M89.49    Chronic pain syndrome G89.4    Rheumatoid arthritis of multiple sites with negative rheumatoid factor ([facility] M06.09    Stage 3b chronic kidney disease ([facility] N18.32    Chronic radicular lumbar pain M54.16, G89.29    Non-ST elevation (NSTEMI) myocardial infarction [facility]) I21.4    Traumatic hematoma of head S00.93XA    Neutropenia, unspecified (HCC) D70.9    Thrombocytopenia, unspecified (HCC) D69.6    Heart failure with reduced ejection fraction ([facility]) I50.20    Atherosclerotic heart disease of native coronary artery with unspecified angina pectoris I25.119    Hyperkalemia E87.5       Isolation/Infection:   Isolation            Contact          Patient Infection Status       Infection Onset Added Last Indicated Last Indicated By Review Planned Expiration Resolved Resolved By    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 04/28/22 04/28/22 VRE Screen by PCR        PCR 4/2022    Resolved    COVID-19 (Rule Out) 07/24/20 07/24/20 07/24/20 COVID-19 Ambulatory (Ordered)   07/27/20 Rule-Out Test Resulted            Nurse Assessment:  Last Vital Signs: BP (!) 146/68   Pulse 61   Temp 97.9 °F (36.6 °C) (Oral)   Resp 16   Wt 160 lb 7.9 oz (72.8 kg)   SpO2 96%   BMI 31.34 kg/m²     Last documented pain score (0-10 scale): Pain Level: 6  Last Weight:   Wt Readings from Last 1 Encounters:   04/27/22 160 lb 7.9 oz (72.8 kg)     Mental Status:  {IP PT MENTAL STATUS:20030}    IV Access:  - None    Nursing Mobility/ADLs:  Walking   Dependent  Transfer  Dependent  Bathing  Dependent  Dressing  Dependent  Toileting  Dependent  Feeding  Dependent  Med Admin  Dependent  Med Delivery   whole    Wound Care Documentation and Therapy:        Elimination:  Continence: Bowel: Yes  Bladder: No  Urinary Catheter: Insertion Date: ***    Colostomy/Ileostomy/Ileal Conduit: {YES / ZH:33921}       Date of Last BM: unknown   Intake/Output Summary (Last 24 hours) at 4/28/2022 1107  Last data filed at 4/28/2022 1020  Gross per 24 hour   Intake 960.33 ml   Output 350 ml   Net 610.33 ml     I/O last 3 completed shifts:   In: 660.3 [I.V.:660.3]  Out: 350 [Urine:350]    Safety Concerns:     History of Falls (last 30 days)    Impairments/Disabilities:      None    Nutrition Therapy:  Current Nutrition Therapy:   - Oral Diet:  Dental Soft    Routes of Feeding: Oral  Liquids: Thin Liquids  Daily Fluid Restriction: no  Last Modified Barium Swallow with Video (Video Swallowing Test): 4/29/22    Treatments at the Time of Hospital Discharge:   Respiratory Treatments: none  Oxygen Therapy:  is not on home oxygen therapy. Ventilator:    - BiPAP   IPAP: 10 cmH20, CPAP/EPAP: 5 cmH2O only when sleeping    Rehab Therapies: Physical Therapy  Weight Bearing Status/Restrictions: No weight bearing restrictions  Other Medical Equipment (for information only, NOT a DME order):  {EQUIPMENT:[id number]}  Other Treatments: none    Patient's personal belongings (please select all that are sent with patient):  None    RN SIGNATURE:  Electronically signed by Tonia Pelaez RN on 5/1/22 at 2:58 PM EDT    CASE MANAGEMENT/SOCIAL WORK SECTION    Inpatient Status Date: 4/27/2022    Readmission Risk Assessment Score:  Readmission Risk              Risk of Unplanned Readmission:  19           Discharging to Facility/ Agency   Name: [facility]  Address: [address] [place] [address]  Phone: [phone]  Fax: [phone]    Dialysis Facility (if applicable)   Name:  Address:  Dialysis Schedule:  Phone:  Fax:    / signature: Electronically signed by SARIKA Jeffries on 4/28/22 at 11:07 AM EDT    PHYSICIAN SECTION    Prognosis: Fair    Condition at Discharge: Stable    Rehab Potential (if transferring to Rehab): Fair    Recommended Labs or Other Treatments After Discharge: BMP in 1 week    Physician Certification: I certify the above information and transfer of Caroline Robles  is necessary for the continuing treatment of the diagnosis listed and that she requires [facility] for less 30 days.      Update Admission H&P: No change in H&P    PHYSICIAN SIGNATURE:  Electronically signed by Jacquelyn Cristina MD on 5/1/22 at 2:42 PM EDT

## 2022-04-28 NOTE — PROGRESS NOTES
Pt admitted to  6K28 from ED. Complaints: Hyperkalemia. Vital signs obtained. Assessment and data collection initiated. Two nurse skin assessment performed by Kelly Eubanks and Talya Brunner LPN. Oriented to room. Policies and procedures for 6K explained. KAREN Bentley discussed hourly rounding with patient addressing 5 P's. Fall prevention and safety brochure discussed with patient. Bed alarm on. Call light in reach. All questions answered with no further questions at this time.

## 2022-04-28 NOTE — FLOWSHEET NOTE
04/28/22 1109   Service Assessment   Patient Orientation Alert and Oriented   History Provided By Child/Family; Patient   Support Systems Children   Social/Functional History   Lives With Daughter   Discharge Planning   Type of Residence [address] [place] [address] Prior To Admission [facility]  (Lance Acevedo )   [phone] Discharge   [phone] Discharge [facility] (SNF);OT;Nursing services;PT;Housekeeping  Jenelle Saleh)     Patient to return to [place] [address] at discharge. SW called [facility]  and confirmed that they are able to accept back at discharge. No precert required.

## 2022-04-29 LAB
ALBUMIN SERPL-MCNC: 3.1 G/DL (ref 3.5–5.1)
ALP BLD-CCNC: 48 U/L (ref 38–126)
ALT SERPL-CCNC: 8 U/L (ref 11–66)
ANION GAP SERPL CALCULATED.3IONS-SCNC: 9 MEQ/L (ref 8–16)
AST SERPL-CCNC: 11 U/L (ref 5–40)
BASOPHILS # BLD: 0.3 %
BASOPHILS ABSOLUTE: 0 THOU/MM3 (ref 0–0.1)
BILIRUB SERPL-MCNC: 0.2 MG/DL (ref 0.3–1.2)
BUN BLDV-MCNC: 72 MG/DL (ref 7–22)
CALCIUM SERPL-MCNC: 8.4 MG/DL (ref 8.5–10.5)
CHLORIDE BLD-SCNC: 102 MEQ/L (ref 98–111)
CO2: 22 MEQ/L (ref 23–33)
CREAT SERPL-MCNC: 2.5 MG/DL (ref 0.4–1.2)
EOSINOPHIL # BLD: 1.9 %
EOSINOPHILS ABSOLUTE: 0.1 THOU/MM3 (ref 0–0.4)
ERYTHROCYTE [DISTWIDTH] IN BLOOD BY AUTOMATED COUNT: 14.8 % (ref 11.5–14.5)
ERYTHROCYTE [DISTWIDTH] IN BLOOD BY AUTOMATED COUNT: 42.1 FL (ref 35–45)
GLUCOSE BLD-MCNC: 154 MG/DL (ref 70–108)
GLUCOSE BLD-MCNC: 186 MG/DL (ref 70–108)
GLUCOSE BLD-MCNC: 195 MG/DL (ref 70–108)
GLUCOSE BLD-MCNC: 223 MG/DL (ref 70–108)
GLUCOSE BLD-MCNC: 245 MG/DL (ref 70–108)
HCT VFR BLD CALC: 29.3 % (ref 37–47)
HEMOGLOBIN: 8.6 GM/DL (ref 12–16)
IMMATURE GRANS (ABS): 0.01 THOU/MM3 (ref 0–0.07)
IMMATURE GRANULOCYTES: 0.3 %
LYMPHOCYTES # BLD: 22.6 %
LYMPHOCYTES ABSOLUTE: 0.8 THOU/MM3 (ref 1–4.8)
MCH RBC QN AUTO: 23.1 PG (ref 26–33)
MCHC RBC AUTO-ENTMCNC: 29.4 GM/DL (ref 32.2–35.5)
MCV RBC AUTO: 78.8 FL (ref 81–99)
MONOCYTES # BLD: 15.1 %
MONOCYTES ABSOLUTE: 0.6 THOU/MM3 (ref 0.4–1.3)
NUCLEATED RED BLOOD CELLS: 0 /100 WBC
PLATELET # BLD: 112 THOU/MM3 (ref 130–400)
PMV BLD AUTO: 12.3 FL (ref 9.4–12.4)
POTASSIUM REFLEX MAGNESIUM: 5.7 MEQ/L (ref 3.5–5.2)
RBC # BLD: 3.72 MILL/MM3 (ref 4.2–5.4)
SEG NEUTROPHILS: 59.8 %
SEGMENTED NEUTROPHILS ABSOLUTE COUNT: 2.2 THOU/MM3 (ref 1.8–7.7)
SODIUM BLD-SCNC: 133 MEQ/L (ref 135–145)
TOTAL PROTEIN: 6.9 G/DL (ref 6.1–8)
WBC # BLD: 3.7 THOU/MM3 (ref 4.8–10.8)

## 2022-04-29 PROCEDURE — 80053 COMPREHEN METABOLIC PANEL: CPT

## 2022-04-29 PROCEDURE — 6370000000 HC RX 637 (ALT 250 FOR IP): Performed by: INTERNAL MEDICINE

## 2022-04-29 PROCEDURE — 92610 EVALUATE SWALLOWING FUNCTION: CPT

## 2022-04-29 PROCEDURE — 2580000003 HC RX 258: Performed by: INTERNAL MEDICINE

## 2022-04-29 PROCEDURE — 2060000000 HC ICU INTERMEDIATE R&B

## 2022-04-29 PROCEDURE — 99232 SBSQ HOSP IP/OBS MODERATE 35: CPT | Performed by: INTERNAL MEDICINE

## 2022-04-29 PROCEDURE — 6370000000 HC RX 637 (ALT 250 FOR IP): Performed by: HOSPITALIST

## 2022-04-29 PROCEDURE — 6360000002 HC RX W HCPCS: Performed by: INTERNAL MEDICINE

## 2022-04-29 PROCEDURE — 82948 REAGENT STRIP/BLOOD GLUCOSE: CPT

## 2022-04-29 PROCEDURE — 6370000000 HC RX 637 (ALT 250 FOR IP)

## 2022-04-29 PROCEDURE — 2500000003 HC RX 250 WO HCPCS: Performed by: INTERNAL MEDICINE

## 2022-04-29 PROCEDURE — 99233 SBSQ HOSP IP/OBS HIGH 50: CPT | Performed by: FAMILY MEDICINE

## 2022-04-29 PROCEDURE — 85025 COMPLETE CBC W/AUTO DIFF WBC: CPT

## 2022-04-29 PROCEDURE — 36415 COLL VENOUS BLD VENIPUNCTURE: CPT

## 2022-04-29 RX ORDER — CEPHALEXIN 250 MG/1
250 CAPSULE ORAL EVERY 12 HOURS SCHEDULED
Status: DISCONTINUED | OUTPATIENT
Start: 2022-04-29 | End: 2022-05-01

## 2022-04-29 RX ORDER — SODIUM CHLORIDE 9 MG/ML
INJECTION, SOLUTION INTRAVENOUS CONTINUOUS
Status: DISCONTINUED | OUTPATIENT
Start: 2022-04-29 | End: 2022-05-01 | Stop reason: HOSPADM

## 2022-04-29 RX ORDER — LANOLIN ALCOHOL/MO/W.PET/CERES
9 CREAM (GRAM) TOPICAL NIGHTLY PRN
Status: DISCONTINUED | OUTPATIENT
Start: 2022-04-29 | End: 2022-05-01 | Stop reason: HOSPADM

## 2022-04-29 RX ADMIN — SODIUM BICARBONATE: 84 INJECTION, SOLUTION INTRAVENOUS at 03:42

## 2022-04-29 RX ADMIN — INSULIN LISPRO 4 UNITS: 100 INJECTION, SOLUTION INTRAVENOUS; SUBCUTANEOUS at 11:31

## 2022-04-29 RX ADMIN — AMLODIPINE BESYLATE 10 MG: 10 TABLET ORAL at 08:36

## 2022-04-29 RX ADMIN — CLOPIDOGREL BISULFATE 75 MG: 75 TABLET ORAL at 08:36

## 2022-04-29 RX ADMIN — INSULIN LISPRO 2 UNITS: 100 INJECTION, SOLUTION INTRAVENOUS; SUBCUTANEOUS at 17:13

## 2022-04-29 RX ADMIN — HEPARIN SODIUM 5000 UNITS: 5000 INJECTION INTRAVENOUS; SUBCUTANEOUS at 18:13

## 2022-04-29 RX ADMIN — Medication 9 MG: at 21:57

## 2022-04-29 RX ADMIN — SODIUM ZIRCONIUM CYCLOSILICATE 10 G: 5 POWDER, FOR SUSPENSION ORAL at 11:36

## 2022-04-29 RX ADMIN — PREGABALIN 75 MG: 75 CAPSULE ORAL at 18:12

## 2022-04-29 RX ADMIN — HEPARIN SODIUM 5000 UNITS: 5000 INJECTION INTRAVENOUS; SUBCUTANEOUS at 03:42

## 2022-04-29 RX ADMIN — SODIUM CHLORIDE, PRESERVATIVE FREE 10 ML: 5 INJECTION INTRAVENOUS at 21:58

## 2022-04-29 RX ADMIN — SODIUM CHLORIDE: 9 INJECTION, SOLUTION INTRAVENOUS at 19:01

## 2022-04-29 RX ADMIN — CEPHALEXIN 250 MG: 250 CAPSULE ORAL at 21:57

## 2022-04-29 RX ADMIN — INSULIN LISPRO 2 UNITS: 100 INJECTION, SOLUTION INTRAVENOUS; SUBCUTANEOUS at 08:36

## 2022-04-29 RX ADMIN — HEPARIN SODIUM 5000 UNITS: 5000 INJECTION INTRAVENOUS; SUBCUTANEOUS at 11:36

## 2022-04-29 RX ADMIN — SODIUM ZIRCONIUM CYCLOSILICATE 10 G: 5 POWDER, FOR SUSPENSION ORAL at 21:56

## 2022-04-29 ASSESSMENT — ENCOUNTER SYMPTOMS
STRIDOR: 0
ABDOMINAL PAIN: 0
SHORTNESS OF BREATH: 0
VOMITING: 0
COLOR CHANGE: 0
BACK PAIN: 1
WHEEZING: 0
DIARRHEA: 0
NAUSEA: 0
COUGH: 0
CONSTIPATION: 0

## 2022-04-29 ASSESSMENT — PAIN SCALES - GENERAL
PAINLEVEL_OUTOF10: 0
PAINLEVEL_OUTOF10: 0

## 2022-04-29 ASSESSMENT — PAIN - FUNCTIONAL ASSESSMENT: PAIN_FUNCTIONAL_ASSESSMENT: ACTIVITIES ARE NOT PREVENTED

## 2022-04-29 NOTE — PLAN OF CARE
Problem: Discharge Planning  Goal: Discharge to home or other facility with appropriate resources  Outcome: Progressing     Problem: Pain  Goal: Verbalizes/displays adequate comfort level or baseline comfort level  Outcome: Progressing  Flowsheets (Taken 4/29/2022 0412)  Verbalizes/displays adequate comfort level or baseline comfort level: Administer analgesics based on type and severity of pain and evaluate response  Note: Norco given for headache pain 8/10. Pt. Stated the tylenol didn't help earlier. Problem: Safety - Adult  Goal: Free from fall injury  Outcome: Progressing  Flowsheets (Taken 4/29/2022 0412)  Free From Fall Injury: Instruct family/caregiver on patient safety  Note: No caregivers present this shift     Problem: Chronic Conditions and Co-morbidities  Goal: Patient's chronic conditions and co-morbidity symptoms are monitored and maintained or improved  Outcome: Progressing  Flowsheets (Taken 4/29/2022 0412)  Care Plan - Patient's Chronic Conditions and Co-Morbidity Symptoms are Monitored and Maintained or Improved: Update acute care plan with appropriate goals if chronic or comorbid symptoms are exacerbated and prevent overall improvement and discharge  Note: Pt. Currently on 3L NC O2 sats are 100%. When pt. Removes O2 accidentally her O2 drops to upper 80's. Pt. Is on continuous pulse ox monitoring. Care plan reviewed with patient. Patient verbalize understanding of the plan of care and contribute to goal setting.

## 2022-04-29 NOTE — PROGRESS NOTES
[address] ICU STEPDOWN TELEMETRY 4K  Clinical Swallow Evaluation      SLP Individual Minutes  Time In: 3606  Time Out: 0371  Minutes: 8  Timed Code Treatment Minutes: 0 Minutes       Date: 2022  Patient Name: Jacqui Sparks      CSN: [id number]   : 1940  ([de-identified] y.o.)  Gender: female   Referring Physician:  Kriss Henry MD  Diagnosis: Hyperkalemia    History of Present Illness/Injury: Patient admitted to [facility] with above med dx; please refer to physician H&P for full details. Per chart review, admission for acute hypoxic respiratory failure, hyperkalemia, and PARIS on CKD. ST consulted to complete clinical swallow evaluation to ascertain needs for dysphagia management. Past Medical History:   Diagnosis Date    Depression     Heart attack ([facility]     [facility]     Hyperlipidemia     Neuropathy     Sleep apnea     Cpap       SUBJECTIVE:  KAREN Gao Mins with approval to proceed with assessment; lung/breath sounds reported to be, \"crackles\". Upon arrival, patient resting in bed, alert and pleasant no family at bedside. Endorsed a baseline diet of regular solids+thin liquids. OBJECTIVE:    Pain:  No pain reported. Current Diet: Regular, thin liquids     Respiratory Status:  Room Air    Behavioral Observation:  Alert and Oriented    CRANIAL NERVE ASSESSMENT   CN V (Trigeminal) Closes and Opens Mandible WFL    Rotary Jaw Movement WFL      CN VII (Facial) Cheeks Hold Food out of Sulci WFL    Opens, Closes/Seals, Protrudes, Retracts Lips WFL    General Appearance Reduced tone    Sensation WFL      CN X (Vagus - Pharyngeal) Raises Back of Tongue WFL      CN XI (Accessory) Lifts Soft Palate WFL      CN XII (Hypoglossal) Elevates Tongue Up and Back WFL    Lateralizes Tongue WFL    Sensation WFL      Other Observations Dentition Sparse/decaying natural dentition     Vocal Quality Wet/gurgly     Cough WFL     Patient Evaluated Using:  Ice Chips, Thin Liquids and Puree    Oral Phase:  Impaired:  Impaired AP Movement    Pharyngeal Phase: Impaired:  Suspected Decreased Airway Protection   *not able to fully validate presence of pharyngeal phase deficits without formal instrumentation/supportive imaging     Signs and Symptoms of Laryngeal Penetration/Aspiration: Immediate Cough and Wet Vocal Quality    Impressions: Patient presents with mild oral dysphagia with inability to fully discern potential presence of pharyngeal phase deficits without formal instrumentation. Oral phase slightly prolonged within PO offerings trialed to achieve adequate manipulation and coordination for AP transit. Thin H20 via cup with consistent, immediate cough reflex produced+wet vocal quality exhibited per perceptual measures. High concerns for potential airway invasion events, certainly not able to r/o totality of laryngeal penetration and/or tracheal aspiration occurrences or degree of pharyngeal dysfunction without formal imaging. Recommendations for NPO diet level, exceptions for ice chips post oral care and administration of crucial/critical medications crushed in puree (as pharmacy permits). Will f/u with formal instrumentation via MBS on 4/30/22 to further assess pharyngeal integrity and to optimize development of dysphagia management POC.    *post evaluation, patient without respiratory distress upon leaving room; RN Blake Whitfield notified re: clinical findings and recommendations from the assessment; verbal receptiveness noted     RECOMMENDATIONS/ASSESSMENT:  Instrumental Evaluation: Modified Barium Swallow (MBS)  Diet Recommendations:  NPO; exceptions for ice chips post oral care and administration of critical/crucial medications crushed in puree (as pharmacy permits)  Strategies:  Recommend NPO and Strategies pending MBS completion   Rehabilitation Potential: good    EDUCATION:  Learner: Patient  Education:  Reviewed results and recommendations of this evaluation, Reviewed diet and strategies, Reviewed signs, symptoms and risks of aspiration, Reviewed ST goals and Plan of Care and Reviewed recommendations for follow-up  Evaluation of Education: Demonstrates with assistance, Needs further instruction and Family not present    PLAN:  Recommendations pending MBS    PATIENT GOAL:    Did not state. Will further assess during treatment. SHORT TERM GOALS:  Short-term Goals  Timeframe for Short-term Goals: 1-3 days  Goal 1: Complete formal instrumentation via MBS to further evaluate pharyngeal integrity to r/o dysfunction and to assist with development of dysphagia management POC. Goal 2: Staff will exhibit return demonstration for completion of comprehensive oral care procedural analysis to maximize oral integrity to reduce potential bacteria colonization. Goal 3: Monitor cognition; complete further evaluation should it become clinically indicated.     LONG TERM GOALS:  No LTG established given short ELOS      Zarina Turner M.A., [address]

## 2022-04-29 NOTE — CARE COORDINATION
Collaborative Discharge Planning    Db Scott  :  1940  MRN:  [id number]    ADMIT DATE:  2022    Discharge Plan SNF     plans return [facility]; therapy following; has walker, CPAP, WC    Discharge Milestones and Delays: Clinical status    From [facility]. Hyperkalemia/PARIS. H 8.6, Creatinine 2.5; monitor. Bicarb gtt, Oxygen 2L continued. Urine Output = 1650 ml/24h; monitor.      SIGNED:  Emily Gilbert RN   2022, 9:03 AM

## 2022-04-29 NOTE — PROGRESS NOTES
[facility]         Renal Inpatient Follow-Up note         4/29/2022 12:10 PM    Pt Name:   Mike Harper  YOB: 1940  Attending:   Bill Wall MD    Chief Complaint : Mike Harper is a [de-identified] y.o. female being followed by nephrology for Thierry/Hyperkalemia    Interval History :   Patient seen and examined by me. No distress  Feels well. No cp or SOB. No diarrhea. Scheduled Medications :    sodium zirconium cyclosilicate  10 g Oral A2H    insulin lispro  0-12 Units SubCUTAneous TID WC    insulin lispro  0-6 Units SubCUTAneous Nightly    amLODIPine  10 mg Oral Daily    clopidogrel  75 mg Oral Daily    pregabalin  75 mg Oral QPM    sodium chloride flush  5-40 mL IntraVENous 2 times per day    heparin (porcine)  5,000 Units SubCUTAneous TID      dextrose      sodium bicarbonate infusion 125 mL/hr at 04/29/22 0342    sodium chloride         Vitals :  BP (!) 134/51   Pulse 63   Temp 98.5 °F (36.9 °C) (Oral)   Resp 16   Wt 160 lb 7.9 oz (72.8 kg)   SpO2 100%   BMI 31.34 kg/m²     24HR INTAKE/OUTPUT:      Intake/Output Summary (Last 24 hours) at 4/29/2022 1210  Last data filed at 4/29/2022 0349  Gross per 24 hour   Intake 2435.41 ml   Output 1650 ml   Net 785.41 ml     Last 3 weights  Wt Readings from Last 3 Encounters:   04/27/22 160 lb 7.9 oz (72.8 kg)   04/13/22 143 lb (64.9 kg)   02/15/22 137 lb (62.1 kg)           Physical Exam :  General Appearance:  Well developed.  No distress  Mouth/Throat:  Oral mucosa moist  Neck:  Supple, no JVD  Lungs:  Breath sounds: clear  Heart[de-identified]  S1,S2 heard  Abdomen:  Soft, non - tender  Musculoskeletal:  Edema -no edema         Last 3 CBC   Recent Labs     04/27/22  1350 04/28/22  0454 04/29/22  0339   WBC 7.3 5.5 3.7*   RBC 4.17* 3.98* 3.72*   HGB 9.6* 9.0* 8.6*   HCT 33.7* 32.0* 29.3*   * 110* 112*     Last 3 CMP  Recent Labs     04/27/22  1625 04/27/22  2126 04/28/22  0454 04/28/22  1255 04/29/22  0339   NA 132*  --  136 133* 133*   K 6.4*   < > 7.0* 6.6* 5.7*     --  109 105 102   CO2 18*  --  15* 19* 22*   BUN 72*  --  72* 73* 72*   CREATININE 3.0*  --  2.7* 2.7* 2.5*   CALCIUM 8.7  --  9.0 8.8 8.4*   LABALBU 3.4*  --  3.2*  --  3.1*   BILITOT 0.3  --  0.4  --  0.2*    < > = values in this interval not displayed. ASSESSMENT / Plan   1. Renal -acute kidney injury on chronic kidney disease, exact etiology not clear  ? Possibly some component of prerenal slight improvement with IV fluids  ? Overall renal function is stable continue aggressive IV hydration  ? May need renal ultrasound scan if not improvement much     2. Electrolytes -hyperkalemia which is better will give another 2 more doses of Lokelma today bicarbonate drip also helped to some degree  3. Acid-base status-patient has some nongap metabolic acidosis most likely due to renal failure. Currently on IV bicarbonate infusion, will switch later today  4. Essential hypertension stable  5. Meds reviewed and discussed with patient      Dr. Alexander Bella MD, M,D.  [facility].

## 2022-04-29 NOTE — PROGRESS NOTES
Physician Progress Note      Portia Carter  [facility] #:                  [id number]  :                       1940  ADMIT DATE:       2022 1:30 PM  DISCH DATE:  RESPONDING  PROVIDER #:        STEVIE STRONG          QUERY TEXT:    Pt admitted with PARIS and noted to have acute hypoxia. If possible, please   document in the progress notes and discharge summary if you are evaluating   and/or treating any of the following: The medical record reflects the following:  Risk Factors: hypoxia  Clinical Indicators: SOB, labored breathing and tachypneic, ABG: pH, Blood   Gas: 7.33 (L)  PCO2: 36  pO2: 47 (L)  HCO3: 19 (L)  Base Excess (Calculated): -6.3 (L)  O2 Sat: 80  IFIO2: 21  Treatment: Supplemental oxygen via bipap, transfer to SD unit for monitoring,   ABG, imaging  Options provided:  -- Acute respiratory failure with hypoxia  -- Chronic respiratory failure with hypoxia  -- Acute on chronic respiratory failure with hypoxia  -- Other - I will add my own diagnosis  -- Disagree - Not applicable / Not valid  -- Disagree - Clinically unable to determine / Unknown  -- Refer to Clinical Documentation Reviewer    PROVIDER RESPONSE TEXT:    This patient is in acute respiratory failure with hypoxia.     Query created by: Raegan Diaz on 2022 9:26 AM      Electronically signed by:  Yesika Biswas 2022 9:05 AM

## 2022-04-29 NOTE — PROGRESS NOTES
PROGRESS NOTE      Patient:  Derrell Mcclellan      Unit/Bed:4K-13/013-A    YOB: 1940    MRN: [id number]       Acct: [de-identified]     PCP: ELLYN Long CNP    Date of Admission: 4/27/2022      Assessment/Plan:    Anticipated Discharge in : pending    Active Hospital Problems    Diagnosis Date Noted    Acute kidney injury superimposed on chronic kidney disease ([address] [N17.9, N18.9]      Priority: Medium    Degenerative lumbar spinal stenosis [M48.061]      Priority: Medium    Acute respiratory failure with hypoxia ([facility] [address] [J96.01]      Priority: Medium    Acute pulmonary edema ([address] [J81.0]      Priority: Medium    Hyperkalemia [E87.5] 04/27/2022     Priority: Medium       1. acute hypoxic respiratory failure, - after floor transfer patient was noted to have increased work of breathing, pulse oximetry at bedside showed O2 sats 94% on room air, currently weaning off O2 as tolerated however ABG drawn did show a depressed PO2 at or near 41 mmHg, not on O2 at home was on BiPAP, low suspecet for PE at this time, Venous Doppler negative B/L For DVT. Currently off Bipap and weaned to 2L NC tolerating well    2. Hyperkalemia - PT has received D50 insulin and is receiving calcium gluconate for cardioprotection initial EKG on arrival does not seem to show peaked T waves. Currently downtrend to 5.7 , Nephrology consulted and on board, defer management to Nephro who recommend starting lokelma, 1m bumex Iv, Insulin + dextrose + Bicarb IVF at this time, possible RRT if continues per Nephro. 3. PARIS on CKD -  Unclear etiology possible prerenal?, baseline creatinine ranges between 1.3-2.04, creatinine 4/22/2022 was approximately 1.77,  currently 2.5 and downtrend, nephro onboard and managing    4. Acute on chronic lumbar pain- patient appears to have prior fractures L2 and degenerative joint disease L4 and L5 but seems to have gross movements intact in lower extremities.  Pain control Meds prn.    5. Dehydration - See hyperkalemia and PARIS as above, see CHF below. 6. Metabolic acidosis - likely 2/2 dehydration and renal PARIS , Nephrology on board continue IV fluids per Nephro on bicarb as above. 7. UTI - LE's on UA Apr/29/2022, note Pan sensitive  UA on Apr/13/2022, however nearly pan resistant cultures on Feb/08/2022. Start Keflex today x 5 dy, adjusted for renal function, pending sensitivities and culture. 8. Chronic systolic heart failure - chart review yields echocardiogram performed at [facility] showing an ef of 35-40% and anterseptal and apical hypokinesis,, there may be a component of mild fluid overload as was hypoxic as above/ will need to watch as was on Bipap as above. Repeat echo today noted 50% EF. Strict I/O, Daily weights. 9. DM2 - Hx of, On januvia and Glipizide at home, Currently on SSI medium dose correction, currently sugars on higher end of 200's. A1c AM.  Hypoglycemia protocols in place.        Chief Complaint:  Generalized Weakness, PARIS    Hospital Course:     Per initial H&P  \"The patient is a [de-identified] y.o. female who presents with chronic lumbar pain according to the chart patient has also had increased recurrent falls and nonspecific lower extremity swelling, for which she has had evaluation at an outside hospital lower extremity Dopplers apparently negative for DVT and fracture, it appears she is have primarily increased weakness the patient herself states symptomatically she feels well she appears to have a certain degree of chronic dysarthria but based on ER documentation the patient was brought to ER secondary to worsening a lumbar pain and increased weakness, and ER she was found to have hyperkalemia and laboratory values consistent with acute kidney injury she also appeared to have modest dehydration with elevated BUN to creatinine ratio physically dry and physical survey with a sinus rhythm on telemetry have received cardioprotective drugs including calcium gluconate secondary to her hyperkalemia she is being admitted for dehydration with mild metabolic acidosis chronic lumbar pain acute kidney injury and hyperkalemia, ECG on admission does not show peaked T waves and infectious normal sinus rhythm  At bedside she is in sinus rhythm on telemetry she is on room air and her blood pressure is 150/72\" Pt was noted to be hypoxic on ABG and was placed on Bipap. Subjective:  Pt seen and examined in AM, off BIpap on 2L NC and sating well, Nephrology on board and seen, fluid management, slightly however will be aggressive IVF in setting of hyperK and Cr per nephro. Continue to monitor for fluid overload. Otherwise stable on cardiac tele    Review of Systems   Constitutional: Negative for chills, diaphoresis, fatigue and fever. Respiratory: Negative for cough, shortness of breath, wheezing and stridor. Cardiovascular: Negative for chest pain, palpitations and leg swelling. Gastrointestinal: Negative for abdominal pain, constipation, diarrhea, nausea and vomiting. Genitourinary: Negative for decreased urine volume, difficulty urinating, dysuria, frequency, hematuria and urgency. Musculoskeletal: Positive for back pain. Skin: Negative for color change, pallor and rash. Neurological: Positive for weakness. Negative for dizziness, seizures, light-headedness and headaches. Psychiatric/Behavioral: Negative for confusion and decreased concentration.        Medications:  Reviewed    Infusion Medications    dextrose      sodium bicarbonate infusion 125 mL/hr at 04/29/22 0342    sodium chloride       Scheduled Medications    sodium zirconium cyclosilicate  10 g Oral Y6Q    insulin lispro  0-12 Units SubCUTAneous TID WC    insulin lispro  0-6 Units SubCUTAneous Nightly    amLODIPine  10 mg Oral Daily    clopidogrel  75 mg Oral Daily    pregabalin  75 mg Oral QPM    sodium chloride flush  5-40 mL IntraVENous 2 times per day    heparin (porcine)  5,000 Units SubCUTAneous TID     PRN Meds: perflutren lipid microspheres, glucagon (rDNA), dextrose, dextrose bolus (hypoglycemia) **OR** dextrose bolus (hypoglycemia), glucose, sodium chloride flush, sodium chloride, ondansetron **OR** ondansetron, polyethylene glycol, acetaminophen **OR** acetaminophen, HYDROcodone 5 mg - acetaminophen, morphine, albuterol      Intake/Output Summary (Last 24 hours) at 4/29/2022 1027  Last data filed at 4/29/2022 0349  Gross per 24 hour   Intake 2435.41 ml   Output 1650 ml   Net 785.41 ml       Diet:  ADULT DIET; Regular; 4 carb choices (60 gm/meal)    Exam:  BP (!) 150/67   Pulse 64   Temp 98.2 °F (36.8 °C) (Oral)   Resp 18   Wt 160 lb 7.9 oz (72.8 kg)   SpO2 100%   BMI 31.34 kg/m²     Physical Exam  Constitutional:       General: She is not in acute distress. Appearance: Normal appearance. She is not ill-appearing or diaphoretic. Interventions: Nasal cannula in place. Comments: 2 L NC   HENT:      Head: Normocephalic and atraumatic. Nose: Nose normal. No congestion or rhinorrhea. Mouth/Throat:      Mouth: Mucous membranes are dry. Pharynx: Oropharynx is clear. No oropharyngeal exudate or posterior oropharyngeal erythema. Eyes:      General: No scleral icterus. Right eye: No discharge. Left eye: No discharge. Extraocular Movements: Extraocular movements intact. Conjunctiva/sclera: Conjunctivae normal.      Pupils: Pupils are equal, round, and reactive to light. Cardiovascular:      Rate and Rhythm: Regular rhythm. Bradycardia present. Pulses: Normal pulses. Heart sounds: Normal heart sounds. No murmur heard. No friction rub. No gallop. Pulmonary:      Effort: Pulmonary effort is normal. No respiratory distress. Breath sounds: Rhonchi (faint) present. No wheezing or rales. Abdominal:      General: Abdomen is flat. Bowel sounds are normal. There is no distension. Palpations: Abdomen is soft. Tenderness: There is no abdominal tenderness. There is no guarding. Musculoskeletal:         General: No swelling, tenderness or signs of injury. Right lower leg: Edema (trace) present. Left lower leg: Edema (trace) present. Skin:     General: Skin is warm and dry. Capillary Refill: Capillary refill takes less than 2 seconds. Coloration: Skin is not jaundiced or pale. Findings: No erythema or rash. Neurological:      General: No focal deficit present. Mental Status: She is alert and oriented to person, place, and time. Labs:   Recent Labs     04/27/22  1350 04/28/22  0454 04/29/22  0339   WBC 7.3 5.5 3.7*   HGB 9.6* 9.0* 8.6*   HCT 33.7* 32.0* 29.3*   * 110* 112*     Recent Labs     04/28/22  0454 04/28/22  1255 04/29/22  0339    133* 133*   K 7.0* 6.6* 5.7*    105 102   CO2 15* 19* 22*   BUN 72* 73* 72*   CREATININE 2.7* 2.7* 2.5*   CALCIUM 9.0 8.8 8.4*     Recent Labs     04/27/22  1625 04/28/22  0454 04/29/22  0339   AST 11 11 11   ALT 8* 8* 8*   BILITOT 0.3 0.4 0.2*   ALKPHOS 49 47 48     No results for input(s): INR in the last 72 hours. No results for input(s): Connee Matar in the last 72 hours. Urinalysis:      Lab Results   Component Value Date    NITRU NEGATIVE 04/28/2022    WBCUA 25-50 04/28/2022    WBCUA 0-5 04/13/2022    BACTERIA NONE SEEN 04/28/2022    RBCUA 3-5 04/28/2022    BLOODU TRACE 04/28/2022    SPECGRAV 1.025 08/24/2020    GLUCOSEU NEGATIVE 04/28/2022       Radiology:  VL DUP LOWER EXTREMITY VENOUS BILATERAL   Final Result   No evidence of a DVT. **This report has been created using voice recognition software. It may contain minor errors which are inherent in voice recognition technology. **      Final report electronically signed by Dr Marissa Chavarria on 4/28/2022 8:47 AM      XR CHEST (2 VW)   Final Result   1. Suboptimal inflation of the lungs. Mild cardiomegaly.    2. Questionable minimal atelectasis/pneumonia right parahilar region and retrocardiac region left lung base. **This report has been created using voice recognition software. It may contain minor errors which are inherent in voice recognition technology. **      Final report electronically signed by Dr. Pastora Thakkar on 4/27/2022 2:58 PM      XR LUMBAR SPINE (2-3 VIEWS)   Final Result   1. Severe degenerative facet arthropathy L4-5 and L5/S1 bilaterally. Moderate vertebral body spondylosis scattered in the lumbar spine. 2. There are mild superior endplate depression fractures L1 and L2, of questionable acuity although the appearance favors a chronic process. 3. Sacroiliac joints unremarkable. **This report has been created using voice recognition software. It may contain minor errors which are inherent in voice recognition technology. **      Final report electronically signed by Dr. Pastora Thakkar on 4/27/2022 3:07 PM      XR PELVIS (1-2 VIEWS)   Final Result   No acute findings. **This report has been created using voice recognition software. It may contain minor errors which are inherent in voice recognition technology. **      Final report electronically signed by Dr. Pastora Thakkar on 4/27/2022 3:08 PM          Diet: ADULT DIET; Regular; 4 carb choices (60 gm/meal)    DVT prophylaxis: [x] Lovenox                                 [] SCDs                                 [] SQ Heparin                                 [] Encourage ambulation           [] Already on Anticoagulation     Disposition:    [x] Home       [] TCU       [] Rehab       [] Psych       [] SNF       [] [place]       [] Other-    Code Status: Full Code    PT/OT Eval Status: pending      Electronically signed by Bernabe Andrea MD on 4/29/2022 at 10:27 AM    This note was electronically signed. Parts of this note may have been dictated by use of voice recognition software and electronically transcribed.  The note may contain errors not detected in proofreading. Please refer to my supervising physician's documentation if my documentation differs.

## 2022-04-30 ENCOUNTER — APPOINTMENT (OUTPATIENT)
Dept: GENERAL RADIOLOGY | Age: 82
DRG: 682 | End: 2022-04-30
Payer: MEDICARE

## 2022-04-30 LAB
ALBUMIN SERPL-MCNC: 3 G/DL (ref 3.5–5.1)
ALP BLD-CCNC: 42 U/L (ref 38–126)
ALT SERPL-CCNC: 9 U/L (ref 11–66)
ANION GAP SERPL CALCULATED.3IONS-SCNC: 9 MEQ/L (ref 8–16)
AST SERPL-CCNC: 10 U/L (ref 5–40)
AVERAGE GLUCOSE: 135 MG/DL (ref 70–126)
BASOPHILS # BLD: 0.7 %
BASOPHILS ABSOLUTE: 0 THOU/MM3 (ref 0–0.1)
BILIRUB SERPL-MCNC: 0.2 MG/DL (ref 0.3–1.2)
BUN BLDV-MCNC: 61 MG/DL (ref 7–22)
CALCIUM SERPL-MCNC: 8.1 MG/DL (ref 8.5–10.5)
CHLORIDE BLD-SCNC: 106 MEQ/L (ref 98–111)
CO2: 22 MEQ/L (ref 23–33)
CREAT SERPL-MCNC: 1.9 MG/DL (ref 0.4–1.2)
EOSINOPHIL # BLD: 2.9 %
EOSINOPHILS ABSOLUTE: 0.1 THOU/MM3 (ref 0–0.4)
ERYTHROCYTE [DISTWIDTH] IN BLOOD BY AUTOMATED COUNT: 14.7 % (ref 11.5–14.5)
ERYTHROCYTE [DISTWIDTH] IN BLOOD BY AUTOMATED COUNT: 42.7 FL (ref 35–45)
GLUCOSE BLD-MCNC: 155 MG/DL (ref 70–108)
GLUCOSE BLD-MCNC: 156 MG/DL (ref 70–108)
GLUCOSE BLD-MCNC: 218 MG/DL (ref 70–108)
GLUCOSE BLD-MCNC: 220 MG/DL (ref 70–108)
GLUCOSE BLD-MCNC: 247 MG/DL (ref 70–108)
HBA1C MFR BLD: 6.5 % (ref 4.4–6.4)
HCT VFR BLD CALC: 29.8 % (ref 37–47)
HEMOGLOBIN: 8.6 GM/DL (ref 12–16)
HYPOCHROMIA: PRESENT
IMMATURE GRANS (ABS): 0.01 THOU/MM3 (ref 0–0.07)
IMMATURE GRANULOCYTES: 0.2 %
LYMPHOCYTES # BLD: 27.4 %
LYMPHOCYTES ABSOLUTE: 1.2 THOU/MM3 (ref 1–4.8)
MCH RBC QN AUTO: 23.1 PG (ref 26–33)
MCHC RBC AUTO-ENTMCNC: 28.9 GM/DL (ref 32.2–35.5)
MCV RBC AUTO: 80.1 FL (ref 81–99)
MONOCYTES # BLD: 14.3 %
MONOCYTES ABSOLUTE: 0.6 THOU/MM3 (ref 0.4–1.3)
NUCLEATED RED BLOOD CELLS: 1 /100 WBC
PLATELET # BLD: 137 THOU/MM3 (ref 130–400)
PLATELET ESTIMATE: ADEQUATE
PMV BLD AUTO: 12.2 FL (ref 9.4–12.4)
POTASSIUM REFLEX MAGNESIUM: 4.9 MEQ/L (ref 3.5–5.2)
POTASSIUM SERPL-SCNC: 4.9 MEQ/L (ref 3.5–5.2)
RBC # BLD: 3.72 MILL/MM3 (ref 4.2–5.4)
SCAN OF BLOOD SMEAR: NORMAL
SEG NEUTROPHILS: 54.5 %
SEGMENTED NEUTROPHILS ABSOLUTE COUNT: 2.3 THOU/MM3 (ref 1.8–7.7)
SODIUM BLD-SCNC: 137 MEQ/L (ref 135–145)
TOTAL PROTEIN: 6.6 G/DL (ref 6.1–8)
WBC # BLD: 4.2 THOU/MM3 (ref 4.8–10.8)

## 2022-04-30 PROCEDURE — 6370000000 HC RX 637 (ALT 250 FOR IP): Performed by: INTERNAL MEDICINE

## 2022-04-30 PROCEDURE — 92526 ORAL FUNCTION THERAPY: CPT | Performed by: SPEECH-LANGUAGE PATHOLOGIST

## 2022-04-30 PROCEDURE — 36415 COLL VENOUS BLD VENIPUNCTURE: CPT

## 2022-04-30 PROCEDURE — 82948 REAGENT STRIP/BLOOD GLUCOSE: CPT

## 2022-04-30 PROCEDURE — 83036 HEMOGLOBIN GLYCOSYLATED A1C: CPT

## 2022-04-30 PROCEDURE — 80053 COMPREHEN METABOLIC PANEL: CPT

## 2022-04-30 PROCEDURE — 85025 COMPLETE CBC W/AUTO DIFF WBC: CPT

## 2022-04-30 PROCEDURE — 6360000002 HC RX W HCPCS: Performed by: INTERNAL MEDICINE

## 2022-04-30 PROCEDURE — 2500000003 HC RX 250 WO HCPCS: Performed by: FAMILY MEDICINE

## 2022-04-30 PROCEDURE — 2060000000 HC ICU INTERMEDIATE R&B

## 2022-04-30 PROCEDURE — 99232 SBSQ HOSP IP/OBS MODERATE 35: CPT | Performed by: INTERNAL MEDICINE

## 2022-04-30 PROCEDURE — 92611 MOTION FLUOROSCOPY/SWALLOW: CPT | Performed by: SPEECH-LANGUAGE PATHOLOGIST

## 2022-04-30 PROCEDURE — 6370000000 HC RX 637 (ALT 250 FOR IP)

## 2022-04-30 PROCEDURE — 74230 X-RAY XM SWLNG FUNCJ C+: CPT

## 2022-04-30 PROCEDURE — 2580000003 HC RX 258: Performed by: INTERNAL MEDICINE

## 2022-04-30 PROCEDURE — 99233 SBSQ HOSP IP/OBS HIGH 50: CPT | Performed by: FAMILY MEDICINE

## 2022-04-30 RX ADMIN — HEPARIN SODIUM 5000 UNITS: 5000 INJECTION INTRAVENOUS; SUBCUTANEOUS at 02:22

## 2022-04-30 RX ADMIN — PREGABALIN 75 MG: 75 CAPSULE ORAL at 17:42

## 2022-04-30 RX ADMIN — INSULIN LISPRO 4 UNITS: 100 INJECTION, SOLUTION INTRAVENOUS; SUBCUTANEOUS at 17:43

## 2022-04-30 RX ADMIN — HEPARIN SODIUM 5000 UNITS: 5000 INJECTION INTRAVENOUS; SUBCUTANEOUS at 09:51

## 2022-04-30 RX ADMIN — SODIUM CHLORIDE: 9 INJECTION, SOLUTION INTRAVENOUS at 04:38

## 2022-04-30 RX ADMIN — INSULIN LISPRO 4 UNITS: 100 INJECTION, SOLUTION INTRAVENOUS; SUBCUTANEOUS at 12:17

## 2022-04-30 RX ADMIN — INSULIN LISPRO 2 UNITS: 100 INJECTION, SOLUTION INTRAVENOUS; SUBCUTANEOUS at 21:25

## 2022-04-30 RX ADMIN — CEPHALEXIN 250 MG: 250 CAPSULE ORAL at 09:53

## 2022-04-30 RX ADMIN — SODIUM CHLORIDE, PRESERVATIVE FREE 10 ML: 5 INJECTION INTRAVENOUS at 09:00

## 2022-04-30 RX ADMIN — BARIUM SULFATE 10 ML: 400 PASTE ORAL at 09:01

## 2022-04-30 RX ADMIN — BARIUM SULFATE 20 ML: 0.81 POWDER, FOR SUSPENSION ORAL at 09:02

## 2022-04-30 RX ADMIN — INSULIN LISPRO 2 UNITS: 100 INJECTION, SOLUTION INTRAVENOUS; SUBCUTANEOUS at 09:53

## 2022-04-30 RX ADMIN — HEPARIN SODIUM 5000 UNITS: 5000 INJECTION INTRAVENOUS; SUBCUTANEOUS at 18:54

## 2022-04-30 RX ADMIN — AMLODIPINE BESYLATE 10 MG: 10 TABLET ORAL at 09:52

## 2022-04-30 RX ADMIN — CLOPIDOGREL BISULFATE 75 MG: 75 TABLET ORAL at 09:53

## 2022-04-30 ASSESSMENT — PAIN DESCRIPTION - LOCATION: LOCATION: LEG

## 2022-04-30 ASSESSMENT — ENCOUNTER SYMPTOMS
ABDOMINAL PAIN: 0
DIARRHEA: 0
WHEEZING: 0
COLOR CHANGE: 0
STRIDOR: 0
NAUSEA: 0
SHORTNESS OF BREATH: 0
VOMITING: 0
BACK PAIN: 1
COUGH: 0
CONSTIPATION: 0

## 2022-04-30 ASSESSMENT — PAIN DESCRIPTION - DESCRIPTORS: DESCRIPTORS: ACHING

## 2022-04-30 ASSESSMENT — PAIN SCALES - GENERAL
PAINLEVEL_OUTOF10: 0
PAINLEVEL_OUTOF10: 7
PAINLEVEL_OUTOF10: 0

## 2022-04-30 ASSESSMENT — PAIN DESCRIPTION - ORIENTATION: ORIENTATION: RIGHT

## 2022-04-30 NOTE — PLAN OF CARE
Problem: Discharge Planning  Goal: Discharge to home or other facility with appropriate resources  Outcome: Progressing     Problem: Pain  Goal: Verbalizes/displays adequate comfort level or baseline comfort level  4/30/2022 0229 by Alfonso Leon RN  Flowsheets (Taken 4/30/2022 0229)  Verbalizes/displays adequate comfort level or baseline comfort level: Encourage patient to monitor pain and request assistance  Note: Denies pain this shift  4/30/2022 0228 by Alfonso Leon RN  Outcome: Progressing     Problem: Safety - Adult  Goal: Free from fall injury  Outcome: Progressing     Problem: Chronic Conditions and Co-morbidities  Goal: Patient's chronic conditions and co-morbidity symptoms are monitored and maintained or improved  4/30/2022 0229 by Alfonso Leon RN  Flowsheets (Taken 4/30/2022 0229)  Care Plan - Patient's Chronic Conditions and Co-Morbidity Symptoms are Monitored and Maintained or Improved: Monitor and assess patient's chronic conditions and comorbid symptoms for stability, deterioration, or improvement  Note: Pt on  oxygen 2L NC   4/30/2022 0228 by Alfonso Leon RN  Outcome: Progressing   Care plan reviewed with patient. Patientverbalize understanding of the plan of care and contribute to goal setting.

## 2022-04-30 NOTE — PROGRESS NOTES
Renal Progress Note  [facility]    Patient :  Uche Aranda; [de-identified] y.o. MRN# [id number]  Location:  [facility]13/013-A  Attending:  Juwan Ortega MD  Admit Date:  4/27/2022   Hospital Day: 3      Subjective:     Nephrology is following the patient for PARIS/CKD. Patient seen and examined . No overnight events. She is having barium swallow today. Urine output 1.4 liters/24 hours. Outpatient Medications:     Medications Prior to Admission: vitamin C (ASCORBIC ACID) 500 MG tablet, Take 500 mg by mouth daily  pregabalin (LYRICA) 75 MG capsule, Take 1 capsule by mouth every evening for 30 days.   amLODIPine (NORVASC) 10 MG tablet, Take 1 tablet by mouth daily  clopidogrel (PLAVIX) 75 MG tablet, Take 1 tablet by mouth daily  SITagliptin (JANUVIA) 50 MG tablet, Take 1 tablet by mouth daily (Patient taking differently: Take 100 mg by mouth daily )  glipiZIDE (GLUCOTROL) 5 MG tablet, Take 1 tablet by mouth daily  hydrALAZINE (APRESOLINE) 25 MG tablet, Take 0.5 tablets by mouth 2 times daily (Patient taking differently: Take 25 mg by mouth 2 times daily )  Ferrous Fumarate 324 (106 Fe) MG TABS, Take 1 tablet by mouth daily  isosorbide mononitrate (IMDUR) 30 MG extended release tablet, Take 1 tablet by mouth daily  vitamin B-12 (CYANOCOBALAMIN) 1000 MCG tablet, Take 1 tablet by mouth daily  [DISCONTINUED] carvedilol (COREG) 12.5 MG tablet, Take 1 tablet by mouth 2 times daily (with meals) (Patient taking differently: Take 25 mg by mouth 2 times daily (with meals) )    Current Medications:     Scheduled Meds:    cephALEXin  250 mg Oral 2 times per day    insulin lispro  0-12 Units SubCUTAneous TID WC    insulin lispro  0-6 Units SubCUTAneous Nightly    amLODIPine  10 mg Oral Daily    clopidogrel  75 mg Oral Daily    pregabalin  75 mg Oral QPM    sodium chloride flush  5-40 mL IntraVENous 2 times per day    heparin (porcine)  5,000 Units SubCUTAneous TID     Continuous Infusions:    sodium chloride 100 mL/hr at 22 0438    dextrose      sodium chloride       PRN Meds:  melatonin, perflutren lipid microspheres, glucagon (rDNA), dextrose, dextrose bolus (hypoglycemia) **OR** dextrose bolus (hypoglycemia), glucose, sodium chloride flush, sodium chloride, ondansetron **OR** ondansetron, polyethylene glycol, acetaminophen **OR** acetaminophen, HYDROcodone 5 mg - acetaminophen, morphine, albuterol    Input/Output:       I/O last 3 completed shifts: In: 1202 [P.O.:720; I.V.:3374]  Out: 2400 [Urine:2400]. Patient Vitals for the past 96 hrs (Last 3 readings):   Weight   22 2040 160 lb 7.9 oz (72.8 kg)       Vital Signs:   Temperature:  Temp: 97.9 °F (36.6 °C)  TMax:   Temp (24hrs), Av.5 °F (36.9 °C), Min:97.9 °F (36.6 °C), Max:98.9 °F (37.2 °C)    Respirations:  Resp: 18  Pulse:   Pulse: 61  BP:    BP: (!) 131/58  BP Range: Systolic (17NYZ), LGU:964 , Min:131 , OSK:410       Diastolic (76RQM), IRC:53, Min:51, Max:70      Physical Examination:     General:  Awake, alert, no acute distress  HEENT: NC/AT/ MMM  Chest:               Diminished but clear  Cardiac:  S1 S2   Abdomen: Soft, non-tender,  Neuro:  No facial droop, No Asterixis  SKIN:  No rashes, good skin turgor. Extremities:  No edema, no cyanosis    Labs:       Recent Labs     22  0454 22  0339 22  0359   WBC 5.5 3.7* 4.2*   RBC 3.98* 3.72* 3.72*   HGB 9.0* 8.6* 8.6*   HCT 32.0* 29.3* 29.8*   MCV 80.4* 78.8* 80.1*   MCH 22.6* 23.1* 23.1*   MCHC 28.1* 29.4* 28.9*   * 112* 137   MPV 12.6* 12.3 12.2      BMP:   Recent Labs     22  0454 22  1255 22  0339 22  0359   NA  --  133* 133* 137   K   < > 6.6* 5.7* 4.9  4.9   CL  --  105 102 106   CO2  --  19* 22* 22*   BUN  --  73* 72* 61*   CREATININE  --  2.7* 2.5* 1.9*   GLUCOSE  --  236* 223* 156*   CALCIUM  --  8.8 8.4* 8.1*    < > = values in this interval not displayed.       Phosphorus:   No results for input(s): PHOS in the last 72 hours.  Magnesium:    Recent Labs     04/27/22  1625   MG 1.9     Albumin:    Recent Labs     04/28/22  0454 04/29/22  0339 04/30/22  0359   LABALBU 3.2* 3.1* 3.0*     BNP:      Lab Results   Component Value Date     12/06/2021     KRISS:    No results found for: KRISS  SPEP:  Lab Results   Component Value Date    PROT 6.6 04/30/2022     UPEP:   No results found for: LABPE  C3:   No results found for: C3  C4:   No results found for: C4  MPO ANCA:   No results found for: MPO  PR3 ANCA:   No results found for: PR3  Anti-GBM:   No results found for: GBMABIGG  Hep BsAg:       No results found for: HEPBSAG  Hep C AB:        No results found for: HEPCAB    Urinalysis/Chemistries:      Lab Results   Component Value Date    NITRU NEGATIVE 04/28/2022    COLORU YELLOW 04/28/2022    PHUR 5.0 04/28/2022    WBCUA 25-50 04/28/2022    WBCUA 0-5 04/13/2022    RBCUA 3-5 04/28/2022    MUCUS Present 02/08/2022    YEAST NONE SEEN 04/28/2022    BACTERIA NONE SEEN 04/28/2022    CLARITYU clear 08/24/2020    SPECGRAV 1.025 08/24/2020    LEUKOCYTESUR LARGE 04/28/2022    UROBILINOGEN 0.2 04/28/2022    BILIRUBINUR NEGATIVE 04/28/2022    BILIRUBINUR neg 08/24/2020    BLOODU TRACE 04/28/2022    GLUCOSEU NEGATIVE 04/28/2022    KETUA NEGATIVE 04/28/2022     Urine Sodium:   No results found for: DONALD  Urine Potassium:  No results found for: KUR  Urine Chloride:  No results found for: CLUR  Urine Osmolarity: No results found for: OSMOU  Urine Protein:   No components found for: TOTALPROTEIN, URINE   Urine Creatinine:   No results found for: LABCREA  Urine Eosinophils:  No components found for: UEOS        Impression and Plan:  1. PARIS on CKD: appears prerenal , is improving with IV fluids. Will continue  2. Hyperkalemia, improved. Stop lokelma  3. Metabolic acidosis, improved  4. HTN  5. UTI  6. CHF: repeat echo shows EF 50% which is Is improved. Appears euvolemic on exam.        Please don't hesitate to call with any questions.   Electronically signed by Maegan Villalobos DO on 4/30/2022 at 9:04 AM

## 2022-04-30 NOTE — PROGRESS NOTES
PROGRESS NOTE      Patient:  Leandra Leos      Unit/Bed:4K-13/013-A    YOB: 1940    MRN: [id number]       Acct: [de-identified]     PCP: ELLYN Hooks CNP    Date of Admission: 4/27/2022      Assessment/Plan:    Anticipated Discharge in : pending    Active Hospital Problems    Diagnosis Date Noted    Dysphagia [R13.10]      Priority: Medium    Acute kidney injury superimposed on chronic kidney disease ([address] [N17.9, N18.9]      Priority: Medium    Degenerative lumbar spinal stenosis [M48.061]      Priority: Medium    Acute respiratory failure with hypoxia ([address] [J96.01]      Priority: Medium    Acute pulmonary edema ([address] [J81.0]      Priority: Medium    Hyperkalemia [E87.5] 04/27/2022     Priority: Medium       1. acute hypoxic respiratory failure, - Resolved after floor transfer patient was noted to have increased work of breathing, pulse oximetry at bedside showed O2 sats 94% on room air, currently weaning off O2 as tolerated however ABG drawn did show a depressed PO2 at or near 41 mmHg, not on O2 at home was on BiPAP, low suspecet for PE at this time, Venous Doppler negative B/L For DVT. Currently off Bipap and weaned to room air when awake, question if Pt uses O2 at home however Pt denies O2 use, currently using at [facility] only. 2. Hyperkalemia - Improving PT has received D50 insulin and is receiving calcium gluconate for cardioprotection initial EKG on arrival does not seem to show peaked T waves. Currently downtrend to 4.9 , Nephrology consulted and on board, defer management to Nephro  Per Nephro Stop lokelma, Bicarb, off dextrose + insulin. 3. PARIS on CKD - Resolving -  Unclear etiology possible prerenal?, baseline creatinine ranges between 1.3-2.04, creatinine 4/22/2022 was approximately 1.77,  currently 1.9 and downtrend, nephro onboard and managing with Fluids. Continue NS at 100ml/hr per nephro.     4. Acute on chronic lumbar pain- patient appears to have prior fractures L2 and degenerative joint disease L4 and L5 but seems to have gross movements intact in lower extremities. Pain control Meds prn.    5. Dehydration - See hyperkalemia and PARIS as above, see CHF below. 6. Metabolic acidosis - Resolving - likely 2/2 dehydration and renal PARIS , Nephrology on board continue IV fluids per Nephro as above    7. UTI - LE's on UA Apr/29/2022, note Pan sensitive  UA on Apr/13/2022, however nearly pan resistant cultures on Feb/08/2022. Continue Keflex today x 5 dy, adjusted for renal function, pending sensitivities and culture. 8. Chronic systolic heart failure - chart review yields echocardiogram performed at [facility] showing an ef of 35-40% and anterseptal and apical hypokinesis,, there may be a component of mild fluid overload as was hypoxic as above/ will need to watch as was on Bipap as above. Repeat echo today noted 50% EF.   +1.8L net fluid since admit, good UOPT at -1.4L  Continue Strict I/O, Daily weights. 9. DM2 - Hx of, On januvia and Glipizide at home, Currently on SSI medium dose correction, currently sugars on higher end of 200's. A1c AM.  Hypoglycemia protocols in place. 10. Dysphagia/Dysphonia - Pt baseline dysarthric, hx CVA in past, does not note past voice change, SLP evaluated and  MBS in AM today, passed however SLP Recommend ENT consult for direct laryngoscopy to assess laryngeal structures for cause of dysphonia and irritated sensation resulting in coughing episodes. Will Defer ENT consult until Monday May/02/2022, otherwise can Follow up OPT at this time.     Chief Complaint:  Generalized Weakness, PARIS    Hospital Course:     Per initial H&P  \"The patient is a [de-identified] y.o. female who presents with chronic lumbar pain according to the chart patient has also had increased recurrent falls and nonspecific lower extremity swelling, for which she has had evaluation at an outside hospital lower extremity Dopplers apparently negative for DVT and fracture, it appears she is have primarily increased weakness the patient herself states symptomatically she feels well she appears to have a certain degree of chronic dysarthria but based on ER documentation the patient was brought to ER secondary to worsening a lumbar pain and increased weakness, and ER she was found to have hyperkalemia and laboratory values consistent with acute kidney injury she also appeared to have modest dehydration with elevated BUN to creatinine ratio physically dry and physical survey with a sinus rhythm on telemetry have received cardioprotective drugs including calcium gluconate secondary to her hyperkalemia she is being admitted for dehydration with mild metabolic acidosis chronic lumbar pain acute kidney injury and hyperkalemia, ECG on admission does not show peaked T waves and infectious normal sinus rhythm  At bedside she is in sinus rhythm on telemetry she is on room air and her blood pressure is 150/72\" Pt was noted to be hypoxic on ABG and was placed on Bipap. Subjective:  Pt seen and examined in AM, off BIpap on 2L NC, noted use of O2 at night per chart, currently trial off O2 when awake at currently stating at 95% on Room air., Nephrology on board and seen, fluid management, IVF in setting of hyperK and Cr per nephro. Continue to monitor for fluid overload. Otherwise stable on cardiac tele. Pt remains pleasantly slightly demented as was focused and fixated on not being returned to her room this morning S/P MBS. Diet per SLP who recommend ENT consult. Review of Systems   Constitutional: Negative for chills, diaphoresis, fatigue and fever. Respiratory: Negative for cough, shortness of breath, wheezing and stridor. Cardiovascular: Negative for chest pain, palpitations and leg swelling. Gastrointestinal: Negative for abdominal pain, constipation, diarrhea, nausea and vomiting.    Genitourinary: Negative for decreased urine volume, difficulty urinating, dysuria, frequency, hematuria and urgency. Musculoskeletal: Positive for back pain. Skin: Negative for color change, pallor and rash. Neurological: Positive for weakness. Negative for dizziness, seizures, light-headedness and headaches. Psychiatric/Behavioral: Negative for confusion and decreased concentration. Medications:  Reviewed    Infusion Medications    sodium chloride 100 mL/hr at 04/30/22 0438    dextrose      sodium chloride       Scheduled Medications    cephALEXin  250 mg Oral 2 times per day    insulin lispro  0-12 Units SubCUTAneous TID WC    insulin lispro  0-6 Units SubCUTAneous Nightly    amLODIPine  10 mg Oral Daily    clopidogrel  75 mg Oral Daily    pregabalin  75 mg Oral QPM    sodium chloride flush  5-40 mL IntraVENous 2 times per day    heparin (porcine)  5,000 Units SubCUTAneous TID     PRN Meds: melatonin, perflutren lipid microspheres, glucagon (rDNA), dextrose, dextrose bolus (hypoglycemia) **OR** dextrose bolus (hypoglycemia), glucose, sodium chloride flush, sodium chloride, ondansetron **OR** ondansetron, polyethylene glycol, acetaminophen **OR** acetaminophen, HYDROcodone 5 mg - acetaminophen, morphine, albuterol      Intake/Output Summary (Last 24 hours) at 4/30/2022 1520  Last data filed at 4/30/2022 1205  Gross per 24 hour   Intake 1148.56 ml   Output 1000 ml   Net 148.56 ml       Diet:  ADULT DIET; Dysphagia - Soft and Bite Sized    Exam:  BP (!) 166/72   Pulse 67   Temp 98.1 °F (36.7 °C) (Oral)   Resp 17   Wt 160 lb 7.9 oz (72.8 kg)   SpO2 95%   BMI 31.34 kg/m²     Physical Exam  Constitutional:       General: She is not in acute distress. Appearance: Normal appearance. She is not ill-appearing or diaphoretic. Interventions: Nasal cannula in place. Comments: 2 L NC   HENT:      Head: Normocephalic and atraumatic. Nose: Nose normal. No congestion or rhinorrhea.       Mouth/Throat:      Mouth: Mucous membranes are moist.      Pharynx: Oropharynx is clear. No oropharyngeal exudate or posterior oropharyngeal erythema. Eyes:      General: No scleral icterus. Right eye: No discharge. Left eye: No discharge. Extraocular Movements: Extraocular movements intact. Conjunctiva/sclera: Conjunctivae normal.      Pupils: Pupils are equal, round, and reactive to light. Cardiovascular:      Rate and Rhythm: Regular rhythm. Bradycardia present. Pulses: Normal pulses. Heart sounds: Normal heart sounds. No murmur heard. No friction rub. No gallop. Pulmonary:      Effort: Pulmonary effort is normal. No respiratory distress. Breath sounds: Rhonchi (faint) present. No wheezing or rales. Abdominal:      General: Abdomen is flat. Bowel sounds are normal. There is no distension. Palpations: Abdomen is soft. Tenderness: There is no abdominal tenderness. There is no guarding. Musculoskeletal:         General: No swelling, tenderness or signs of injury. Right lower leg: Edema (trace) present. Left lower leg: Edema (trace) present. Skin:     General: Skin is warm and dry. Capillary Refill: Capillary refill takes less than 2 seconds. Coloration: Skin is not jaundiced or pale. Findings: No erythema or rash. Neurological:      General: No focal deficit present. Mental Status: She is alert and oriented to person, place, and time. Labs:   Recent Labs     04/28/22 0454 04/29/22 0339 04/30/22 0359   WBC 5.5 3.7* 4.2*   HGB 9.0* 8.6* 8.6*   HCT 32.0* 29.3* 29.8*   * 112* 137     Recent Labs     04/28/22  0454 04/28/22  1255 04/29/22 0339 04/30/22 0359   NA  --  133* 133* 137   K   < > 6.6* 5.7* 4.9  4.9   CL  --  105 102 106   CO2  --  19* 22* 22*   BUN  --  73* 72* 61*   CREATININE  --  2.7* 2.5* 1.9*   CALCIUM  --  8.8 8.4* 8.1*    < > = values in this interval not displayed.      Recent Labs     04/28/22  0454 04/29/22 0339 04/30/22 0359   AST 11 11 10   ALT 8* 8* 9*   BILITOT 0.4 0.2* 0.2*   ALKPHOS 47 48 42     No results for input(s): INR in the last 72 hours. No results for input(s): Analia Belts in the last 72 hours. Urinalysis:      Lab Results   Component Value Date    NITRU NEGATIVE 04/28/2022    WBCUA 25-50 04/28/2022    WBCUA 0-5 04/13/2022    BACTERIA NONE SEEN 04/28/2022    RBCUA 3-5 04/28/2022    BLOODU TRACE 04/28/2022    SPECGRAV 1.025 08/24/2020    GLUCOSEU NEGATIVE 04/28/2022       Radiology:  FL MODIFIED BARIUM SWALLOW W VIDEO   Final Result   No laryngeal penetration or aspiration. Comments and recommendations are available from speech therapy            **This report has been created using voice recognition software. It may contain minor errors which are inherent in voice recognition technology. **      Final report electronically signed by Dr. Rene Mcconnell on 4/30/2022 9:37 AM      VL DUP LOWER EXTREMITY VENOUS BILATERAL   Final Result   No evidence of a DVT. **This report has been created using voice recognition software. It may contain minor errors which are inherent in voice recognition technology. **      Final report electronically signed by Dr Rikki Vallejo on 4/28/2022 8:47 AM      XR CHEST (2 VW)   Final Result   1. Suboptimal inflation of the lungs. Mild cardiomegaly. 2. Questionable minimal atelectasis/pneumonia right parahilar region and retrocardiac region left lung base. **This report has been created using voice recognition software. It may contain minor errors which are inherent in voice recognition technology. **      Final report electronically signed by Dr. Pastora Thakkar on 4/27/2022 2:58 PM      XR LUMBAR SPINE (2-3 VIEWS)   Final Result   1. Severe degenerative facet arthropathy L4-5 and L5/S1 bilaterally. Moderate vertebral body spondylosis scattered in the lumbar spine.    2. There are mild superior endplate depression fractures L1 and L2, of questionable acuity although the appearance favors a chronic process. 3. Sacroiliac joints unremarkable. **This report has been created using voice recognition software. It may contain minor errors which are inherent in voice recognition technology. **      Final report electronically signed by Dr. Harsh Daily on 4/27/2022 3:07 PM      XR PELVIS (1-2 VIEWS)   Final Result   No acute findings. **This report has been created using voice recognition software. It may contain minor errors which are inherent in voice recognition technology. **      Final report electronically signed by Dr. Harsh Daily on 4/27/2022 3:08 PM          Diet: ADULT DIET; Dysphagia - Soft and Bite Sized    DVT prophylaxis: [x] Lovenox                                 [] SCDs                                 [] SQ Heparin                                 [] Encourage ambulation           [] Already on Anticoagulation     Disposition:    [] Home       [] TCU       [] Rehab       [] Psych       [x] SNF       [] [facility]       [] Other-    Code Status: Full Code    PT/OT Eval Status: pending      Electronically signed by Reyes Nearing, MD on 4/30/2022 at 3:20 PM    This note was electronically signed. Parts of this note may have been dictated by use of voice recognition software and electronically transcribed. The note may contain errors not detected in proofreading. Please refer to my supervising physician's documentation if my documentation differs.

## 2022-04-30 NOTE — PROGRESS NOTES
[address] ICU STEPDOWN TELEMETRY 4K  Modified Barium Swallow + Dysphagia tx    SLP Individual Minutes  Time In: 8960  Time Out: 7648  Minutes: 20  Timed Code Treatment Minutes: 0 Minutes       Date: 2022  Patient Name: Derrell Mcclellan      CSN: [id number]   : 1940  ([de-identified] y.o.)  Gender: female   Referring Physician:  Amanda Yanez MD  Diagnosis: Hyperkalemia   Precautions: Fall risk, Aspiration precautions  History of Present Illness/Injury: Patient admitted to [facility] with above med dx; please refer to physician H&P for full details. Per chart review, admission for acute hypoxic respiratory failure, hyperkalemia, and PARIS on CKD. MBS ordered following completion of clinical swallow evaluation given concerns for impairments with pharyngeal swallow function. has a past medical history of Depression, Heart attack ([address] Hyperlipidemia, Neuropathy, and Sleep apnea. Current Diet: NPO following completion of CSE- Reports regular diet with thin liquids at baseline    Pain: No pain reported. SUBJECTIVE:  Patient positioned in high fowlers in radiology suite for completion of assessment. OBJECTIVE:    Respiratory Status:  Room Air    Behavioral Observation:  Alert and Oriented    PATIENT WAS EVALUATED USING:  Barium: Thin Liquids, Puree, Soft Solids, Coarse Solids and Mixed Consistency    ORAL PREPARATION PHASE:  WFL    ORAL PHASE: Uncontrolled Bolus/Diffuse Fall Over Tongue Base     ORAL PHASE DELIO SCORE: (Dysphagia outcome and severity scale)  6 = WFL/Modified Independent - Normal Diet - May have mild oral delay    PHARYNGEAL PHASE:  Impaired: Delayed Swallow     PHARYNGEAL PHASE DELIO SCORE: (Dysphagia outcome and severity scale)  6 = WFL/Modified Independent - May have mild pharyngeal delay or residue but clears spontaneously - No aspiration or laryngeal penetration    EVIDENCE FOR LARYNGEAL PENETRATION AND/OR ASPIRATION:  No evidence of laryngeal penetration  No evidence of aspiration    PENETRATION-ASPIRATION SCALE (PAS): Thin Liquids: 1 = Material does not enter the airway  Puree:  1 = Material does not enter the airway  Soft Solid:  1 = Material does not enter the airway  Hard Solid: 1 = Material does not enter the airway    ESOPHAGEAL PHASE:   No significant findings    ATTEMPTED TECHNIQUES:  Small Bolus Size Effective    Straw Effective    Cup Effective    Chin Tuck Not Attempted    Head Turn Not Attempted    Spoon Presentations Not Attempted    Volitional Cough Not Attempted    Spontaneous Cough Not Attempted           DIAGNOSTIC IMPRESSIONS:  Patient presents with modified independent oral swallow phase characterized by slightly prolonged mastication given sparse dentition, but appropriate textural breakdown and organized posterior transit. Premature spillage evident with dense consistencies with slightly delayed swallow trigger, but with appropriate hyolaryngeal elevation, anterior excursion, thyrohyoid approximation and pharyngeal constriction. No laryngeal penetration or tracheal aspiration evident throughout study. No appreciable oral or pharyngeal residue. Patient did present with a significant coughing episode with mixed consistency trial, however upon extensive review of the imaging, there was NO evidence of material in the laryngeal vestibule or airway. Patient reporting \"I cough like that a lot when I am eating. \" When asked to further describe what it feels like, patient stating \"a tickle, but it happens all the time. \" Patient does present with dysphonia and report of \"tickle sensation\" during the swallow. Recommend ENT consult for direct laryngoscopy to assess laryngeal structures for cause of dysphonia and irritated sensation resulting in coughing episodes.      DYSPHAGIA INTERVENTION:   Following completion of Modified Barium Swallow evaluation, transitioned to skilled service provision for duration of 8 minutes with direct verbal education and use of GARIMA unit provided re: anatomy/physiology of swallow mechanism, rationale for soft and bite size diet recommendation d/t reduced dentition and patient report of feeling she needs to complete \"big swallows\" with hard/dense/coarse textures. Reviewed recommendations for upright positioning, small bites/sips, alternating liquids and solids and monitoring for fatigue. Also discussed consideration for completion of ENT consult to further assess laryngeal structures. *Verbal receptiveness noted. *Will continue dysphagia education and skilled dysphagia tx within future sessions. Diet Recommendations:  Soft and bite size with thin liquids  Strategies:  Full Upright Position, Small Bite/Sip and Alternate Solids and Liquids   Rehabilitation Potential: good    EDUCATION:  Learner: Patient  Education:  Reviewed results and recommendations of this evaluation, Reviewed diet and strategies and Reviewed ST goals and Plan of Care  Evaluation of Education: Verbalizes understanding and Needs further instruction    PLAN:  Skilled SLP intervention on acute care 3-5 x per week or until goals met and/or pt plateaus in function. Specific interventions for next session may include: Diet monitor . PATIENT GOAL:    Return to least restrictive diet. SHORT TERM GOALS:  Short-term Goals  Timeframe for Short-term Goals: 2 weeks  Goal 1: Patient will safely consume a soft and bite size diet with thin liquids without overt difficulty or s/s of aspiration to safely meet nutritional needs. Goal 2: Staff will exhibit return demonstration for completion of comprehensive oral care procedural analysis to maximize oral integrity to reduce potential bacteria colonization. Goal 3: Monitor cognition; complete further evaluation should it become clinically indicated.   Goal 4: Consult ENT regarding dysphonia and reports of \"tickling in throat\" when eating      LONG TERM GOALS:  No long term goals established due to estimated length of stay. Abdulkadir Olivia.  [address], [address], [address]

## 2022-05-01 ENCOUNTER — APPOINTMENT (OUTPATIENT)
Dept: GENERAL RADIOLOGY | Age: 82
DRG: 682 | End: 2022-05-01
Payer: MEDICARE

## 2022-05-01 VITALS
DIASTOLIC BLOOD PRESSURE: 76 MMHG | OXYGEN SATURATION: 96 % | HEART RATE: 70 BPM | TEMPERATURE: 98.1 F | BODY MASS INDEX: 34.19 KG/M2 | RESPIRATION RATE: 16 BRPM | WEIGHT: 175.04 LBS | SYSTOLIC BLOOD PRESSURE: 159 MMHG

## 2022-05-01 LAB
ANION GAP SERPL CALCULATED.3IONS-SCNC: 10 MEQ/L (ref 8–16)
BUN BLDV-MCNC: 45 MG/DL (ref 7–22)
CALCIUM SERPL-MCNC: 8.2 MG/DL (ref 8.5–10.5)
CHLORIDE BLD-SCNC: 111 MEQ/L (ref 98–111)
CO2: 21 MEQ/L (ref 23–33)
CREAT SERPL-MCNC: 1.7 MG/DL (ref 0.4–1.2)
EKG ATRIAL RATE: 70 BPM
EKG P AXIS: 60 DEGREES
EKG P-R INTERVAL: 150 MS
EKG Q-T INTERVAL: 402 MS
EKG QRS DURATION: 98 MS
EKG QTC CALCULATION (BAZETT): 434 MS
EKG R AXIS: 26 DEGREES
EKG T AXIS: 85 DEGREES
EKG VENTRICULAR RATE: 70 BPM
GLUCOSE BLD-MCNC: 138 MG/DL (ref 70–108)
GLUCOSE BLD-MCNC: 142 MG/DL (ref 70–108)
GLUCOSE BLD-MCNC: 153 MG/DL (ref 70–108)
GLUCOSE BLD-MCNC: 270 MG/DL (ref 70–108)
ORGANISM: ABNORMAL
POTASSIUM SERPL-SCNC: 4.7 MEQ/L (ref 3.5–5.2)
SODIUM BLD-SCNC: 142 MEQ/L (ref 135–145)
TROPONIN T: < 0.01 NG/ML
URINE CULTURE REFLEX: ABNORMAL

## 2022-05-01 PROCEDURE — 93010 ELECTROCARDIOGRAM REPORT: CPT | Performed by: INTERNAL MEDICINE

## 2022-05-01 PROCEDURE — 6360000002 HC RX W HCPCS: Performed by: INTERNAL MEDICINE

## 2022-05-01 PROCEDURE — 71045 X-RAY EXAM CHEST 1 VIEW: CPT

## 2022-05-01 PROCEDURE — 6370000000 HC RX 637 (ALT 250 FOR IP): Performed by: STUDENT IN AN ORGANIZED HEALTH CARE EDUCATION/TRAINING PROGRAM

## 2022-05-01 PROCEDURE — 80048 BASIC METABOLIC PNL TOTAL CA: CPT

## 2022-05-01 PROCEDURE — 84484 ASSAY OF TROPONIN QUANT: CPT

## 2022-05-01 PROCEDURE — 6370000000 HC RX 637 (ALT 250 FOR IP): Performed by: INTERNAL MEDICINE

## 2022-05-01 PROCEDURE — 99232 SBSQ HOSP IP/OBS MODERATE 35: CPT | Performed by: INTERNAL MEDICINE

## 2022-05-01 PROCEDURE — 6370000000 HC RX 637 (ALT 250 FOR IP)

## 2022-05-01 PROCEDURE — 82948 REAGENT STRIP/BLOOD GLUCOSE: CPT

## 2022-05-01 PROCEDURE — 36415 COLL VENOUS BLD VENIPUNCTURE: CPT

## 2022-05-01 PROCEDURE — 2580000003 HC RX 258: Performed by: INTERNAL MEDICINE

## 2022-05-01 PROCEDURE — 99239 HOSP IP/OBS DSCHRG MGMT >30: CPT | Performed by: FAMILY MEDICINE

## 2022-05-01 PROCEDURE — 93005 ELECTROCARDIOGRAM TRACING: CPT | Performed by: STUDENT IN AN ORGANIZED HEALTH CARE EDUCATION/TRAINING PROGRAM

## 2022-05-01 RX ORDER — PRAVASTATIN SODIUM 40 MG
40 TABLET ORAL NIGHTLY
Qty: 90 TABLET | Refills: 0 | DISCHARGE
Start: 2022-05-01

## 2022-05-01 RX ORDER — CARVEDILOL 12.5 MG/1
12.5 TABLET ORAL 2 TIMES DAILY WITH MEALS
Qty: 180 TABLET | Refills: 0 | DISCHARGE
Start: 2022-05-01 | End: 2022-08-08

## 2022-05-01 RX ORDER — GRANULES FOR ORAL 3 G/1
3 POWDER ORAL ONCE
Qty: 1 EACH | Refills: 0 | DISCHARGE
Start: 2022-05-03 | End: 2022-05-03

## 2022-05-01 RX ORDER — ASPIRIN 81 MG/1
81 TABLET ORAL DAILY
Qty: 30 TABLET | Refills: 0 | DISCHARGE
Start: 2022-05-01

## 2022-05-01 RX ORDER — GRANULES FOR ORAL 3 G/1
3 POWDER ORAL ONCE
Status: COMPLETED | OUTPATIENT
Start: 2022-05-01 | End: 2022-05-01

## 2022-05-01 RX ADMIN — AMLODIPINE BESYLATE 10 MG: 10 TABLET ORAL at 09:28

## 2022-05-01 RX ADMIN — INSULIN LISPRO 6 UNITS: 100 INJECTION, SOLUTION INTRAVENOUS; SUBCUTANEOUS at 14:10

## 2022-05-01 RX ADMIN — GRANULES FOR ORAL SOLUTION 1 PACKET: 3 POWDER ORAL at 14:10

## 2022-05-01 RX ADMIN — CLOPIDOGREL BISULFATE 75 MG: 75 TABLET ORAL at 09:28

## 2022-05-01 RX ADMIN — CEPHALEXIN 250 MG: 250 CAPSULE ORAL at 09:28

## 2022-05-01 RX ADMIN — INSULIN LISPRO 2 UNITS: 100 INJECTION, SOLUTION INTRAVENOUS; SUBCUTANEOUS at 09:26

## 2022-05-01 RX ADMIN — CEPHALEXIN 250 MG: 250 CAPSULE ORAL at 00:18

## 2022-05-01 RX ADMIN — HEPARIN SODIUM 5000 UNITS: 5000 INJECTION INTRAVENOUS; SUBCUTANEOUS at 09:28

## 2022-05-01 RX ADMIN — HEPARIN SODIUM 5000 UNITS: 5000 INJECTION INTRAVENOUS; SUBCUTANEOUS at 02:24

## 2022-05-01 RX ADMIN — SODIUM CHLORIDE: 9 INJECTION, SOLUTION INTRAVENOUS at 01:06

## 2022-05-01 ASSESSMENT — PAIN SCALES - GENERAL
PAINLEVEL_OUTOF10: 0
PAINLEVEL_OUTOF10: 0

## 2022-05-01 NOTE — PROGRESS NOTES
Attempted to call report to nursing home several times. The first time she stated she had to do some stat labs on a patient and would call me back.   The second time they placed me on hold and no one ever picked up

## 2022-05-01 NOTE — PROGRESS NOTES
Renal Progress Note  [facility]    Patient :  Tianna Hermosillo; [de-identified] y.o. MRN# [id number]  Location:  [facility]13/013-A  Attending:  Taylor Eng MD  Admit Date:  4/27/2022   Hospital Day: 4      Subjective:     Nephrology is following the patient for PARIS/CKD. Patient seen and examined. No overnight events. On 2 L NC. Outpatient Medications:     Medications Prior to Admission: vitamin C (ASCORBIC ACID) 500 MG tablet, Take 500 mg by mouth daily  pregabalin (LYRICA) 75 MG capsule, Take 1 capsule by mouth every evening for 30 days.   amLODIPine (NORVASC) 10 MG tablet, Take 1 tablet by mouth daily  clopidogrel (PLAVIX) 75 MG tablet, Take 1 tablet by mouth daily  SITagliptin (JANUVIA) 50 MG tablet, Take 1 tablet by mouth daily (Patient taking differently: Take 100 mg by mouth daily )  glipiZIDE (GLUCOTROL) 5 MG tablet, Take 1 tablet by mouth daily  hydrALAZINE (APRESOLINE) 25 MG tablet, Take 0.5 tablets by mouth 2 times daily (Patient taking differently: Take 25 mg by mouth 2 times daily )  Ferrous Fumarate 324 (106 Fe) MG TABS, Take 1 tablet by mouth daily  isosorbide mononitrate (IMDUR) 30 MG extended release tablet, Take 1 tablet by mouth daily  vitamin B-12 (CYANOCOBALAMIN) 1000 MCG tablet, Take 1 tablet by mouth daily  [DISCONTINUED] carvedilol (COREG) 12.5 MG tablet, Take 1 tablet by mouth 2 times daily (with meals) (Patient taking differently: Take 25 mg by mouth 2 times daily (with meals) )    Current Medications:     Scheduled Meds:    cephALEXin  250 mg Oral 2 times per day    insulin lispro  0-12 Units SubCUTAneous TID WC    insulin lispro  0-6 Units SubCUTAneous Nightly    amLODIPine  10 mg Oral Daily    clopidogrel  75 mg Oral Daily    pregabalin  75 mg Oral QPM    sodium chloride flush  5-40 mL IntraVENous 2 times per day    heparin (porcine)  5,000 Units SubCUTAneous TID     Continuous Infusions:    sodium chloride 100 mL/hr at 05/01/22 0106    dextrose      sodium chloride       PRN Meds:  melatonin, perflutren lipid microspheres, glucagon (rDNA), dextrose, dextrose bolus (hypoglycemia) **OR** dextrose bolus (hypoglycemia), glucose, sodium chloride flush, sodium chloride, ondansetron **OR** ondansetron, polyethylene glycol, acetaminophen **OR** acetaminophen, HYDROcodone 5 mg - acetaminophen, morphine, albuterol    Input/Output:       I/O last 3 completed shifts: In: 2696.8 [P.O.:440; I.V.:2256.8]  Out: 1900 [Urine:1900]. Patient Vitals for the past 96 hrs (Last 3 readings):   Weight   22 0300 175 lb 0.7 oz (79.4 kg)   22 204 160 lb 7.9 oz (72.8 kg)       Vital Signs:   Temperature:  Temp: 98.3 °F (36.8 °C)  TMax:   Temp (24hrs), Av.2 °F (36.8 °C), Min:98 °F (36.7 °C), Max:98.3 °F (36.8 °C)    Respirations:  Resp: 18  Pulse:   Pulse: 62  BP:    BP: 137/60  BP Range: Systolic (06RBH), CBJ:461 , Min:134 , EGH:102       Diastolic (61BNL), VLV:78, Min:60, Max:72      Physical Examination:     General:  Awake, alert, no acute distress  HEENT: NC/AT/ MMM  Chest:               Diminished but clear  Cardiac:  S1 S2   Abdomen: Soft, non-tender,  Neuro:  No facial droop, No Asterixis  SKIN:  No rashes, good skin turgor. Extremities:  No edema, no cyanosis    Labs:       Recent Labs     22  035   WBC 3.7* 4.2*   RBC 3.72* 3.72*   HGB 8.6* 8.6*   HCT 29.3* 29.8*   MCV 78.8* 80.1*   MCH 23.1* 23.1*   MCHC 29.4* 28.9*   * 137   MPV 12.3 12.2      BMP:   Recent Labs     22  03322  03322  0359 22  032   *  --  137 142   K 5.7*   < > 4.9  4.9 4.7     --  106 111   CO2 22*  --  22* 21*   BUN 72*  --  61* 45*   CREATININE 2.5*  --  1.9* 1.7*   GLUCOSE 223*  --  156* 153*   CALCIUM 8.4*  --  8.1* 8.2*    < > = values in this interval not displayed. Phosphorus:   No results for input(s): PHOS in the last 72 hours. Magnesium:    No results for input(s): MG in the last 72 hours.   Albumin:    Recent Labs     04/29/22  0339 04/30/22  0359   LABALBU 3.1* 3.0*     BNP:      Lab Results   Component Value Date     12/06/2021     KRISS:    No results found for: KRISS  SPEP:  Lab Results   Component Value Date    PROT 6.6 04/30/2022     UPEP:   No results found for: LABPE  C3:   No results found for: C3  C4:   No results found for: C4  MPO ANCA:   No results found for: MPO  PR3 ANCA:   No results found for: PR3  Anti-GBM:   No results found for: GBMABIGG  Hep BsAg:       No results found for: HEPBSAG  Hep C AB:        No results found for: HEPCAB    Urinalysis/Chemistries:      Lab Results   Component Value Date    NITRU NEGATIVE 04/28/2022    COLORU YELLOW 04/28/2022    PHUR 5.0 04/28/2022    WBCUA 25-50 04/28/2022    WBCUA 0-5 04/13/2022    RBCUA 3-5 04/28/2022    MUCUS Present 02/08/2022    YEAST NONE SEEN 04/28/2022    BACTERIA NONE SEEN 04/28/2022    CLARITYU clear 08/24/2020    SPECGRAV 1.025 08/24/2020    LEUKOCYTESUR LARGE 04/28/2022    UROBILINOGEN 0.2 04/28/2022    BILIRUBINUR NEGATIVE 04/28/2022    BILIRUBINUR neg 08/24/2020    BLOODU TRACE 04/28/2022    GLUCOSEU NEGATIVE 04/28/2022    KETUA NEGATIVE 04/28/2022     Urine Sodium:   No results found for: DONALD  Urine Potassium:  No results found for: KUR  Urine Chloride:  No results found for: CLUR  Urine Osmolarity: No results found for: OSMOU  Urine Protein:   No components found for: TOTALPROTEIN, URINE   Urine Creatinine:   No results found for: LABCREA  Urine Eosinophils:  No components found for: UEOS        Impression and Plan:  1. PARIS on CKD:prerenal, improved and near her baseline. Reduce IV fluids to 50 ml/hour  2. Hyperkalemia, resolved  3. Metabolic acidosis, improved  4. HTN  5. UTI  6. CHF: repeat echo shows EF 50% which is Is improved. Appears euvolemic on exam.        Please don't hesitate to call with any questions.   Electronically signed by Thai Ames DO on 5/1/2022 at 8:46 AM

## 2022-05-01 NOTE — DISCHARGE INSTR - DIET

## 2022-05-02 NOTE — CARE COORDINATION
5/2/22, 7:16 AM EDT    Patient goals/plan/ treatment preferences discussed by  and . Patient goals/plan/ treatment preferences reviewed with patient/ family. Patient/ family verbalize understanding of discharge plan and are in agreement with goal/plan/treatment preferences. Understanding was demonstrated using the teach back method. AVS provided by RN at time of discharge, which includes all necessary medical information pertaining to the patients current course of illness, treatment, post-discharge goals of care, and treatment preferences.      Services At/After Discharge: Long Term Care       IMM Letter  IMM Letter given to Patient/Family/Significant other/Guardian/POA/by[de-identified] KAREN Robin CM  IMM Letter date given[de-identified] 04/29/22  IMM Letter time given[de-identified] 1034

## 2022-05-09 ENCOUNTER — TELEPHONE (OUTPATIENT)
Dept: PHYSICAL MEDICINE AND REHAB | Age: 82
End: 2022-05-09

## 2022-05-09 NOTE — TELEPHONE ENCOUNTER
[facility] pharmacy called to clarify that pt. Is to be on both Gabapentin and Lyrica. Per Dr. Royal Sharma note pt. To stop Gabapentin and just use Lyrica.

## 2022-05-11 DIAGNOSIS — K21.9 GASTROESOPHAGEAL REFLUX DISEASE, UNSPECIFIED WHETHER ESOPHAGITIS PRESENT: ICD-10-CM

## 2022-05-11 DIAGNOSIS — N18.32 TYPE 2 DIABETES MELLITUS WITH STAGE 3B CHRONIC KIDNEY DISEASE, WITHOUT LONG-TERM CURRENT USE OF INSULIN (HCC): ICD-10-CM

## 2022-05-11 DIAGNOSIS — E78.2 MIXED HYPERLIPIDEMIA: ICD-10-CM

## 2022-05-11 DIAGNOSIS — E11.22 TYPE 2 DIABETES MELLITUS WITH STAGE 3B CHRONIC KIDNEY DISEASE, WITHOUT LONG-TERM CURRENT USE OF INSULIN (HCC): ICD-10-CM

## 2022-05-11 RX ORDER — MAGNESIUM CITRATE
SOLUTION, ORAL ORAL
Qty: 180 TABLET | Refills: 0 | OUTPATIENT
Start: 2022-05-11

## 2022-05-11 RX ORDER — GLIPIZIDE 5 MG/1
TABLET ORAL
Qty: 90 TABLET | Refills: 0 | Status: SHIPPED | OUTPATIENT
Start: 2022-05-11 | End: 2022-08-08

## 2022-05-11 RX ORDER — CLOPIDOGREL BISULFATE 75 MG/1
TABLET ORAL
Qty: 90 TABLET | Refills: 0 | Status: SHIPPED | OUTPATIENT
Start: 2022-05-11 | End: 2022-08-08
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
## 2022-05-23 ENCOUNTER — TELEPHONE (OUTPATIENT)
Dept: FAMILY MEDICINE CLINIC | Age: 82
End: 2022-05-23

## 2022-05-25 ENCOUNTER — TELEPHONE (OUTPATIENT)
Dept: NEPHROLOGY | Age: 82
End: 2022-05-25

## 2022-05-25 ENCOUNTER — OFFICE VISIT (OUTPATIENT)
Dept: PHYSICAL MEDICINE AND REHAB | Age: 82
End: 2022-05-25
Payer: MEDICARE

## 2022-05-25 VITALS — WEIGHT: 156 LBS | BODY MASS INDEX: 30.63 KG/M2 | HEIGHT: 60 IN

## 2022-05-25 DIAGNOSIS — R53.81 DEBILITY: ICD-10-CM

## 2022-05-25 DIAGNOSIS — M79.2 NEUROPATHIC PAIN: ICD-10-CM

## 2022-05-25 DIAGNOSIS — G89.29 OTHER CHRONIC PAIN: Primary | ICD-10-CM

## 2022-05-25 DIAGNOSIS — I69.959 HEMIPARESIS AS LATE EFFECT OF CEREBROVASCULAR DISEASE, UNSPECIFIED CEREBROVASCULAR DISEASE TYPE, UNSPECIFIED LATERALITY (HCC): ICD-10-CM

## 2022-05-25 LAB
GFR SERPL CREATININE-BSD FRML MDRD: 15 ML/MIN/1.73M2
GFR SERPL CREATININE-BSD FRML MDRD: 17 ML/MIN/1.73M2
GFR SERPL CREATININE-BSD FRML MDRD: 17 ML/MIN/1.73M2
GFR SERPL CREATININE-BSD FRML MDRD: 19 ML/MIN/1.73M2
GFR SERPL CREATININE-BSD FRML MDRD: 25 ML/MIN/1.73M2
GFR SERPL CREATININE-BSD FRML MDRD: 29 ML/MIN/1.73M2

## 2022-05-25 PROCEDURE — 1090F PRES/ABSN URINE INCON ASSESS: CPT | Performed by: ANESTHESIOLOGY

## 2022-05-25 PROCEDURE — G8417 CALC BMI ABV UP PARAM F/U: HCPCS | Performed by: ANESTHESIOLOGY

## 2022-05-25 PROCEDURE — 1111F DSCHRG MED/CURRENT MED MERGE: CPT | Performed by: ANESTHESIOLOGY

## 2022-05-25 PROCEDURE — 99214 OFFICE O/P EST MOD 30 MIN: CPT | Performed by: ANESTHESIOLOGY

## 2022-05-25 PROCEDURE — 1123F ACP DISCUSS/DSCN MKR DOCD: CPT | Performed by: ANESTHESIOLOGY

## 2022-05-25 PROCEDURE — G8400 PT W/DXA NO RESULTS DOC: HCPCS | Performed by: ANESTHESIOLOGY

## 2022-05-25 PROCEDURE — G8427 DOCREV CUR MEDS BY ELIG CLIN: HCPCS | Performed by: ANESTHESIOLOGY

## 2022-05-25 PROCEDURE — 1036F TOBACCO NON-USER: CPT | Performed by: ANESTHESIOLOGY

## 2022-05-25 RX ORDER — ACETAMINOPHEN 325 MG/1
650 TABLET ORAL EVERY 6 HOURS PRN
COMMUNITY

## 2022-05-25 RX ORDER — PANTOPRAZOLE SODIUM 20 MG/1
20 TABLET, DELAYED RELEASE ORAL DAILY
COMMUNITY

## 2022-05-25 RX ORDER — OXYCODONE HYDROCHLORIDE AND ACETAMINOPHEN 5; 325 MG/1; MG/1
0.5 TABLET ORAL 2 TIMES DAILY PRN
Qty: 30 TABLET | Refills: 0 | Status: SHIPPED | OUTPATIENT
Start: 2022-05-25 | End: 2022-06-22

## 2022-05-25 RX ORDER — POLYETHYLENE GLYCOL 3350 17 G/17G
17 POWDER, FOR SOLUTION ORAL DAILY
COMMUNITY

## 2022-05-25 NOTE — TELEPHONE ENCOUNTER
I left a message for Bryant Aden. I scheduled her for June 6th at [facility].

## 2022-05-25 NOTE — TELEPHONE ENCOUNTER
----- Message from Lexy Adams MD sent at 5/25/2022  3:27 PM EDT -----  Pt needs a post hospital FU with a  BMP prior

## 2022-05-25 NOTE — PROGRESS NOTES
Chronic Pain/PM&R Clinic Note     Encounter Date: 5/25/22    Subjective:   Chief Complaint:   Chief Complaint   Patient presents with    Follow-up    Medication Refill     Pregabalin       History of Present Illness:   Bandar Granados is a [de-identified] y.o. female seen in the clinic initially on 04/13/22 upon request from ELLYN Goins (PCP) for her history of chronic pain. She has medical history of previous left-sided stroke with resultant right-sided hemiparesis (in [place] ~7-8 years ago), T2DM, and CKD. She states that since her stroke in [place] she has been experiencing right-sided whole body pain. She also has a lot of issues with weakness and spasticity on the right side of her body that impairs her ability to function. She states that she is ambulate with the use of a walker due to her pain and weakness in the right side of her body. She states that she does live with her daughter at this point in time and has a first-floor set up. She rates her pain is a constant 5-7/10 stabbing, dull ache pain on the entire right side of her body including her face, arm, and leg. She denies any associated saddle anesthesia or bowel/bladder incontinence. She states that she has a tough time taking medications due to her kidney disease. She states that currently she is managing her pain with use of gabapentin and Tylenol. She does not feel like this is very effective. Today, 5/25/2022, patient presents for planned follow-up for management of chronic pain. She states that she felt like the Lyrica was effective for her somewhat but she did not like the way it made her feel. She states that she currently resides in a nursing facility and is working on her rehab towards getting home. She states that her pain is limiting her ability to participate in physical therapy and she feels like she is not making progress towards getting home.   She is wonder if she can have any other medication  A stronger for reduction help out with her pain. She denies any other new symptoms this point.     Current Treatment Medications:   Tylenol as needed    Historical Medications:  Gabapentin-ineffective  Lyrica-unable to tolerate    Past Medical History:   Diagnosis Date    Depression     Heart attack ([address]     [facility]     Hyperlipidemia     Neuropathy     Sleep apnea     Cpap       Past Surgical History:   Procedure Laterality Date    APPENDECTOMY      CHOLECYSTECTOMY      HIP SURGERY Right 7/29/2020    right , Genicular Nerve block [facility]    Left total hip    OVARY REMOVAL         Family History   Problem Relation Age of Onset    Cancer Mother         colon    Diabetes Father     Stroke Father        Medications & Allergies:   Current Outpatient Medications   Medication Instructions    acetaminophen (TYLENOL) 650 mg, Oral, EVERY 6 HOURS PRN    amLODIPine (NORVASC) 10 mg, Oral, DAILY    aspirin 81 mg, Oral, DAILY    carvedilol (COREG) 12.5 mg, Oral, 2 TIMES DAILY WITH MEALS    clopidogrel (PLAVIX) 75 MG tablet take 1 tablet by mouth once daily    Ferrous Fumarate 324 mg, Oral, DAILY    glipiZIDE (GLUCOTROL) 5 MG tablet take 1 tablet by mouth once daily    hydrALAZINE (APRESOLINE) 12.5 mg, Oral, 2 TIMES DAILY    isosorbide mononitrate (IMDUR) 30 mg, Oral, DAILY    oxyCODONE-acetaminophen (PERCOCET) 5-325 MG per tablet 0.5 tablets, Oral, 2 TIMES DAILY PRN    pantoprazole (PROTONIX) 20 mg, Oral, DAILY    polyethylene glycol (GLYCOLAX) 17 g, Oral, DAILY    pravastatin (PRAVACHOL) 40 mg, Oral, NIGHTLY    pregabalin (LYRICA) 75 mg, Oral, EVERY EVENING    SITagliptin (JANUVIA) 50 mg, Oral, DAILY    vitamin B-12 (CYANOCOBALAMIN) 1,000 mcg, Oral, DAILY    vitamin C (ASCORBIC ACID) 500 mg, Oral, DAILY       No Known Allergies    Review of Systems:   Constitutional: negative for weight changes or fevers  Genitourinary: negative for bowel/bladder incontinence   Musculoskeletal: positive for right-sided whole body pain  Neurological: Positive for right-sided weakness  Behavioral/Psych: negative for anxiety/depression   All other systems reviewed and are negative    Objective: There were no vitals filed for this visit. Constitutional: Pleasant, no acute distress   Head: Normocephalic, atraumatic   Eyes: Conjunctivae normal   Neck: Supple, symmetrical   Lungs: Normal respiratory effort, non-labored breathing   Cardiovascular: Limbs warm and well perfused   Abdomen: Non-protruded   Musculoskeletal: Muscle bulk symmetric, no atrophy, no gross deformities   Neurological: Cranial nerves II-XII grossly intact. Spastic right-sided hemiparesis. Evidence of hyperesthesia and allodynia in right upper and right lower limbs. · Gait -spastic hemiplegia gait. Ambulates with assistive device. Skin: No rashes or lesions visible in limbs   Psychological: Cooperative, no exaggerated pain behaviors     Assessment:    Diagnosis Orders   1. Other chronic pain  oxyCODONE-acetaminophen (PERCOCET) 5-325 MG per tablet   2. Hemiparesis as late effect of cerebrovascular disease, unspecified cerebrovascular disease type, unspecified laterality ([facility] [address]     3. Neuropathic pain     4. Salvador Krishnan is a [de-identified] y. o.female presenting to the pain clinic for evaluation of chronic right-sided body pain in the setting of left-sided CVA with residual right-sided spastic hemiparesis. Her clinical history and physical examination are consistent with central post stroke pain. She is unable to tolerate her Lyrica. I have started her on low-dose Percocet for her pain due to her advanced CKD. We will follow-up in 4 weeks for reevaluation. Plan: The following treatment recommendations and plan were discussed in detail with Dario Rodriguez.     Imaging/Studies/Labs:   I will look into obtaining records from [place] for her MRI imaging after her stroke. Analgesics: For continued chronic pain secondary to previous stroke, I have started the patient on low-dose Percocet 2.5 mg she can take up to twice a day as needed for severe pain (pain greater than 7/10). Patient is advised take this medication as prescribed as taking more than prescribed can lead development of tolerance, physical dependence, addiction. OARRS reviewed and is appropriate. Pain contract signed. Adjuvants:   None; unable to tolerate neuropathic medications and has advanced CKD    Interventions:   None     Anticoagulation/NPO Recommendations:   None    Multidisciplinary Pain Management:   In the presence of complex, chronic, and multi-factorial pain, the importance of a multidisciplinary approach to pain management in the patients management regimen was emphasized and discussed in great detail.      Referrals:  None    Prescriptions Written This Visit:   Percocet 5 mg (#30, 0 refills)    Follow-up: 4 weeks      Osiris Quach DO  Interventional Pain Management/PM&R   [place]

## 2022-06-06 ENCOUNTER — OFFICE VISIT (OUTPATIENT)
Dept: NEPHROLOGY | Age: 82
End: 2022-06-06
Payer: MEDICARE

## 2022-06-06 VITALS
HEART RATE: 70 BPM | SYSTOLIC BLOOD PRESSURE: 114 MMHG | WEIGHT: 152 LBS | DIASTOLIC BLOOD PRESSURE: 75 MMHG | OXYGEN SATURATION: 97 % | BODY MASS INDEX: 29.69 KG/M2

## 2022-06-06 DIAGNOSIS — N18.32 STAGE 3B CHRONIC KIDNEY DISEASE (HCC): ICD-10-CM

## 2022-06-06 DIAGNOSIS — I10 HTN (HYPERTENSION), BENIGN: ICD-10-CM

## 2022-06-06 DIAGNOSIS — E87.5 HYPERKALEMIA: Primary | ICD-10-CM

## 2022-06-06 PROCEDURE — G8417 CALC BMI ABV UP PARAM F/U: HCPCS | Performed by: INTERNAL MEDICINE

## 2022-06-06 PROCEDURE — 99213 OFFICE O/P EST LOW 20 MIN: CPT | Performed by: INTERNAL MEDICINE

## 2022-06-06 PROCEDURE — 1036F TOBACCO NON-USER: CPT | Performed by: INTERNAL MEDICINE

## 2022-06-06 PROCEDURE — G8427 DOCREV CUR MEDS BY ELIG CLIN: HCPCS | Performed by: INTERNAL MEDICINE

## 2022-06-06 PROCEDURE — 1123F ACP DISCUSS/DSCN MKR DOCD: CPT | Performed by: INTERNAL MEDICINE

## 2022-06-06 PROCEDURE — G8400 PT W/DXA NO RESULTS DOC: HCPCS | Performed by: INTERNAL MEDICINE

## 2022-06-06 PROCEDURE — 1090F PRES/ABSN URINE INCON ASSESS: CPT | Performed by: INTERNAL MEDICINE

## 2022-06-06 NOTE — PROGRESS NOTES
pravastatin (PRAVACHOL) 40 MG tablet Take 1 tablet by mouth nightly 90 tablet 0    carvedilol (COREG) 12.5 MG tablet Take 1 tablet by mouth 2 times daily (with meals) 180 tablet 0    vitamin C (ASCORBIC ACID) 500 MG tablet Take 500 mg by mouth daily      amLODIPine (NORVASC) 10 MG tablet Take 1 tablet by mouth daily 90 tablet 1       Vitals     /75 (Site: Left Upper Arm, Position: Sitting, Cuff Size: Large Adult)   Pulse 70   Wt 152 lb (68.9 kg)   SpO2 97%   BMI 29.69 kg/m²  Wt Readings from Last 3 Encounters:   06/06/22 152 lb (68.9 kg)   05/25/22 156 lb (70.8 kg)   05/01/22 175 lb 0.7 oz (79.4 kg)        Physical Exam     General -- no distress  Lungs -- clear  Heart -- S1, S2 heard, JVD - no  Abdomen - soft, non-tender  Extremities -- mild edema  CNS - awake and alert    Labs, Radiology and Tests    Labs -               Potassium            BUN            Creatinine            eGFR                        UPCR            [facility]                          Renal Ultrasound Scan --12/21  Right kidney 9.7 cm left kidney 9 cm normal ultrasound     Assessment    1. Renal -definitely has chronic kidney disease stage III mostly due to senile nephrosclerosis longstanding hypertension and diabetes  -However no new labs available at this time, will check labs at this time  2. Electrolytes within normal limits we will follow the potassium monitoring blood work  3. Essential hypertension running well  4. Diabetes mellitus with diabetic nephropathy  5. meds reviewed and D/W patient  6. Follow-up in 6 months    Tests and orders placed this Encounter   No orders of the defined types were placed in this encounter. Anders Miller M.D  [facility].

## 2022-06-07 LAB
ALBUMIN SERPL-MCNC: 3.7 G/DL
ALP BLD-CCNC: 55 U/L
ALT SERPL-CCNC: 8 U/L
ANION GAP SERPL CALCULATED.3IONS-SCNC: NORMAL MMOL/L
AST SERPL-CCNC: 9 U/L
BILIRUB SERPL-MCNC: 0.6 MG/DL (ref 0.1–1.4)
BUN BLDV-MCNC: 38 MG/DL
CALCIUM SERPL-MCNC: 8.8 MG/DL
CHLORIDE BLD-SCNC: 108 MMOL/L
CO2: 21 MMOL/L
CREAT SERPL-MCNC: 1.7 MG/DL
GFR CALCULATED: 29
GLUCOSE BLD-MCNC: 217 MG/DL
POTASSIUM SERPL-SCNC: 5.1 MMOL/L
SODIUM BLD-SCNC: 137 MMOL/L
TOTAL PROTEIN: 7.2

## 2022-06-14 RX ORDER — DULOXETIN HYDROCHLORIDE 60 MG/1
60 CAPSULE, DELAYED RELEASE ORAL DAILY
Qty: 90 CAPSULE | Refills: 0 | Status: SHIPPED | OUTPATIENT
Start: 2022-06-14

## 2022-06-14 NOTE — TELEPHONE ENCOUNTER
Rx Refill Note  Requested Prescriptions     Pending Prescriptions Disp Refills   • DULoxetine (CYMBALTA) 60 MG capsule [Pharmacy Med Name: DULOXETINE HCL DR CAPS 60MG] 90 capsule 3     Sig: TAKE 1 CAPSULE DAILY      Last office visit with prescribing clinician: 9/7/2021      Next office visit with prescribing clinician: Past due @10/25/21    Zandra Swain MA  06/14/22, 08:05 EDT     Shan Muse [phone] Son    Left message to return call. Pt needs a recheck from [id number] - if Dr. De Jesus is not available any provider is OK to see.    Will do a 90 day refill only -[facility]

## 2022-06-21 ENCOUNTER — OFFICE VISIT (OUTPATIENT)
Dept: FAMILY MEDICINE CLINIC | Age: 82
End: 2022-06-21
Payer: MEDICARE

## 2022-06-21 VITALS
OXYGEN SATURATION: 96 % | BODY MASS INDEX: 29.1 KG/M2 | SYSTOLIC BLOOD PRESSURE: 128 MMHG | TEMPERATURE: 98.9 F | HEART RATE: 94 BPM | RESPIRATION RATE: 20 BRPM | DIASTOLIC BLOOD PRESSURE: 68 MMHG | WEIGHT: 149 LBS

## 2022-06-21 DIAGNOSIS — I50.20 HEART FAILURE WITH REDUCED EJECTION FRACTION (HCC): ICD-10-CM
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
## 2022-06-21 DIAGNOSIS — E11.22 TYPE 2 DIABETES MELLITUS WITH STAGE 3B CHRONIC KIDNEY DISEASE, WITHOUT LONG-TERM CURRENT USE OF INSULIN (HCC): Primary | ICD-10-CM

## 2022-06-21 DIAGNOSIS — N18.32 TYPE 2 DIABETES MELLITUS WITH STAGE 3B CHRONIC KIDNEY DISEASE, WITHOUT LONG-TERM CURRENT USE OF INSULIN (HCC): Primary | ICD-10-CM

## 2022-06-21 PROCEDURE — 1036F TOBACCO NON-USER: CPT | Performed by: NURSE PRACTITIONER

## 2022-06-21 PROCEDURE — 3044F HG A1C LEVEL LT 7.0%: CPT | Performed by: NURSE PRACTITIONER

## 2022-06-21 PROCEDURE — G8417 CALC BMI ABV UP PARAM F/U: HCPCS | Performed by: NURSE PRACTITIONER

## 2022-06-21 PROCEDURE — G8400 PT W/DXA NO RESULTS DOC: HCPCS | Performed by: NURSE PRACTITIONER

## 2022-06-21 PROCEDURE — 1090F PRES/ABSN URINE INCON ASSESS: CPT | Performed by: NURSE PRACTITIONER

## 2022-06-21 PROCEDURE — 1123F ACP DISCUSS/DSCN MKR DOCD: CPT | Performed by: NURSE PRACTITIONER

## 2022-06-21 PROCEDURE — 99213 OFFICE O/P EST LOW 20 MIN: CPT | Performed by: NURSE PRACTITIONER

## 2022-06-21 PROCEDURE — G8427 DOCREV CUR MEDS BY ELIG CLIN: HCPCS | Performed by: NURSE PRACTITIONER

## 2022-06-21 ASSESSMENT — PATIENT HEALTH QUESTIONNAIRE - PHQ9
SUM OF ALL RESPONSES TO PHQ QUESTIONS 1-9: 0
2. FEELING DOWN, DEPRESSED OR HOPELESS: 0
1. LITTLE INTEREST OR PLEASURE IN DOING THINGS: 0
SUM OF ALL RESPONSES TO PHQ9 QUESTIONS 1 & 2: 0
SUM OF ALL RESPONSES TO PHQ QUESTIONS 1-9: 0

## 2022-06-21 NOTE — PATIENT INSTRUCTIONS
Patient Education        Diabetes and Preventing Falls: Care Instructions  Overview     Complications of diabetes--such as nerve damage, foot problems, and reduced vision--may increase your risk of a fall. Some of your medicines also may add toyour risk. By making your home safer, you can lower your risk of falling. Doing things toprevent diabetes complications may also help to lower your risk. You can make your home safer with a few simple measures. Follow-up care is a key part of your treatment and safety. Be sure to make and go to all appointments, and call your doctor if you are having problems. It's also a good idea to know your test results and keep alist of the medicines you take. How can you care for yourself at home? Taking care of yourself   Keep your blood sugar at a target level (which you set with your doctor).  Exercise regularly to improve your strength, muscle tone, and balance. Walk if you can. Swimming may be a good choice if you cannot walk easily.  Have your vision checked as often as your doctor recommends. It is usually once a year or more often if you have eye problems.  Know the side effects of the medicines you take. Ask your doctor or pharmacist whether the medicines you take can affect your balance. Sleeping pills or sedatives can affect your balance.  Limit the amount of alcohol you drink. Alcohol can impair your balance and other senses.  Have your doctor check your feet during each visit. If you have a foot problem, see your doctor. Preventing falls at home   Remove raised doorway thresholds, throw rugs, and clutter. Repair loose carpet or raised areas in the floor. [facility] furniture and electrical cords to keep them out of walking paths.  Use nonskid floor wax, and wipe up spills right away, especially on ceramic tile floors.  If you use a walker or cane, put rubber tips on it.  If you use crutches, clean the bottoms of them regularly with an abrasive pad, such as steel wool.  Keep your house well lit, especially [place], and outside walkways. Use night-lights in areas such as hallways and bathrooms. Add extra light switches or use remote switches (such as switches that go on or off when you clap your hands) to make it easier to turn lights on if you have to get up during the night.  Install sturdy handrails on stairways. Put grab bars near your shower, bathtub, and toilet.  Store household items on low shelves so that you do not have to climb or reach high. Or use a reaching device that you can get at a medical supply store. If you have to climb for something, use a step stool with handrails, or ask someone to get it for you.  Keep a cordless phone and a flashlight with new batteries by your bed. If possible, put a phone in each of the main rooms of your house, or carry a cell phone in case you fall and cannot reach a phone. Or you can wear a device around your neck or wrist. You push a button that sends a signal for help.  Wear low-heeled shoes that fit well and give your feet good support. Use footwear with nonskid soles. Check the heels and soles of your shoes for wear. Repair or replace worn heels or soles.  Do not wear socks without shoes on wood floors.  Walk on the grass when the sidewalks are slippery. If you live in an area that gets snow and ice in the winter, sprinkle salt on slippery steps and sidewalks. Where can you learn more? Go to https://[email or web address]. org and sign in to your [email or web address] account. Enter K835 in the [facility] box to learn more about \"Diabetes and Preventing Falls: Care Instructions. \"     If you do not have an account, please click on the \"Sign Up Now\" link. Current as of: September 8, 2021               Content Version: 13.3  © 2820-5539 [employer]wise, Incorporated. Care instructions adapted under license by [facility] ([facility]).  If you have questions about a medical condition or this instruction, always ask your healthcare professional. [place] [address] any warranty or liability for your use of this information.

## 2022-06-21 NOTE — PROGRESS NOTES
[address] [address] [address]  [facility] [address]  Dept: [phone]  Dept Fax: [phone]  Loc: [phone]     Visit Date:  6/21/2022      Patient:  Uche Aranda  YOB: 1940    HPI:     Chief Complaint   Patient presents with    Follow-up     DC 6/13/22 from [facility], c/o right leg pain-sees pain mgmt, 1 report of fall in the bathroom       Patient is brought by her daughter today for evaluation for home health care and physical assistance products. Patient was recently discharged from the nursing home and will be going home with her daughter. Patient states overall she is doing well, has a decent appetite, sleeping adequately. She did have a fall in the shower couple days ago when she slipped. Her daughter was in a different room and came to help her up, and this did aggravate the chronic pain of her right leg. Otherwise patient sustained no apparent injuries. Patient had been admitted to the hospital for hypokalemia and acute kidney injury. Medications    Current Outpatient Medications:     acetaminophen (TYLENOL) 325 mg tablet, Take 650 mg by mouth every 6 hours as needed for Pain, Disp: , Rfl:     polyethylene glycol (GLYCOLAX) 17 GM/SCOOP powder, Take 17 g by mouth daily, Disp: , Rfl:     pantoprazole (PROTONIX) 20 MG tablet, Take 20 mg by mouth daily, Disp: , Rfl:     oxyCODONE-acetaminophen (PERCOCET) 5-325 MG per tablet, Take 0.5 tablets by mouth 2 times daily as needed for Pain for up to 30 days. , Disp: 30 tablet, Rfl: 0    clopidogrel (PLAVIX) 75 MG tablet, take 1 tablet by mouth once daily, Disp: 90 tablet, Rfl: 0    glipiZIDE (GLUCOTROL) 5 MG tablet, take 1 tablet by mouth once daily, Disp: 90 tablet, Rfl: 0    aspirin 81 MG EC tablet, Take 1 tablet by mouth daily, Disp: 30 tablet, Rfl: 0    pravastatin (PRAVACHOL) 40 MG tablet, Take 1 tablet by mouth nightly, Disp: 90 tablet, Rfl: 0   carvedilol (COREG) 12.5 MG tablet, Take 1 tablet by mouth 2 times daily (with meals), Disp: 180 tablet, Rfl: 0    vitamin C (ASCORBIC ACID) 500 MG tablet, Take 500 mg by mouth daily, Disp: , Rfl:     amLODIPine (NORVASC) 10 MG tablet, Take 1 tablet by mouth daily, Disp: 90 tablet, Rfl: 1    pregabalin (LYRICA) 75 MG capsule, Take 1 capsule by mouth every evening for 30 days. , Disp: 30 capsule, Rfl: 2    SITagliptin (JANUVIA) 50 MG tablet, Take 1 tablet by mouth daily (Patient taking differently: Take 100 mg by mouth daily ), Disp: 90 tablet, Rfl: 1    hydrALAZINE (APRESOLINE) 25 MG tablet, Take 0.5 tablets by mouth 2 times daily (Patient taking differently: Take 25 mg by mouth 2 times daily ), Disp: 90 tablet, Rfl: 1    Ferrous Fumarate 324 (106 Fe) MG TABS, Take 1 tablet by mouth daily, Disp: 90 tablet, Rfl: 0    isosorbide mononitrate (IMDUR) 30 MG extended release tablet, Take 1 tablet by mouth daily, Disp: 90 tablet, Rfl: 1    vitamin B-12 (CYANOCOBALAMIN) 1000 MCG tablet, Take 1 tablet by mouth daily, Disp: 90 tablet, Rfl: 1    The patient has No Known Allergies. Past Medical History  Paul Domingo  has a past medical history of Depression, Heart attack ([address] Hyperlipidemia, Neuropathy, and Sleep apnea. Subjective:      Review of Systems   Constitutional: Negative for activity change, fatigue and fever. HENT: Negative for congestion, ear pain, rhinorrhea, sore throat and trouble swallowing. Eyes: Negative for pain, discharge and redness. Respiratory: Negative for cough, shortness of breath and wheezing. Cardiovascular: Negative. Gastrointestinal: Negative for abdominal pain, constipation, diarrhea, nausea and vomiting. Endocrine: Negative. Genitourinary: Negative for dysuria, frequency and urgency. Musculoskeletal: Positive for arthralgias. Negative for back pain and myalgias. Skin: Negative for rash. Allergic/Immunologic: Negative.     Neurological: Negative for dizziness, tremors, weakness and headaches. Hematological: Negative. Psychiatric/Behavioral: Negative for dysphoric mood and sleep disturbance. The patient is not nervous/anxious. Objective:     /68 (Site: Right Upper Arm)   Pulse 94   Temp 98.9 °F (37.2 °C) (Oral)   Resp 20   Wt 149 lb (67.6 kg)   SpO2 96%   BMI 29.10 kg/m²     Physical Exam  Vitals reviewed. Constitutional:       General: She is not in acute distress. Appearance: Normal appearance. She is well-developed. She is not toxic-appearing or diaphoretic. HENT:      Head: Normocephalic. No right periorbital erythema or left periorbital erythema. Jaw: No trismus. Right Ear: Hearing and external ear normal.      Left Ear: Hearing and external ear normal.      Nose: Nose normal. No mucosal edema or rhinorrhea. Mouth/Throat:      Mouth: Mucous membranes are moist.      Dentition: Normal dentition. Pharynx: Uvula midline. No posterior oropharyngeal erythema. Tonsils: No tonsillar exudate. 0 on the right. 0 on the left. Eyes:      General: Lids are normal.         Right eye: No discharge. Left eye: No discharge. Conjunctiva/sclera: Conjunctivae normal.      Right eye: Right conjunctiva is not injected. No chemosis. Left eye: No chemosis. Pupils: Pupils are equal, round, and reactive to light. Neck:      Vascular: No JVD. Trachea: Trachea normal.   Cardiovascular:      Rate and Rhythm: Normal rate and regular rhythm. Heart sounds: Normal heart sounds. No murmur heard. Pulmonary:      Effort: Pulmonary effort is normal. No respiratory distress. Breath sounds: Normal breath sounds. No stridor. No wheezing. Abdominal:      General: Bowel sounds are normal. There is no distension. Palpations: Abdomen is soft. Tenderness: There is no abdominal tenderness. Musculoskeletal:         General: No tenderness or signs of injury. Normal range of motion.       Cervical back: Full passive range of motion without pain and normal range of motion. Lymphadenopathy:      Cervical: No cervical adenopathy. Skin:     General: Skin is warm and dry. Capillary Refill: Capillary refill takes less than 2 seconds. Findings: No rash. Neurological:      Mental Status: She is alert and oriented to person, place, and time. GCS: GCS eye subscore is 4. GCS verbal subscore is 5. GCS motor subscore is 6. Cranial Nerves: No cranial nerve deficit. Coordination: Coordination normal.      Gait: Gait normal.   Psychiatric:         Mood and Affect: Mood is not anxious or depressed. Speech: Speech normal.         Behavior: Behavior normal. Behavior is not withdrawn or hyperactive. Behavior is cooperative. Thought Content: Thought content normal.         Judgment: Judgment normal.         Assessment/Plan:      Nilesh Mcfarland was seen today for follow-up. Diagnoses and all orders for this visit:    Type 2 diabetes mellitus with stage 3b chronic kidney disease, without long-term current use of insulin ([address]    Fall at home, initial encounter    Heart failure with reduced ejection fraction ([facility]    Tony Mcgrath will require home care because of her general physical immobility and inability to move about without a walker and assistance. Patient will need help with bathing as she has a history of falling. She needs help with food preparation, housekeeping and getting dressed. .  The patient is in agreement to receiving home care. Tony Mcgrath was evaluated today and a DME order was entered for a wheeled walker with seat because she requires this to successfully complete daily living tasks of ambulating. A wheeled walker with seat is necessary due to the patient's unsteady gait, upper body weakness, inability to  and ambulation device, ambulating only short distances by pushing a walker, and the need to sit for a short time before resuming ambulation.   These tasks cannot be completed with a lesser ambulation device such as a cane, crutch, or standard walker. The need for this equipment was discussed with the patient and she understands and is in agreement. Return in about 6 months (around 12/21/2022), or if symptoms worsen or fail to improve. Patient instructions given andreviewed.         Electronically signed by ELLYN Brown CNP on 6/22/2022 at 7:56 AM

## 2022-06-22 ENCOUNTER — OFFICE VISIT (OUTPATIENT)
Dept: PHYSICAL MEDICINE AND REHAB | Age: 82
End: 2022-06-22
Payer: MEDICARE

## 2022-06-22 VITALS
HEIGHT: 60 IN | DIASTOLIC BLOOD PRESSURE: 70 MMHG | BODY MASS INDEX: 29.25 KG/M2 | SYSTOLIC BLOOD PRESSURE: 136 MMHG | WEIGHT: 149 LBS

## 2022-06-22 DIAGNOSIS — G89.29 OTHER CHRONIC PAIN: ICD-10-CM

## 2022-06-22 DIAGNOSIS — R53.81 DEBILITY: ICD-10-CM

## 2022-06-22 DIAGNOSIS — M79.2 NEUROPATHIC PAIN: ICD-10-CM

## 2022-06-22 DIAGNOSIS — I69.959 HEMIPARESIS AS LATE EFFECT OF CEREBROVASCULAR DISEASE, UNSPECIFIED CEREBROVASCULAR DISEASE TYPE, UNSPECIFIED LATERALITY (HCC): Primary | ICD-10-CM

## 2022-06-22 PROCEDURE — 99214 OFFICE O/P EST MOD 30 MIN: CPT | Performed by: ANESTHESIOLOGY

## 2022-06-22 PROCEDURE — G8417 CALC BMI ABV UP PARAM F/U: HCPCS | Performed by: ANESTHESIOLOGY

## 2022-06-22 PROCEDURE — 1036F TOBACCO NON-USER: CPT | Performed by: ANESTHESIOLOGY

## 2022-06-22 PROCEDURE — G8427 DOCREV CUR MEDS BY ELIG CLIN: HCPCS | Performed by: ANESTHESIOLOGY

## 2022-06-22 PROCEDURE — G8400 PT W/DXA NO RESULTS DOC: HCPCS | Performed by: ANESTHESIOLOGY

## 2022-06-22 PROCEDURE — 1123F ACP DISCUSS/DSCN MKR DOCD: CPT | Performed by: ANESTHESIOLOGY

## 2022-06-22 PROCEDURE — 1090F PRES/ABSN URINE INCON ASSESS: CPT | Performed by: ANESTHESIOLOGY

## 2022-06-22 RX ORDER — OXYCODONE HYDROCHLORIDE AND ACETAMINOPHEN 5; 325 MG/1; MG/1
1 TABLET ORAL 2 TIMES DAILY PRN
Qty: 60 TABLET | Refills: 0 | Status: SHIPPED | OUTPATIENT
Start: 2022-06-24 | End: 2022-07-22 | Stop reason: SDUPTHER

## 2022-06-22 ASSESSMENT — ENCOUNTER SYMPTOMS
EYE PAIN: 0
COUGH: 0
SHORTNESS OF BREATH: 0
BACK PAIN: 0
DIARRHEA: 0
WHEEZING: 0
TROUBLE SWALLOWING: 0
RHINORRHEA: 0
EYE REDNESS: 0
CONSTIPATION: 0
EYE DISCHARGE: 0
SORE THROAT: 0
ABDOMINAL PAIN: 0
ALLERGIC/IMMUNOLOGIC NEGATIVE: 1
VOMITING: 0
NAUSEA: 0

## 2022-06-22 NOTE — PROGRESS NOTES
Chronic Pain/PM&R Clinic Note     Encounter Date: 6/22/22    Subjective:   Chief Complaint:   Chief Complaint   Patient presents with    Follow-up    Medication Refill     Percocet    Discuss Medications     1/2 tablet of percocet is not controlling pain        History of Present Illness:   Jorge Sutherland is a [de-identified] y.o. female seen in the clinic initially on 04/13/22 upon request from ELLYN Childress (PCP) for her history of chronic pain. She has medical history of previous left-sided stroke with resultant right-sided hemiparesis (in [place] ~7-8 years ago), T2DM, and CKD. She states that since her stroke in [place] she has been experiencing right-sided whole body pain. She also has a lot of issues with weakness and spasticity on the right side of her body that impairs her ability to function. She states that she is ambulate with the use of a walker due to her pain and weakness in the right side of her body. She states that she does live with her daughter at this point in time and has a first-floor set up. She rates her pain is a constant 5-7/10 stabbing, dull ache pain on the entire right side of her body including her face, arm, and leg. She denies any associated saddle anesthesia or bowel/bladder incontinence. She states that she has a tough time taking medications due to her kidney disease. She states that currently she is managing her pain with use of gabapentin and Tylenol. She does not feel like this is very effective. Today, 6/22/2022, patient presents for planned follow-up for management of chronic pain. She states that she has been taking her half a tablet Percocet consistently twice daily. She feels like this medication has not been super effective but given her pain relief. She denies any side effects on this medication. She is wondering if she can increase this to a full tablet to help with better pain control. She denies any other new pain at this point.     Current Treatment Medications:   Tylenol as needed    Historical Medications:  Gabapentin-ineffective  Lyrica-unable to tolerate    Past Medical History:   Diagnosis Date    Depression     Heart attack ([facility]     [facility]     Hyperlipidemia     Neuropathy     Sleep apnea     Cpap       Past Surgical History:   Procedure Laterality Date    APPENDECTOMY      CHOLECYSTECTOMY      HIP SURGERY Right 7/29/2020    right , Genicular Nerve block performed by Sam Lee [facility]    Left total hip    OVARY REMOVAL         Family History   Problem Relation Age of Onset    Cancer Mother         colon    Diabetes Father     Stroke Father        Medications & Allergies:   Current Outpatient Medications   Medication Instructions    acetaminophen (TYLENOL) 650 mg, Oral, EVERY 6 HOURS PRN    amLODIPine (NORVASC) 10 mg, Oral, DAILY    aspirin 81 mg, Oral, DAILY    carvedilol (COREG) 12.5 mg, Oral, 2 TIMES DAILY WITH MEALS    clopidogrel (PLAVIX) 75 MG tablet take 1 tablet by mouth once daily    Ferrous Fumarate 324 mg, Oral, DAILY    glipiZIDE (GLUCOTROL) 5 MG tablet take 1 tablet by mouth once daily    hydrALAZINE (APRESOLINE) 12.5 mg, Oral, 2 TIMES DAILY    isosorbide mononitrate (IMDUR) 30 mg, Oral, DAILY    [START ON 6/24/2022] oxyCODONE-acetaminophen (PERCOCET) 5-325 MG per tablet 1 tablet, Oral, 2 TIMES DAILY PRN    pantoprazole (PROTONIX) 20 mg, Oral, DAILY    polyethylene glycol (GLYCOLAX) 17 g, Oral, DAILY    pravastatin (PRAVACHOL) 40 mg, Oral, NIGHTLY    SITagliptin (JANUVIA) 50 mg, Oral, DAILY    vitamin B-12 (CYANOCOBALAMIN) 1,000 mcg, Oral, DAILY    vitamin C (ASCORBIC ACID) 500 mg, Oral, DAILY       No Known Allergies    Review of Systems:   Constitutional: negative for weight changes or fevers  Genitourinary: negative for bowel/bladder incontinence   Musculoskeletal: positive for right-sided whole body pain  Neurological: Positive for right-sided weakness  Behavioral/Psych: negative for anxiety/depression   All other systems reviewed and are negative    Objective:     Vitals:    06/22/22 1448   BP: 136/70       Constitutional: Pleasant, no acute distress   Head: Normocephalic, atraumatic   Eyes: Conjunctivae normal   Neck: Supple, symmetrical   Lungs: Normal respiratory effort, non-labored breathing   Cardiovascular: Limbs warm and well perfused   Abdomen: Non-protruded   Musculoskeletal: Muscle bulk symmetric, no atrophy, no gross deformities   Neurological: Cranial nerves II-XII grossly intact. Spastic right-sided hemiparesis. Evidence of hyperesthesia and allodynia in right upper and right lower limbs. · Gait -spastic hemiplegia gait. Ambulates with assistive device. Skin: No rashes or lesions visible in limbs   Psychological: Cooperative, no exaggerated pain behaviors     Assessment:    Diagnosis Orders   1. Hemiparesis as late effect of cerebrovascular disease, unspecified cerebrovascular disease type, unspecified laterality ([facility]     2. Other chronic pain  oxyCODONE-acetaminophen (PERCOCET) 5-325 MG per tablet   3. Neuropathic pain     4. Michelle Alonzo is a [de-identified] y. o.female presenting to the pain clinic for evaluation of chronic right-sided body pain in the setting of left-sided CVA with residual right-sided spastic hemiparesis. Her clinical history and physical examination are consistent with central post stroke pain. She is unable to tolerate her Lyrica. I have increased her Percocet to 5 mg twice daily as needed. We will follow-up in 8 weeks for evaluation. Plan: The following treatment recommendations and plan were discussed in detail with Leandra Leos. Imaging/Studies/Labs:   I will look into obtaining records from [place] for her MRI imaging after her stroke. Analgesics:    For continued chronic pain secondary to previous stroke, I have increased her Percocet to 5 mg she can take up to twice a day as needed for severe pain (pain greater than 7/10). Patient is advised take this medication as prescribed as taking more than prescribed can lead development of tolerance, physical dependence, addiction. OARRS reviewed and is appropriate. Pain contract signed. Adjuvants:   None; unable to tolerate neuropathic medications and has advanced CKD    Interventions:   None     Anticoagulation/NPO Recommendations:   None    Multidisciplinary Pain Management:   In the presence of complex, chronic, and multi-factorial pain, the importance of a multidisciplinary approach to pain management in the patients management regimen was emphasized and discussed in great detail.      Referrals:  None    Prescriptions Written This Visit:   Percocet 5 mg (#60, 0 refills)    Follow-up: 8 weeks      Medina Stroud, DO  Interventional Pain Management/PM&R   [place]

## 2022-07-06 ENCOUNTER — TELEPHONE (OUTPATIENT)
Dept: FAMILY MEDICINE CLINIC | Age: 82
End: 2022-07-06

## 2022-07-06 NOTE — LETTER
[address]  [address] [address]  Phone: [phone]  Fax: [phone]    ELLYN Krueger CNP         July 6, 2022     Patient: Sho Verduzco   YOB: 1940   Date of Visit: 7/6/2022       To Whom It May Concern: It is my medical opinion that Sandee Havers requires a disability parking placard for medical reasons  Duration of need: 5 years    If you have any questions or concerns, please don't hesitate to call.     Sincerely,        ELLYN Krueger CNP

## 2022-07-06 NOTE — TELEPHONE ENCOUNTER
----- Message from Domitila Rush sent at 7/6/2022  2:38 PM EDT -----  Subject: Referral Request    Reason for referral request? pt grand daughter called asking for a parking   sticker , saying she's in a wheel chair   Provider patient wants to be referred to(if known):     Provider Phone Number(if known):     Additional Information for Provider?   ---------------------------------------------------------------------------  --------------  [address]    [phone]; OK to leave message on voicemail  ---------------------------------------------------------------------------  --------------

## 2022-07-20 DIAGNOSIS — E11.40 TYPE 2 DIABETES MELLITUS WITH DIABETIC NEUROPATHY, WITHOUT LONG-TERM CURRENT USE OF INSULIN (HCC): ICD-10-CM

## 2022-07-22 DIAGNOSIS — G89.29 OTHER CHRONIC PAIN: ICD-10-CM

## 2022-07-22 RX ORDER — OXYCODONE HYDROCHLORIDE AND ACETAMINOPHEN 5; 325 MG/1; MG/1
1 TABLET ORAL 2 TIMES DAILY PRN
Qty: 60 TABLET | Refills: 0 | Status: SHIPPED | OUTPATIENT
Start: 2022-07-22 | End: 2022-08-21

## 2022-07-22 NOTE — TELEPHONE ENCOUNTER
Manus Grounds called requesting a refill on the following medications:  Requested Prescriptions     Pending Prescriptions Disp Refills    oxyCODONE-acetaminophen (PERCOCET) 5-325 MG per tablet 60 tablet 0     Sig: Take 1 tablet by mouth 2 times daily as needed for Pain for up to 30 days.      Pharmacy verified: [facility]  .pv      Date of last visit: 6/22/2022  Date of next visit (if applicable): 7/31/0282

## 2022-08-05 DIAGNOSIS — N18.32 TYPE 2 DIABETES MELLITUS WITH STAGE 3B CHRONIC KIDNEY DISEASE, WITHOUT LONG-TERM CURRENT USE OF INSULIN (HCC): ICD-10-CM

## 2022-08-05 DIAGNOSIS — E11.22 TYPE 2 DIABETES MELLITUS WITH STAGE 3B CHRONIC KIDNEY DISEASE, WITHOUT LONG-TERM CURRENT USE OF INSULIN (HCC): ICD-10-CM

## 2022-08-05 RX ORDER — SITAGLIPTIN 50 MG/1
TABLET, FILM COATED ORAL
Qty: 90 TABLET | Refills: 1 | Status: SHIPPED | OUTPATIENT
Start: 2022-08-05

## 2022-08-06 DIAGNOSIS — I25.2 HISTORY OF MI (MYOCARDIAL INFARCTION): ICD-10-CM

## 2022-08-06 DIAGNOSIS — I50.20 HEART FAILURE WITH REDUCED EJECTION FRACTION (HCC): ICD-10-CM

## 2022-08-06 DIAGNOSIS — E78.2 MIXED HYPERLIPIDEMIA: ICD-10-CM

## 2022-08-06 DIAGNOSIS — E11.22 TYPE 2 DIABETES MELLITUS WITH STAGE 3B CHRONIC KIDNEY DISEASE, WITHOUT LONG-TERM CURRENT USE OF INSULIN (HCC): ICD-10-CM

## 2022-08-06 DIAGNOSIS — I10 ESSENTIAL HYPERTENSION: ICD-10-CM

## 2022-08-06 DIAGNOSIS — N18.32 TYPE 2 DIABETES MELLITUS WITH STAGE 3B CHRONIC KIDNEY DISEASE, WITHOUT LONG-TERM CURRENT USE OF INSULIN (HCC): ICD-10-CM

## 2022-08-08 RX ORDER — ISOSORBIDE MONONITRATE 30 MG/1
TABLET, EXTENDED RELEASE ORAL
Qty: 90 TABLET | Refills: 2 | Status: SHIPPED | OUTPATIENT
Start: 2022-08-08

## 2022-08-08 RX ORDER — AMLODIPINE BESYLATE 10 MG/1
TABLET ORAL
Qty: 90 TABLET | Refills: 2 | Status: SHIPPED | OUTPATIENT
Start: 2022-08-08

## 2022-08-08 RX ORDER — CLOPIDOGREL BISULFATE 75 MG/1
75 TABLET ORAL DAILY
Qty: 90 TABLET | Refills: 2 | Status: SHIPPED | OUTPATIENT
Start: 2022-08-08

## 2022-08-08 RX ORDER — CARVEDILOL 12.5 MG/1
TABLET ORAL
Qty: 180 TABLET | Refills: 2 | Status: SHIPPED | OUTPATIENT
Start: 2022-08-08

## 2022-08-08 RX ORDER — GLIPIZIDE 5 MG/1
5 TABLET ORAL DAILY
Qty: 90 TABLET | Refills: 2 | Status: SHIPPED | OUTPATIENT
Start: 2022-08-08

## 2022-08-18 ENCOUNTER — OFFICE VISIT (OUTPATIENT)
Dept: PHYSICAL MEDICINE AND REHAB | Age: 82
End: 2022-08-18
Payer: MEDICARE

## 2022-08-18 VITALS
DIASTOLIC BLOOD PRESSURE: 84 MMHG | SYSTOLIC BLOOD PRESSURE: 144 MMHG | WEIGHT: 149 LBS | HEIGHT: 60 IN | BODY MASS INDEX: 29.25 KG/M2

## 2022-08-18 DIAGNOSIS — R53.81 DEBILITY: ICD-10-CM

## 2022-08-18 DIAGNOSIS — M79.2 NEUROPATHIC PAIN: ICD-10-CM

## 2022-08-18 DIAGNOSIS — I69.959 HEMIPARESIS AS LATE EFFECT OF CEREBROVASCULAR DISEASE, UNSPECIFIED CEREBROVASCULAR DISEASE TYPE, UNSPECIFIED LATERALITY (HCC): ICD-10-CM

## 2022-08-18 DIAGNOSIS — G89.29 OTHER CHRONIC PAIN: Primary | ICD-10-CM

## 2022-08-18 PROCEDURE — G8400 PT W/DXA NO RESULTS DOC: HCPCS | Performed by: ANESTHESIOLOGY

## 2022-08-18 PROCEDURE — G8417 CALC BMI ABV UP PARAM F/U: HCPCS | Performed by: ANESTHESIOLOGY

## 2022-08-18 PROCEDURE — 1036F TOBACCO NON-USER: CPT | Performed by: ANESTHESIOLOGY

## 2022-08-18 PROCEDURE — 99214 OFFICE O/P EST MOD 30 MIN: CPT | Performed by: ANESTHESIOLOGY

## 2022-08-18 PROCEDURE — 1123F ACP DISCUSS/DSCN MKR DOCD: CPT | Performed by: ANESTHESIOLOGY

## 2022-08-18 PROCEDURE — 1090F PRES/ABSN URINE INCON ASSESS: CPT | Performed by: ANESTHESIOLOGY

## 2022-08-18 PROCEDURE — G8427 DOCREV CUR MEDS BY ELIG CLIN: HCPCS | Performed by: ANESTHESIOLOGY

## 2022-08-18 RX ORDER — BUPRENORPHINE 5 UG/H
1 PATCH TRANSDERMAL WEEKLY
Qty: 4 PATCH | Refills: 0 | Status: SHIPPED | OUTPATIENT
Start: 2022-08-18 | End: 2022-09-01 | Stop reason: SDUPTHER

## 2022-08-18 NOTE — PROGRESS NOTES
better. She states that her daughter has been helping her manage her meds.     Current Treatment Medications:   Tylenol as needed    Historical Medications:  Gabapentin-ineffective  Lyrica-unable to tolerate  Percocet - ineffective    Past Medical History:   Diagnosis Date    Depression     Heart attack ([address]     [place]     Hyperlipidemia     Neuropathy     Sleep apnea     Cpap       Past Surgical History:   Procedure Laterality Date    APPENDECTOMY      CHOLECYSTECTOMY      DENTAL SURGERY      HIP SURGERY Right 07/29/2020    right , Genicular Nerve block performed by Mic Shaikh MD at [address] [employer]    Left total hip    OVARY REMOVAL         Family History   Problem Relation Age of Onset    Cancer Mother         colon    Diabetes Father     Stroke Father        Medications & Allergies:   Current Outpatient Medications   Medication Instructions    acetaminophen (TYLENOL) 650 mg, Oral, EVERY 6 HOURS PRN    amLODIPine (NORVASC) 10 MG tablet take 1 tablet by mouth once daily    aspirin 81 mg, Oral, DAILY    buprenorphine (BUPRENEX) 5 MCG/HR PTWK 1 patch, TransDERmal, WEEKLY    carvedilol (COREG) 12.5 MG tablet take 1 tablet by mouth twice a day with meals    clopidogrel (PLAVIX) 75 mg, Oral, DAILY    Ferrous Fumarate 324 mg, Oral, DAILY    glipiZIDE (GLUCOTROL) 5 mg, Oral, DAILY    hydrALAZINE (APRESOLINE) 12.5 mg, Oral, 2 TIMES DAILY    isosorbide mononitrate (IMDUR) 30 MG extended release tablet take 1 tablet by mouth once daily    JANUVIA 50 MG tablet take 1 tablet by mouth once daily    oxyCODONE-acetaminophen (PERCOCET) 5-325 MG per tablet 1 tablet, Oral, 2 TIMES DAILY PRN    pantoprazole (PROTONIX) 20 mg, Oral, DAILY    polyethylene glycol (GLYCOLAX) 17 g, Oral, DAILY    pravastatin (PRAVACHOL) 40 mg, Oral, NIGHTLY    vitamin B-12 (CYANOCOBALAMIN) 1,000 mcg, Oral, DAILY    vitamin C (ASCORBIC ACID) 500 mg, Oral, DAILY       No Known Allergies    Review of Systems: Constitutional: negative for weight changes or fevers  Genitourinary: negative for bowel/bladder incontinence   Musculoskeletal: positive for right-sided whole body pain  Neurological: Positive for right-sided weakness  Behavioral/Psych: negative for anxiety/depression   All other systems reviewed and are negative    Objective:     Vitals:    08/18/22 1546   BP: (!) 144/84       Constitutional: Pleasant, no acute distress   Head: Normocephalic, atraumatic   Eyes: Conjunctivae normal   Neck: Supple, symmetrical   Lungs: Normal respiratory effort, non-labored breathing   Cardiovascular: Limbs warm and well perfused   Abdomen: Non-protruded   Musculoskeletal: Muscle bulk symmetric, no atrophy, no gross deformities   Neurological: Cranial nerves II-XII grossly intact. Spastic right-sided hemiparesis. Evidence of hyperesthesia and allodynia in right upper and right lower limbs. · Gait -spastic hemiplegia gait. Ambulates with assistive device. Skin: No rashes or lesions visible in limbs   Psychological: Cooperative, no exaggerated pain behaviors     Assessment:    Diagnosis Orders   1. Other chronic pain  buprenorphine (BUPRENEX) 5 MCG/HR PTWK      2. Hemiparesis as late effect of cerebrovascular disease, unspecified cerebrovascular disease type, unspecified laterality ([facility] [address]        3. Neuropathic pain        4. Lili Razo is a [de-identified] y. o.female presenting to the pain clinic for evaluation of chronic right-sided body pain in the setting of left-sided CVA with residual right-sided spastic hemiparesis. Her clinical history and physical examination are consistent with central post stroke pain. She is unable to tolerate her Lyrica and oral opioids. I have started her on a trial for low-dose buprenorphine patch. We will follow-up in 4 weeks for reevaluation. Plan:    The following treatment recommendations and plan were discussed in detail with Elaine Pean Terrell Flores. Imaging/Studies/Labs:   I will look into obtaining records from [place] for her MRI imaging after her stroke. Analgesics: For continued chronic pain secondary to previous stroke and failed response to oral opioid therapy, I have started the patient on Butrans patch 5 mcg/hr. patient instructed on how to apply patches appropriately. Patient is too high risk for oral opioid therapy due to cognitive impairment. OARRS reviewed and is appropriate. Pain contract signed. Adjuvants:   None; unable to tolerate neuropathic medications and has advanced CKD    Interventions:   None     Anticoagulation/NPO Recommendations:   None    Multidisciplinary Pain Management:   In the presence of complex, chronic, and multi-factorial pain, the importance of a multidisciplinary approach to pain management in the patients management regimen was emphasized and discussed in great detail.      Referrals:  None    Prescriptions Written This Visit:   Butran's patch 5 mcg    Follow-up: 4 weeks      Chelsie Fischer DO  Interventional Pain Management/PM&R   [place]

## 2022-08-24 ENCOUNTER — TELEPHONE (OUTPATIENT)
Dept: PHYSICAL MEDICINE AND REHAB | Age: 82
End: 2022-08-24

## 2022-08-24 NOTE — TELEPHONE ENCOUNTER
Pt.called regarding need PA for Buprenex patch you ordered at last office aptm. 8/18. Pa will take at least 2 -3 weeks to get approved as Clementeen Neither off this week and have backlog. Can she get a short term script  of another med till then. Will need to be in 1 week increments so insurance will cover.

## 2022-08-26 NOTE — TELEPHONE ENCOUNTER
Pt .Anita Eldridge (hippa) recalled to reclarify why not get script and reiterated no other script will be given till get PA on Buprenex.

## 2022-08-29 DIAGNOSIS — G89.29 OTHER CHRONIC PAIN: ICD-10-CM

## 2022-08-29 NOTE — TELEPHONE ENCOUNTER
PA sent to plan today    Key: [id number]    Outcome: PA was already submitted for this patient and drug which was denied. ;[id number]    I contacted this patient's insurance at [phone] and spoke with PA rep who stated that this patient initiated a PA for her buprenorphine (BUPRENEX) 5 MCG/HR PTWK patches on 8/26/2022. This is the same day that the patient filled a script for 1 patch at her pharmacy as recorded in [place]. The rep stated that d/t no response from this office re: \"additional clinical questions,\" the PA was denied. I was on vacation 8/22-8/29, I had no knowledge of this. I then asked could I have the direct number to the appeal dept, the rep stated that there is not an external line for the appeals depth, I was then transferred to the PA appeal dept and I was able to initiate an appeal over the phone for buprenorphine (BUPRENEX) 5 MCG/HR PTWK patches with PA appeals rep Dahlia Hills. Dahlia Hills stated that she will now send the completed information to the review dept and this office will be notified via fax. Dahlia Hills also stated that this patient will be called and a letter will be sent to her home once a decision is reached.

## 2022-08-29 NOTE — TELEPHONE ENCOUNTER
Still awaiting PA completion and needs a 7 day refill so pt. Can pay out of pocket  OARRS reviewed. UDS: no data. Last seen: 8/18/2022.  Follow-up:   Future Appointments   Date Time Provider Sindhu Ascencio   12/5/2022  2:00 PM Guero aPrks MD [facility]

## 2022-09-01 RX ORDER — BUPRENORPHINE 5 UG/H
1 PATCH TRANSDERMAL WEEKLY
Qty: 1 PATCH | Refills: 0 | Status: SHIPPED | OUTPATIENT
Start: 2022-09-03 | End: 2022-09-04

## 2022-09-09 RX ORDER — DULOXETIN HYDROCHLORIDE 60 MG/1
CAPSULE, DELAYED RELEASE ORAL
Qty: 90 CAPSULE | Refills: 3 | OUTPATIENT
Start: 2022-09-09

## 2022-09-09 NOTE — TELEPHONE ENCOUNTER
Received faxed approval letter for buprenex patches. Letter attached to this encounter. Patient filled script for buprenex patches on 9/1/2022.

## 2022-11-17 DIAGNOSIS — I10 ESSENTIAL HYPERTENSION: ICD-10-CM

## 2022-11-17 RX ORDER — BENAZEPRIL HYDROCHLORIDE 20 MG/1
TABLET ORAL
OUTPATIENT
Start: 2022-11-17

## 2022-11-17 NOTE — TELEPHONE ENCOUNTER
Demographics states pt is at [facility]. D/c Summary in Media tab states pt is going to SNF. Is provider at SNF taking over?

## 2022-12-01 ENCOUNTER — READMISSION MANAGEMENT (OUTPATIENT)
Dept: CALL CENTER | Facility: HOSPITAL | Age: 82
End: 2022-12-01

## 2022-12-01 ENCOUNTER — TRANSITIONAL CARE MANAGEMENT TELEPHONE ENCOUNTER (OUTPATIENT)
Dept: CALL CENTER | Facility: HOSPITAL | Age: 82
End: 2022-12-01

## 2022-12-01 NOTE — OUTREACH NOTE
Prep Survey    Flowsheet Row Responses   [facility] facility patient discharged from? Non-BH   Is LACE score < 7 ? Non-BH Discharge   Emergency Room discharge w/ pulse ox? No   Eligibility [facility] [place] [facility] [place]   Date of Discharge 11/30/22   Discharge diagnosis Metabolic encephalopathy   Does the patient have one of the following disease processes/diagnoses(primary or secondary)? Other   Prep survey completed? Yes          HARDY SHAH - Registered Nurse

## 2022-12-01 NOTE — OUTREACH NOTE
Call Center TCM Note    Flowsheet Row Responses   [facility] patient discharged from? Non-   Does the patient have one of the following disease processes/diagnoses(primary or secondary)? Other   TCM attempt successful? No   Unsuccessful attempts Attempt 1          Denisse Teixeira RN    12/1/2022, 13:20 EST

## 2022-12-02 ENCOUNTER — TRANSITIONAL CARE MANAGEMENT TELEPHONE ENCOUNTER (OUTPATIENT)
Dept: CALL CENTER | Facility: HOSPITAL | Age: 82
End: 2022-12-02

## 2022-12-02 NOTE — OUTREACH NOTE
Call Center TCM Note    Flowsheet Row Responses   [facility] patient discharged from? Non-   Does the patient have one of the following disease processes/diagnoses(primary or secondary)? Other   TCM attempt successful? No   Unsuccessful attempts Attempt 3          Denisse Teixeira RN    12/2/2022, 08:59 EST

## 2022-12-31 PROBLEM — I21.4 NON-ST ELEVATION (NSTEMI) MYOCARDIAL INFARCTION (HCC): Status: RESOLVED | Noted: 2021-12-20 | Resolved: 2022-12-31

## 2022-12-31 PROBLEM — N18.32 STAGE 3B CHRONIC KIDNEY DISEASE (HCC): Status: RESOLVED | Noted: 2021-11-16 | Resolved: 2022-12-31

## 2022-12-31 PROBLEM — S00.93XA TRAUMATIC HEMATOMA OF HEAD: Status: RESOLVED | Noted: 2021-12-21 | Resolved: 2022-12-31

## 2022-12-31 PROBLEM — D69.6 THROMBOCYTOPENIA, UNSPECIFIED (HCC): Status: RESOLVED | Noted: 2022-01-04 | Resolved: 2022-12-31

## 2022-12-31 PROBLEM — D70.9 NEUTROPENIA, UNSPECIFIED (HCC): Status: RESOLVED | Noted: 2022-01-04 | Resolved: 2022-12-31

## 2022-12-31 PROBLEM — E87.5 HYPERKALEMIA: Status: RESOLVED | Noted: 2022-04-27 | Resolved: 2022-12-31

## 2023-01-06 PROBLEM — I25.10 ASHD (ARTERIOSCLEROTIC HEART DISEASE): Status: ACTIVE | Noted: 2022-02-15

## 2023-02-01 ENCOUNTER — FOLLOWUP TELEPHONE ENCOUNTER (OUTPATIENT)
Dept: PALLATIVE CARE | Age: 83
End: 2023-02-01

## 2023-02-01 NOTE — ACP (ADVANCE CARE PLANNING)
Call placed to HCPOA this date to schedule a meeting in facility for ACP discussion.  Patient shared some thoughts this date but does wish to talk with family and consider different options for her healthcare.  She was approached about dialysis recently. O2 in placed this date.  Patient from home and did not use O2 until Covid Dx in November 2022.  She is MCAID pending, may be able to return home but would need to be ambulatory.    Holli Villar RN, BSN, CHPN  Community Palliative Care & Hospice Liaison  Advance Care   Respecting Choices Facilitator

## 2023-02-08 ENCOUNTER — CLINICAL DOCUMENTATION (OUTPATIENT)
Dept: PALLATIVE CARE | Age: 83
End: 2023-02-08

## 2023-02-08 NOTE — ACP (ADVANCE CARE PLANNING)
Advanced Steps Advance Care Planning Conversation  POLST (Goals of Care for Serious Illness and/or Frailty)         Date of conversation: 2/8/2023   Location: [facility], Bud Mathieu. Patient's room. Length (minutes): 25    Participants:   [x] Patient    [x] Healthcare agent (already designated in existing ACP document)    Name: Malou Drew    Relationship to Patient: Jimbo Gilbert    Phone number: [phone]    HCPOA sent to Medical Records last week, Primary and secondary decision makers are indicated with AD document attached to both parties. Advanced Steps® ACP Facilitator: Adria Jeter RN, BSN, [facility]      Conversation Topics   (If Patient does not have decision making capacity, Agent/Surrogate responds based on understanding of how patient would respond if capable)    Understanding of Medical Condition/s AND Potential Complications:    Patient response: \"I can't walk and need dialysis. \"  Healthcare Agent/Other Surrogate: \"Her main concerns are her hear, which is at 50%, her kidneys, which are at 15% and her legs being too weak. \"    Johan Arita 15 from Experiences Related to Serious Illness: Fani Jackson has had to go in to [facility] for therapy a couple times now and when unable to walk. She will need to do more to get home. \"    Identifies the following as important for living well: \"I've lived a good life. I have my family\"    Hopes: \"To go home even though I like it here. \"      Worries/Fears about Medical Condition: \"I don't worry, what's gonna happen will happen. \"    Sources of support/comfort described as: \"I have my family\"    Cultural, Nondenominational, spiritual, or personal beliefs described as: \"I'm a believer\"      Needs to discuss with spiritual/Nondenominational advisor: [] Yes  [x] No    Needs more information about illness and complications:  [] Yes  [x] No      Cardiopulmonary Resuscitation      \"What do you understand about CPR? \" Response: \"You gotta do what you gotta do, I want a chance. \"    Order Elected for CPR: [x]  Yes CPR []  No CPR      When NOT in Cardiopulmonary Arrest, Order Elected:      [x] Full Treatments  [] Selective Treatments  [] Comfort-Focused Treatments    Medically-Assisted Nutrition:    [] Provide feeding through new or existing surgically-placed tubes   [] Trial period for artificial nutrition but no surgically-placed tubes  [] No artificial means of nutrition desired  [x] Not discussed or no decision made (provide standard of care). We did briefly discuss this as an option some people accept and others     do not with to have it. Reviewed what Living Will discusses in relation to artificial feedings.          Provided the following to the patient and/or decision maker (check all that apply):      Healthcare Decision Information Cards:   [] What is a Ventilator   [] What is Artificial Nutrition and Hydration   [] What is CPR      [x] Review of existing Advance Directive  [] Assistance with completion of new Living Will   [] Assistance with completion of new Healthcare Power of   [x] Review of POLST Form       Meeting Outcomes:   [x] ACP discussion completed   [x] POLST form completed  [x] POLST prepared for Provider review and signature  [] Original placed on chart, if in facility (form to be sent with patient at tranfser / discharge)    To be completed once signed:  [] Copy given to healthcare agent    [] Copy scanned to electronic medical record    If ACP discussion not completed, last interview topic discussed: N/A     Follow-up plan:     [] Schedule follow-up conversation to continue planning   [] Referred individual to Provider for additional questions/concerns   [x] Advised patient/agent/surrogate to review completed POLST form and update if needed with changes in condition, patient preferences or care setting     [x] This note routed to one or more involved healthcare providers

## 2023-08-17 PROBLEM — Z86.73 HISTORY OF CVA (CEREBROVASCULAR ACCIDENT): Status: ACTIVE | Noted: 2023-08-17

## 2023-08-17 PROBLEM — M06.09 RHEUMATOID ARTHRITIS OF MULTIPLE SITES WITH NEGATIVE RHEUMATOID FACTOR (HCC): Status: RESOLVED | Noted: 2021-10-12 | Resolved: 2023-08-17

## 2023-08-17 PROBLEM — E53.8 B12 DEFICIENCY: Chronic | Status: ACTIVE | Noted: 2023-08-17

## 2023-08-17 PROBLEM — G47.33 OSA ON CPAP: Chronic | Status: ACTIVE | Noted: 2023-08-17

## 2023-08-17 PROBLEM — E53.8 B12 DEFICIENCY: Status: ACTIVE | Noted: 2023-08-17

## 2023-08-17 PROBLEM — G89.29 CHRONIC RADICULAR LUMBAR PAIN: Status: RESOLVED | Noted: 2021-11-16 | Resolved: 2023-08-17

## 2023-08-17 PROBLEM — D69.6 THROMBOCYTOPENIA (HCC): Chronic | Status: ACTIVE | Noted: 2022-01-04

## 2023-08-17 PROBLEM — I10 HYPERTENSION, ESSENTIAL: Chronic | Status: ACTIVE | Noted: 2019-10-18

## 2023-08-17 PROBLEM — Z86.73 HISTORY OF CVA (CEREBROVASCULAR ACCIDENT): Chronic | Status: ACTIVE | Noted: 2023-08-17

## 2023-08-17 PROBLEM — D63.8 ANEMIA OF CHRONIC DISEASE: Chronic | Status: ACTIVE | Noted: 2023-08-17

## 2023-08-17 PROBLEM — D63.8 ANEMIA OF CHRONIC DISEASE: Status: ACTIVE | Noted: 2023-08-17

## 2023-08-17 PROBLEM — I69.30 HISTORY OF CVA WITH RESIDUAL DEFICIT: Status: ACTIVE | Noted: 2023-08-17

## 2023-08-17 PROBLEM — M79.2 NEUROGENIC PAIN DUE TO CENTRAL NERVOUS SYSTEM ABNORMALITY FOLLOWING STROKE: Status: ACTIVE | Noted: 2023-08-17

## 2023-08-17 PROBLEM — I69.359 HISTORY OF HEMORRHAGIC STROKE WITH RESIDUAL HEMIPARESIS (HCC): Chronic | Status: ACTIVE | Noted: 2019-10-18

## 2023-08-17 PROBLEM — M51.36 DEGENERATIVE DISC DISEASE, LUMBAR: Chronic | Status: ACTIVE | Noted: 2023-08-17

## 2023-08-17 PROBLEM — I69.398 NEUROGENIC PAIN DUE TO CENTRAL NERVOUS SYSTEM ABNORMALITY FOLLOWING STROKE: Status: ACTIVE | Noted: 2023-08-17

## 2023-08-17 PROBLEM — M54.16 CHRONIC RADICULAR LUMBAR PAIN: Status: RESOLVED | Noted: 2021-11-16 | Resolved: 2023-08-17

## 2023-08-17 PROBLEM — I50.20 HEART FAILURE WITH REDUCED EJECTION FRACTION (HCC): Status: ACTIVE | Noted: 2023-08-17

## 2023-08-17 PROBLEM — I50.20 HEART FAILURE WITH REDUCED EJECTION FRACTION (HCC): Chronic | Status: ACTIVE | Noted: 2023-08-17

## 2023-08-17 PROBLEM — I50.20 HEART FAILURE WITH REDUCED EJECTION FRACTION (HCC): Status: RESOLVED | Noted: 2022-02-15 | Resolved: 2023-08-17

## 2023-08-17 PROBLEM — E61.1 IRON DEFICIENCY: Status: ACTIVE | Noted: 2023-08-17

## 2023-08-17 PROBLEM — N18.4 STAGE 4 CHRONIC KIDNEY DISEASE (HCC): Chronic | Status: ACTIVE | Noted: 2023-08-17

## 2023-08-17 PROBLEM — I69.398 NEUROGENIC PAIN DUE TO CENTRAL NERVOUS SYSTEM ABNORMALITY FOLLOWING STROKE: Chronic | Status: ACTIVE | Noted: 2023-08-17

## 2023-08-17 PROBLEM — I69.30 HISTORY OF CVA WITH RESIDUAL DEFICIT: Chronic | Status: ACTIVE | Noted: 2023-08-17

## 2023-08-17 PROBLEM — G89.4 CHRONIC PAIN SYNDROME: Status: RESOLVED | Noted: 2020-02-16 | Resolved: 2023-08-17

## 2023-08-17 PROBLEM — M51.36 DDD (DEGENERATIVE DISC DISEASE), LUMBAR: Status: ACTIVE | Noted: 2023-08-17

## 2023-08-17 PROBLEM — E61.1 IRON DEFICIENCY: Chronic | Status: ACTIVE | Noted: 2023-08-17

## 2023-08-17 PROBLEM — I10 ESSENTIAL HYPERTENSION: Status: RESOLVED | Noted: 2019-10-18 | Resolved: 2023-08-17

## 2023-08-17 PROBLEM — E11.40 TYPE 2 DIABETES MELLITUS WITH DIABETIC NEUROPATHY, WITHOUT LONG-TERM CURRENT USE OF INSULIN (HCC): Status: RESOLVED | Noted: 2019-10-18 | Resolved: 2023-08-17

## 2023-08-17 PROBLEM — K21.9 GERD (GASTROESOPHAGEAL REFLUX DISEASE): Status: ACTIVE | Noted: 2023-08-17

## 2023-08-17 PROBLEM — F41.9 ANXIETY: Status: RESOLVED | Noted: 2019-10-18 | Resolved: 2023-08-17

## 2023-08-17 PROBLEM — F41.8 DEPRESSION WITH ANXIETY: Chronic | Status: ACTIVE | Noted: 2023-08-17

## 2023-08-17 PROBLEM — F32.A DEPRESSION: Status: ACTIVE | Noted: 2023-08-17

## 2023-08-17 PROBLEM — E11.9 DM2 (DIABETES MELLITUS, TYPE 2) (HCC): Chronic | Status: ACTIVE | Noted: 2019-10-18

## 2023-08-17 PROBLEM — E78.5 HYPERLIPIDEMIA: Status: RESOLVED | Noted: 2019-10-18 | Resolved: 2023-08-17

## 2023-08-17 PROBLEM — M15.9 PRIMARY OSTEOARTHRITIS INVOLVING MULTIPLE JOINTS: Status: RESOLVED | Noted: 2020-02-16 | Resolved: 2023-08-17

## 2023-08-17 PROBLEM — N18.4 STAGE 4 CHRONIC KIDNEY DISEASE (HCC): Status: ACTIVE | Noted: 2023-08-17

## 2023-08-17 PROBLEM — E78.5 DYSLIPIDEMIA: Chronic | Status: ACTIVE | Noted: 2019-10-18

## 2023-08-17 PROBLEM — I25.10 ASHD (ARTERIOSCLEROTIC HEART DISEASE): Chronic | Status: ACTIVE | Noted: 2022-02-15

## 2023-08-17 PROBLEM — E11.9 DM2 (DIABETES MELLITUS, TYPE 2) (HCC): Status: ACTIVE | Noted: 2019-10-18

## 2023-08-17 PROBLEM — K21.9 GERD (GASTROESOPHAGEAL REFLUX DISEASE): Chronic | Status: ACTIVE | Noted: 2023-08-17

## 2023-08-17 PROBLEM — F41.8 DEPRESSION WITH ANXIETY: Status: ACTIVE | Noted: 2023-08-17

## 2023-08-17 PROBLEM — G47.33 OSA ON CPAP: Status: ACTIVE | Noted: 2023-08-17

## 2023-08-17 PROBLEM — M79.2 NEUROGENIC PAIN DUE TO CENTRAL NERVOUS SYSTEM ABNORMALITY FOLLOWING STROKE: Chronic | Status: ACTIVE | Noted: 2023-08-17

## 2023-09-05 ENCOUNTER — CLINICAL DOCUMENTATION ONLY (OUTPATIENT)
Facility: CLINIC | Age: 83
End: 2023-09-05

## 2023-12-31 NOTE — TELEPHONE ENCOUNTER
----- Message from Kika Richter sent at 11/10/2021  3:14 PM EST -----  Subject: Message to Provider    QUESTIONS  Information for Provider? Liudmila Francisco is returning a call to Dejuan. Unable to WT.   ---------------------------------------------------------------------------  --------------  CALL BACK INFO  What is the best way for the office to contact you? OK to leave message on   voicemail  Preferred Call Back Phone Number?  [phone]  ---------------------------------------------------------------------------  --------------  SCRIPT ANSWERS  undefined
Apparently, when she was here 10-12-21, she brought a list of her current medications and Hydrocodone/acet 10/325 was on that. Daughter claims there was a list of 10-15 medications that are not on her list correctly. She said that you told her you would refill the pain medication as needed and you would refer them to pain management. She has an appt with pain management on 12-27-21. I explained to her that she will need to be seen every 90 days to refill the hydrocodone/acet 10/325, unless Dr Azalea Kessler takes over this. Daughter verbalized understanding. She is asking if you will refill it because she has a hard time getting her in here and it was the mistake of our office for not putting her medications in correctly at the visit.       Hydrocodone/Acet 10/325 one q6 prn  [facility]  [facility] [place]
Patients daughter is mischaracterizing the situation. She stated in a previous note that the patient was unable to get into pain management for 4 to 5 months when in fact she had an appointment already in place for next month. On the patient's initial visit with me I specifically noted that \"no refills were needed\". Due to the fact that the patient's daughter continues to push hard on this issue and is not willing to follow my request, I am terminating my relationship with the patient. Before actually dismissing her, you can see if Dr. Radha Jackson is willing to follow with her since he did before but otherwise it is obvious that the relationship with me is not starting off healthy.
She is scheduled for 11-16-21 at 3:00.
The patient is a 68y Male complaining of abdominal pain.

## 2024-06-10 NOTE — TELEPHONE ENCOUNTER
----- Message from Sergei Zaidi sent at 5/23/2022  2:45 PM EDT -----  Subject: Message to Provider    QUESTIONS  Information for Provider? Pt's daughter, gibran hernandez, was calling to let   pcp know that pt is in rehab at [facility]. Pt went there to do rehab   for walking. Pt had increased potassium and was admitted into [facility]. pt went to [facility] after leaving [facility] when she was having foot   swelling and inability to walk.  ---------------------------------------------------------------------------  --------------  CALL BACK INFO  What is the best way for the office to contact you? OK to leave message on   voicemail  Preferred Call Back Phone Number? [phone]  ---------------------------------------------------------------------------  --------------  SCRIPT ANSWERS  Relationship to Patient? Other  Representative Name? gibran hernandez-daughter  Is the Representative on the appropriate HIPAA document in Epic?  Yes
Noted.
Home

## 2024-06-19 ENCOUNTER — APPOINTMENT (OUTPATIENT)
Dept: CT IMAGING | Age: 84
DRG: 870 | End: 2024-06-19
Payer: MEDICARE

## 2024-06-19 ENCOUNTER — APPOINTMENT (OUTPATIENT)
Dept: GENERAL RADIOLOGY | Age: 84
DRG: 870 | End: 2024-06-19
Payer: MEDICARE

## 2024-06-19 ENCOUNTER — HOSPITAL ENCOUNTER (INPATIENT)
Age: 84
LOS: 6 days | DRG: 870 | End: 2024-06-25
Attending: EMERGENCY MEDICINE | Admitting: INTERNAL MEDICINE
Payer: MEDICARE

## 2024-06-19 DIAGNOSIS — I50.43 ACUTE ON CHRONIC COMBINED SYSTOLIC AND DIASTOLIC CHF (CONGESTIVE HEART FAILURE) (HCC): ICD-10-CM

## 2024-06-19 DIAGNOSIS — R57.9 SHOCK (HCC): ICD-10-CM

## 2024-06-19 DIAGNOSIS — N18.6 ESRD ON HEMODIALYSIS (HCC): ICD-10-CM

## 2024-06-19 DIAGNOSIS — G93.41 METABOLIC ENCEPHALOPATHY: ICD-10-CM

## 2024-06-19 DIAGNOSIS — A41.9 SEPSIS SECONDARY TO UTI (HCC): ICD-10-CM

## 2024-06-19 DIAGNOSIS — A41.9 SEPTIC SHOCK (HCC): ICD-10-CM

## 2024-06-19 DIAGNOSIS — Z99.2 ESRD ON HEMODIALYSIS (HCC): ICD-10-CM

## 2024-06-19 DIAGNOSIS — E87.20 METABOLIC ACIDOSIS: ICD-10-CM

## 2024-06-19 DIAGNOSIS — J96.21 ACUTE ON CHRONIC RESPIRATORY FAILURE WITH HYPOXIA (HCC): Primary | ICD-10-CM

## 2024-06-19 DIAGNOSIS — R56.9 NEW ONSET SEIZURE (HCC): ICD-10-CM

## 2024-06-19 DIAGNOSIS — N39.0 SEPSIS SECONDARY TO UTI (HCC): ICD-10-CM

## 2024-06-19 DIAGNOSIS — R65.21 SEPTIC SHOCK (HCC): ICD-10-CM

## 2024-06-19 DIAGNOSIS — T68.XXXA HYPOTHERMIA, INITIAL ENCOUNTER: ICD-10-CM

## 2024-06-19 LAB
ABO: NORMAL
ALBUMIN SERPL BCG-MCNC: 2.8 G/DL (ref 3.5–5.1)
ALBUMIN SERPL BCG-MCNC: 2.8 G/DL (ref 3.5–5.1)
ALP SERPL-CCNC: 138 U/L (ref 38–126)
ALP SERPL-CCNC: 184 U/L (ref 38–126)
ALT SERPL W/O P-5'-P-CCNC: 50 U/L (ref 11–66)
ALT SERPL W/O P-5'-P-CCNC: 844 U/L (ref 11–66)
AMORPH SED URNS QL MICRO: ABNORMAL
ANION GAP SERPL CALC-SCNC: 26 MEQ/L (ref 8–16)
ANION GAP SERPL CALC-SCNC: 27 MEQ/L (ref 8–16)
ANION GAP SERPL CALC-SCNC: 30 MEQ/L (ref 8–16)
ANTIBODY SCREEN: NORMAL
APTT PPP: 36.8 SECONDS (ref 22–38)
ARTERIAL PATENCY WRIST A: ABNORMAL
AST SERPL-CCNC: 176 U/L (ref 5–40)
AST SERPL-CCNC: 2751 U/L (ref 5–40)
B-OH-BUTYR SERPL-MSCNC: 9.34 MG/DL (ref 0.2–2.81)
BACTERIA URNS QL MICRO: ABNORMAL /HPF
BASE EXCESS BLDA CALC-SCNC: -14.6 MMOL/L (ref -2–3)
BASE EXCESS BLDA CALC-SCNC: -15.5 MMOL/L (ref -2.5–2.5)
BASE EXCESS BLDA CALC-SCNC: -16 MMOL/L (ref -2–3)
BASE EXCESS BLDA CALC-SCNC: -7.9 MMOL/L (ref -2–3)
BASOPHILS ABSOLUTE: 0 THOU/MM3 (ref 0–0.1)
BASOPHILS NFR BLD AUTO: 0.2 %
BDY SITE: ABNORMAL
BILIRUB SERPL-MCNC: 0.6 MG/DL (ref 0.3–1.2)
BILIRUB SERPL-MCNC: 1 MG/DL (ref 0.3–1.2)
BILIRUB UR QL STRIP.AUTO: ABNORMAL
BREATHS SETTING VENT: 16 BPM
BUN SERPL-MCNC: 27 MG/DL (ref 7–22)
BUN SERPL-MCNC: 36 MG/DL (ref 7–22)
BUN SERPL-MCNC: 38 MG/DL (ref 7–22)
CA-I BLD ISE-SCNC: 0.99 MMOL/L (ref 1.12–1.32)
CA-I BLD ISE-SCNC: 1.13 MMOL/L (ref 1.12–1.32)
CA-I BLD ISE-SCNC: 1.19 MMOL/L (ref 1.12–1.32)
CA-I BLD ISE-SCNC: 1.22 MMOL/L (ref 1.12–1.32)
CALCIUM SERPL-MCNC: 8.2 MG/DL (ref 8.5–10.5)
CALCIUM SERPL-MCNC: 8.7 MG/DL (ref 8.5–10.5)
CALCIUM SERPL-MCNC: 8.9 MG/DL (ref 8.5–10.5)
CASTS #/AREA URNS LPF: ABNORMAL /LPF
CHARACTER UR: ABNORMAL
CHLORIDE BLD-SCNC: 104 MEQ/L (ref 98–109)
CHLORIDE BLD-SCNC: 105 MEQ/L (ref 98–109)
CHLORIDE SERPL-SCNC: 101 MEQ/L (ref 98–111)
CHLORIDE SERPL-SCNC: 97 MEQ/L (ref 98–111)
CHLORIDE SERPL-SCNC: 99 MEQ/L (ref 98–111)
CO2 SERPL-SCNC: 12 MEQ/L (ref 23–33)
CO2 SERPL-SCNC: 13 MEQ/L (ref 23–33)
CO2 SERPL-SCNC: 17 MEQ/L (ref 23–33)
COLLECTED BY:: ABNORMAL
COLOR: YELLOW
CREAT SERPL-MCNC: 3.3 MG/DL (ref 0.4–1.2)
CREAT SERPL-MCNC: 4.2 MG/DL (ref 0.4–1.2)
CREAT SERPL-MCNC: 4.7 MG/DL (ref 0.4–1.2)
CRYSTALS URNS MICRO: ABNORMAL
DEPRECATED RDW RBC AUTO: 55.3 FL (ref 35–45)
DEPRECATED RDW RBC AUTO: 56.6 FL (ref 35–45)
DEVICE: ABNORMAL
EKG ATRIAL RATE: 103 BPM
EKG ATRIAL RATE: 106 BPM
EKG ATRIAL RATE: 109 BPM
EKG ATRIAL RATE: 109 BPM
EKG P AXIS: 60 DEGREES
EKG P AXIS: 66 DEGREES
EKG P AXIS: 66 DEGREES
EKG P AXIS: 68 DEGREES
EKG P-R INTERVAL: 144 MS
EKG P-R INTERVAL: 154 MS
EKG P-R INTERVAL: 188 MS
EKG P-R INTERVAL: 198 MS
EKG Q-T INTERVAL: 356 MS
EKG Q-T INTERVAL: 358 MS
EKG Q-T INTERVAL: 378 MS
EKG Q-T INTERVAL: 428 MS
EKG QRS DURATION: 114 MS
EKG QRS DURATION: 126 MS
EKG QRS DURATION: 130 MS
EKG QRS DURATION: 130 MS
EKG QTC CALCULATION (BAZETT): 468 MS
EKG QTC CALCULATION (BAZETT): 479 MS
EKG QTC CALCULATION (BAZETT): 509 MS
EKG QTC CALCULATION (BAZETT): 568 MS
EKG R AXIS: 48 DEGREES
EKG R AXIS: 48 DEGREES
EKG R AXIS: 58 DEGREES
EKG R AXIS: 60 DEGREES
EKG T AXIS: 126 DEGREES
EKG T AXIS: 144 DEGREES
EKG T AXIS: 148 DEGREES
EKG T AXIS: 163 DEGREES
EKG VENTRICULAR RATE: 103 BPM
EKG VENTRICULAR RATE: 106 BPM
EKG VENTRICULAR RATE: 109 BPM
EKG VENTRICULAR RATE: 109 BPM
ELLIPTOCYTES: ABNORMAL
EOSINOPHIL NFR BLD AUTO: 0 %
EOSINOPHILS ABSOLUTE: 0 THOU/MM3 (ref 0–0.4)
EPITHELIAL CELLS, UA: ABNORMAL /HPF
ERYTHROCYTE [DISTWIDTH] IN BLOOD BY AUTOMATED COUNT: 18.1 % (ref 11.5–14.5)
ERYTHROCYTE [DISTWIDTH] IN BLOOD BY AUTOMATED COUNT: 18.5 % (ref 11.5–14.5)
ETHANOL SERPL-MCNC: < 0.01 % (ref 0–0.08)
FIO2 ON VENT O2 ANALYZER: 100 %
FIO2 ON VENT O2 ANALYZER: 100 %
FIO2 ON VENT O2 ANALYZER: 40 %
FIO2 ON VENT O2 ANALYZER: 50 %
FLUAV RNA RESP QL NAA+PROBE: NOT DETECTED
FLUBV RNA RESP QL NAA+PROBE: NOT DETECTED
GFR SERPL CREATININE-BSD FRML MDRD: 10 ML/MIN/1.73M2
GFR SERPL CREATININE-BSD FRML MDRD: 13 ML/MIN/1.73M2
GFR SERPL CREATININE-BSD FRML MDRD: 9 ML/MIN/1.73M2
GLUCOSE BLD STRIP.AUTO-MCNC: 177 MG/DL (ref 70–108)
GLUCOSE BLD-MCNC: 208 MG/DL (ref 70–108)
GLUCOSE BLD-MCNC: 215 MG/DL (ref 70–108)
GLUCOSE SERPL-MCNC: 149 MG/DL (ref 70–108)
GLUCOSE SERPL-MCNC: 193 MG/DL (ref 70–108)
GLUCOSE SERPL-MCNC: 250 MG/DL (ref 70–108)
GLUCOSE UR QL STRIP.AUTO: NEGATIVE MG/DL
HCO3 BLDA-SCNC: 10 MMOL/L (ref 23–28)
HCO3 BLDA-SCNC: 12 MMOL/L (ref 23–28)
HCO3 BLDA-SCNC: 12 MMOL/L (ref 23–28)
HCO3 BLDA-SCNC: 19 MMOL/L (ref 23–28)
HCT VFR BLD AUTO: 22.1 % (ref 37–47)
HCT VFR BLD AUTO: 23.9 % (ref 37–47)
HCT VFR BLD AUTO: 32.6 % (ref 37–47)
HGB BLD-MCNC: 6 GM/DL (ref 12–16)
HGB BLD-MCNC: 6.4 GM/DL (ref 12–16)
HGB BLD-MCNC: 9.8 GM/DL (ref 12–16)
HGB UR QL STRIP.AUTO: ABNORMAL
HYPOCHROMIA BLD QL SMEAR: PRESENT
IMM GRANULOCYTES # BLD AUTO: 0.33 THOU/MM3 (ref 0–0.07)
IMM GRANULOCYTES NFR BLD AUTO: 1.9 %
INR PPP: 1.16 (ref 0.85–1.13)
KETONES UR QL STRIP.AUTO: ABNORMAL
LACTATE BLD-SCNC: 12.9 MMOL/L (ref 0.5–1.9)
LACTATE BLD-SCNC: 19.7 MMOL/L (ref 0.5–1.9)
LACTATE SERPL-SCNC: 13.4 MMOL/L (ref 0.5–2)
LACTATE SERPL-SCNC: 15.1 MMOL/L (ref 0.5–2)
LACTATE SERPL-SCNC: 19.3 MMOL/L (ref 0.5–2)
LACTATE SERPL-SCNC: 19.9 MMOL/L (ref 0.5–2)
LACTATE SERPL-SCNC: 23.2 MMOL/L (ref 0.5–2)
LACTATE SERPL-SCNC: 8.1 MMOL/L (ref 0.5–2)
LIPASE SERPL-CCNC: 11 U/L (ref 5.6–51.3)
LYMPHOCYTES ABSOLUTE: 1.3 THOU/MM3 (ref 1–4.8)
LYMPHOCYTES NFR BLD AUTO: 7.3 %
MAGNESIUM SERPL-MCNC: 2.3 MG/DL (ref 1.6–2.4)
MAGNESIUM SERPL-MCNC: 2.9 MG/DL (ref 1.6–2.4)
MAGNESIUM SERPL-MCNC: 3.1 MG/DL (ref 1.6–2.4)
MCH RBC QN AUTO: 23.7 PG (ref 26–33)
MCH RBC QN AUTO: 23.8 PG (ref 26–33)
MCHC RBC AUTO-ENTMCNC: 26.8 GM/DL (ref 32.2–35.5)
MCHC RBC AUTO-ENTMCNC: 27.1 GM/DL (ref 32.2–35.5)
MCV RBC AUTO: 87.4 FL (ref 81–99)
MCV RBC AUTO: 88.8 FL (ref 81–99)
MONOCYTES ABSOLUTE: 1.4 THOU/MM3 (ref 0.4–1.3)
MONOCYTES NFR BLD AUTO: 7.8 %
MRSA DNA SPEC QL NAA+PROBE: POSITIVE
MUCOUS THREADS URNS QL MICRO: ABNORMAL
NEUTROPHILS ABSOLUTE: 14.5 THOU/MM3 (ref 1.8–7.7)
NEUTROPHILS NFR BLD AUTO: 82.8 %
NITRITE UR QL STRIP: NEGATIVE
NRBC BLD AUTO-RTO: 0 /100 WBC
NT-PROBNP SERPL IA-MCNC: ABNORMAL PG/ML (ref 0–449)
OSMOLALITY SERPL CALC.SUM OF ELEC: 290 MOSMOL/KG (ref 275–300)
PATHOLOGIST REVIEW: ABNORMAL
PCO2 BLDA: 22 MMHG (ref 35–45)
PCO2 TEMP ADJ BLDMV: 32 MMHG (ref 41–51)
PCO2 TEMP ADJ BLDMV: 35 MMHG (ref 41–51)
PCO2 TEMP ADJ BLDMV: 40 MMHG (ref 41–51)
PEEP SETTING VENT: 6 MMHG
PH BLDA: 7.26 [PH] (ref 7.35–7.45)
PH BLDMV: 7.14 [PH] (ref 7.31–7.41)
PH BLDMV: 7.19 [PH] (ref 7.31–7.41)
PH BLDMV: 7.27 [PH] (ref 7.31–7.41)
PH UR STRIP.AUTO: 5.5 [PH] (ref 5–9)
PHOSPHATE SERPL-MCNC: 4 MG/DL (ref 2.4–4.7)
PHOSPHATE SERPL-MCNC: 4.9 MG/DL (ref 2.4–4.7)
PHOSPHATE SERPL-MCNC: 5.2 MG/DL (ref 2.4–4.7)
PIP: 22 CMH2O
PIP: 24 CMH2O
PLATELET # BLD AUTO: 167 THOU/MM3 (ref 130–400)
PLATELET # BLD AUTO: 209 THOU/MM3 (ref 130–400)
PLATELET BLD QL SMEAR: ADEQUATE
PMV BLD AUTO: 12.4 FL (ref 9.4–12.4)
PMV BLD AUTO: 12.5 FL (ref 9.4–12.4)
PO2 BLDA: 353 MMHG (ref 71–104)
PO2 BLDMV: 24 MMHG (ref 25–40)
PO2 BLDMV: 34 MMHG (ref 25–40)
PO2 BLDMV: 43 MMHG (ref 25–40)
POC CREATININE WHOLE BLOOD: 3.6 MG/DL (ref 0.5–1.2)
POC CREATININE WHOLE BLOOD: 4.6 MG/DL (ref 0.5–1.2)
POIKILOCYTES: ABNORMAL
POLYCHROMASIA BLD QL SMEAR: ABNORMAL
POTASSIUM BLD-SCNC: 4.4 MEQ/L (ref 3.5–4.9)
POTASSIUM BLD-SCNC: 4.7 MEQ/L (ref 3.5–4.9)
POTASSIUM SERPL-SCNC: 3.8 MEQ/L (ref 3.5–5.2)
POTASSIUM SERPL-SCNC: 4.7 MEQ/L (ref 3.5–5.2)
POTASSIUM SERPL-SCNC: 4.9 MEQ/L (ref 3.5–5.2)
POTASSIUM SERPL-SCNC: 4.9 MEQ/L (ref 3.5–5.2)
PROLACTIN SERPL-MCNC: 10.6 NG/ML
PROT SERPL-MCNC: 6 G/DL (ref 6.1–8)
PROT SERPL-MCNC: 6.5 G/DL (ref 6.1–8)
PROT UR STRIP.AUTO-MCNC: 100 MG/DL
RBC # BLD AUTO: 2.53 MILL/MM3 (ref 4.2–5.4)
RBC # BLD AUTO: 2.69 MILL/MM3 (ref 4.2–5.4)
RBC URINE: > 100 /HPF
RENAL EPI CELLS #/AREA URNS HPF: ABNORMAL /[HPF]
RH FACTOR: NORMAL
SAO2 % BLDA: 100 %
SAO2 % BLDMV: 28 %
SAO2 % BLDMV: 58 %
SAO2 % BLDMV: 68 %
SARS-COV-2 RNA RESP QL NAA+PROBE: NOT DETECTED
SET PEEP: 6 MMHG
SET RESPIRATORY RATE: 16 BPM
SET RESPIRATORY RATE: 16 BPM
SITE: ABNORMAL
SODIUM BLD-SCNC: 139 MEQ/L (ref 138–146)
SODIUM BLD-SCNC: 142 MEQ/L (ref 138–146)
SODIUM SERPL-SCNC: 138 MEQ/L (ref 135–145)
SODIUM SERPL-SCNC: 140 MEQ/L (ref 135–145)
SODIUM SERPL-SCNC: 144 MEQ/L (ref 135–145)
SP GR UR REFRACT.AUTO: 1.02 (ref 1–1.03)
TARGETS BLD QL SMEAR: ABNORMAL
TROPONIN, HIGH SENSITIVITY: 2043 NG/L (ref 0–12)
TROPONIN, HIGH SENSITIVITY: 2788 NG/L (ref 0–12)
TROPONIN, HIGH SENSITIVITY: 702 NG/L (ref 0–12)
UROBILINOGEN, URINE: 0.2 EU/DL (ref 0–1)
VENTILATION MODE VENT: ABNORMAL
WBC # BLD AUTO: 17.5 THOU/MM3 (ref 4.8–10.8)
WBC # BLD AUTO: 19 THOU/MM3 (ref 4.8–10.8)
WBC #/AREA URNS HPF: > 200 /HPF
WBC #/AREA URNS HPF: ABNORMAL /[HPF]
YEAST LIKE FUNGI URNS QL MICRO: ABNORMAL

## 2024-06-19 PROCEDURE — 2580000003 HC RX 258

## 2024-06-19 PROCEDURE — 87077 CULTURE AEROBIC IDENTIFY: CPT

## 2024-06-19 PROCEDURE — 36556 INSERT NON-TUNNEL CV CATH: CPT

## 2024-06-19 PROCEDURE — 2500000003 HC RX 250 WO HCPCS

## 2024-06-19 PROCEDURE — 93005 ELECTROCARDIOGRAM TRACING: CPT

## 2024-06-19 PROCEDURE — 93010 ELECTROCARDIOGRAM REPORT: CPT | Performed by: INTERNAL MEDICINE

## 2024-06-19 PROCEDURE — 2580000003 HC RX 258: Performed by: EMERGENCY MEDICINE

## 2024-06-19 PROCEDURE — C9113 INJ PANTOPRAZOLE SODIUM, VIA: HCPCS | Performed by: STUDENT IN AN ORGANIZED HEALTH CARE EDUCATION/TRAINING PROGRAM

## 2024-06-19 PROCEDURE — 87641 MR-STAPH DNA AMP PROBE: CPT

## 2024-06-19 PROCEDURE — 81001 URINALYSIS AUTO W/SCOPE: CPT

## 2024-06-19 PROCEDURE — 2700000000 HC OXYGEN THERAPY PER DAY

## 2024-06-19 PROCEDURE — 6360000002 HC RX W HCPCS: Performed by: STUDENT IN AN ORGANIZED HEALTH CARE EDUCATION/TRAINING PROGRAM

## 2024-06-19 PROCEDURE — 90945 DIALYSIS ONE EVALUATION: CPT

## 2024-06-19 PROCEDURE — 84484 ASSAY OF TROPONIN QUANT: CPT

## 2024-06-19 PROCEDURE — 85730 THROMBOPLASTIN TIME PARTIAL: CPT

## 2024-06-19 PROCEDURE — 87070 CULTURE OTHR SPECIMN AEROBIC: CPT

## 2024-06-19 PROCEDURE — 80053 COMPREHEN METABOLIC PANEL: CPT

## 2024-06-19 PROCEDURE — 85018 HEMOGLOBIN: CPT

## 2024-06-19 PROCEDURE — 83735 ASSAY OF MAGNESIUM: CPT

## 2024-06-19 PROCEDURE — 85027 COMPLETE CBC AUTOMATED: CPT

## 2024-06-19 PROCEDURE — 82435 ASSAY OF BLOOD CHLORIDE: CPT

## 2024-06-19 PROCEDURE — 83690 ASSAY OF LIPASE: CPT

## 2024-06-19 PROCEDURE — 2580000003 HC RX 258: Performed by: INTERNAL MEDICINE

## 2024-06-19 PROCEDURE — 6360000002 HC RX W HCPCS

## 2024-06-19 PROCEDURE — 94761 N-INVAS EAR/PLS OXIMETRY MLT: CPT

## 2024-06-19 PROCEDURE — 2580000003 HC RX 258: Performed by: STUDENT IN AN ORGANIZED HEALTH CARE EDUCATION/TRAINING PROGRAM

## 2024-06-19 PROCEDURE — 87040 BLOOD CULTURE FOR BACTERIA: CPT

## 2024-06-19 PROCEDURE — 83605 ASSAY OF LACTIC ACID: CPT

## 2024-06-19 PROCEDURE — 93005 ELECTROCARDIOGRAM TRACING: CPT | Performed by: EMERGENCY MEDICINE

## 2024-06-19 PROCEDURE — 03HY32Z INSERTION OF MONITORING DEVICE INTO UPPER ARTERY, PERCUTANEOUS APPROACH: ICD-10-PCS | Performed by: INTERNAL MEDICINE

## 2024-06-19 PROCEDURE — 6370000000 HC RX 637 (ALT 250 FOR IP)

## 2024-06-19 PROCEDURE — 86923 COMPATIBILITY TEST ELECTRIC: CPT

## 2024-06-19 PROCEDURE — 6360000002 HC RX W HCPCS: Performed by: EMERGENCY MEDICINE

## 2024-06-19 PROCEDURE — 36415 COLL VENOUS BLD VENIPUNCTURE: CPT

## 2024-06-19 PROCEDURE — 87636 SARSCOV2 & INF A&B AMP PRB: CPT

## 2024-06-19 PROCEDURE — 2500000003 HC RX 250 WO HCPCS: Performed by: EMERGENCY MEDICINE

## 2024-06-19 PROCEDURE — 82330 ASSAY OF CALCIUM: CPT

## 2024-06-19 PROCEDURE — 30233N1 TRANSFUSION OF NONAUTOLOGOUS RED BLOOD CELLS INTO PERIPHERAL VEIN, PERCUTANEOUS APPROACH: ICD-10-PCS | Performed by: INTERNAL MEDICINE

## 2024-06-19 PROCEDURE — 86901 BLOOD TYPING SEROLOGIC RH(D): CPT

## 2024-06-19 PROCEDURE — 84132 ASSAY OF SERUM POTASSIUM: CPT

## 2024-06-19 PROCEDURE — 36600 WITHDRAWAL OF ARTERIAL BLOOD: CPT

## 2024-06-19 PROCEDURE — 6370000000 HC RX 637 (ALT 250 FOR IP): Performed by: STUDENT IN AN ORGANIZED HEALTH CARE EDUCATION/TRAINING PROGRAM

## 2024-06-19 PROCEDURE — 85025 COMPLETE CBC W/AUTO DIFF WBC: CPT

## 2024-06-19 PROCEDURE — 82948 REAGENT STRIP/BLOOD GLUCOSE: CPT

## 2024-06-19 PROCEDURE — 85014 HEMATOCRIT: CPT

## 2024-06-19 PROCEDURE — 86900 BLOOD TYPING SEROLOGIC ABO: CPT

## 2024-06-19 PROCEDURE — 94002 VENT MGMT INPAT INIT DAY: CPT

## 2024-06-19 PROCEDURE — 84295 ASSAY OF SERUM SODIUM: CPT

## 2024-06-19 PROCEDURE — 2000000000 HC ICU R&B

## 2024-06-19 PROCEDURE — 5A1D90Z PERFORMANCE OF URINARY FILTRATION, CONTINUOUS, GREATER THAN 18 HOURS PER DAY: ICD-10-PCS | Performed by: INTERNAL MEDICINE

## 2024-06-19 PROCEDURE — 96361 HYDRATE IV INFUSION ADD-ON: CPT

## 2024-06-19 PROCEDURE — 89220 SPUTUM SPECIMEN COLLECTION: CPT

## 2024-06-19 PROCEDURE — 82565 ASSAY OF CREATININE: CPT

## 2024-06-19 PROCEDURE — 74018 RADEX ABDOMEN 1 VIEW: CPT

## 2024-06-19 PROCEDURE — 86850 RBC ANTIBODY SCREEN: CPT

## 2024-06-19 PROCEDURE — 2500000003 HC RX 250 WO HCPCS: Performed by: INTERNAL MEDICINE

## 2024-06-19 PROCEDURE — 82803 BLOOD GASES ANY COMBINATION: CPT

## 2024-06-19 PROCEDURE — 99291 CRITICAL CARE FIRST HOUR: CPT

## 2024-06-19 PROCEDURE — 83880 ASSAY OF NATRIURETIC PEPTIDE: CPT

## 2024-06-19 PROCEDURE — 84100 ASSAY OF PHOSPHORUS: CPT

## 2024-06-19 PROCEDURE — P9016 RBC LEUKOCYTES REDUCED: HCPCS

## 2024-06-19 PROCEDURE — 87086 URINE CULTURE/COLONY COUNT: CPT

## 2024-06-19 PROCEDURE — 36592 COLLECT BLOOD FROM PICC: CPT

## 2024-06-19 PROCEDURE — 87205 SMEAR GRAM STAIN: CPT

## 2024-06-19 PROCEDURE — 36620 INSERTION CATHETER ARTERY: CPT

## 2024-06-19 PROCEDURE — 31500 INSERT EMERGENCY AIRWAY: CPT

## 2024-06-19 PROCEDURE — 71045 X-RAY EXAM CHEST 1 VIEW: CPT

## 2024-06-19 PROCEDURE — 84146 ASSAY OF PROLACTIN: CPT

## 2024-06-19 PROCEDURE — 36430 TRANSFUSION BLD/BLD COMPNT: CPT

## 2024-06-19 PROCEDURE — 99291 CRITICAL CARE FIRST HOUR: CPT | Performed by: INTERNAL MEDICINE

## 2024-06-19 PROCEDURE — 82077 ASSAY SPEC XCP UR&BREATH IA: CPT

## 2024-06-19 PROCEDURE — 82947 ASSAY GLUCOSE BLOOD QUANT: CPT

## 2024-06-19 PROCEDURE — 85610 PROTHROMBIN TIME: CPT

## 2024-06-19 PROCEDURE — 5A1955Z RESPIRATORY VENTILATION, GREATER THAN 96 CONSECUTIVE HOURS: ICD-10-PCS | Performed by: INTERNAL MEDICINE

## 2024-06-19 PROCEDURE — 0BH17EZ INSERTION OF ENDOTRACHEAL AIRWAY INTO TRACHEA, VIA NATURAL OR ARTIFICIAL OPENING: ICD-10-PCS | Performed by: EMERGENCY MEDICINE

## 2024-06-19 PROCEDURE — 82010 KETONE BODYS QUAN: CPT

## 2024-06-19 PROCEDURE — 70450 CT HEAD/BRAIN W/O DYE: CPT

## 2024-06-19 PROCEDURE — 2500000003 HC RX 250 WO HCPCS: Performed by: STUDENT IN AN ORGANIZED HEALTH CARE EDUCATION/TRAINING PROGRAM

## 2024-06-19 PROCEDURE — 96374 THER/PROPH/DIAG INJ IV PUSH: CPT

## 2024-06-19 PROCEDURE — 93005 ELECTROCARDIOGRAM TRACING: CPT | Performed by: STUDENT IN AN ORGANIZED HEALTH CARE EDUCATION/TRAINING PROGRAM

## 2024-06-19 PROCEDURE — 87186 SC STD MICRODIL/AGAR DIL: CPT

## 2024-06-19 RX ORDER — ONDANSETRON 4 MG/1
4 TABLET, ORALLY DISINTEGRATING ORAL EVERY 6 HOURS PRN
COMMUNITY

## 2024-06-19 RX ORDER — PROPOFOL 10 MG/ML
5-50 INJECTION, EMULSION INTRAVENOUS CONTINUOUS
Status: DISCONTINUED | OUTPATIENT
Start: 2024-06-20 | End: 2024-06-24

## 2024-06-19 RX ORDER — CALCITRIOL 0.25 UG/1
0.25 CAPSULE, LIQUID FILLED ORAL EVERY OTHER DAY
Status: DISCONTINUED | OUTPATIENT
Start: 2024-06-19 | End: 2024-06-19

## 2024-06-19 RX ORDER — LANOLIN ALCOHOL/MO/W.PET/CERES
3 CREAM (GRAM) TOPICAL NIGHTLY
COMMUNITY

## 2024-06-19 RX ORDER — SODIUM CHLORIDE 9 MG/ML
INJECTION, SOLUTION INTRAVENOUS PRN
Status: DISCONTINUED | OUTPATIENT
Start: 2024-06-19 | End: 2024-06-20

## 2024-06-19 RX ORDER — SODIUM CHLORIDE 0.9 % (FLUSH) 0.9 %
1.1-1.9 SYRINGE (ML) INJECTION PRN
Status: DISCONTINUED | OUTPATIENT
Start: 2024-06-19 | End: 2024-06-20

## 2024-06-19 RX ORDER — CALCIUM GLUCONATE 20 MG/ML
2000 INJECTION, SOLUTION INTRAVENOUS PRN
Status: DISCONTINUED | OUTPATIENT
Start: 2024-06-19 | End: 2024-06-24

## 2024-06-19 RX ORDER — CHLORHEXIDINE GLUCONATE ORAL RINSE 1.2 MG/ML
15 SOLUTION DENTAL 2 TIMES DAILY
Status: DISCONTINUED | OUTPATIENT
Start: 2024-06-19 | End: 2024-06-25 | Stop reason: HOSPADM

## 2024-06-19 RX ORDER — PROPOFOL 10 MG/ML
INJECTION, EMULSION INTRAVENOUS
Status: COMPLETED
Start: 2024-06-19 | End: 2024-06-19

## 2024-06-19 RX ORDER — MIDODRINE HYDROCHLORIDE 10 MG/1
10 TABLET ORAL AS NEEDED
COMMUNITY

## 2024-06-19 RX ORDER — AMLODIPINE BESYLATE 10 MG/1
10 TABLET ORAL DAILY
Status: DISCONTINUED | OUTPATIENT
Start: 2024-06-19 | End: 2024-06-24

## 2024-06-19 RX ORDER — METOPROLOL SUCCINATE 25 MG/1
25 TABLET, EXTENDED RELEASE ORAL DAILY
COMMUNITY

## 2024-06-19 RX ORDER — ALLOPURINOL 100 MG/1
100 TABLET ORAL DAILY
Status: DISCONTINUED | OUTPATIENT
Start: 2024-06-20 | End: 2024-06-24

## 2024-06-19 RX ORDER — PANTOPRAZOLE SODIUM 40 MG/1
40 TABLET, DELAYED RELEASE ORAL DAILY
Status: DISCONTINUED | OUTPATIENT
Start: 2024-06-19 | End: 2024-06-19

## 2024-06-19 RX ORDER — ASPIRIN 81 MG/1
81 TABLET, CHEWABLE ORAL DAILY
COMMUNITY

## 2024-06-19 RX ORDER — MIDAZOLAM HYDROCHLORIDE 1 MG/ML
1-10 INJECTION, SOLUTION INTRAVENOUS CONTINUOUS
Status: DISCONTINUED | OUTPATIENT
Start: 2024-06-19 | End: 2024-06-20

## 2024-06-19 RX ORDER — NYSTATIN 10B UNIT
POWDER (EA) MISCELLANEOUS 2 TIMES DAILY
COMMUNITY

## 2024-06-19 RX ORDER — CALCIUM CHLORIDE, MAGNESIUM CHLORIDE, DEXTROSE MONOHYDRATE, LACTIC ACID, SODIUM CHLORIDE, SODIUM BICARBONATE AND POTASSIUM CHLORIDE 5.15; 2.03; 22; 5.4; 6.46; 3.09; .157 G/L; G/L; G/L; G/L; G/L; G/L; G/L
INJECTION INTRAVENOUS CONTINUOUS
Status: DISCONTINUED | OUTPATIENT
Start: 2024-06-19 | End: 2024-06-20

## 2024-06-19 RX ORDER — CLOPIDOGREL BISULFATE 75 MG/1
75 TABLET ORAL DAILY
Status: DISCONTINUED | OUTPATIENT
Start: 2024-06-19 | End: 2024-06-24

## 2024-06-19 RX ORDER — SODIUM CHLORIDE 9 MG/ML
INJECTION, SOLUTION INTRAVENOUS CONTINUOUS
Status: DISCONTINUED | OUTPATIENT
Start: 2024-06-19 | End: 2024-06-19

## 2024-06-19 RX ORDER — INSULIN LISPRO 100 [IU]/ML
0-16 INJECTION, SOLUTION INTRAVENOUS; SUBCUTANEOUS
Status: DISCONTINUED | OUTPATIENT
Start: 2024-06-19 | End: 2024-06-22

## 2024-06-19 RX ORDER — LORAZEPAM 2 MG/ML
2 INJECTION INTRAMUSCULAR EVERY 4 HOURS PRN
Status: DISCONTINUED | OUTPATIENT
Start: 2024-06-19 | End: 2024-06-24

## 2024-06-19 RX ORDER — DEXTROSE MONOHYDRATE 100 MG/ML
INJECTION, SOLUTION INTRAVENOUS CONTINUOUS PRN
Status: DISCONTINUED | OUTPATIENT
Start: 2024-06-19 | End: 2024-06-24

## 2024-06-19 RX ORDER — INSULIN LISPRO 100 [IU]/ML
0-4 INJECTION, SOLUTION INTRAVENOUS; SUBCUTANEOUS NIGHTLY
Status: DISCONTINUED | OUTPATIENT
Start: 2024-06-19 | End: 2024-06-22

## 2024-06-19 RX ORDER — ATORVASTATIN CALCIUM 40 MG/1
40 TABLET, FILM COATED ORAL NIGHTLY
Status: DISCONTINUED | OUTPATIENT
Start: 2024-06-19 | End: 2024-06-24

## 2024-06-19 RX ORDER — PROPOFOL 10 MG/ML
5-50 INJECTION, EMULSION INTRAVENOUS CONTINUOUS
Status: DISCONTINUED | OUTPATIENT
Start: 2024-06-19 | End: 2024-06-19

## 2024-06-19 RX ORDER — GLUCAGON 1 MG/ML
1 KIT INJECTION PRN
Status: DISCONTINUED | OUTPATIENT
Start: 2024-06-19 | End: 2024-06-24

## 2024-06-19 RX ORDER — CALCIUM GLUCONATE 10 MG/ML
1000 INJECTION, SOLUTION INTRAVENOUS PRN
Status: DISCONTINUED | OUTPATIENT
Start: 2024-06-19 | End: 2024-06-24

## 2024-06-19 RX ORDER — LORAZEPAM 2 MG/ML
INJECTION INTRAMUSCULAR
Status: DISPENSED
Start: 2024-06-19 | End: 2024-06-19

## 2024-06-19 RX ORDER — 0.9 % SODIUM CHLORIDE 0.9 %
1000 INTRAVENOUS SOLUTION INTRAVENOUS ONCE
Status: COMPLETED | OUTPATIENT
Start: 2024-06-19 | End: 2024-06-19

## 2024-06-19 RX ORDER — LORAZEPAM 2 MG/ML
INJECTION INTRAMUSCULAR
Status: DISCONTINUED
Start: 2024-06-19 | End: 2024-06-19 | Stop reason: WASHOUT

## 2024-06-19 RX ORDER — MIRTAZAPINE 15 MG/1
15 TABLET, FILM COATED ORAL DAILY
COMMUNITY

## 2024-06-19 RX ORDER — NOREPINEPHRINE BITARTRATE 0.06 MG/ML
1-100 INJECTION, SOLUTION INTRAVENOUS CONTINUOUS
Status: DISCONTINUED | OUTPATIENT
Start: 2024-06-19 | End: 2024-06-25 | Stop reason: HOSPADM

## 2024-06-19 RX ORDER — ISOSORBIDE MONONITRATE 30 MG/1
30 TABLET, EXTENDED RELEASE ORAL DAILY
Status: DISCONTINUED | OUTPATIENT
Start: 2024-06-19 | End: 2024-06-24

## 2024-06-19 RX ORDER — DULOXETIN HYDROCHLORIDE 60 MG/1
60 CAPSULE, DELAYED RELEASE ORAL DAILY
Status: DISCONTINUED | OUTPATIENT
Start: 2024-06-19 | End: 2024-06-25 | Stop reason: HOSPADM

## 2024-06-19 RX ORDER — FERROUS SULFATE 325(65) MG
325 TABLET ORAL EVERY OTHER DAY
Status: DISCONTINUED | OUTPATIENT
Start: 2024-06-20 | End: 2024-06-24

## 2024-06-19 RX ORDER — BISACODYL 10 MG
10 SUPPOSITORY, RECTAL RECTAL DAILY PRN
COMMUNITY

## 2024-06-19 RX ORDER — CARVEDILOL 6.25 MG/1
6.25 TABLET ORAL 2 TIMES DAILY
Status: DISCONTINUED | OUTPATIENT
Start: 2024-06-19 | End: 2024-06-24

## 2024-06-19 RX ADMIN — CLOPIDOGREL BISULFATE 75 MG: 75 TABLET ORAL at 21:29

## 2024-06-19 RX ADMIN — SODIUM CHLORIDE: 9 INJECTION, SOLUTION INTRAVENOUS at 17:13

## 2024-06-19 RX ADMIN — CALCIUM CHLORIDE, MAGNESIUM CHLORIDE, DEXTROSE MONOHYDRATE, LACTIC ACID, SODIUM CHLORIDE, SODIUM BICARBONATE AND POTASSIUM CHLORIDE: 5.15; 2.03; 22; 5.4; 6.46; 3.09; .157 INJECTION INTRAVENOUS at 16:37

## 2024-06-19 RX ADMIN — ATORVASTATIN CALCIUM 40 MG: 40 TABLET, FILM COATED ORAL at 21:28

## 2024-06-19 RX ADMIN — MEROPENEM 1000 MG: 1 INJECTION INTRAVENOUS at 18:14

## 2024-06-19 RX ADMIN — PROPOFOL 20 MCG/KG/MIN: 10 INJECTION, EMULSION INTRAVENOUS at 21:47

## 2024-06-19 RX ADMIN — SODIUM BICARBONATE: 84 INJECTION, SOLUTION INTRAVENOUS at 15:10

## 2024-06-19 RX ADMIN — LEVETIRACETAM 500 MG: 100 INJECTION INTRAVENOUS at 21:20

## 2024-06-19 RX ADMIN — Medication 15 MCG/MIN: at 09:52

## 2024-06-19 RX ADMIN — SODIUM BICARBONATE: 84 INJECTION, SOLUTION INTRAVENOUS at 21:57

## 2024-06-19 RX ADMIN — CALCIUM CHLORIDE, MAGNESIUM CHLORIDE, DEXTROSE MONOHYDRATE, LACTIC ACID, SODIUM CHLORIDE, SODIUM BICARBONATE AND POTASSIUM CHLORIDE: 5.15; 2.03; 22; 5.4; 6.46; 3.09; .157 INJECTION INTRAVENOUS at 22:01

## 2024-06-19 RX ADMIN — VASOPRESSIN 0.03 UNITS/MIN: 20 INJECTION INTRAVENOUS at 21:43

## 2024-06-19 RX ADMIN — PIPERACILLIN AND TAZOBACTAM 4500 MG: 4; .5 INJECTION, POWDER, LYOPHILIZED, FOR SOLUTION INTRAVENOUS at 12:16

## 2024-06-19 RX ADMIN — CALCIUM CHLORIDE, MAGNESIUM CHLORIDE, DEXTROSE MONOHYDRATE, LACTIC ACID, SODIUM CHLORIDE, SODIUM BICARBONATE AND POTASSIUM CHLORIDE: 5.15; 2.03; 22; 5.4; 6.46; 3.09; .157 INJECTION INTRAVENOUS at 21:59

## 2024-06-19 RX ADMIN — AMIODARONE HYDROCHLORIDE 1 MG/MIN: 1.8 INJECTION, SOLUTION INTRAVENOUS at 21:32

## 2024-06-19 RX ADMIN — SODIUM CHLORIDE 1000 ML: 9 INJECTION, SOLUTION INTRAVENOUS at 09:11

## 2024-06-19 RX ADMIN — CALCIUM GLUCONATE 1000 MG: 10 INJECTION, SOLUTION INTRAVENOUS at 19:01

## 2024-06-19 RX ADMIN — AMIODARONE HYDROCHLORIDE 150 MG: 1.5 INJECTION, SOLUTION INTRAVENOUS at 21:23

## 2024-06-19 RX ADMIN — VANCOMYCIN HYDROCHLORIDE 1000 MG: 1 INJECTION, POWDER, LYOPHILIZED, FOR SOLUTION INTRAVENOUS at 13:02

## 2024-06-19 RX ADMIN — LEVETIRACETAM 1000 MG: 100 INJECTION INTRAVENOUS at 11:49

## 2024-06-19 RX ADMIN — SODIUM CHLORIDE, PRESERVATIVE FREE 40 MG: 5 INJECTION INTRAVENOUS at 17:03

## 2024-06-19 RX ADMIN — MIDAZOLAM HYDROCHLORIDE 2 MG/HR: 5 INJECTION, SOLUTION INTRAMUSCULAR; INTRAVENOUS at 11:23

## 2024-06-19 RX ADMIN — CHLORHEXIDINE GLUCONATE 15 ML: 1.2 RINSE ORAL at 20:50

## 2024-06-19 RX ADMIN — Medication 45 MCG/MIN: at 18:09

## 2024-06-19 ASSESSMENT — PULMONARY FUNCTION TESTS
PIF_VALUE: 24
PIF_VALUE: 23
PIF_VALUE: 24
PIF_VALUE: 23

## 2024-06-19 NOTE — ED TRIAGE NOTES
Pt to ED from [facility] with altered mental status. Staff was concerned that the patient was not \"acting normal\" EMS found her blood pressure \"80s over 50s.\" Upon arrival EMS had a NRB due to the \"patients hands were cold.\" Pt is normally on 2L o2. This RN has the patient on 4L o2 at 100%. Pt denies any pain. Pt is alert to self. Blood sugar per

## 2024-06-19 NOTE — ED PROVIDER NOTES
[facility]  EMERGENCY DEPARTMENT ENCOUNTER        PATIENT NAME: Beatriz Lau  MRN: [id number]  : 1940  DOWLING: 2024  PROVIDER: Delmer Brock MD    Patient was seen and evaluated at 9:26 AM EDT. Nurses Notes are reviewed and I agree except as noted in the HPI.  Chief Complaint   Patient presents with    Altered Mental Status     HISTORY OF PRESENT ILLNESS     Beatriz Lau is a 84 y.o. female brought in by EMS from [facility] for evaluation of altered mental status.    EMS staff noticed the patient was confused and lethargic this morning with low BP with SBP around 70s.  On arrival, temperature 98.9, pulse was 106, BP 70/56.  Patient looks tired and lethargic, but follows basic commands.  Patient denies fever, chills, chest pain, increasing SOB (home O2 2 L/minute baseline), nausea, vomiting, or diarrhea.    PMH is briefly reviewed positive for chronic anemia with baseline hemoglobin around 8, CAD, prior hemorrhagic CVA leaving residual hemiparesis, depression, anxiety, diabetes, morbid obesity, ESRD hemodialysis, HTN, CYNTHIA, and thrombocytopenia.    This HPI was provided by EMS and the EMR review.    PAST MEDICAL HISTORY    has a past medical history of Anemia of chronic disease, ASHD (arteriosclerotic heart disease), B12 deficiency, Cerebral artery occlusion with cerebral infarction ([facility]), CHF (congestive heart failure) ([facility]), Degenerative disc disease, lumbar, Depression with anxiety, DM2 (diabetes mellitus, type 2) ([facility]), Dyslipidemia, GERD (gastroesophageal reflux disease), Heart failure with reduced ejection fraction ([facility]), Hemodialysis patient ([facility]), History of CVA with residual deficit, History of hemorrhagic stroke with residual hemiparesis ([facility]), Hypertension, essential, Iron deficiency, 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    SURGICAL HISTORY      has a past

## 2024-06-19 NOTE — FLOWSHEET NOTE
Patient arrived to unit from ED via stretcher. Patient transferred to ICU bed 4D15 and placed on continuous ICU bedside monitor. Vitals obtained. See flowsheets. Patient's IV access includes CVC left fem. Current infusions and rates of infusion include versed @ 2 ml/hr, levo @ 31.9 ml/hr. Assessment completed by KAREN Mathur. Two nurse skin assessment completed by KAREN Mathur and KAREN Henning. See flowsheets for assessment details. Policies and procedures of ICU unable to be explained to patient at this time. Family member(s)/representative(s) present at time of admission include n/a. Patient rights explained to family member(s)/representatives and patient, as able. All questions posed by patient's family member(s)/representative(s) and patient answered at this time.

## 2024-06-19 NOTE — PROCEDURES
PROCEDURE NOTE  Date: 6/19/2024   Name: Beatriz aLu  YOB: 1940    Procedures EKG completed, given to RN

## 2024-06-19 NOTE — H&P
CRITICAL CARE H&P NOTE      Patient:  Beatriz Lau    Unit/Bed:4D-15/015-A  YOB: 1940  MRN: [id number]   PCP: No primary care provider on file.  Date of Admission: 6/19/2024  Chief Complaint:- Seizures    Assessment and Plan:    Acute hypoxic respiratory failure:  Intubated for air way protection 6/19. Initial cardiomegaly and probably CHF. ABG indicative of primary metabolic acidosis with secondary respiratory alkalosis and additional metabolic alkalosis. Started on bicarb gtt, see below. Maintain lung protective strategies. Continue to wean as tolerated for SpO2 > 90%. Repeat CXR and check ABG if respiratory status worsens.   Acute metabolic encephalopathy: Multifactorial. Presented witnessed seizure activity, see below and HAGMA. History of ESRD, electrolytes WNL, no overt uremia. CT head negative. Blood gases with no significant hypercapnia or hypoxia. Check TSH. EtOH and UDS ordered. Possibly  sepsis. Started Vancomycin and rocephin for empiric coverage 6/19. Check cultures (respiratory, blood x2, tunneled cath blood x1, urine) and pneumonia panel.  EEG ordered. On antiepileptics. Avoid narcotics/antihistamines/antipsychotics.  Seizures:  Witnessed. No prior history of seizures or drug use. Electrolytes non significant. Medications reviewed. Low suspicion for infectious process. CT head negative. Defer MRI brain now. S/p ativan and loading Keppra. Continue Keppra 500 mg IV BID. Check prolactin. EEG ordered.   HAGMA:  with lactic acidosis. Suspect secondary to noncompliance with dialysis with concomitant seizures. Presented LA 15.1. Initial ABG pH 7.19, HCO3 13. Check BHB, EtOH and UDS. Started on Bicarb gtt. Plans for CRRT. Trend LA every 2 hours until less 2.0 x2.   Acute on chronic HFpEF, NYHA III:  Unclear etiology, suspect NICM. Last Echo EF 50%, no RWMA or valvular abnormalities 4/2022 . Presented ProBNP > 70 K, CXR w/ cardiomegaly. Home GDMT includes BB and diuretic, no SGLT2i. 2 g

## 2024-06-19 NOTE — CONSENT
Informed Consent for Blood Component Transfusion Note    I have discussed with the daughter the rationale for blood component transfusion; its benefits in treating or preventing fatigue, organ damage, or death; and its risk which includes mild transfusion reactions, rare risk of blood borne infection, or more serious but rare reactions. I have discussed the alternatives to transfusion, including the risk and consequences of not receiving transfusion. The daughter had an opportunity to ask questions and had agreed to proceed with transfusion of blood components.    Electronically signed by Tommy Nielsen DO on 6/19/24 at 4:30 PM EDT

## 2024-06-20 ENCOUNTER — APPOINTMENT (OUTPATIENT)
Dept: INTERVENTIONAL RADIOLOGY/VASCULAR | Age: 84
DRG: 870 | End: 2024-06-20
Payer: MEDICARE

## 2024-06-20 ENCOUNTER — APPOINTMENT (OUTPATIENT)
Dept: GENERAL RADIOLOGY | Age: 84
DRG: 870 | End: 2024-06-20
Payer: MEDICARE

## 2024-06-20 PROBLEM — R56.9 NEW ONSET SEIZURE (HCC): Status: ACTIVE | Noted: 2024-06-20

## 2024-06-20 LAB
ACINETOBACTER CALCOACETICUS-BAUMANNII BY PCR: NOT DETECTED
ALBUMIN SERPL BCG-MCNC: 2.4 G/DL (ref 3.5–5.1)
ALBUMIN SERPL BCG-MCNC: 2.7 G/DL (ref 3.5–5.1)
ALBUMIN SERPL BCG-MCNC: 2.8 G/DL (ref 3.5–5.1)
ALP SERPL-CCNC: 182 U/L (ref 38–126)
ALP SERPL-CCNC: 190 U/L (ref 38–126)
ALP SERPL-CCNC: 191 U/L (ref 38–126)
ALT SERPL W/O P-5'-P-CCNC: 1338 U/L (ref 11–66)
ALT SERPL W/O P-5'-P-CCNC: 1733 U/L (ref 11–66)
ALT SERPL W/O P-5'-P-CCNC: 1842 U/L (ref 11–66)
ANION GAP SERPL CALC-SCNC: 12 MEQ/L (ref 8–16)
ANION GAP SERPL CALC-SCNC: 13 MEQ/L (ref 8–16)
ANION GAP SERPL CALC-SCNC: 14 MEQ/L (ref 8–16)
ANION GAP SERPL CALC-SCNC: 22 MEQ/L (ref 8–16)
ARTERIAL PATENCY WRIST A: ABNORMAL
AST SERPL-CCNC: 4792 U/L (ref 5–40)
AST SERPL-CCNC: < 5 U/L (ref 5–40)
AST SERPL-CCNC: < 5 U/L (ref 5–40)
BASE EXCESS BLDA CALC-SCNC: -0.5 MMOL/L (ref -2.5–2.5)
BASE EXCESS BLDA CALC-SCNC: -0.8 MMOL/L (ref -2–3)
BDY SITE: ABNORMAL
BILIRUB SERPL-MCNC: 0.7 MG/DL (ref 0.3–1.2)
BILIRUB SERPL-MCNC: 0.9 MG/DL (ref 0.3–1.2)
BILIRUB SERPL-MCNC: 0.9 MG/DL (ref 0.3–1.2)
BLACTX-M ISLT/SPM QL: ABNORMAL
BLAIMP ISLT/SPM QL: ABNORMAL
BLAKPC ISLT/SPM QL: ABNORMAL
BLAOXA-48-LIKE ISLT/SPM QL: ABNORMAL
BLAVIM ISLT/SPM QL: ABNORMAL
BUN SERPL-MCNC: 11 MG/DL (ref 7–22)
BUN SERPL-MCNC: 11 MG/DL (ref 7–22)
BUN SERPL-MCNC: 16 MG/DL (ref 7–22)
BUN SERPL-MCNC: 9 MG/DL (ref 7–22)
C PNEUM DNA LOWER RESP QL NAA+NON-PROBE: NOT DETECTED
CA-I BLD ISE-SCNC: 1 MMOL/L (ref 1.12–1.32)
CA-I BLD ISE-SCNC: 1.11 MMOL/L (ref 1.12–1.32)
CA-I BLD ISE-SCNC: 1.14 MMOL/L (ref 1.12–1.32)
CA-I BLD ISE-SCNC: 1.14 MMOL/L (ref 1.12–1.32)
CALCIUM SERPL-MCNC: 7.6 MG/DL (ref 8.5–10.5)
CALCIUM SERPL-MCNC: 7.9 MG/DL (ref 8.5–10.5)
CALCIUM SERPL-MCNC: 8.1 MG/DL (ref 8.5–10.5)
CALCIUM SERPL-MCNC: 8.9 MG/DL (ref 8.5–10.5)
CHLORIDE SERPL-SCNC: 100 MEQ/L (ref 98–111)
CHLORIDE SERPL-SCNC: 98 MEQ/L (ref 98–111)
CHLORIDE SERPL-SCNC: 99 MEQ/L (ref 98–111)
CHLORIDE SERPL-SCNC: 99 MEQ/L (ref 98–111)
CO2 SERPL-SCNC: 17 MEQ/L (ref 23–33)
CO2 SERPL-SCNC: 21 MEQ/L (ref 23–33)
CO2 SERPL-SCNC: 22 MEQ/L (ref 23–33)
CO2 SERPL-SCNC: 23 MEQ/L (ref 23–33)
COLLECTED BY:: ABNORMAL
COLLECTED BY:: NORMAL
CREAT SERPL-MCNC: 0.8 MG/DL (ref 0.4–1.2)
CREAT SERPL-MCNC: 1 MG/DL (ref 0.4–1.2)
CREAT SERPL-MCNC: 1.1 MG/DL (ref 0.4–1.2)
CREAT SERPL-MCNC: 1.9 MG/DL (ref 0.4–1.2)
DEPRECATED RDW RBC AUTO: 50.5 FL (ref 35–45)
DEVICE: ABNORMAL
DEVICE: NORMAL
EKG ATRIAL RATE: 111 BPM
EKG ATRIAL RATE: 76 BPM
EKG ATRIAL RATE: 78 BPM
EKG P AXIS: 63 DEGREES
EKG P AXIS: 64 DEGREES
EKG P AXIS: 76 DEGREES
EKG P-R INTERVAL: 134 MS
EKG P-R INTERVAL: 142 MS
EKG P-R INTERVAL: 150 MS
EKG Q-T INTERVAL: 344 MS
EKG Q-T INTERVAL: 420 MS
EKG Q-T INTERVAL: 438 MS
EKG QRS DURATION: 108 MS
EKG QRS DURATION: 110 MS
EKG QRS DURATION: 110 MS
EKG QTC CALCULATION (BAZETT): 467 MS
EKG QTC CALCULATION (BAZETT): 472 MS
EKG QTC CALCULATION (BAZETT): 499 MS
EKG R AXIS: 47 DEGREES
EKG R AXIS: 61 DEGREES
EKG R AXIS: 66 DEGREES
EKG T AXIS: 111 DEGREES
EKG T AXIS: 80 DEGREES
EKG T AXIS: 86 DEGREES
EKG VENTRICULAR RATE: 111 BPM
EKG VENTRICULAR RATE: 76 BPM
EKG VENTRICULAR RATE: 78 BPM
ENTEROBACTER CLOACAE COMPLEX BY PCR: NOT DETECTED
ERYTHROCYTE [DISTWIDTH] IN BLOOD BY AUTOMATED COUNT: 17.7 % (ref 11.5–14.5)
ESCHERICHIA COLI BY PCR: NOT DETECTED
FIO2 ON VENT O2 ANALYZER: 40 %
FIO2 ON VENT O2 ANALYZER: 40 %
FLUAV RNA LOWER RESP QL NAA+NON-PROBE: NOT DETECTED
FLUBV RNA LOWER RESP QL NAA+NON-PROBE: NOT DETECTED
GFR SERPL CREATININE-BSD FRML MDRD: 26 ML/MIN/1.73M2
GFR SERPL CREATININE-BSD FRML MDRD: 50 ML/MIN/1.73M2
GFR SERPL CREATININE-BSD FRML MDRD: 56 ML/MIN/1.73M2
GFR SERPL CREATININE-BSD FRML MDRD: 73 ML/MIN/1.73M2
GLUCOSE BLD STRIP.AUTO-MCNC: 139 MG/DL (ref 70–108)
GLUCOSE BLD STRIP.AUTO-MCNC: 199 MG/DL (ref 70–108)
GLUCOSE BLD-MCNC: 210 MG/DL (ref 70–108)
GLUCOSE SERPL-MCNC: 152 MG/DL (ref 70–108)
GLUCOSE SERPL-MCNC: 156 MG/DL (ref 70–108)
GLUCOSE SERPL-MCNC: 158 MG/DL (ref 70–108)
GLUCOSE SERPL-MCNC: 248 MG/DL (ref 70–108)
HADV DNA LOWER RESP QL NAA+NON-PROBE: NOT DETECTED
HAEMOPHILUS INFLUENZAE BY PCR: NOT DETECTED
HCO3 BLDA-SCNC: 23 MMOL/L (ref 23–28)
HCO3 BLDA-SCNC: 25 MMOL/L (ref 23–28)
HCOV RNA LOWER RESP QL NAA+NON-PROBE: NOT DETECTED
HCT VFR BLD AUTO: 31.4 % (ref 37–47)
HCT VFR BLD AUTO: 31.4 % (ref 37–47)
HCT VFR BLD AUTO: 31.6 % (ref 37–47)
HGB BLD-MCNC: 10 GM/DL (ref 12–16)
HGB BLD-MCNC: 9.9 GM/DL (ref 12–16)
HGB BLD-MCNC: 9.9 GM/DL (ref 12–16)
HMPV RNA LOWER RESP QL NAA+NON-PROBE: NOT DETECTED
HPIV RNA LOWER RESP QL NAA+NON-PROBE: NOT DETECTED
KLEBSIELLA AEROGENES BY PCR: NOT DETECTED
KLEBSIELLA OXYTOCA BY PCR: NOT DETECTED
KLEBSIELLA PNEUMONIAE GROUP BY PCR: NOT DETECTED
L PNEUMO DNA LOWER RESP QL NAA+NON-PROBE: NOT DETECTED
LACTATE SERPL-SCNC: 10.1 MMOL/L (ref 0.5–2)
LACTATE SERPL-SCNC: 2.6 MMOL/L (ref 0.5–2)
LACTATE SERPL-SCNC: 2.8 MMOL/L (ref 0.5–2)
LACTATE SERPL-SCNC: 3.7 MMOL/L (ref 0.5–2)
LACTATE SERPL-SCNC: 6.5 MMOL/L (ref 0.5–2)
LACTATE SERPL-SCNC: 7.7 MMOL/L (ref 0.5–2)
M PNEUMO DNA LOWER RESP QL NAA+NON-PROBE: NOT DETECTED
MAGNESIUM SERPL-MCNC: 2 MG/DL (ref 1.6–2.4)
MAGNESIUM SERPL-MCNC: 2 MG/DL (ref 1.6–2.4)
MAGNESIUM SERPL-MCNC: 2.2 MG/DL (ref 1.6–2.4)
MAGNESIUM SERPL-MCNC: 2.2 MG/DL (ref 1.6–2.4)
MAGNESIUM SERPL-MCNC: 2.4 MG/DL (ref 1.6–2.4)
MCH RBC QN AUTO: 25.9 PG (ref 26–33)
MCHC RBC AUTO-ENTMCNC: 31.6 GM/DL (ref 32.2–35.5)
MCV RBC AUTO: 81.9 FL (ref 81–99)
MORAXELLA CATARRHALIS BY PCR: NOT DETECTED
PCO2 BLDA: 32 MMHG (ref 35–45)
PCO2 TEMP ADJ BLDMV: 42 MMHG (ref 41–51)
PEEP SETTING VENT: 6 MMHG
PH BLDA: 7.46 [PH] (ref 7.35–7.45)
PH BLDMV: 7.37 [PH] (ref 7.31–7.41)
PHOSPHATE SERPL-MCNC: 0.6 MG/DL (ref 2.4–4.7)
PHOSPHATE SERPL-MCNC: 1.1 MG/DL (ref 2.4–4.7)
PHOSPHATE SERPL-MCNC: 1.3 MG/DL (ref 2.4–4.7)
PHOSPHATE SERPL-MCNC: 2.6 MG/DL (ref 2.4–4.7)
PIP: 22 CMH2O
PIP: 22 CMH2O
PLATELET # BLD AUTO: 123 THOU/MM3 (ref 130–400)
PMV BLD AUTO: 13 FL (ref 9.4–12.4)
PO2 BLDA: 179 MMHG (ref 71–104)
PO2 BLDMV: 34 MMHG (ref 25–40)
POTASSIUM SERPL-SCNC: 3.4 MEQ/L (ref 3.5–5.2)
POTASSIUM SERPL-SCNC: 3.6 MEQ/L (ref 3.5–5.2)
POTASSIUM SERPL-SCNC: 3.9 MEQ/L (ref 3.5–5.2)
POTASSIUM SERPL-SCNC: 4 MEQ/L (ref 3.5–5.2)
POTASSIUM SERPL-SCNC: 4.1 MEQ/L (ref 3.5–5.2)
PROT SERPL-MCNC: 5.6 G/DL (ref 6.1–8)
PROT SERPL-MCNC: 5.7 G/DL (ref 6.1–8)
PROT SERPL-MCNC: 5.8 G/DL (ref 6.1–8)
PROTEUS SPECIES BY PCR: NOT DETECTED
PSEUDOMONAS AERUGINOSA BY PCR: NOT DETECTED
RBC # BLD AUTO: 3.86 MILL/MM3 (ref 4.2–5.4)
RESISTANT GENE MECA/C & MREJ BY PCR: NOT DETECTED
RESISTANT GENE NDM BY PCR: ABNORMAL
RSV RNA LOWER RESP QL NAA+NON-PROBE: NOT DETECTED
RV+EV RNA LOWER RESP QL NAA+NON-PROBE: NOT DETECTED
SAO2 % BLDA: 100 %
SAO2 % BLDMV: 63 %
SERRATIA MARCESCENS BY PCR: NOT DETECTED
SET PEEP: 6 MMHG
SITE: NORMAL
SODIUM SERPL-SCNC: 133 MEQ/L (ref 135–145)
SODIUM SERPL-SCNC: 135 MEQ/L (ref 135–145)
SODIUM SERPL-SCNC: 135 MEQ/L (ref 135–145)
SODIUM SERPL-SCNC: 137 MEQ/L (ref 135–145)
SOURCE: ABNORMAL
SPECIMEN ACCEPTABILITY: ABNORMAL
STAPH AUREUS BY PCR: DETECTED
STREP AGALACTIAE BY PCR: NOT DETECTED
STREP PNEUMONIAE BY PCR: NOT DETECTED
STREP PYOGENES BY PCR: NOT DETECTED
TROPONIN, HIGH SENSITIVITY: 5257 NG/L (ref 0–12)
TSH SERPL DL<=0.005 MIU/L-ACNC: 0.88 UIU/ML (ref 0.4–4.2)
VANCOMYCIN SERPL-MCNC: 9.1 UG/ML (ref 0.1–39.9)
VENTILATION MODE VENT: ABNORMAL
VENTILATION MODE VENT: NORMAL
WBC # BLD AUTO: 15.9 THOU/MM3 (ref 4.8–10.8)

## 2024-06-20 PROCEDURE — 2580000003 HC RX 258

## 2024-06-20 PROCEDURE — 2720000010 HC SURG SUPPLY STERILE

## 2024-06-20 PROCEDURE — 87150 DNA/RNA AMPLIFIED PROBE: CPT

## 2024-06-20 PROCEDURE — 2500000003 HC RX 250 WO HCPCS: Performed by: INTERNAL MEDICINE

## 2024-06-20 PROCEDURE — 82330 ASSAY OF CALCIUM: CPT

## 2024-06-20 PROCEDURE — 99291 CRITICAL CARE FIRST HOUR: CPT | Performed by: INTERNAL MEDICINE

## 2024-06-20 PROCEDURE — 82947 ASSAY GLUCOSE BLOOD QUANT: CPT

## 2024-06-20 PROCEDURE — 3E033XZ INTRODUCTION OF VASOPRESSOR INTO PERIPHERAL VEIN, PERCUTANEOUS APPROACH: ICD-10-PCS

## 2024-06-20 PROCEDURE — 80202 ASSAY OF VANCOMYCIN: CPT

## 2024-06-20 PROCEDURE — 93005 ELECTROCARDIOGRAM TRACING: CPT | Performed by: STUDENT IN AN ORGANIZED HEALTH CARE EDUCATION/TRAINING PROGRAM

## 2024-06-20 PROCEDURE — 87581 M.PNEUMON DNA AMP PROBE: CPT

## 2024-06-20 PROCEDURE — 87070 CULTURE OTHR SPECIMN AEROBIC: CPT

## 2024-06-20 PROCEDURE — 36556 INSERT NON-TUNNEL CV CATH: CPT

## 2024-06-20 PROCEDURE — 94003 VENT MGMT INPAT SUBQ DAY: CPT

## 2024-06-20 PROCEDURE — 82803 BLOOD GASES ANY COMBINATION: CPT

## 2024-06-20 PROCEDURE — 85018 HEMOGLOBIN: CPT

## 2024-06-20 PROCEDURE — 87486 CHLMYD PNEUM DNA AMP PROBE: CPT

## 2024-06-20 PROCEDURE — 87798 DETECT AGENT NOS DNA AMP: CPT

## 2024-06-20 PROCEDURE — 84484 ASSAY OF TROPONIN QUANT: CPT

## 2024-06-20 PROCEDURE — 2580000003 HC RX 258: Performed by: INTERNAL MEDICINE

## 2024-06-20 PROCEDURE — 82948 REAGENT STRIP/BLOOD GLUCOSE: CPT

## 2024-06-20 PROCEDURE — 84443 ASSAY THYROID STIM HORMONE: CPT

## 2024-06-20 PROCEDURE — 84132 ASSAY OF SERUM POTASSIUM: CPT

## 2024-06-20 PROCEDURE — C9113 INJ PANTOPRAZOLE SODIUM, VIA: HCPCS | Performed by: STUDENT IN AN ORGANIZED HEALTH CARE EDUCATION/TRAINING PROGRAM

## 2024-06-20 PROCEDURE — 6370000000 HC RX 637 (ALT 250 FOR IP): Performed by: STUDENT IN AN ORGANIZED HEALTH CARE EDUCATION/TRAINING PROGRAM

## 2024-06-20 PROCEDURE — 2000000000 HC ICU R&B

## 2024-06-20 PROCEDURE — 37799 UNLISTED PX VASCULAR SURGERY: CPT

## 2024-06-20 PROCEDURE — 93010 ELECTROCARDIOGRAM REPORT: CPT | Performed by: INTERNAL MEDICINE

## 2024-06-20 PROCEDURE — 87541 LEGION PNEUMO DNA AMP PROB: CPT

## 2024-06-20 PROCEDURE — 6370000000 HC RX 637 (ALT 250 FOR IP)

## 2024-06-20 PROCEDURE — 2500000003 HC RX 250 WO HCPCS: Performed by: EMERGENCY MEDICINE

## 2024-06-20 PROCEDURE — 71045 X-RAY EXAM CHEST 1 VIEW: CPT

## 2024-06-20 PROCEDURE — 2580000003 HC RX 258: Performed by: STUDENT IN AN ORGANIZED HEALTH CARE EDUCATION/TRAINING PROGRAM

## 2024-06-20 PROCEDURE — 87205 SMEAR GRAM STAIN: CPT

## 2024-06-20 PROCEDURE — 6360000002 HC RX W HCPCS

## 2024-06-20 PROCEDURE — 2580000003 HC RX 258: Performed by: NURSE PRACTITIONER

## 2024-06-20 PROCEDURE — 95705 EEG W/O VID 2-12 HR UNMNTR: CPT

## 2024-06-20 PROCEDURE — 95717 EEG PHYS/QHP 2-12 HR W/O VID: CPT | Performed by: PSYCHIATRY & NEUROLOGY

## 2024-06-20 PROCEDURE — 77001 FLUOROGUIDE FOR VEIN DEVICE: CPT

## 2024-06-20 PROCEDURE — 89220 SPUTUM SPECIMEN COLLECTION: CPT

## 2024-06-20 PROCEDURE — 83735 ASSAY OF MAGNESIUM: CPT

## 2024-06-20 PROCEDURE — 2500000003 HC RX 250 WO HCPCS

## 2024-06-20 PROCEDURE — 2709999900 IR FLUORO GUIDED CVA DEVICE PLMT/REPLACE/REMOVAL

## 2024-06-20 PROCEDURE — 6360000002 HC RX W HCPCS: Performed by: STUDENT IN AN ORGANIZED HEALTH CARE EDUCATION/TRAINING PROGRAM

## 2024-06-20 PROCEDURE — 80053 COMPREHEN METABOLIC PANEL: CPT

## 2024-06-20 PROCEDURE — 84100 ASSAY OF PHOSPHORUS: CPT

## 2024-06-20 PROCEDURE — 85027 COMPLETE CBC AUTOMATED: CPT

## 2024-06-20 PROCEDURE — 02H633Z INSERTION OF INFUSION DEVICE INTO RIGHT ATRIUM, PERCUTANEOUS APPROACH: ICD-10-PCS | Performed by: RADIOLOGY

## 2024-06-20 PROCEDURE — 36415 COLL VENOUS BLD VENIPUNCTURE: CPT

## 2024-06-20 PROCEDURE — 83605 ASSAY OF LACTIC ACID: CPT

## 2024-06-20 PROCEDURE — 87631 RESP VIRUS 3-5 TARGETS: CPT

## 2024-06-20 PROCEDURE — 85014 HEMATOCRIT: CPT

## 2024-06-20 PROCEDURE — 94761 N-INVAS EAR/PLS OXIMETRY MLT: CPT

## 2024-06-20 RX ORDER — SODIUM CHLORIDE 9 MG/ML
INJECTION, SOLUTION INTRAVENOUS PRN
Status: DISCONTINUED | OUTPATIENT
Start: 2024-06-20 | End: 2024-06-24

## 2024-06-20 RX ORDER — SODIUM CHLORIDE 0.9 % (FLUSH) 0.9 %
5-40 SYRINGE (ML) INJECTION EVERY 12 HOURS SCHEDULED
Status: DISCONTINUED | OUTPATIENT
Start: 2024-06-20 | End: 2024-06-24

## 2024-06-20 RX ORDER — SODIUM CHLORIDE 0.9 % (FLUSH) 0.9 %
5-40 SYRINGE (ML) INJECTION PRN
Status: DISCONTINUED | OUTPATIENT
Start: 2024-06-20 | End: 2024-06-24

## 2024-06-20 RX ORDER — ASPIRIN 81 MG/1
81 TABLET, CHEWABLE ORAL DAILY
Status: DISCONTINUED | OUTPATIENT
Start: 2024-06-20 | End: 2024-06-24

## 2024-06-20 RX ORDER — SODIUM CHLORIDE 0.9 % (FLUSH) 0.9 %
1.1-1.9 SYRINGE (ML) INJECTION PRN
Status: DISCONTINUED | OUTPATIENT
Start: 2024-06-20 | End: 2024-06-20

## 2024-06-20 RX ORDER — SODIUM CHLORIDE 0.9 % (FLUSH) 0.9 %
1.1-1.9 SYRINGE (ML) INJECTION PRN
Status: DISCONTINUED | OUTPATIENT
Start: 2024-06-20 | End: 2024-06-24

## 2024-06-20 RX ADMIN — Medication: at 20:37

## 2024-06-20 RX ADMIN — VANCOMYCIN HYDROCHLORIDE 500 MG: 500 INJECTION, POWDER, LYOPHILIZED, FOR SOLUTION INTRAVENOUS at 00:47

## 2024-06-20 RX ADMIN — SODIUM PHOSPHATE, MONOBASIC, MONOHYDRATE AND SODIUM PHOSPHATE, DIBASIC, ANHYDROUS 24 MMOL: 142; 276 INJECTION, SOLUTION INTRAVENOUS at 22:55

## 2024-06-20 RX ADMIN — VASOPRESSIN 0.04 UNITS/MIN: 20 INJECTION INTRAVENOUS at 04:50

## 2024-06-20 RX ADMIN — ASPIRIN 81 MG 81 MG: 81 TABLET ORAL at 08:57

## 2024-06-20 RX ADMIN — Medication: at 04:13

## 2024-06-20 RX ADMIN — CALCIUM CHLORIDE, MAGNESIUM CHLORIDE, DEXTROSE MONOHYDRATE, LACTIC ACID, SODIUM CHLORIDE, SODIUM BICARBONATE AND POTASSIUM CHLORIDE: 5.15; 2.03; 22; 5.4; 6.46; 3.09; .157 INJECTION INTRAVENOUS at 02:55

## 2024-06-20 RX ADMIN — ALLOPURINOL 100 MG: 100 TABLET ORAL at 08:42

## 2024-06-20 RX ADMIN — DULOXETINE HYDROCHLORIDE 60 MG: 60 CAPSULE, DELAYED RELEASE ORAL at 08:42

## 2024-06-20 RX ADMIN — SODIUM CHLORIDE, PRESERVATIVE FREE 40 MG: 5 INJECTION INTRAVENOUS at 08:42

## 2024-06-20 RX ADMIN — Medication 36 MCG/MIN: at 06:05

## 2024-06-20 RX ADMIN — VASOPRESSIN 0.04 UNITS/MIN: 20 INJECTION INTRAVENOUS at 15:32

## 2024-06-20 RX ADMIN — SODIUM BICARBONATE: 84 INJECTION, SOLUTION INTRAVENOUS at 12:05

## 2024-06-20 RX ADMIN — FERROUS SULFATE TAB 325 MG (65 MG ELEMENTAL FE) 325 MG: 325 (65 FE) TAB at 08:42

## 2024-06-20 RX ADMIN — PROPOFOL 20 MCG/KG/MIN: 10 INJECTION, EMULSION INTRAVENOUS at 05:56

## 2024-06-20 RX ADMIN — AMIODARONE HYDROCHLORIDE 0.5 MG/MIN: 1.8 INJECTION, SOLUTION INTRAVENOUS at 00:13

## 2024-06-20 RX ADMIN — Medication: at 09:12

## 2024-06-20 RX ADMIN — Medication: at 09:15

## 2024-06-20 RX ADMIN — Medication: at 09:14

## 2024-06-20 RX ADMIN — CHLORHEXIDINE GLUCONATE 15 ML: 1.2 RINSE ORAL at 20:14

## 2024-06-20 RX ADMIN — Medication: at 04:14

## 2024-06-20 RX ADMIN — PROPOFOL 20 MCG/KG/MIN: 10 INJECTION, EMULSION INTRAVENOUS at 00:52

## 2024-06-20 RX ADMIN — PROPOFOL 20 MCG/KG/MIN: 10 INJECTION, EMULSION INTRAVENOUS at 21:35

## 2024-06-20 RX ADMIN — Medication 12 MCG/MIN: at 21:32

## 2024-06-20 RX ADMIN — Medication 50 MCG/MIN: at 00:43

## 2024-06-20 RX ADMIN — ATORVASTATIN CALCIUM 40 MG: 40 TABLET, FILM COATED ORAL at 20:40

## 2024-06-20 RX ADMIN — CLOPIDOGREL BISULFATE 75 MG: 75 TABLET ORAL at 08:42

## 2024-06-20 RX ADMIN — Medication: at 14:52

## 2024-06-20 RX ADMIN — MEROPENEM 1000 MG: 1 INJECTION INTRAVENOUS at 04:41

## 2024-06-20 RX ADMIN — Medication: at 14:54

## 2024-06-20 RX ADMIN — LEVETIRACETAM 500 MG: 100 INJECTION INTRAVENOUS at 20:43

## 2024-06-20 RX ADMIN — MEROPENEM 1000 MG: 1 INJECTION INTRAVENOUS at 16:44

## 2024-06-20 RX ADMIN — SODIUM CHLORIDE, PRESERVATIVE FREE 10 ML: 5 INJECTION INTRAVENOUS at 20:16

## 2024-06-20 RX ADMIN — LEVETIRACETAM 500 MG: 100 INJECTION INTRAVENOUS at 09:06

## 2024-06-20 RX ADMIN — CHLORHEXIDINE GLUCONATE 15 ML: 1.2 RINSE ORAL at 08:42

## 2024-06-20 RX ADMIN — INSULIN LISPRO 4 UNITS: 100 INJECTION, SOLUTION INTRAVENOUS; SUBCUTANEOUS at 06:41

## 2024-06-20 ASSESSMENT — PULMONARY FUNCTION TESTS
PIF_VALUE: 22
PIF_VALUE: 23
PIF_VALUE: 22
PIF_VALUE: 22
PIF_VALUE: 23
PIF_VALUE: 22

## 2024-06-20 NOTE — PROCEDURES
PROCEDURE NOTE  Date: 6/19/2024   Name: Beatriz Lau  YOB: 1940    Procedures  12 lead EKG completed. Results handed to Adrienne JONES.

## 2024-06-20 NOTE — PROCEDURES
PROCEDURE NOTE  Date: 6/20/2024   Name: Beatriz Lau  YOB: 1940    Procedures  12 lead EKG completed. Results handed to Adrienne JONES.

## 2024-06-20 NOTE — FLOWSHEET NOTE
0800 Mrs Lau rests in the bed without apparent distress. She remains intubated 7.5 ETT at 18 cm to her lips and sedated with Propofol. She requires Levophed and Vasopressin to maintain her BP at ordered parameters. These are infusing through a 3 lumen CVC left groin site clear. She has a dialysis catheter left chest site clear currently running CVVH. The system appears to be running without difficulty. She has a mccarthy patent to gravity but is currently anuric. Plan to try to remove 50 cc/hr and wean down pressors as she tolerates. Get TTE later today.

## 2024-06-20 NOTE — PROCEDURES
PROCEDURE NOTE  Date: 6/20/2024   Name: Beatriz Lau  YOB: 1940    Procedures    Date: June 20, 2024    Referring physician: Dr. Alba    Indication  Patient aged 84 y with question of seizures. EEG done to assess for epileptiform activity.    Introduction  This routine 3 hour 54 minutes EEG was recorded using the [employer] one band system. Automated seizure detection algorithms were applied.    Description  During the maximal alert state, a well-regulated, symmetric, 3 to 6 Hz frequencies were seen bilaterally.  There was intermittent focal slowing. Stage I and stage II sleep were not clearly observed. There were no interictal epileptiform discharges or electrographic seizures.  High impedence was seen as the study progressed.    Activations  Hyperventilation was not performed. Intermittent photic stimulation was not performed     Impression  There is abnormal EEG with diffuse morphing delta and theta slowing suggestive of moderate encephalopathy.  The faster beta frequencies are suggestive of medication use.    No epileptiform discharges or seizures were identified. Please note the absence of such activity on this record cannot conclusively rule out an epileptic disorder. If such is still clinically suspected, a repeat study with sleep deprivation and/or prolonged sampling may be helpful.    This was a limited 8 channel EEG, which is prone to artifact and a conventional 10-20 lead EEG may be considered if clinically indicated.     Please note this EEG was meant to screen for emergent condition and is prone to artifact and with some limitations. The interpretation of this EEG result should be taken only with clinical correlation. Ideally regular EEG with full leads should be considered when possible.     Electronically signed by Tony Humphries MD on 6/20/2024

## 2024-06-20 NOTE — H&P
Formulation and discussion of sedation / procedure plans, risks, benefits, side effects and alternatives with patient and/or responsible adult completed.    History and Physical reviewed and unchanged.    Electronically signed by Brett Sherman MD on 6/20/24 at 1:19 PM EDT

## 2024-06-20 NOTE — SIGNIFICANT EVENT
Patients labs returned concerning for shock liver and worsening cardiomyopathy. (Troponin now in the 5,000s). EKG however does not reveal any ST or T wave changes to suggest ACS.     I performed a bedside ultrasound of her heart to visualize her cardiac function. Her RV was significantly dilated and only moving minimally. Her IVC was very plump. She has been receiving 150 cc/h of sodium bicarb gtt and has been running even with CRRT.    I suspect she is in R heart failure.    I contacted Dr. Jason MD, w/ my findings. We will start pulling 25 cc/h with CRRT to see if pt's shock improves.    -Formal TTE recommended for suspected RHF  -Pt may be a good candidate for SGC  -Cardiology consulted; appreciate their recs  -Decrease bicarb gtt to 30 cc/h as lactate has significantly improved    Signed:  Dr. Kenroy Olivares MD  Internal Medicine, PGY-3, Chief Resident  06/20/24  4:31 AM    Staff: Kurt Laurent MD

## 2024-06-20 NOTE — FLOWSHEET NOTE
1700  Dr Gomez messaged-informed of Lactic acid 23.2 and I yvonne 0.99. asked if wanted ca replacement protocol. Clarified order for 0.9NS at 100ml/hr along with the bicarb drip. States to stop the 0.9NS and continue with the bicarb drip. Prdered to use the Ca replacement protocol for CRRT. Clarified frequency of lab draws. States to change CMP to q 8hrs along with a VGB.

## 2024-06-20 NOTE — PROCEDURES
PROCEDURE NOTE  Date: 6/19/2024   Name: Beatriz Lau  YOB: 1940    Insert Arterial Line    Date/Time: 6/19/2024 10:48 PM    Performed by: Kenroy Olivares MD  Authorized by: Kenroy Olivares MD  Consent: The procedure was performed in an emergent situation.  Risks and benefits: risks, benefits and alternatives were discussed  Consent given by: power of   Test results: test results available and properly labeled  Site marked: the operative site was marked  Imaging studies: imaging studies available  Required items: required blood products, implants, devices, and special equipment available  Patient identity confirmed: arm band, provided demographic data and hospital-assigned identification number  Time out: Immediately prior to procedure a \"time out\" was called to verify the correct patient, procedure, equipment, support staff and site/side marked as required.  Preparation: Patient was prepped and draped in the usual sterile fashion.  Indications: multiple ABGs and hemodynamic monitoring  Location: left radial  Anesthesia: see MAR for details    Sedation:  Patient sedated: yes  Sedatives: propofol    Needle gauge: 21.  Seldinger technique: Seldinger technique used  Number of attempts: 1  Post-procedure: line sutured and dressing applied  Post-procedure CMS: normal and unchanged  Comments: Dr. Kurt Alba MD, was available if needed throughout the entirety of the procedure.

## 2024-06-20 NOTE — OP NOTE
PICC Line Insertion:      Pre-Procedure Diagnosis:  hypotension     Procedure Performed: PICC line insertion.  IV team to follow     Anesthesia: local     Findings: successful    Immediate Complications:  None    Estimated Blood Loss: minimal    SEE DICTATED PROCEDURE NOTE FOR COMPLETE DETAILS.    Brett Sherman MD   6/20/2024 1:19 PM

## 2024-06-21 ENCOUNTER — APPOINTMENT (OUTPATIENT)
Dept: GENERAL RADIOLOGY | Age: 84
DRG: 870 | End: 2024-06-21
Payer: MEDICARE

## 2024-06-21 ENCOUNTER — APPOINTMENT (OUTPATIENT)
Age: 84
DRG: 870 | End: 2024-06-21
Payer: MEDICARE

## 2024-06-21 LAB
ALBUMIN SERPL BCG-MCNC: 2.2 G/DL (ref 3.5–5.1)
ALBUMIN SERPL BCG-MCNC: 2.3 G/DL (ref 3.5–5.1)
ALBUMIN SERPL BCG-MCNC: 2.4 G/DL (ref 3.5–5.1)
ALBUMIN SERPL BCG-MCNC: 2.4 G/DL (ref 3.5–5.1)
ALP SERPL-CCNC: 219 U/L (ref 38–126)
ALP SERPL-CCNC: 238 U/L (ref 38–126)
ALP SERPL-CCNC: 243 U/L (ref 38–126)
ALP SERPL-CCNC: 254 U/L (ref 38–126)
ALT SERPL W/O P-5'-P-CCNC: 1061 U/L (ref 11–66)
ALT SERPL W/O P-5'-P-CCNC: 1126 U/L (ref 11–66)
ALT SERPL W/O P-5'-P-CCNC: 632 U/L (ref 11–66)
ALT SERPL W/O P-5'-P-CCNC: 815 U/L (ref 11–66)
ANION GAP SERPL CALC-SCNC: 10 MEQ/L (ref 8–16)
ANION GAP SERPL CALC-SCNC: 12 MEQ/L (ref 8–16)
ANION GAP SERPL CALC-SCNC: 14 MEQ/L (ref 8–16)
ANION GAP SERPL CALC-SCNC: 15 MEQ/L (ref 8–16)
AST SERPL-CCNC: 1701 U/L (ref 5–40)
AST SERPL-CCNC: 2355 U/L (ref 5–40)
AST SERPL-CCNC: 2621 U/L (ref 5–40)
AST SERPL-CCNC: 3899 U/L (ref 5–40)
BACTERIA SPEC RESP CULT: NORMAL
BACTERIA UR CULT: ABNORMAL
BASOPHILS ABSOLUTE: 0 THOU/MM3 (ref 0–0.1)
BASOPHILS NFR BLD AUTO: 0.1 %
BILIRUB SERPL-MCNC: 0.6 MG/DL (ref 0.3–1.2)
BILIRUB SERPL-MCNC: 0.7 MG/DL (ref 0.3–1.2)
BILIRUB SERPL-MCNC: 0.8 MG/DL (ref 0.3–1.2)
BILIRUB SERPL-MCNC: 0.9 MG/DL (ref 0.3–1.2)
BUN SERPL-MCNC: 10 MG/DL (ref 7–22)
BUN SERPL-MCNC: 11 MG/DL (ref 7–22)
BUN SERPL-MCNC: 12 MG/DL (ref 7–22)
BUN SERPL-MCNC: 18 MG/DL (ref 7–22)
CA-I BLD ISE-SCNC: 1.01 MMOL/L (ref 1.12–1.32)
CA-I BLD ISE-SCNC: 1.05 MMOL/L (ref 1.12–1.32)
CA-I BLD ISE-SCNC: 1.07 MMOL/L (ref 1.12–1.32)
CA-I BLD ISE-SCNC: 1.09 MMOL/L (ref 1.12–1.32)
CA-I BLD ISE-SCNC: 1.12 MMOL/L (ref 1.12–1.32)
CALCIUM SERPL-MCNC: 7.6 MG/DL (ref 8.5–10.5)
CALCIUM SERPL-MCNC: 7.8 MG/DL (ref 8.5–10.5)
CALCIUM SERPL-MCNC: 8 MG/DL (ref 8.5–10.5)
CALCIUM SERPL-MCNC: 8 MG/DL (ref 8.5–10.5)
CHLORIDE SERPL-SCNC: 100 MEQ/L (ref 98–111)
CHLORIDE SERPL-SCNC: 100 MEQ/L (ref 98–111)
CHLORIDE SERPL-SCNC: 105 MEQ/L (ref 98–111)
CHLORIDE SERPL-SCNC: 98 MEQ/L (ref 98–111)
CO2 SERPL-SCNC: 21 MEQ/L (ref 23–33)
CO2 SERPL-SCNC: 21 MEQ/L (ref 23–33)
CO2 SERPL-SCNC: 23 MEQ/L (ref 23–33)
CO2 SERPL-SCNC: 24 MEQ/L (ref 23–33)
CREAT SERPL-MCNC: 0.8 MG/DL (ref 0.4–1.2)
CREAT SERPL-MCNC: 0.8 MG/DL (ref 0.4–1.2)
CREAT SERPL-MCNC: 0.9 MG/DL (ref 0.4–1.2)
CREAT SERPL-MCNC: 1.4 MG/DL (ref 0.4–1.2)
DACROCYTES: ABNORMAL
DEPRECATED RDW RBC AUTO: 53.5 FL (ref 35–45)
ECHO AO ASC DIAM: 3.1 CM
ECHO AO ASCENDING AORTA INDEX: 1.95 CM/M2
ECHO AR MAX VEL PISA: 2.9 M/S
ECHO AV CUSP MM: 1.7 CM
ECHO AV PEAK GRADIENT: 9 MMHG
ECHO AV PEAK VELOCITY: 1.5 M/S
ECHO AV REGURGITANT PHT: 602 MS
ECHO AV VELOCITY RATIO: 0.6
ECHO EST RA PRESSURE: 10 MMHG
ECHO LA AREA 2C: 14.8 CM2
ECHO LA AREA 4C: 14.2 CM2
ECHO LA DIAMETER INDEX: 1.95 CM/M2
ECHO LA DIAMETER: 3.1 CM
ECHO LA MAJOR AXIS: 4.6 CM
ECHO LA MINOR AXIS: 4.9 CM
ECHO LA VOL BP: 36 ML (ref 22–52)
ECHO LA VOL MOD A2C: 37 ML (ref 22–52)
ECHO LA VOL MOD A4C: 34 ML (ref 22–52)
ECHO LA VOL/BSA BIPLANE: 23 ML/M2 (ref 16–34)
ECHO LA VOLUME INDEX MOD A2C: 23 ML/M2 (ref 16–34)
ECHO LA VOLUME INDEX MOD A4C: 21 ML/M2 (ref 16–34)
ECHO LV EDV A2C: 167 ML
ECHO LV EDV A4C: 147 ML
ECHO LV EDV INDEX A4C: 92 ML/M2
ECHO LV EDV NDEX A2C: 105 ML/M2
ECHO LV EJECTION FRACTION A2C: 13 %
ECHO LV EJECTION FRACTION A4C: 10 %
ECHO LV EJECTION FRACTION BIPLANE: 11 % (ref 55–100)
ECHO LV ESV A2C: 146 ML
ECHO LV ESV A4C: 131 ML
ECHO LV ESV INDEX A2C: 92 ML/M2
ECHO LV ESV INDEX A4C: 82 ML/M2
ECHO LV FRACTIONAL SHORTENING: 5 % (ref 28–44)
ECHO LV INTERNAL DIMENSION DIASTOLE INDEX: 3.52 CM/M2
ECHO LV INTERNAL DIMENSION DIASTOLIC: 5.6 CM (ref 3.9–5.3)
ECHO LV INTERNAL DIMENSION SYSTOLIC INDEX: 3.33 CM/M2
ECHO LV INTERNAL DIMENSION SYSTOLIC: 5.3 CM
ECHO LV IVSD: 0.7 CM (ref 0.6–0.9)
ECHO LV MASS 2D: 191.6 G (ref 67–162)
ECHO LV MASS INDEX 2D: 120.5 G/M2 (ref 43–95)
ECHO LV POSTERIOR WALL DIASTOLIC: 1.1 CM (ref 0.6–0.9)
ECHO LV RELATIVE WALL THICKNESS RATIO: 0.39
ECHO LVOT PEAK GRADIENT: 3 MMHG
ECHO LVOT PEAK VELOCITY: 0.9 M/S
ECHO MV E DECELERATION TIME (DT): 113 MS
ECHO MV E VELOCITY: 1.38 M/S
ECHO MV REGURGITANT PEAK GRADIENT: 81 MMHG
ECHO MV REGURGITANT PEAK VELOCITY: 4.5 M/S
ECHO PV MAX VELOCITY: 0.7 M/S
ECHO PV PEAK GRADIENT: 2 MMHG
ECHO RIGHT VENTRICULAR SYSTOLIC PRESSURE (RVSP): 33 MMHG
ECHO RV INTERNAL DIMENSION: 1.8 CM
ECHO TV E WAVE: 0.4 M/S
ECHO TV REGURGITANT MAX VELOCITY: 2.4 M/S
ECHO TV REGURGITANT PEAK GRADIENT: 23 MMHG
EKG ATRIAL RATE: 114 BPM
EKG P AXIS: 55 DEGREES
EKG P-R INTERVAL: 170 MS
EKG Q-T INTERVAL: 382 MS
EKG QRS DURATION: 122 MS
EKG QTC CALCULATION (BAZETT): 526 MS
EKG R AXIS: 62 DEGREES
EKG T AXIS: 103 DEGREES
EKG VENTRICULAR RATE: 114 BPM
EOSINOPHIL NFR BLD AUTO: 0.4 %
EOSINOPHILS ABSOLUTE: 0.1 THOU/MM3 (ref 0–0.4)
ERYTHROCYTE [DISTWIDTH] IN BLOOD BY AUTOMATED COUNT: 18.3 % (ref 11.5–14.5)
GFR SERPL CREATININE-BSD FRML MDRD: 37 ML/MIN/1.73M2
GFR SERPL CREATININE-BSD FRML MDRD: 64 ML/MIN/1.73M2
GFR SERPL CREATININE-BSD FRML MDRD: 73 ML/MIN/1.73M2
GFR SERPL CREATININE-BSD FRML MDRD: 73 ML/MIN/1.73M2
GLUCOSE BLD STRIP.AUTO-MCNC: 198 MG/DL (ref 70–108)
GLUCOSE BLD STRIP.AUTO-MCNC: 278 MG/DL (ref 70–108)
GLUCOSE SERPL-MCNC: 196 MG/DL (ref 70–108)
GLUCOSE SERPL-MCNC: 203 MG/DL (ref 70–108)
GLUCOSE SERPL-MCNC: 243 MG/DL (ref 70–108)
GLUCOSE SERPL-MCNC: 249 MG/DL (ref 70–108)
GRAM STN SPEC: NORMAL
HCT VFR BLD AUTO: 28.8 % (ref 37–47)
HGB BLD-MCNC: 9.4 GM/DL (ref 12–16)
IMM GRANULOCYTES # BLD AUTO: 0.12 THOU/MM3 (ref 0–0.07)
IMM GRANULOCYTES NFR BLD AUTO: 0.9 %
INR PPP: 1.32 (ref 0.85–1.13)
LACTATE SERPL-SCNC: 2.2 MMOL/L (ref 0.5–2)
LACTATE SERPL-SCNC: 2.4 MMOL/L (ref 0.5–2)
LACTATE SERPL-SCNC: 2.4 MMOL/L (ref 0.5–2)
LACTATE SERPL-SCNC: 2.5 MMOL/L (ref 0.5–2)
LACTATE SERPL-SCNC: 2.8 MMOL/L (ref 0.5–2)
LACTATE SERPL-SCNC: 2.9 MMOL/L (ref 0.5–2)
LYMPHOCYTES ABSOLUTE: 0.6 THOU/MM3 (ref 1–4.8)
LYMPHOCYTES NFR BLD AUTO: 4.5 %
MAGNESIUM SERPL-MCNC: 2.3 MG/DL (ref 1.6–2.4)
MAGNESIUM SERPL-MCNC: 2.4 MG/DL (ref 1.6–2.4)
MAGNESIUM SERPL-MCNC: 2.4 MG/DL (ref 1.6–2.4)
MAGNESIUM SERPL-MCNC: 2.5 MG/DL (ref 1.6–2.4)
MCH RBC QN AUTO: 27.1 PG (ref 26–33)
MCHC RBC AUTO-ENTMCNC: 32.6 GM/DL (ref 32.2–35.5)
MCV RBC AUTO: 83 FL (ref 81–99)
MONOCYTES ABSOLUTE: 0.9 THOU/MM3 (ref 0.4–1.3)
MONOCYTES NFR BLD AUTO: 6.5 %
NEUTROPHILS ABSOLUTE: 12.4 THOU/MM3 (ref 1.8–7.7)
NEUTROPHILS NFR BLD AUTO: 87.6 %
NRBC BLD AUTO-RTO: 12 /100 WBC
ORGANISM: ABNORMAL
PHOSPHATE SERPL-MCNC: 0.9 MG/DL (ref 2.4–4.7)
PHOSPHATE SERPL-MCNC: 1.1 MG/DL (ref 2.4–4.7)
PHOSPHATE SERPL-MCNC: 1.5 MG/DL (ref 2.4–4.7)
PHOSPHATE SERPL-MCNC: 1.9 MG/DL (ref 2.4–4.7)
PHOSPHATE SERPL-MCNC: 3.2 MG/DL (ref 2.4–4.7)
PLATELET # BLD AUTO: 161 THOU/MM3 (ref 130–400)
PLATELET BLD QL SMEAR: ABNORMAL
PMV BLD AUTO: 12.5 FL (ref 9.4–12.4)
POIKILOCYTES: ABNORMAL
POLYCHROMASIA BLD QL SMEAR: ABNORMAL
POTASSIUM SERPL-SCNC: 3.4 MEQ/L (ref 3.5–5.2)
POTASSIUM SERPL-SCNC: 3.9 MEQ/L (ref 3.5–5.2)
POTASSIUM SERPL-SCNC: 4 MEQ/L (ref 3.5–5.2)
POTASSIUM SERPL-SCNC: 4.1 MEQ/L (ref 3.5–5.2)
PROT SERPL-MCNC: 5.1 G/DL (ref 6.1–8)
PROT SERPL-MCNC: 5.5 G/DL (ref 6.1–8)
PROT SERPL-MCNC: 5.7 G/DL (ref 6.1–8)
PROT SERPL-MCNC: 5.8 G/DL (ref 6.1–8)
RBC # BLD AUTO: 3.47 MILL/MM3 (ref 4.2–5.4)
REVIEWED BY: NORMAL
SCAN OF BLOOD SMEAR: NORMAL
SMEAR REVIEW: NORMAL
SODIUM SERPL-SCNC: 133 MEQ/L (ref 135–145)
SODIUM SERPL-SCNC: 134 MEQ/L (ref 135–145)
SODIUM SERPL-SCNC: 136 MEQ/L (ref 135–145)
SODIUM SERPL-SCNC: 140 MEQ/L (ref 135–145)
WBC # BLD AUTO: 14.1 THOU/MM3 (ref 4.8–10.8)

## 2024-06-21 PROCEDURE — 94761 N-INVAS EAR/PLS OXIMETRY MLT: CPT

## 2024-06-21 PROCEDURE — 84100 ASSAY OF PHOSPHORUS: CPT

## 2024-06-21 PROCEDURE — 6360000002 HC RX W HCPCS: Performed by: STUDENT IN AN ORGANIZED HEALTH CARE EDUCATION/TRAINING PROGRAM

## 2024-06-21 PROCEDURE — 36415 COLL VENOUS BLD VENIPUNCTURE: CPT

## 2024-06-21 PROCEDURE — 2580000003 HC RX 258: Performed by: INTERNAL MEDICINE

## 2024-06-21 PROCEDURE — 71045 X-RAY EXAM CHEST 1 VIEW: CPT

## 2024-06-21 PROCEDURE — 2580000003 HC RX 258: Performed by: NURSE PRACTITIONER

## 2024-06-21 PROCEDURE — 80053 COMPREHEN METABOLIC PANEL: CPT

## 2024-06-21 PROCEDURE — 83735 ASSAY OF MAGNESIUM: CPT

## 2024-06-21 PROCEDURE — C9113 INJ PANTOPRAZOLE SODIUM, VIA: HCPCS | Performed by: STUDENT IN AN ORGANIZED HEALTH CARE EDUCATION/TRAINING PROGRAM

## 2024-06-21 PROCEDURE — 85025 COMPLETE CBC W/AUTO DIFF WBC: CPT

## 2024-06-21 PROCEDURE — C8929 TTE W OR WO FOL WCON,DOPPLER: HCPCS

## 2024-06-21 PROCEDURE — 99291 CRITICAL CARE FIRST HOUR: CPT | Performed by: INTERNAL MEDICINE

## 2024-06-21 PROCEDURE — 6370000000 HC RX 637 (ALT 250 FOR IP)

## 2024-06-21 PROCEDURE — 2700000000 HC OXYGEN THERAPY PER DAY

## 2024-06-21 PROCEDURE — 51798 US URINE CAPACITY MEASURE: CPT

## 2024-06-21 PROCEDURE — 85610 PROTHROMBIN TIME: CPT

## 2024-06-21 PROCEDURE — 2580000003 HC RX 258

## 2024-06-21 PROCEDURE — 6370000000 HC RX 637 (ALT 250 FOR IP): Performed by: STUDENT IN AN ORGANIZED HEALTH CARE EDUCATION/TRAINING PROGRAM

## 2024-06-21 PROCEDURE — 93010 ELECTROCARDIOGRAM REPORT: CPT | Performed by: INTERNAL MEDICINE

## 2024-06-21 PROCEDURE — 94003 VENT MGMT INPAT SUBQ DAY: CPT

## 2024-06-21 PROCEDURE — 6360000002 HC RX W HCPCS

## 2024-06-21 PROCEDURE — 2000000000 HC ICU R&B

## 2024-06-21 PROCEDURE — 82948 REAGENT STRIP/BLOOD GLUCOSE: CPT

## 2024-06-21 PROCEDURE — 93005 ELECTROCARDIOGRAM TRACING: CPT

## 2024-06-21 PROCEDURE — 93306 TTE W/DOPPLER COMPLETE: CPT | Performed by: INTERNAL MEDICINE

## 2024-06-21 PROCEDURE — 83605 ASSAY OF LACTIC ACID: CPT

## 2024-06-21 PROCEDURE — 82330 ASSAY OF CALCIUM: CPT

## 2024-06-21 PROCEDURE — 2500000003 HC RX 250 WO HCPCS: Performed by: INTERNAL MEDICINE

## 2024-06-21 PROCEDURE — 2580000003 HC RX 258: Performed by: STUDENT IN AN ORGANIZED HEALTH CARE EDUCATION/TRAINING PROGRAM

## 2024-06-21 PROCEDURE — 6360000004 HC RX CONTRAST MEDICATION: Performed by: INTERNAL MEDICINE

## 2024-06-21 PROCEDURE — 99223 1ST HOSP IP/OBS HIGH 75: CPT | Performed by: INTERNAL MEDICINE

## 2024-06-21 RX ORDER — ACETAMINOPHEN 325 MG/1
650 TABLET ORAL EVERY 4 HOURS PRN
Status: DISCONTINUED | OUTPATIENT
Start: 2024-06-21 | End: 2024-06-24

## 2024-06-21 RX ORDER — WATER 10 ML/10ML
INJECTION INTRAMUSCULAR; INTRAVENOUS; SUBCUTANEOUS
Status: COMPLETED
Start: 2024-06-21 | End: 2024-06-22

## 2024-06-21 RX ADMIN — ALLOPURINOL 100 MG: 100 TABLET ORAL at 07:46

## 2024-06-21 RX ADMIN — CALCIUM GLUCONATE 1000 MG: 10 INJECTION, SOLUTION INTRAVENOUS at 13:40

## 2024-06-21 RX ADMIN — Medication: at 04:07

## 2024-06-21 RX ADMIN — ALTEPLASE 2 MG: 2.2 INJECTION, POWDER, LYOPHILIZED, FOR SOLUTION INTRAVENOUS at 23:56

## 2024-06-21 RX ADMIN — VASOPRESSIN 0.01 UNITS/MIN: 20 INJECTION INTRAVENOUS at 14:06

## 2024-06-21 RX ADMIN — MEROPENEM 1000 MG: 1 INJECTION INTRAVENOUS at 05:14

## 2024-06-21 RX ADMIN — INSULIN LISPRO 4 UNITS: 100 INJECTION, SOLUTION INTRAVENOUS; SUBCUTANEOUS at 09:32

## 2024-06-21 RX ADMIN — ALTEPLASE 2 MG: 2.2 INJECTION, POWDER, LYOPHILIZED, FOR SOLUTION INTRAVENOUS at 19:16

## 2024-06-21 RX ADMIN — SODIUM CHLORIDE, PRESERVATIVE FREE 10 ML: 5 INJECTION INTRAVENOUS at 21:00

## 2024-06-21 RX ADMIN — SODIUM CHLORIDE, PRESERVATIVE FREE 40 MG: 5 INJECTION INTRAVENOUS at 07:46

## 2024-06-21 RX ADMIN — CALCIUM GLUCONATE 1000 MG: 10 INJECTION, SOLUTION INTRAVENOUS at 02:16

## 2024-06-21 RX ADMIN — PROPOFOL 20 MCG/KG/MIN: 10 INJECTION, EMULSION INTRAVENOUS at 20:17

## 2024-06-21 RX ADMIN — Medication: at 11:40

## 2024-06-21 RX ADMIN — CHLORHEXIDINE GLUCONATE 15 ML: 1.2 RINSE ORAL at 21:46

## 2024-06-21 RX ADMIN — SODIUM PHOSPHATE, MONOBASIC, MONOHYDRATE AND SODIUM PHOSPHATE, DIBASIC, ANHYDROUS 12 MMOL: 142; 276 INJECTION, SOLUTION INTRAVENOUS at 10:12

## 2024-06-21 RX ADMIN — CALCIUM GLUCONATE 1000 MG: 10 INJECTION, SOLUTION INTRAVENOUS at 08:44

## 2024-06-21 RX ADMIN — LEVETIRACETAM 500 MG: 100 INJECTION INTRAVENOUS at 21:49

## 2024-06-21 RX ADMIN — CALCIUM GLUCONATE 1000 MG: 10 INJECTION, SOLUTION INTRAVENOUS at 06:17

## 2024-06-21 RX ADMIN — PERFLUTREN 10 ML: 6.52 INJECTION, SUSPENSION INTRAVENOUS at 07:29

## 2024-06-21 RX ADMIN — SODIUM CHLORIDE, PRESERVATIVE FREE 10 ML: 5 INJECTION INTRAVENOUS at 07:47

## 2024-06-21 RX ADMIN — DULOXETINE HYDROCHLORIDE 60 MG: 60 CAPSULE, DELAYED RELEASE ORAL at 07:46

## 2024-06-21 RX ADMIN — CHLORHEXIDINE GLUCONATE 15 ML: 1.2 RINSE ORAL at 07:46

## 2024-06-21 RX ADMIN — ATORVASTATIN CALCIUM 40 MG: 40 TABLET, FILM COATED ORAL at 21:46

## 2024-06-21 RX ADMIN — ASPIRIN 81 MG 81 MG: 81 TABLET ORAL at 07:46

## 2024-06-21 RX ADMIN — LEVETIRACETAM 500 MG: 100 INJECTION INTRAVENOUS at 09:46

## 2024-06-21 RX ADMIN — INSULIN LISPRO 8 UNITS: 100 INJECTION, SOLUTION INTRAVENOUS; SUBCUTANEOUS at 18:55

## 2024-06-21 RX ADMIN — CLOPIDOGREL BISULFATE 75 MG: 75 TABLET ORAL at 07:46

## 2024-06-21 RX ADMIN — ACETAMINOPHEN 650 MG: 325 TABLET ORAL at 11:01

## 2024-06-21 RX ADMIN — Medication: at 11:41

## 2024-06-21 RX ADMIN — VASOPRESSIN 0.04 UNITS/MIN: 20 INJECTION INTRAVENOUS at 00:52

## 2024-06-21 RX ADMIN — SODIUM PHOSPHATE, MONOBASIC, MONOHYDRATE AND SODIUM PHOSPHATE, DIBASIC, ANHYDROUS 24 MMOL: 142; 276 INJECTION, SOLUTION INTRAVENOUS at 15:44

## 2024-06-21 RX ADMIN — MEROPENEM 1000 MG: 1 INJECTION INTRAVENOUS at 17:43

## 2024-06-21 ASSESSMENT — PULMONARY FUNCTION TESTS
PIF_VALUE: 20
PIF_VALUE: 21
PIF_VALUE: 20
PIF_VALUE: 21
PIF_VALUE: 20

## 2024-06-21 NOTE — FLOWSHEET NOTE
1600 SCUF stopped as her access port was unable to draw. Dr Berger made aware. Will get INR and CXR to evaluate.    1800 Plan to try cathflow to reopen her dialysis port per Dr Sebastian.

## 2024-06-21 NOTE — DISCHARGE INSTR - COC
Continuity of Care Form    Patient Name: Beatriz Lau   :  1940  MRN:  [id number]    Admit date:  2024  Discharge date:  ***    Code Status Order: Full Code   Advance Directives:     Admitting Physician:  Kurt Alba MD  PCP: No primary care provider on file.    Discharging Nurse: ***  Discharging Hospital Unit/Room#: 4D-15/015-A  Discharging Unit Phone Number: ***    Emergency Contact:   Extended Emergency Contact Information  Primary Emergency Contact: Beulah Shelton  Home Phone: [phone]  Relation: Child  Secondary Emergency Contact: ASIA DOE (POA)  Home Phone: [phone]  Relation: Grandchild    Past Surgical History:  Past Surgical History:   Procedure Laterality Date    APPENDECTOMY      CHOLECYSTECTOMY      DENTAL SURGERY      HIP SURGERY Right 2020    right , Genicular Nerve block performed by Ander Navarro MD at [facility] OR    JOINT REPLACEMENT      Left total hip    OVARY REMOVAL      UPPER GASTROINTESTINAL ENDOSCOPY N/A 2023    EGD DILATION performed by Eavristo Marie MD at [facility]    UPPER GASTROINTESTINAL ENDOSCOPY  2023    EGD DILATION SAVORY performed by Evaristo Marie MD at [facility]       Immunization History:   Immunization History   Administered Date(s) Administered    COVID-19, MODERNA BLUE border, Primary or Immunocompromised, (age 12y+), IM, 100 mcg/0.5mL 2022, 2022, 2022    COVID-19, MODERNA Bivalent, (age 12y+), IM, 50 mcg/0.5 mL 2023    Influenza Virus Vaccine 10/06/2020, 10/21/2020, 2022    Influenza, FLUAD, (age 65 y+), Adjuvanted, 0.5mL 2021    Influenza, Triv, inactivated, subunit, adjuvanted, IM (Fluad 65 yrs and older) 10/18/2019    Pneumococcal, PCV-13, PREVNAR 13, (age 6w+), IM, 0.5mL 10/21/2020    Pneumococcal, PCV20, PREVNAR 20, (age 6w+), IM, 0.5mL 2022    Pneumococcal, PPSV23, PNEUMOVAX 23, (age 2y+), SC/IM, 0.5mL 2019, 2020    TDaP,

## 2024-06-21 NOTE — FLOWSHEET NOTE
0800 Mrs Lau rests in the bed without apparent distress. She remains intubated 7.5 ETT at 18 cm to her lips and sedated with Propofol. She requires Levophed and Vasopressin to maintain her BP at ordered parameters. These are infusing through a 3 lumen CVC right SC site clear. She has a dialysis catheter left chest site clear currently running CVVH. The system appears to be running without difficulty. She has a mccarthy patent to gravity but is currently anuric. The mccarthy was removed. Left groin site from previously removed CVC clean and dry. She has an OGT with tube feeding of Nepro at 30 cc/h which is goal. She has about 10 cc's residual this AM. Plan to continue to remove fluid and wean pressors as she tolerates. She is able to move all 4 extremities to tactile stimuli.

## 2024-06-21 NOTE — FLOWSHEET NOTE
1900 Dr Gomez made aware of issues with the dialysis catheter. OK with her to try cath flow.    1920 Cath flow given. 2.1 cc's into the red port and 2.2 into the blue port.

## 2024-06-21 NOTE — PROCEDURES
PROCEDURE NOTE  Date: 6/21/2024   Name: Beatriz Lau  YOB: 1940    Procedures        12 lead EKG completed. Results handed to REBEKAH Sears

## 2024-06-22 LAB
ABO: NORMAL
ALBUMIN SERPL BCG-MCNC: 2.2 G/DL (ref 3.5–5.1)
ALBUMIN SERPL BCG-MCNC: 2.2 G/DL (ref 3.5–5.1)
ALBUMIN SERPL BCG-MCNC: 2.3 G/DL (ref 3.5–5.1)
ALBUMIN SERPL BCG-MCNC: 2.4 G/DL (ref 3.5–5.1)
ALP SERPL-CCNC: 204 U/L (ref 38–126)
ALP SERPL-CCNC: 231 U/L (ref 38–126)
ALP SERPL-CCNC: 231 U/L (ref 38–126)
ALP SERPL-CCNC: 242 U/L (ref 38–126)
ALT SERPL W/O P-5'-P-CCNC: 573 U/L (ref 11–66)
ALT SERPL W/O P-5'-P-CCNC: 602 U/L (ref 11–66)
ALT SERPL W/O P-5'-P-CCNC: 639 U/L (ref 11–66)
ALT SERPL W/O P-5'-P-CCNC: 739 U/L (ref 11–66)
ANION GAP SERPL CALC-SCNC: 11 MEQ/L (ref 8–16)
ANION GAP SERPL CALC-SCNC: 12 MEQ/L (ref 8–16)
ANION GAP SERPL CALC-SCNC: 13 MEQ/L (ref 8–16)
ANTIBODY SCREEN: NORMAL
AST SERPL-CCNC: 1358 U/L (ref 5–40)
AST SERPL-CCNC: 651 U/L (ref 5–40)
AST SERPL-CCNC: 801 U/L (ref 5–40)
AST SERPL-CCNC: 863 U/L (ref 5–40)
BACTERIA SPEC RESP CULT: ABNORMAL
BASOPHILS ABSOLUTE: 0 THOU/MM3 (ref 0–0.1)
BASOPHILS NFR BLD AUTO: 0.2 %
BILIRUB SERPL-MCNC: 0.5 MG/DL (ref 0.3–1.2)
BILIRUB SERPL-MCNC: 0.6 MG/DL (ref 0.3–1.2)
BUN SERPL-MCNC: 21 MG/DL (ref 7–22)
BUN SERPL-MCNC: 22 MG/DL (ref 7–22)
BUN SERPL-MCNC: 26 MG/DL (ref 7–22)
BUN SERPL-MCNC: 28 MG/DL (ref 7–22)
BUN SERPL-MCNC: 30 MG/DL (ref 7–22)
CA-I BLD ISE-SCNC: 1.02 MMOL/L (ref 1.12–1.32)
CA-I BLD ISE-SCNC: 1.06 MMOL/L (ref 1.12–1.32)
CA-I BLD ISE-SCNC: 1.07 MMOL/L (ref 1.12–1.32)
CA-I BLD ISE-SCNC: 1.09 MMOL/L (ref 1.12–1.32)
CA-I BLD ISE-SCNC: 1.14 MMOL/L (ref 1.12–1.32)
CALCIUM SERPL-MCNC: 7.1 MG/DL (ref 8.5–10.5)
CALCIUM SERPL-MCNC: 7.3 MG/DL (ref 8.5–10.5)
CALCIUM SERPL-MCNC: 7.6 MG/DL (ref 8.5–10.5)
CALCIUM SERPL-MCNC: 7.7 MG/DL (ref 8.5–10.5)
CALCIUM SERPL-MCNC: 7.7 MG/DL (ref 8.5–10.5)
CHLORIDE SERPL-SCNC: 102 MEQ/L (ref 98–111)
CHLORIDE SERPL-SCNC: 105 MEQ/L (ref 98–111)
CHLORIDE SERPL-SCNC: 105 MEQ/L (ref 98–111)
CHLORIDE SERPL-SCNC: 106 MEQ/L (ref 98–111)
CHLORIDE SERPL-SCNC: 108 MEQ/L (ref 98–111)
CO2 SERPL-SCNC: 18 MEQ/L (ref 23–33)
CO2 SERPL-SCNC: 19 MEQ/L (ref 23–33)
CO2 SERPL-SCNC: 21 MEQ/L (ref 23–33)
CO2 SERPL-SCNC: 23 MEQ/L (ref 23–33)
CO2 SERPL-SCNC: 23 MEQ/L (ref 23–33)
CREAT SERPL-MCNC: 1.5 MG/DL (ref 0.4–1.2)
CREAT SERPL-MCNC: 1.6 MG/DL (ref 0.4–1.2)
CREAT SERPL-MCNC: 2.1 MG/DL (ref 0.4–1.2)
CREAT SERPL-MCNC: 2.2 MG/DL (ref 0.4–1.2)
CREAT SERPL-MCNC: 2.2 MG/DL (ref 0.4–1.2)
DEPRECATED RDW RBC AUTO: 54.4 FL (ref 35–45)
DEPRECATED RDW RBC AUTO: 57 FL (ref 35–45)
EOSINOPHIL NFR BLD AUTO: 1.1 %
EOSINOPHILS ABSOLUTE: 0.1 THOU/MM3 (ref 0–0.4)
ERYTHROCYTE [DISTWIDTH] IN BLOOD BY AUTOMATED COUNT: 18.6 % (ref 11.5–14.5)
ERYTHROCYTE [DISTWIDTH] IN BLOOD BY AUTOMATED COUNT: 18.7 % (ref 11.5–14.5)
GFR SERPL CREATININE-BSD FRML MDRD: 22 ML/MIN/1.73M2
GFR SERPL CREATININE-BSD FRML MDRD: 22 ML/MIN/1.73M2
GFR SERPL CREATININE-BSD FRML MDRD: 23 ML/MIN/1.73M2
GFR SERPL CREATININE-BSD FRML MDRD: 32 ML/MIN/1.73M2
GFR SERPL CREATININE-BSD FRML MDRD: 34 ML/MIN/1.73M2
GLUCOSE BLD STRIP.AUTO-MCNC: 143 MG/DL (ref 70–108)
GLUCOSE BLD STRIP.AUTO-MCNC: 183 MG/DL (ref 70–108)
GLUCOSE BLD STRIP.AUTO-MCNC: 216 MG/DL (ref 70–108)
GLUCOSE SERPL-MCNC: 216 MG/DL (ref 70–108)
GLUCOSE SERPL-MCNC: 230 MG/DL (ref 70–108)
GLUCOSE SERPL-MCNC: 303 MG/DL (ref 70–108)
GLUCOSE SERPL-MCNC: 304 MG/DL (ref 70–108)
GLUCOSE SERPL-MCNC: 363 MG/DL (ref 70–108)
GRAM STN SPEC: ABNORMAL
HCT VFR BLD AUTO: 23 % (ref 37–47)
HCT VFR BLD AUTO: 35.4 % (ref 37–47)
HGB BLD-MCNC: 11.2 GM/DL (ref 12–16)
HGB BLD-MCNC: 7.2 GM/DL (ref 12–16)
IMM GRANULOCYTES # BLD AUTO: 0.24 THOU/MM3 (ref 0–0.07)
IMM GRANULOCYTES NFR BLD AUTO: 1.9 %
LACTATE SERPL-SCNC: 1.6 MMOL/L (ref 0.5–2)
LACTATE SERPL-SCNC: 1.7 MMOL/L (ref 0.5–2)
LACTATE SERPL-SCNC: 1.7 MMOL/L (ref 0.5–2)
LACTATE SERPL-SCNC: 1.9 MMOL/L (ref 0.5–2)
LACTATE SERPL-SCNC: 2.1 MMOL/L (ref 0.5–2)
LACTATE SERPL-SCNC: 2.5 MMOL/L (ref 0.5–2)
LYMPHOCYTES ABSOLUTE: 0.7 THOU/MM3 (ref 1–4.8)
LYMPHOCYTES NFR BLD AUTO: 5.5 %
MAGNESIUM SERPL-MCNC: 2.1 MG/DL (ref 1.6–2.4)
MAGNESIUM SERPL-MCNC: 2.2 MG/DL (ref 1.6–2.4)
MAGNESIUM SERPL-MCNC: 2.2 MG/DL (ref 1.6–2.4)
MAGNESIUM SERPL-MCNC: 2.3 MG/DL (ref 1.6–2.4)
MCH RBC QN AUTO: 26.1 PG (ref 26–33)
MCH RBC QN AUTO: 27.1 PG (ref 26–33)
MCHC RBC AUTO-ENTMCNC: 31.3 GM/DL (ref 32.2–35.5)
MCHC RBC AUTO-ENTMCNC: 31.6 GM/DL (ref 32.2–35.5)
MCV RBC AUTO: 83.3 FL (ref 81–99)
MCV RBC AUTO: 85.7 FL (ref 81–99)
MONOCYTES ABSOLUTE: 0.8 THOU/MM3 (ref 0.4–1.3)
MONOCYTES NFR BLD AUTO: 6.1 %
NEUTROPHILS ABSOLUTE: 10.5 THOU/MM3 (ref 1.8–7.7)
NEUTROPHILS NFR BLD AUTO: 85.2 %
NRBC BLD AUTO-RTO: 6 /100 WBC
ORGANISM: ABNORMAL
PHOSPHATE SERPL-MCNC: 2.1 MG/DL (ref 2.4–4.7)
PHOSPHATE SERPL-MCNC: 2.2 MG/DL (ref 2.4–4.7)
PHOSPHATE SERPL-MCNC: 2.2 MG/DL (ref 2.4–4.7)
PHOSPHATE SERPL-MCNC: 3.4 MG/DL (ref 2.4–4.7)
PLATELET # BLD AUTO: 116 THOU/MM3 (ref 130–400)
PLATELET # BLD AUTO: 70 THOU/MM3 (ref 130–400)
PMV BLD AUTO: 11.6 FL (ref 9.4–12.4)
PMV BLD AUTO: 13 FL (ref 9.4–12.4)
POTASSIUM SERPL-SCNC: 2.9 MEQ/L (ref 3.5–5.2)
POTASSIUM SERPL-SCNC: 3.2 MEQ/L (ref 3.5–5.2)
POTASSIUM SERPL-SCNC: 3.2 MEQ/L (ref 3.5–5.2)
POTASSIUM SERPL-SCNC: 3.3 MEQ/L (ref 3.5–5.2)
POTASSIUM SERPL-SCNC: 4.3 MEQ/L (ref 3.5–5.2)
PROT SERPL-MCNC: 4.9 G/DL (ref 6.1–8)
PROT SERPL-MCNC: 5 G/DL (ref 6.1–8)
PROT SERPL-MCNC: 5.5 G/DL (ref 6.1–8)
PROT SERPL-MCNC: 5.8 G/DL (ref 6.1–8)
RBC # BLD AUTO: 2.76 MILL/MM3 (ref 4.2–5.4)
RBC # BLD AUTO: 4.13 MILL/MM3 (ref 4.2–5.4)
RH FACTOR: NORMAL
SCAN OF BLOOD SMEAR: NORMAL
SODIUM SERPL-SCNC: 135 MEQ/L (ref 135–145)
SODIUM SERPL-SCNC: 136 MEQ/L (ref 135–145)
SODIUM SERPL-SCNC: 139 MEQ/L (ref 135–145)
SODIUM SERPL-SCNC: 140 MEQ/L (ref 135–145)
SODIUM SERPL-SCNC: 140 MEQ/L (ref 135–145)
WBC # BLD AUTO: 12.3 THOU/MM3 (ref 4.8–10.8)
WBC # BLD AUTO: 13 THOU/MM3 (ref 4.8–10.8)

## 2024-06-22 PROCEDURE — 51798 US URINE CAPACITY MEASURE: CPT

## 2024-06-22 PROCEDURE — 2500000003 HC RX 250 WO HCPCS: Performed by: INTERNAL MEDICINE

## 2024-06-22 PROCEDURE — 85027 COMPLETE CBC AUTOMATED: CPT

## 2024-06-22 PROCEDURE — 6370000000 HC RX 637 (ALT 250 FOR IP): Performed by: STUDENT IN AN ORGANIZED HEALTH CARE EDUCATION/TRAINING PROGRAM

## 2024-06-22 PROCEDURE — 6370000000 HC RX 637 (ALT 250 FOR IP)

## 2024-06-22 PROCEDURE — 2580000003 HC RX 258

## 2024-06-22 PROCEDURE — 86900 BLOOD TYPING SEROLOGIC ABO: CPT

## 2024-06-22 PROCEDURE — 99291 CRITICAL CARE FIRST HOUR: CPT | Performed by: INTERNAL MEDICINE

## 2024-06-22 PROCEDURE — 94003 VENT MGMT INPAT SUBQ DAY: CPT

## 2024-06-22 PROCEDURE — 2580000003 HC RX 258: Performed by: NURSE PRACTITIONER

## 2024-06-22 PROCEDURE — 82948 REAGENT STRIP/BLOOD GLUCOSE: CPT

## 2024-06-22 PROCEDURE — 2000000000 HC ICU R&B

## 2024-06-22 PROCEDURE — 6360000002 HC RX W HCPCS: Performed by: INTERNAL MEDICINE

## 2024-06-22 PROCEDURE — 86901 BLOOD TYPING SEROLOGIC RH(D): CPT

## 2024-06-22 PROCEDURE — 86022 PLATELET ANTIBODIES: CPT

## 2024-06-22 PROCEDURE — 36430 TRANSFUSION BLD/BLD COMPNT: CPT

## 2024-06-22 PROCEDURE — 2700000000 HC OXYGEN THERAPY PER DAY

## 2024-06-22 PROCEDURE — 2580000003 HC RX 258: Performed by: INTERNAL MEDICINE

## 2024-06-22 PROCEDURE — 83605 ASSAY OF LACTIC ACID: CPT

## 2024-06-22 PROCEDURE — 36415 COLL VENOUS BLD VENIPUNCTURE: CPT

## 2024-06-22 PROCEDURE — 86850 RBC ANTIBODY SCREEN: CPT

## 2024-06-22 PROCEDURE — 84100 ASSAY OF PHOSPHORUS: CPT

## 2024-06-22 PROCEDURE — 6360000002 HC RX W HCPCS: Performed by: STUDENT IN AN ORGANIZED HEALTH CARE EDUCATION/TRAINING PROGRAM

## 2024-06-22 PROCEDURE — 85025 COMPLETE CBC W/AUTO DIFF WBC: CPT

## 2024-06-22 PROCEDURE — 94761 N-INVAS EAR/PLS OXIMETRY MLT: CPT

## 2024-06-22 PROCEDURE — 82330 ASSAY OF CALCIUM: CPT

## 2024-06-22 PROCEDURE — 2580000003 HC RX 258: Performed by: STUDENT IN AN ORGANIZED HEALTH CARE EDUCATION/TRAINING PROGRAM

## 2024-06-22 PROCEDURE — C9113 INJ PANTOPRAZOLE SODIUM, VIA: HCPCS | Performed by: STUDENT IN AN ORGANIZED HEALTH CARE EDUCATION/TRAINING PROGRAM

## 2024-06-22 PROCEDURE — 2500000003 HC RX 250 WO HCPCS

## 2024-06-22 PROCEDURE — 80053 COMPREHEN METABOLIC PANEL: CPT

## 2024-06-22 PROCEDURE — 6360000002 HC RX W HCPCS

## 2024-06-22 PROCEDURE — 83735 ASSAY OF MAGNESIUM: CPT

## 2024-06-22 RX ORDER — INSULIN LISPRO 100 [IU]/ML
0-16 INJECTION, SOLUTION INTRAVENOUS; SUBCUTANEOUS EVERY 6 HOURS SCHEDULED
Status: DISCONTINUED | OUTPATIENT
Start: 2024-06-22 | End: 2024-06-24

## 2024-06-22 RX ORDER — POTASSIUM CHLORIDE 29.8 MG/ML
20 INJECTION INTRAVENOUS
Status: COMPLETED | OUTPATIENT
Start: 2024-06-22 | End: 2024-06-22

## 2024-06-22 RX ORDER — SODIUM CHLORIDE 9 MG/ML
INJECTION, SOLUTION INTRAVENOUS PRN
Status: DISCONTINUED | OUTPATIENT
Start: 2024-06-22 | End: 2024-06-24

## 2024-06-22 RX ORDER — POTASSIUM CHLORIDE 20 MEQ/1
40 TABLET, EXTENDED RELEASE ORAL PRN
Status: DISCONTINUED | OUTPATIENT
Start: 2024-06-22 | End: 2024-06-22

## 2024-06-22 RX ORDER — POTASSIUM CHLORIDE 7.45 MG/ML
10 INJECTION INTRAVENOUS PRN
Status: DISCONTINUED | OUTPATIENT
Start: 2024-06-22 | End: 2024-06-22

## 2024-06-22 RX ORDER — MAGNESIUM SULFATE IN WATER 40 MG/ML
2000 INJECTION, SOLUTION INTRAVENOUS PRN
Status: DISCONTINUED | OUTPATIENT
Start: 2024-06-22 | End: 2024-06-22

## 2024-06-22 RX ORDER — INSULIN GLARGINE 100 [IU]/ML
12 INJECTION, SOLUTION SUBCUTANEOUS NIGHTLY
Status: DISCONTINUED | OUTPATIENT
Start: 2024-06-22 | End: 2024-06-24

## 2024-06-22 RX ORDER — HEPARIN SODIUM 5000 [USP'U]/ML
5000 INJECTION, SOLUTION INTRAVENOUS; SUBCUTANEOUS EVERY 8 HOURS SCHEDULED
Status: DISCONTINUED | OUTPATIENT
Start: 2024-06-22 | End: 2024-06-24

## 2024-06-22 RX ADMIN — POTASSIUM CHLORIDE 20 MEQ: 29.8 INJECTION, SOLUTION INTRAVENOUS at 22:50

## 2024-06-22 RX ADMIN — LEVETIRACETAM 500 MG: 100 INJECTION INTRAVENOUS at 20:35

## 2024-06-22 RX ADMIN — CALCIUM GLUCONATE 1000 MG: 10 INJECTION, SOLUTION INTRAVENOUS at 05:29

## 2024-06-22 RX ADMIN — SODIUM CHLORIDE, PRESERVATIVE FREE 40 MG: 5 INJECTION INTRAVENOUS at 08:40

## 2024-06-22 RX ADMIN — POTASSIUM CHLORIDE 20 MEQ: 29.8 INJECTION, SOLUTION INTRAVENOUS at 21:45

## 2024-06-22 RX ADMIN — SODIUM CHLORIDE, PRESERVATIVE FREE 10 ML: 5 INJECTION INTRAVENOUS at 09:21

## 2024-06-22 RX ADMIN — SODIUM PHOSPHATE, MONOBASIC, MONOHYDRATE AND SODIUM PHOSPHATE, DIBASIC, ANHYDROUS 6 MMOL: 142; 276 INJECTION, SOLUTION INTRAVENOUS at 16:42

## 2024-06-22 RX ADMIN — ACETAMINOPHEN 650 MG: 325 TABLET ORAL at 12:41

## 2024-06-22 RX ADMIN — ATORVASTATIN CALCIUM 40 MG: 40 TABLET, FILM COATED ORAL at 20:38

## 2024-06-22 RX ADMIN — ALLOPURINOL 100 MG: 100 TABLET ORAL at 08:39

## 2024-06-22 RX ADMIN — CALCIUM GLUCONATE 1000 MG: 10 INJECTION, SOLUTION INTRAVENOUS at 21:09

## 2024-06-22 RX ADMIN — HEPARIN SODIUM 5000 UNITS: 5000 INJECTION INTRAVENOUS; SUBCUTANEOUS at 14:01

## 2024-06-22 RX ADMIN — INSULIN LISPRO 16 UNITS: 100 INJECTION, SOLUTION INTRAVENOUS; SUBCUTANEOUS at 06:25

## 2024-06-22 RX ADMIN — CHLORHEXIDINE GLUCONATE 15 ML: 1.2 RINSE ORAL at 08:39

## 2024-06-22 RX ADMIN — ASPIRIN 81 MG 81 MG: 81 TABLET ORAL at 08:39

## 2024-06-22 RX ADMIN — MEROPENEM 1000 MG: 1 INJECTION INTRAVENOUS at 16:38

## 2024-06-22 RX ADMIN — SODIUM BICARBONATE: 84 INJECTION, SOLUTION INTRAVENOUS at 01:51

## 2024-06-22 RX ADMIN — SODIUM CHLORIDE, PRESERVATIVE FREE 10 ML: 5 INJECTION INTRAVENOUS at 20:37

## 2024-06-22 RX ADMIN — VASOPRESSIN 0.02 UNITS/MIN: 20 INJECTION INTRAVENOUS at 12:37

## 2024-06-22 RX ADMIN — ACETAMINOPHEN 650 MG: 325 TABLET ORAL at 08:39

## 2024-06-22 RX ADMIN — LEVETIRACETAM 500 MG: 100 INJECTION INTRAVENOUS at 08:39

## 2024-06-22 RX ADMIN — CALCIUM GLUCONATE 1000 MG: 10 INJECTION, SOLUTION INTRAVENOUS at 00:48

## 2024-06-22 RX ADMIN — POTASSIUM CHLORIDE 20 MEQ: 29.8 INJECTION, SOLUTION INTRAVENOUS at 11:12

## 2024-06-22 RX ADMIN — PROPOFOL 20 MCG/KG/MIN: 10 INJECTION, EMULSION INTRAVENOUS at 05:58

## 2024-06-22 RX ADMIN — MEROPENEM 1000 MG: 1 INJECTION INTRAVENOUS at 04:30

## 2024-06-22 RX ADMIN — FERROUS SULFATE TAB 325 MG (65 MG ELEMENTAL FE) 325 MG: 325 (65 FE) TAB at 08:39

## 2024-06-22 RX ADMIN — INSULIN LISPRO 4 UNITS: 100 INJECTION, SOLUTION INTRAVENOUS; SUBCUTANEOUS at 18:28

## 2024-06-22 RX ADMIN — DULOXETINE HYDROCHLORIDE 60 MG: 60 CAPSULE, DELAYED RELEASE ORAL at 08:39

## 2024-06-22 RX ADMIN — CHLORHEXIDINE GLUCONATE 15 ML: 1.2 RINSE ORAL at 20:30

## 2024-06-22 RX ADMIN — INSULIN GLARGINE 12 UNITS: 100 INJECTION, SOLUTION SUBCUTANEOUS at 21:25

## 2024-06-22 RX ADMIN — WATER 20 ML: 1 INJECTION INTRAMUSCULAR; INTRAVENOUS; SUBCUTANEOUS at 00:15

## 2024-06-22 RX ADMIN — POTASSIUM CHLORIDE 20 MEQ: 29.8 INJECTION, SOLUTION INTRAVENOUS at 11:50

## 2024-06-22 RX ADMIN — CLOPIDOGREL BISULFATE 75 MG: 75 TABLET ORAL at 08:39

## 2024-06-22 RX ADMIN — INSULIN LISPRO 12 UNITS: 100 INJECTION, SOLUTION INTRAVENOUS; SUBCUTANEOUS at 15:15

## 2024-06-22 ASSESSMENT — PULMONARY FUNCTION TESTS
PIF_VALUE: 20
PIF_VALUE: 22
PIF_VALUE: 20
PIF_VALUE: 22
PIF_VALUE: 22
PIF_VALUE: 20

## 2024-06-22 NOTE — FLOWSHEET NOTE
0800 Mrs Lau rests in the bed without apparent distress. She remains intubated 7.5 ETT at 18 cm to her lips and sedated with Propofol. She requires Levophed and Vasopressin to maintain her BP at ordered parameters. These are infusing through a 3 lumen CVC right SC site clear. She has a dialysis catheter left chest site clear currently running SCUF and attempting to remove 50 cc/hr. The system has high return pressures and high filter pressures despite running for just a few hours. This seems to be a dialysis access problem as if we reposition her head, we seem to get better flows.Left groin site from previously removed CVC clean and dry. She has an OGT with tube feeding of Nepro at 30 cc/h which is goal. She has about 20 cc's residual this AM. Plan to continue to remove fluid and wean pressors as she tolerates. She is able to move all 4 extremities to tactile stimuli.    0900 Dr Nielsen in to see and we will replace PRBC's today and hopefully wean off pressors.    1000 Dr Gomez notified of AM labs. Will replace potassium per protocol.

## 2024-06-22 NOTE — FLOWSHEET NOTE
2210 Attempted to restart CRRT.  Unable to secondary to access pressures post cath flow attempt.  Dr Perez notified.  2330 Dr Gomez notified of dialysis cath issues.  Okay with attempting to cath flow again and if still unable to gain sufficient pulling pressures contacting IR in morning for new dialysis access.  2356 cath flow dwelled  0130 cath flow aspirated  0230 CRRT restarted

## 2024-06-22 NOTE — FLOWSHEET NOTE
2210 Attempted to restart CRRT.  Unable to secondary to access pressures post cath flow attempt.  Dr Perez notified.  2330 Dr Gomez notified of dialysis cath issues.  Okay with attempting to cath flow again and if still unable to gain sufficient pulling pressures contacting IR in morning for new dialysis access.

## 2024-06-23 ENCOUNTER — APPOINTMENT (OUTPATIENT)
Dept: GENERAL RADIOLOGY | Age: 84
DRG: 870 | End: 2024-06-23
Payer: MEDICARE

## 2024-06-23 LAB
ALBUMIN SERPL BCG-MCNC: 2.4 G/DL (ref 3.5–5.1)
ALBUMIN SERPL BCG-MCNC: 2.4 G/DL (ref 3.5–5.1)
ALBUMIN SERPL BCG-MCNC: 2.8 G/DL (ref 3.5–5.1)
ALP SERPL-CCNC: 237 U/L (ref 38–126)
ALP SERPL-CCNC: 237 U/L (ref 38–126)
ALP SERPL-CCNC: 252 U/L (ref 38–126)
ALT SERPL W/O P-5'-P-CCNC: 395 U/L (ref 11–66)
ALT SERPL W/O P-5'-P-CCNC: 439 U/L (ref 11–66)
ANION GAP SERPL CALC-SCNC: 11 MEQ/L (ref 8–16)
ANION GAP SERPL CALC-SCNC: 13 MEQ/L (ref 8–16)
ANION GAP SERPL CALC-SCNC: 16 MEQ/L (ref 8–16)
AST SERPL-CCNC: 295 U/L (ref 5–40)
AST SERPL-CCNC: 396 U/L (ref 5–40)
BASOPHILS ABSOLUTE: 0.1 THOU/MM3 (ref 0–0.1)
BASOPHILS NFR BLD AUTO: 0.4 %
BILIRUB SERPL-MCNC: 0.5 MG/DL (ref 0.3–1.2)
BILIRUB SERPL-MCNC: 0.6 MG/DL (ref 0.3–1.2)
BUN SERPL-MCNC: 34 MG/DL (ref 7–22)
BUN SERPL-MCNC: 34 MG/DL (ref 7–22)
BUN SERPL-MCNC: 38 MG/DL (ref 7–22)
CA-I BLD ISE-SCNC: 1.04 MMOL/L (ref 1.12–1.32)
CA-I BLD ISE-SCNC: 1.07 MMOL/L (ref 1.12–1.32)
CALCIUM SERPL-MCNC: 7.4 MG/DL (ref 8.5–10.5)
CALCIUM SERPL-MCNC: 7.7 MG/DL (ref 8.5–10.5)
CALCIUM SERPL-MCNC: 7.9 MG/DL (ref 8.5–10.5)
CHLORIDE SERPL-SCNC: 104 MEQ/L (ref 98–111)
CHLORIDE SERPL-SCNC: 109 MEQ/L (ref 98–111)
CHLORIDE SERPL-SCNC: 110 MEQ/L (ref 98–111)
CO2 SERPL-SCNC: 15 MEQ/L (ref 23–33)
CO2 SERPL-SCNC: 18 MEQ/L (ref 23–33)
CO2 SERPL-SCNC: 20 MEQ/L (ref 23–33)
CREAT SERPL-MCNC: 2.1 MG/DL (ref 0.4–1.2)
CREAT SERPL-MCNC: 2.4 MG/DL (ref 0.4–1.2)
CREAT SERPL-MCNC: 2.9 MG/DL (ref 0.4–1.2)
DEPRECATED RDW RBC AUTO: 57.7 FL (ref 35–45)
EOSINOPHIL NFR BLD AUTO: 1.3 %
EOSINOPHILS ABSOLUTE: 0.2 THOU/MM3 (ref 0–0.4)
ERYTHROCYTE [DISTWIDTH] IN BLOOD BY AUTOMATED COUNT: 19 % (ref 11.5–14.5)
FERRITIN SERPL IA-MCNC: ABNORMAL NG/ML (ref 10–291)
FIBRINOGEN PPP-MCNC: 268 MG/100ML (ref 155–475)
FSP PPP LA-ACNC: > 20 MCG/ML
GFR SERPL CREATININE-BSD FRML MDRD: 16 ML/MIN/1.73M2
GFR SERPL CREATININE-BSD FRML MDRD: 20 ML/MIN/1.73M2
GFR SERPL CREATININE-BSD FRML MDRD: 23 ML/MIN/1.73M2
GLUCOSE BLD STRIP.AUTO-MCNC: 226 MG/DL (ref 70–108)
GLUCOSE BLD STRIP.AUTO-MCNC: 237 MG/DL (ref 70–108)
GLUCOSE BLD STRIP.AUTO-MCNC: 238 MG/DL (ref 70–108)
GLUCOSE BLD STRIP.AUTO-MCNC: 79 MG/DL (ref 70–108)
GLUCOSE SERPL-MCNC: 241 MG/DL (ref 70–108)
GLUCOSE SERPL-MCNC: 249 MG/DL (ref 70–108)
GLUCOSE SERPL-MCNC: 268 MG/DL (ref 70–108)
HCT VFR BLD AUTO: 35.2 % (ref 37–47)
HGB BLD-MCNC: 11.3 GM/DL (ref 12–16)
IMM GRANULOCYTES # BLD AUTO: 0.22 THOU/MM3 (ref 0–0.07)
IMM GRANULOCYTES NFR BLD AUTO: 1.6 %
LACTATE SERPL-SCNC: 1.2 MMOL/L (ref 0.5–2)
LYMPHOCYTES ABSOLUTE: 0.8 THOU/MM3 (ref 1–4.8)
LYMPHOCYTES NFR BLD AUTO: 5.6 %
MAGNESIUM SERPL-MCNC: 2 MG/DL (ref 1.6–2.4)
MAGNESIUM SERPL-MCNC: 2.1 MG/DL (ref 1.6–2.4)
MCH RBC QN AUTO: 27.5 PG (ref 26–33)
MCHC RBC AUTO-ENTMCNC: 32.1 GM/DL (ref 32.2–35.5)
MCV RBC AUTO: 85.6 FL (ref 81–99)
MONOCYTES ABSOLUTE: 1.2 THOU/MM3 (ref 0.4–1.3)
MONOCYTES NFR BLD AUTO: 8.5 %
NEUTROPHILS ABSOLUTE: 11.6 THOU/MM3 (ref 1.8–7.7)
NEUTROPHILS NFR BLD AUTO: 82.6 %
NRBC BLD AUTO-RTO: 3 /100 WBC
PHOSPHATE SERPL-MCNC: 1.9 MG/DL (ref 2.4–4.7)
PHOSPHATE SERPL-MCNC: 2.2 MG/DL (ref 2.4–4.7)
PHOSPHATE SERPL-MCNC: 3.5 MG/DL (ref 2.4–4.7)
PLATELET # BLD AUTO: 57 THOU/MM3 (ref 130–400)
PMV BLD AUTO: ABNORMAL FL (ref 9.4–12.4)
POTASSIUM SERPL-SCNC: 4 MEQ/L (ref 3.5–5.2)
POTASSIUM SERPL-SCNC: 4.2 MEQ/L (ref 3.5–5.2)
POTASSIUM SERPL-SCNC: 4.3 MEQ/L (ref 3.5–5.2)
PROT SERPL-MCNC: 5.8 G/DL (ref 6.1–8)
PROT SERPL-MCNC: 6.4 G/DL (ref 6.1–8)
RBC # BLD AUTO: 4.11 MILL/MM3 (ref 4.2–5.4)
SODIUM SERPL-SCNC: 137 MEQ/L (ref 135–145)
SODIUM SERPL-SCNC: 139 MEQ/L (ref 135–145)
SODIUM SERPL-SCNC: 140 MEQ/L (ref 135–145)
WBC # BLD AUTO: 14.1 THOU/MM3 (ref 4.8–10.8)

## 2024-06-23 PROCEDURE — 6370000000 HC RX 637 (ALT 250 FOR IP): Performed by: STUDENT IN AN ORGANIZED HEALTH CARE EDUCATION/TRAINING PROGRAM

## 2024-06-23 PROCEDURE — 82728 ASSAY OF FERRITIN: CPT

## 2024-06-23 PROCEDURE — 85384 FIBRINOGEN ACTIVITY: CPT

## 2024-06-23 PROCEDURE — 36415 COLL VENOUS BLD VENIPUNCTURE: CPT

## 2024-06-23 PROCEDURE — 2500000003 HC RX 250 WO HCPCS: Performed by: EMERGENCY MEDICINE

## 2024-06-23 PROCEDURE — 6360000002 HC RX W HCPCS: Performed by: STUDENT IN AN ORGANIZED HEALTH CARE EDUCATION/TRAINING PROGRAM

## 2024-06-23 PROCEDURE — 82948 REAGENT STRIP/BLOOD GLUCOSE: CPT

## 2024-06-23 PROCEDURE — 84100 ASSAY OF PHOSPHORUS: CPT

## 2024-06-23 PROCEDURE — 2000000000 HC ICU R&B

## 2024-06-23 PROCEDURE — 2580000003 HC RX 258: Performed by: INTERNAL MEDICINE

## 2024-06-23 PROCEDURE — 82330 ASSAY OF CALCIUM: CPT

## 2024-06-23 PROCEDURE — 2580000003 HC RX 258: Performed by: STUDENT IN AN ORGANIZED HEALTH CARE EDUCATION/TRAINING PROGRAM

## 2024-06-23 PROCEDURE — 85025 COMPLETE CBC W/AUTO DIFF WBC: CPT

## 2024-06-23 PROCEDURE — 83605 ASSAY OF LACTIC ACID: CPT

## 2024-06-23 PROCEDURE — 83735 ASSAY OF MAGNESIUM: CPT

## 2024-06-23 PROCEDURE — 94761 N-INVAS EAR/PLS OXIMETRY MLT: CPT

## 2024-06-23 PROCEDURE — 2580000003 HC RX 258: Performed by: NURSE PRACTITIONER

## 2024-06-23 PROCEDURE — 6360000002 HC RX W HCPCS: Performed by: INTERNAL MEDICINE

## 2024-06-23 PROCEDURE — 99291 CRITICAL CARE FIRST HOUR: CPT | Performed by: INTERNAL MEDICINE

## 2024-06-23 PROCEDURE — 2500000003 HC RX 250 WO HCPCS: Performed by: INTERNAL MEDICINE

## 2024-06-23 PROCEDURE — 2700000000 HC OXYGEN THERAPY PER DAY

## 2024-06-23 PROCEDURE — 2500000003 HC RX 250 WO HCPCS

## 2024-06-23 PROCEDURE — 6370000000 HC RX 637 (ALT 250 FOR IP)

## 2024-06-23 PROCEDURE — 85362 FIBRIN DEGRADATION PRODUCTS: CPT

## 2024-06-23 PROCEDURE — 71045 X-RAY EXAM CHEST 1 VIEW: CPT

## 2024-06-23 PROCEDURE — 6360000002 HC RX W HCPCS

## 2024-06-23 PROCEDURE — 94003 VENT MGMT INPAT SUBQ DAY: CPT

## 2024-06-23 PROCEDURE — 2580000003 HC RX 258

## 2024-06-23 PROCEDURE — 80053 COMPREHEN METABOLIC PANEL: CPT

## 2024-06-23 PROCEDURE — C9113 INJ PANTOPRAZOLE SODIUM, VIA: HCPCS | Performed by: STUDENT IN AN ORGANIZED HEALTH CARE EDUCATION/TRAINING PROGRAM

## 2024-06-23 RX ADMIN — INSULIN GLARGINE 12 UNITS: 100 INJECTION, SOLUTION SUBCUTANEOUS at 20:34

## 2024-06-23 RX ADMIN — LEVETIRACETAM 500 MG: 100 INJECTION INTRAVENOUS at 10:07

## 2024-06-23 RX ADMIN — CALCIUM GLUCONATE 1000 MG: 10 INJECTION, SOLUTION INTRAVENOUS at 21:04

## 2024-06-23 RX ADMIN — LEVETIRACETAM 500 MG: 100 INJECTION INTRAVENOUS at 20:53

## 2024-06-23 RX ADMIN — CHLORHEXIDINE GLUCONATE 15 ML: 1.2 RINSE ORAL at 20:34

## 2024-06-23 RX ADMIN — Medication: at 15:57

## 2024-06-23 RX ADMIN — Medication 4 MCG/MIN: at 03:09

## 2024-06-23 RX ADMIN — CALCIUM GLUCONATE 1000 MG: 10 INJECTION, SOLUTION INTRAVENOUS at 04:12

## 2024-06-23 RX ADMIN — CLOPIDOGREL BISULFATE 75 MG: 75 TABLET ORAL at 08:40

## 2024-06-23 RX ADMIN — SODIUM CHLORIDE, PRESERVATIVE FREE 40 MG: 5 INJECTION INTRAVENOUS at 08:24

## 2024-06-23 RX ADMIN — MEROPENEM 1000 MG: 1 INJECTION INTRAVENOUS at 05:02

## 2024-06-23 RX ADMIN — CHLORHEXIDINE GLUCONATE 15 ML: 1.2 RINSE ORAL at 07:58

## 2024-06-23 RX ADMIN — ALLOPURINOL 100 MG: 100 TABLET ORAL at 08:40

## 2024-06-23 RX ADMIN — ATORVASTATIN CALCIUM 40 MG: 40 TABLET, FILM COATED ORAL at 20:45

## 2024-06-23 RX ADMIN — MICAFUNGIN SODIUM 100 MG: 100 INJECTION, POWDER, LYOPHILIZED, FOR SOLUTION INTRAVENOUS at 14:04

## 2024-06-23 RX ADMIN — ASPIRIN 81 MG 81 MG: 81 TABLET ORAL at 08:40

## 2024-06-23 RX ADMIN — SODIUM PHOSPHATE, MONOBASIC, MONOHYDRATE AND SODIUM PHOSPHATE, DIBASIC, ANHYDROUS 6 MMOL: 142; 276 INJECTION, SOLUTION INTRAVENOUS at 22:09

## 2024-06-23 RX ADMIN — SODIUM CHLORIDE, PRESERVATIVE FREE 10 ML: 5 INJECTION INTRAVENOUS at 08:40

## 2024-06-23 RX ADMIN — SODIUM PHOSPHATE, MONOBASIC, MONOHYDRATE AND SODIUM PHOSPHATE, DIBASIC, ANHYDROUS 12 MMOL: 142; 276 INJECTION, SOLUTION INTRAVENOUS at 06:27

## 2024-06-23 RX ADMIN — SODIUM CHLORIDE, PRESERVATIVE FREE 10 ML: 5 INJECTION INTRAVENOUS at 20:34

## 2024-06-23 RX ADMIN — DULOXETINE HYDROCHLORIDE 60 MG: 60 CAPSULE, DELAYED RELEASE ORAL at 08:40

## 2024-06-23 RX ADMIN — PROPOFOL 20 MCG/KG/MIN: 10 INJECTION, EMULSION INTRAVENOUS at 03:26

## 2024-06-23 RX ADMIN — INSULIN LISPRO 4 UNITS: 100 INJECTION, SOLUTION INTRAVENOUS; SUBCUTANEOUS at 06:16

## 2024-06-23 RX ADMIN — INSULIN LISPRO 4 UNITS: 100 INJECTION, SOLUTION INTRAVENOUS; SUBCUTANEOUS at 12:06

## 2024-06-23 RX ADMIN — Medication: at 15:56

## 2024-06-23 RX ADMIN — ACETAMINOPHEN 650 MG: 325 TABLET ORAL at 16:37

## 2024-06-23 RX ADMIN — SODIUM BICARBONATE: 84 INJECTION, SOLUTION INTRAVENOUS at 13:49

## 2024-06-23 RX ADMIN — MEROPENEM 1000 MG: 1 INJECTION INTRAVENOUS at 17:24

## 2024-06-23 ASSESSMENT — PULMONARY FUNCTION TESTS
PIF_VALUE: 20
PIF_VALUE: 21
PIF_VALUE: 21
PIF_VALUE: 20
PIF_VALUE: 21
PIF_VALUE: 20

## 2024-06-23 NOTE — PLAN OF CARE
Problem: Respiratory - Adult  Goal: Achieves optimal ventilation and oxygenation  6/20/2024 1449 by Reg Hutton, [facility]  Outcome: Progressing                                                 Patient Weaning Progress    The patient's vent settings was not able to be weaned this shift.      Ventilator settings that were weaned              [] Mode   [x] Pressure support weaned   [x] Fio2 weaned   [] Peep weaned      Spontaneous weaning trial  was not attempted due to defined parameters for SBT (spontaneous breathing trial) not being met.     *Results of SBT documented under SBTOUTCOME note.    Reason that defined ventilator parameters for SBT was not met              [x] Patient condition requires increased ventilator settings  [x] Requires increased sedation   [x] Settings not within weaning range   [] SAT not completed   [] Physician orders      Evac tube was  hooked up with continuous low suction(20-30mmHg)      Cuff was  deflated to determine cuff leak. A leak  was detected.     Unable to get agreement for goals because no family is present and patient cannot respond.     
  Problem: Discharge Planning  Goal: Discharge to home or other facility with appropriate resources  6/20/2024 1415 by Leonor Rocha LSW  Outcome: Progressing     Consult received. Please see SW note dated 6/20.   
  Problem: Discharge Planning  Goal: Discharge to home or other facility with appropriate resources  6/20/2024 1511 by Johnathon Bang RN  Outcome: /[facility] Progressing  Flowsheets (Taken 6/20/2024 1511)  Discharge to home or other facility with appropriate resources: Identify barriers to discharge with patient and caregiver     Problem: Pain  Goal: Verbalizes/displays adequate comfort level or baseline comfort level  6/20/2024 1511 by Johnathon Bang RN  Outcome: /[facility] Progressing  Flowsheets (Taken 6/20/2024 1511)  Verbalizes/displays adequate comfort level or baseline comfort level:   Assess pain using appropriate pain scale   Administer analgesics based on type and severity of pain and evaluate response     Problem: Safety - Adult  Goal: Free from fall injury  6/20/2024 1511 by Johnathon Bang RN  Outcome: /[facility] Progressing  Flowsheets (Taken 6/20/2024 1511)  Free From Fall Injury: Instruct family/caregiver on patient safety     Problem: Skin/Tissue Integrity  Goal: Absence of new skin breakdown  Description: 1.  Monitor for areas of redness and/or skin breakdown  2.  Assess vascular access sites hourly  3.  Every 4-6 hours minimum:  Change oxygen saturation probe site  4.  Every 4-6 hours:  If on nasal continuous positive airway pressure, respiratory therapy assess nares and determine need for appliance change or resting period.  6/20/2024 1511 by Johntahon Bang RN  Outcome: /[facility] Progressing  Note: Frequent position changes     Problem: Respiratory - Adult  Goal: Achieves optimal ventilation and oxygenation  6/20/2024 1511 by Johnathon Bang RN  Outcome: /[facility] Progressing  Flowsheets (Taken 6/20/2024 1511)  Achieves optimal ventilation and oxygenation:   Assess for changes in respiratory status   Position to facilitate oxygenation and minimize respiratory effort   Oxygen supplementation based on oxygen saturation or arterial blood gases   Assess the need for suctioning and aspirate as needed   
  Problem: Discharge Planning  Goal: Discharge to home or other facility with appropriate resources  6/21/2024 0023 by Corry Browning RN  Outcome: Progressing  Flowsheets (Taken 6/21/2024 0023)  Discharge to home or other facility with appropriate resources: Identify barriers to discharge with patient and caregiver  6/20/2024 1511 by Johnathon Bang RN  Outcome: [facility] Progressing  Flowsheets (Taken 6/20/2024 1511)  Discharge to home or other facility with appropriate resources: Identify barriers to discharge with patient and caregiver  6/20/2024 1415 by Leonor Rocha LSW  Outcome: Progressing     Problem: Pain  Goal: Verbalizes/displays adequate comfort level or baseline comfort level  6/21/2024 0023 by Corry Browning RN  Outcome: Progressing  Flowsheets (Taken 6/21/2024 0023)  Verbalizes/displays adequate comfort level or baseline comfort level:   Encourage patient to monitor pain and request assistance   Assess pain using appropriate pain scale  6/20/2024 1511 by Johnathon Bang RN  Outcome: [facility] Progressing  Flowsheets (Taken 6/20/2024 1511)  Verbalizes/displays adequate comfort level or baseline comfort level:   Assess pain using appropriate pain scale   Administer analgesics based on type and severity of pain and evaluate response     Problem: Safety - Adult  Goal: Free from fall injury  6/21/2024 0023 by Corry Browning RN  Outcome: Progressing  Flowsheets (Taken 6/21/2024 0023)  Free From Fall Injury: Instruct family/caregiver on patient safety  6/20/2024 1511 by Johnathon Bang RN  Outcome: [facility] Progressing  Flowsheets (Taken 6/20/2024 1511)  Free From Fall Injury: Instruct family/caregiver on patient safety     Problem: Skin/Tissue Integrity  Goal: Absence of new skin breakdown  Description: 1.  Monitor for areas of redness and/or skin breakdown  2.  Assess vascular access sites hourly  3.  Every 4-6 hours minimum:  Change oxygen saturation probe site  4.  Every 4-6 hours:  If on nasal 
  Problem: Discharge Planning  Goal: Discharge to home or other facility with appropriate resources  6/22/2024 0342 by Adrienne Espitia, RN  Outcome: Not Progressing  Pt intubated on CRRT    Problem: Pain  Goal: Verbalizes/displays adequate comfort level or baseline comfort level  6/22/2024 0342 by Adrienne Espitia, RN  Outcome: Progressing  Verbalizes/displays adequate comfort level or baseline comfort level:   Assess pain using appropriate pain scale   Administer analgesics based on type and severity of pain and evaluate response   Implement non-pharmacological measures as appropriate and evaluate response   Notify Licensed Independent Practitioner if interventions unsuccessful or patient reports new pain     Problem: Safety - Adult  Goal: Free from fall injury  6/22/2024 0342 by Adrienne Espitia, RN  Outcome: Progressing  Flowsheets (Taken 6/21/2024 1427 by Johnathon Bang, RN)  Free From Fall Injury:   Instruct family/caregiver on patient safety   Based on caregiver fall risk screen, instruct family/caregiver to ask for assistance with transferring infant if caregiver noted to have fall risk factors  Note: Without fall thus far.  Falling star in place.  Bridges Fall Scale completed.  Outcome: Progressing  Flowsheets (Taken 6/21/2024 1427)  Free From Fall Injury:   Instruct family/caregiver on patient safety   Based on caregiver fall risk screen, instruct family/caregiver to ask for assistance with transferring infant if caregiver noted to have fall risk factors     Problem: Skin/Tissue Integrity  Goal: Absence of new skin breakdown  Description: 1.  Monitor for areas of redness and/or skin breakdown  2.  Assess vascular access sites hourly  3.  Every 4-6 hours minimum:  Change oxygen saturation probe site  4.  Every 4-6 hours:  If on nasal continuous positive airway pressure, respiratory therapy assess nares and determine need for appliance change or resting period.  6/22/2024 0342 by Adrienne Espitia, 
  Problem: Discharge Planning  Goal: Discharge to home or other facility with appropriate resources  6/22/2024 1325 by Johnathon Bang RN  Outcome: Progressing  Flowsheets (Taken 6/22/2024 1325)  Discharge to home or other facility with appropriate resources: Identify barriers to discharge with patient and caregiver     Problem: Pain  Goal: Verbalizes/displays adequate comfort level or baseline comfort level  6/22/2024 1325 by Johnathon Bang RN  Outcome: Progressing  Flowsheets (Taken 6/21/2024 1427)  Verbalizes/displays adequate comfort level or baseline comfort level:   Assess pain using appropriate pain scale   Administer analgesics based on type and severity of pain and evaluate response   Implement non-pharmacological measures as appropriate and evaluate response   Notify Licensed Independent Practitioner if interventions unsuccessful or patient reports new pain     Problem: Safety - Adult  Goal: Free from fall injury  6/22/2024 1325 by Johnathon Bang RN  Outcome: Progressing  Flowsheets (Taken 6/21/2024 1427)  Free From Fall Injury:   Instruct family/caregiver on patient safety   Based on caregiver fall risk screen, instruct family/caregiver to ask for assistance with transferring infant if caregiver noted to have fall risk factors     Problem: Skin/Tissue Integrity  Goal: Absence of new skin breakdown  Description: 1.  Monitor for areas of redness and/or skin breakdown  2.  Assess vascular access sites hourly  3.  Every 4-6 hours minimum:  Change oxygen saturation probe site  4.  Every 4-6 hours:  If on nasal continuous positive airway pressure, respiratory therapy assess nares and determine need for appliance change or resting period.  6/22/2024 1325 by Johnathon Bang RN  Outcome: Progressing  Note: Frequent position changes     Problem: Respiratory - Adult  Goal: Achieves optimal ventilation and oxygenation  6/22/2024 1325 by Johnathon Bang, RN  Outcome: Progressing  Flowsheets  Taken 6/22/2024 1325 
  Problem: Discharge Planning  Goal: Discharge to home or other facility with appropriate resources  6/23/2024 1333 by Johnathon Bang RN  Outcome: Progressing  Flowsheets (Taken 6/23/2024 1333)  Discharge to home or other facility with appropriate resources:   Identify barriers to discharge with patient and caregiver   Identify discharge learning needs (meds, wound care, etc)   Refer to discharge planning if patient needs post-hospital services based on physician order or complex needs related to functional status, cognitive ability or social support system     Problem: Pain  Goal: Verbalizes/displays adequate comfort level or baseline comfort level  6/23/2024 1333 by Johnathon Bang, RN  Outcome: Progressing  Flowsheets (Taken 6/23/2024 1333)  Verbalizes/displays adequate comfort level or baseline comfort level:   Assess pain using appropriate pain scale   Administer analgesics based on type and severity of pain and evaluate response   Implement non-pharmacological measures as appropriate and evaluate response   Notify Licensed Independent Practitioner if interventions unsuccessful or patient reports new pain     Problem: Safety - Adult  Goal: Free from fall injury  6/23/2024 1333 by Johnathon Bang RN  Outcome: Progressing  Flowsheets (Taken 6/23/2024 1333)  Free From Fall Injury:   Instruct family/caregiver on patient safety   Based on caregiver fall risk screen, instruct family/caregiver to ask for assistance with transferring infant if caregiver noted to have fall risk factors     Problem: Skin/Tissue Integrity  Goal: Absence of new skin breakdown  Description: 1.  Monitor for areas of redness and/or skin breakdown  2.  Assess vascular access sites hourly  3.  Every 4-6 hours minimum:  Change oxygen saturation probe site  4.  Every 4-6 hours:  If on nasal continuous positive airway pressure, respiratory therapy assess nares and determine need for appliance change or resting period.  6/23/2024 1333 by Beti 
  Problem: Discharge Planning  Goal: Discharge to home or other facility with appropriate resources  Outcome: Progressing  Flowsheets (Taken 6/21/2024 1427)  Discharge to home or other facility with appropriate resources:   Identify barriers to discharge with patient and caregiver   Arrange for needed discharge resources and transportation as appropriate     Problem: Pain  Goal: Verbalizes/displays adequate comfort level or baseline comfort level  Outcome: Progressing  Flowsheets (Taken 6/21/2024 1427)  Verbalizes/displays adequate comfort level or baseline comfort level:   Assess pain using appropriate pain scale   Administer analgesics based on type and severity of pain and evaluate response   Implement non-pharmacological measures as appropriate and evaluate response   Notify Licensed Independent Practitioner if interventions unsuccessful or patient reports new pain     Problem: Safety - Adult  Goal: Free from fall injury  Outcome: Progressing  Flowsheets (Taken 6/21/2024 1427)  Free From Fall Injury:   Instruct family/caregiver on patient safety   Based on caregiver fall risk screen, instruct family/caregiver to ask for assistance with transferring infant if caregiver noted to have fall risk factors     Problem: Skin/Tissue Integrity  Goal: Absence of new skin breakdown  Description: 1.  Monitor for areas of redness and/or skin breakdown  2.  Assess vascular access sites hourly  3.  Every 4-6 hours minimum:  Change oxygen saturation probe site  4.  Every 4-6 hours:  If on nasal continuous positive airway pressure, respiratory therapy assess nares and determine need for appliance change or resting period.  Outcome: Progressing  Note: Frequent position changes     Problem: Respiratory - Adult  Goal: Achieves optimal ventilation and oxygenation  6/21/2024 1427 by Johnathon Bang RN  Outcome: Progressing  Flowsheets (Taken 6/21/2024 1427)  Achieves optimal ventilation and oxygenation:   Assess for changes in 
  Problem: Respiratory - Adult  Goal: Achieves optimal ventilation and oxygenation  6/21/2024 0559 by Ciara Serrano RCP  Outcome: Progressing  6/21/2024 0023 by Corry Browning RN  Outcome: Progressing  Flowsheets (Taken 6/21/2024 0023)  Achieves optimal ventilation and oxygenation: Assess for changes in respiratory status                                                Patient Weaning Progress    The patient's vent settings was able to be weaned this shift.      Ventilator settings that were weaned              [] Mode   [x] Pressure support weaned   [] Fio2 weaned   [] Peep weaned      Spontaneous weaning trial  was attempted.     The patient was not able to tolerate SBT.  EtCO2 of 32 and SpO2 of 100 on 25% FiO2.  Spontanteous VT was 324 and RR 23 breaths/min.  The patient was on SBT for 5 minutes     Evac tube was not  hooked up with continuous low suction(20-30mmHg)      Cuff was not  deflated to determine cuff leak.   Unable to get agreement for goals because no family is present and patient cannot respond.     
  Problem: Respiratory - Adult  Goal: Achieves optimal ventilation and oxygenation  6/22/2024 0520 by Alana Orantes RCP  Outcome: Progressing                                               Patient Weaning Progress    The patient's vent settings was not able to be weaned this shift.      Ventilator settings that were weaned              [] Mode   [] Pressure support weaned   [] Fio2 weaned   [] Peep weaned      Spontaneous weaning trial  was not attempted.     due to defined parameters for SBT (spontaneous breathing trial) not being met.     *Results of SBT documented under SBTOUTCOME note.    Reason that defined ventilator parameters for SBT was not met              [] Patient condition requires increased ventilator settings  [x] Requires increased sedation   [] Settings not within weaning range   [] SAT not completed   [] Physician orders      Evac tube was  hooked up with continuous low suction(20-30mmHg)      Cuff was not  deflated to determine cuff leak. AUnable to get agreement for goals because no family is present and patient cannot respond.     
  Problem: Respiratory - Adult  Goal: Achieves optimal ventilation and oxygenation  6/23/2024 0922 by Scott Cee, [facility]  Outcome: Progressing                                                Patient Weaning Progress    The patient's vent settings was not able to be weaned this shift.      Ventilator settings that were weaned              [] Mode   [] Pressure support weaned   [] Fio2 weaned   [] Peep weaned      Spontaneous weaning trial  was not attempted.     due to defined parameters for SBT (spontaneous breathing trial) not being met.     *Results of SBT documented under SBTOUTCOME note.    Reason that defined ventilator parameters for SBT was not met              [x] Patient condition requires increased ventilator settings  [] Requires increased sedation   [x] Settings not within weaning range   [] SAT not completed   [] Physician orders  Cuff was not  deflated to determine cuff leak.     Unable to get agreement for goals because no family is present and patient cannot respond.     
Progressing  Flowsheets (Taken 6/20/2024 0548)  Achieves optimal ventilation and oxygenation:   Assess for changes in respiratory status   Position to facilitate oxygenation and minimize respiratory effort   Oxygen supplementation based on oxygen saturation or arterial blood gases   Encourage broncho-pulmonary hygiene including cough, deep breathe, incentive spirometry   Assess the need for suctioning and aspirate as needed   Respiratory therapy support as indicated   Assess and instruct to report shortness of breath or any respiratory difficulty     Problem: Discharge Planning  Goal: Discharge to home or other facility with appropriate resources  Outcome: Not Progressing   Care plan reviewed with granddaughter at bedside.  Granddaughter verbalized understanding with all questions answered.  Problem: Cardiovascular - Adult  Goal: Absence of cardiac dysrhythmias or at baseline  Outcome: Not Progressing  Flowsheets (Taken 6/20/2024 0548)  Absence of cardiac dysrhythmias or at baseline:   Monitor cardiac rate and rhythm   Administer antiarrhythmia medication and electrolyte replacement as ordered   Assess for signs of decreased cardiac output     
Progressing  Note: No new skin breakdown noted.  Q2 hr and prn turns.     Problem: Respiratory - Adult  Goal: Achieves optimal ventilation and oxygenation  6/23/2024 0652 by Deyanira Tolliver RN  Outcome: Progressing  Flowsheets (Taken 6/22/2024 2000)  Achieves optimal ventilation and oxygenation:   Assess for changes in respiratory status   Position to facilitate oxygenation and minimize respiratory effort   Oxygen supplementation based on oxygen saturation or arterial blood gases   Assess the need for suctioning and aspirate as needed   Assess and instruct to report shortness of breath or any respiratory difficulty   Respiratory therapy support as indicated     Problem: Cardiovascular - Adult  Goal: Absence of cardiac dysrhythmias or at baseline  6/23/2024 0652 by Deyanira Tolliver RN  Outcome: Progressing  Flowsheets (Taken 6/22/2024 2000)  Absence of cardiac dysrhythmias or at baseline:   Monitor cardiac rate and rhythm   Assess for signs of decreased cardiac output   Administer antiarrhythmia medication and electrolyte replacement as ordered     Problem: Chronic Conditions and Co-morbidities  Goal: Patient's chronic conditions and co-morbidity symptoms are monitored and maintained or improved  6/23/2024 0652 by Deyanira Tolliver RN  Outcome: Progressing  Flowsheets (Taken 6/22/2024 2000)  Care Plan - Patient's Chronic Conditions and Co-Morbidity Symptoms are Monitored and Maintained or Improved:   Monitor and assess patient's chronic conditions and comorbid symptoms for stability, deterioration, or improvement   Collaborate with multidisciplinary team to address chronic and comorbid conditions and prevent exacerbation or deterioration   Update acute care plan with appropriate goals if chronic or comorbid symptoms are exacerbated and prevent overall improvement and discharge     Problem: Nutrition Deficit:  Goal: Optimize nutritional status  6/23/2024 0652 by Deyanira Tolliver RN  Outcome: Progressing  Flowsheets (Taken

## 2024-06-23 NOTE — FLOWSHEET NOTE
Dr Olivares at bedside discussing plan of care with family.  POA on phone with daughters who are present.  Family wishes to continue with care until more family arrives.  They wish to add hospice care and have code status changed.

## 2024-06-23 NOTE — SIGNIFICANT EVENT
Platelets dropped to 70 k from 161 k. Coincides with initiation of heparin. Per Dr. Jason MD, Nephrology, Pt had similar drop in platelets at [facility] during her recent hospitalization there.     Plan:  -D/c heparin SC  -SCD  -HIT Panel    Signed:  Dr. Kenroy Olivares MD  Internal Medicine, PGY-3, Chief Resident  06/22/24  9:27 PM    Staff: Corey Avery MD

## 2024-06-23 NOTE — FLOWSHEET NOTE
0800 Mrs Lau rests in the bed without apparent distress. She remains intubated 7.5 ETT at 18 cm to her lips and sedated with Propofol. She requires Levophed and Vasopressin to maintain her BP at ordered parameters. These are infusing through a 3 lumen CVC right SC site clear. She has a dialysis catheter left chest site clear currently running SCUF and attempting to remove 100 cc/hr. The system appears to be running well. Left groin site from previously removed CVC clean and dry. She has an OGT with tube feeding of Nepro at 30 cc/h which is goal. She has about 20 cc's residual this AM. Plan to continue to remove fluid and wean pressors as she tolerates. She is unable to move her extremities this AM to tactile stimulation and her sedation has been weaned down.

## 2024-06-23 NOTE — SIGNIFICANT EVENT
At approximately 2000, I was notified that family was present and wanted to the direction they wanted to pursue for pt Beatriz Lau's care.  I explained to them that patient was completely dependent on CRRT, her ejection fraction was 10 to 15% (her heart was working at approximately 1/5 of a normal heart) likely contributing to renal failure, she was dependent on the ventilator for breathing, her access to dialysis was repeatedly being infected, and that her prognosis was poor.  POA and patient's daughters, after discussing thoroughly, were all in agreement that patient, at this time, is \"very tired.\"  They also stated that prior to this hospitalization, patient states I am tired and I do not want to do this anymore.\"  After a long discussion about how to proceed with patient's transition of care and after offering all possible options, patient's family including the POA decided to make the patient limited no x 4.  They stated they want to wait on de-escalating current management until more family can come and visit the patient.  They do not, however, wish to pursue resuscitative efforts while waiting for family to come visit the patient.  They state \"if her heart stops, let her go comfortably.\"  I explained to them the various types of CODE STATUS is that do exist and they elected to pursue limited no x 4 at this time.  They also requested palliative care and hospice be consulted for assistance in transitioning care.    #Transitions of Care: Active  As above, pt will be transitioning to end-of-life care.   Plan:   -Inpatient palliative care consulted  -Hospice consulted  -CODE STATUS changed to limited no x4.   -Will pursue comfort measures in addition to current therapies until patient + family are prepared    Signed:  Dr. Kenroy Olivares MD  Internal Medicine, PGY-3, Chief Resident  06/22/24  8:23 PM    Staff: Dr. Corey Solorzano MD, Pulmonology / Critical Care

## 2024-06-24 LAB
ALBUMIN SERPL BCG-MCNC: 3.2 G/DL (ref 3.5–5.1)
ALBUMIN SERPL BCG-MCNC: 3.3 G/DL (ref 3.5–5.1)
ALP SERPL-CCNC: 204 U/L (ref 38–126)
ALP SERPL-CCNC: 240 U/L (ref 38–126)
ALT SERPL W/O P-5'-P-CCNC: 264 U/L (ref 11–66)
ALT SERPL W/O P-5'-P-CCNC: 278 U/L (ref 11–66)
ANION GAP SERPL CALC-SCNC: 11 MEQ/L (ref 8–16)
ANION GAP SERPL CALC-SCNC: 12 MEQ/L (ref 8–16)
AST SERPL-CCNC: 170 U/L (ref 5–40)
AST SERPL-CCNC: 183 U/L (ref 5–40)
BACTERIA BLD AEROBE CULT: NORMAL
BASOPHILS ABSOLUTE: 0 THOU/MM3 (ref 0–0.1)
BASOPHILS NFR BLD AUTO: 0.4 %
BILIRUB SERPL-MCNC: 0.8 MG/DL (ref 0.3–1.2)
BILIRUB SERPL-MCNC: 0.9 MG/DL (ref 0.3–1.2)
BUN SERPL-MCNC: 24 MG/DL (ref 7–22)
BUN SERPL-MCNC: 28 MG/DL (ref 7–22)
CA-I BLD ISE-SCNC: 1.05 MMOL/L (ref 1.12–1.32)
CA-I BLD ISE-SCNC: 1.11 MMOL/L (ref 1.12–1.32)
CALCIUM SERPL-MCNC: 8.1 MG/DL (ref 8.5–10.5)
CALCIUM SERPL-MCNC: 8.2 MG/DL (ref 8.5–10.5)
CHLORIDE SERPL-SCNC: 103 MEQ/L (ref 98–111)
CHLORIDE SERPL-SCNC: 99 MEQ/L (ref 98–111)
CO2 SERPL-SCNC: 23 MEQ/L (ref 23–33)
CO2 SERPL-SCNC: 23 MEQ/L (ref 23–33)
CREAT SERPL-MCNC: 1.2 MG/DL (ref 0.4–1.2)
CREAT SERPL-MCNC: 1.6 MG/DL (ref 0.4–1.2)
DEPRECATED RDW RBC AUTO: 58.2 FL (ref 35–45)
EOSINOPHIL NFR BLD AUTO: 1.7 %
EOSINOPHILS ABSOLUTE: 0.2 THOU/MM3 (ref 0–0.4)
ERYTHROCYTE [DISTWIDTH] IN BLOOD BY AUTOMATED COUNT: 19.8 % (ref 11.5–14.5)
GFR SERPL CREATININE-BSD FRML MDRD: 32 ML/MIN/1.73M2
GFR SERPL CREATININE-BSD FRML MDRD: 45 ML/MIN/1.73M2
GLUCOSE BLD STRIP.AUTO-MCNC: 189 MG/DL (ref 70–108)
GLUCOSE BLD STRIP.AUTO-MCNC: 256 MG/DL (ref 70–108)
GLUCOSE SERPL-MCNC: 197 MG/DL (ref 70–108)
GLUCOSE SERPL-MCNC: 269 MG/DL (ref 70–108)
HCT VFR BLD AUTO: 33.7 % (ref 37–47)
HGB BLD-MCNC: 10.9 GM/DL (ref 12–16)
IMM GRANULOCYTES # BLD AUTO: 0.21 THOU/MM3 (ref 0–0.07)
IMM GRANULOCYTES NFR BLD AUTO: 1.8 %
LYMPHOCYTES ABSOLUTE: 1.3 THOU/MM3 (ref 1–4.8)
LYMPHOCYTES NFR BLD AUTO: 10.6 %
MAGNESIUM SERPL-MCNC: 2.1 MG/DL (ref 1.6–2.4)
MCH RBC QN AUTO: 27.6 PG (ref 26–33)
MCHC RBC AUTO-ENTMCNC: 32.3 GM/DL (ref 32.2–35.5)
MCV RBC AUTO: 85.3 FL (ref 81–99)
MONOCYTES ABSOLUTE: 1.6 THOU/MM3 (ref 0.4–1.3)
MONOCYTES NFR BLD AUTO: 13.2 %
NEUTROPHILS ABSOLUTE: 8.7 THOU/MM3 (ref 1.8–7.7)
NEUTROPHILS NFR BLD AUTO: 72.3 %
NRBC BLD AUTO-RTO: 3 /100 WBC
PHOSPHATE SERPL-MCNC: 1.7 MG/DL (ref 2.4–4.7)
PHOSPHATE SERPL-MCNC: 2.3 MG/DL (ref 2.4–4.7)
PLATELET # BLD AUTO: 48 THOU/MM3 (ref 130–400)
PMV BLD AUTO: ABNORMAL FL (ref 9.4–12.4)
POTASSIUM SERPL-SCNC: 3.8 MEQ/L (ref 3.5–5.2)
POTASSIUM SERPL-SCNC: 4.3 MEQ/L (ref 3.5–5.2)
PROT SERPL-MCNC: 6.3 G/DL (ref 6.1–8)
PROT SERPL-MCNC: 6.6 G/DL (ref 6.1–8)
RBC # BLD AUTO: 3.95 MILL/MM3 (ref 4.2–5.4)
REVIEWED BY: NORMAL
SMEAR REVIEW: NORMAL
SODIUM SERPL-SCNC: 134 MEQ/L (ref 135–145)
SODIUM SERPL-SCNC: 137 MEQ/L (ref 135–145)
TRIGL SERPL-MCNC: 191 MG/DL (ref 0–199)
WBC # BLD AUTO: 12 THOU/MM3 (ref 4.8–10.8)

## 2024-06-24 PROCEDURE — 2580000003 HC RX 258: Performed by: INTERNAL MEDICINE

## 2024-06-24 PROCEDURE — 94761 N-INVAS EAR/PLS OXIMETRY MLT: CPT

## 2024-06-24 PROCEDURE — 82330 ASSAY OF CALCIUM: CPT

## 2024-06-24 PROCEDURE — 85025 COMPLETE CBC W/AUTO DIFF WBC: CPT

## 2024-06-24 PROCEDURE — 2580000003 HC RX 258: Performed by: NURSE PRACTITIONER

## 2024-06-24 PROCEDURE — 6370000000 HC RX 637 (ALT 250 FOR IP): Performed by: STUDENT IN AN ORGANIZED HEALTH CARE EDUCATION/TRAINING PROGRAM

## 2024-06-24 PROCEDURE — 2580000003 HC RX 258: Performed by: STUDENT IN AN ORGANIZED HEALTH CARE EDUCATION/TRAINING PROGRAM

## 2024-06-24 PROCEDURE — 80053 COMPREHEN METABOLIC PANEL: CPT

## 2024-06-24 PROCEDURE — 36415 COLL VENOUS BLD VENIPUNCTURE: CPT

## 2024-06-24 PROCEDURE — 84478 ASSAY OF TRIGLYCERIDES: CPT

## 2024-06-24 PROCEDURE — 82948 REAGENT STRIP/BLOOD GLUCOSE: CPT

## 2024-06-24 PROCEDURE — 6370000000 HC RX 637 (ALT 250 FOR IP)

## 2024-06-24 PROCEDURE — 6360000002 HC RX W HCPCS: Performed by: STUDENT IN AN ORGANIZED HEALTH CARE EDUCATION/TRAINING PROGRAM

## 2024-06-24 PROCEDURE — 83735 ASSAY OF MAGNESIUM: CPT

## 2024-06-24 PROCEDURE — 2500000003 HC RX 250 WO HCPCS: Performed by: INTERNAL MEDICINE

## 2024-06-24 PROCEDURE — C9113 INJ PANTOPRAZOLE SODIUM, VIA: HCPCS | Performed by: STUDENT IN AN ORGANIZED HEALTH CARE EDUCATION/TRAINING PROGRAM

## 2024-06-24 PROCEDURE — 94003 VENT MGMT INPAT SUBQ DAY: CPT

## 2024-06-24 PROCEDURE — 84100 ASSAY OF PHOSPHORUS: CPT

## 2024-06-24 PROCEDURE — 99291 CRITICAL CARE FIRST HOUR: CPT | Performed by: INTERNAL MEDICINE

## 2024-06-24 PROCEDURE — 2000000000 HC ICU R&B

## 2024-06-24 PROCEDURE — 6360000002 HC RX W HCPCS

## 2024-06-24 PROCEDURE — P9047 ALBUMIN (HUMAN), 25%, 50ML: HCPCS

## 2024-06-24 PROCEDURE — 6360000002 HC RX W HCPCS: Performed by: INTERNAL MEDICINE

## 2024-06-24 RX ORDER — GLYCOPYRROLATE 0.2 MG/ML
0.2 INJECTION INTRAMUSCULAR; INTRAVENOUS EVERY 4 HOURS PRN
Status: DISCONTINUED | OUTPATIENT
Start: 2024-06-24 | End: 2024-06-25 | Stop reason: HOSPADM

## 2024-06-24 RX ORDER — HALOPERIDOL 5 MG/ML
1 INJECTION INTRAMUSCULAR
Status: DISCONTINUED | OUTPATIENT
Start: 2024-06-24 | End: 2024-06-25 | Stop reason: HOSPADM

## 2024-06-24 RX ORDER — LORAZEPAM 2 MG/ML
0.5 INJECTION INTRAMUSCULAR
Status: DISCONTINUED | OUTPATIENT
Start: 2024-06-24 | End: 2024-06-24

## 2024-06-24 RX ORDER — LORAZEPAM 2 MG/ML
0.5 INJECTION INTRAMUSCULAR EVERY 30 MIN PRN
Status: DISCONTINUED | OUTPATIENT
Start: 2024-06-24 | End: 2024-06-25 | Stop reason: HOSPADM

## 2024-06-24 RX ORDER — LORAZEPAM 2 MG/ML
1 INJECTION INTRAMUSCULAR EVERY 6 HOURS PRN
Status: DISCONTINUED | OUTPATIENT
Start: 2024-06-24 | End: 2024-06-24

## 2024-06-24 RX ORDER — LORAZEPAM 2 MG/ML
1 INJECTION INTRAMUSCULAR ONCE
Status: COMPLETED | OUTPATIENT
Start: 2024-06-24 | End: 2024-06-24

## 2024-06-24 RX ORDER — ALBUMIN (HUMAN) 12.5 G/50ML
25 SOLUTION INTRAVENOUS ONCE
Status: COMPLETED | OUTPATIENT
Start: 2024-06-24 | End: 2024-06-24

## 2024-06-24 RX ADMIN — CHLORHEXIDINE GLUCONATE 15 ML: 1.2 RINSE ORAL at 09:58

## 2024-06-24 RX ADMIN — PROPOFOL 15 MCG/KG/MIN: 10 INJECTION, EMULSION INTRAVENOUS at 03:07

## 2024-06-24 RX ADMIN — INSULIN LISPRO 8 UNITS: 100 INJECTION, SOLUTION INTRAVENOUS; SUBCUTANEOUS at 13:08

## 2024-06-24 RX ADMIN — ASPIRIN 81 MG 81 MG: 81 TABLET ORAL at 09:57

## 2024-06-24 RX ADMIN — CALCIUM GLUCONATE 1000 MG: 10 INJECTION, SOLUTION INTRAVENOUS at 05:51

## 2024-06-24 RX ADMIN — MEROPENEM 1000 MG: 1 INJECTION INTRAVENOUS at 04:34

## 2024-06-24 RX ADMIN — Medication: at 00:34

## 2024-06-24 RX ADMIN — SODIUM PHOSPHATE, MONOBASIC, MONOHYDRATE AND SODIUM PHOSPHATE, DIBASIC, ANHYDROUS 12 MMOL: 142; 276 INJECTION, SOLUTION INTRAVENOUS at 08:14

## 2024-06-24 RX ADMIN — CHLORHEXIDINE GLUCONATE 15 ML: 1.2 RINSE ORAL at 22:46

## 2024-06-24 RX ADMIN — CLOPIDOGREL BISULFATE 75 MG: 75 TABLET ORAL at 09:57

## 2024-06-24 RX ADMIN — Medication: at 09:39

## 2024-06-24 RX ADMIN — Medication: at 00:35

## 2024-06-24 RX ADMIN — ALLOPURINOL 100 MG: 100 TABLET ORAL at 09:57

## 2024-06-24 RX ADMIN — DULOXETINE HYDROCHLORIDE 60 MG: 60 CAPSULE, DELAYED RELEASE ORAL at 09:57

## 2024-06-24 RX ADMIN — SODIUM CHLORIDE, PRESERVATIVE FREE 30 ML: 5 INJECTION INTRAVENOUS at 09:10

## 2024-06-24 RX ADMIN — Medication: at 09:38

## 2024-06-24 RX ADMIN — HYDROMORPHONE HYDROCHLORIDE 1 MG: 1 INJECTION, SOLUTION INTRAMUSCULAR; INTRAVENOUS; SUBCUTANEOUS at 20:19

## 2024-06-24 RX ADMIN — LEVETIRACETAM 500 MG: 100 INJECTION INTRAVENOUS at 13:41

## 2024-06-24 RX ADMIN — FERROUS SULFATE TAB 325 MG (65 MG ELEMENTAL FE) 325 MG: 325 (65 FE) TAB at 09:57

## 2024-06-24 RX ADMIN — ALBUMIN (HUMAN) 25 G: 0.25 INJECTION, SOLUTION INTRAVENOUS at 02:33

## 2024-06-24 RX ADMIN — MICAFUNGIN SODIUM 100 MG: 100 INJECTION, POWDER, LYOPHILIZED, FOR SOLUTION INTRAVENOUS at 15:18

## 2024-06-24 RX ADMIN — LORAZEPAM 1 MG: 2 INJECTION INTRAMUSCULAR; INTRAVENOUS at 20:18

## 2024-06-24 RX ADMIN — INSULIN LISPRO 8 UNITS: 100 INJECTION, SOLUTION INTRAVENOUS; SUBCUTANEOUS at 00:29

## 2024-06-24 RX ADMIN — SODIUM CHLORIDE, PRESERVATIVE FREE 40 MG: 5 INJECTION INTRAVENOUS at 09:58

## 2024-06-24 RX ADMIN — SODIUM CHLORIDE: 9 INJECTION, SOLUTION INTRAVENOUS at 12:11

## 2024-06-24 ASSESSMENT — PULMONARY FUNCTION TESTS
PIF_VALUE: 17
PIF_VALUE: 17
PIF_VALUE: 23
PIF_VALUE: 20
PIF_VALUE: 23
PIF_VALUE: 20

## 2024-06-24 NOTE — PALLIATIVE CARE
Initial Evaluation        Patient:   Beatriz Lau  YOB: 1940  Age:  84 y.o.  Room:  [facility]15/015-A  MRN:  [id number]   Acct: [id number]    Date of Admission:  6/19/2024  8:52 AM  Date of Service:  6/24/2024  Completed By:  Eve Gallardo RN        Reason for Palliative Care Evaluation:-   End of Life Care     Current Concerns   Potential withdrawal of life support     Palliative Performance Scale   10%  Bed Bound; Extensive disease; Total care; Mouth care only; Drowsy/coma     History    Patient with history of HTN, HLD, CVA, aortic mural thrombus, DM, ESRD on HD, anemia, GERD, gout, CYNTHIA, and CHF.  Patient admitted with AMS from ECF.  Patient with respiratory failure, septic and cardiogenic shock, CHF, acute liver injury and metabolic encephalopathy.     Goals of Care Discussions and Plan         Family/Patient Discussion:- Patient currently unresponsive on the ventilator.  No family at the bedside.    Plan/Follow-Up:-   Discussed case with primary RNLeadnra.   Unsure as to what time family is planning on arriving.  Primary RN to call if time is known or needs arise.   1034  Hospice is notified of consult.    Code Status: Limited     ACP documents on file:  -Power of  for Healthcare  -[facility] Power of /Healthcare Surrogate Decision Makers:  Lakeena Cage medical POA, Therena [place] 1st alternate            Electronically signed by Eve Gallardo RN on 6/24/2024 at 12:38 PM           Palliative Care Office: [phone]

## 2024-06-24 NOTE — CONSULTS
.Nephrology Consult Note      PatPt Name: Beatriz Lau  MRN: [id number]  [id number]  YOB: 1940  Admit Date: 6/19/2024  8:52 AM  Date of evaluation: 6/19/2024  Primary Care Physician: No primary care provider on file.   4D-15/015-A     Nephrologist:  Maged Gomez MD, FASN    Reason for Consult:  ESRD  Requesting Physician:   Primary Care Physician  No primary care provider on file.      History Obtained From:  past medical records    History of Present Ilness:    To be noticed all  my history was obtained from chart review and  ICU team , as pt is intubated , sedated  on vent .     The patient is a 84 year old female with a PMH of HFrEF, HTN, HLD, CVA, IDDMII, ESRD on HHD, CYNTHIA on CPAP, GERD,  who presented to [facility] for altered mental status and seizure like activity. Per report the patient was noted to be more altered at nursing facility. She was then brought to the ED for further evaluation. The patient was noted to be hypotensive and hypoxic, followed by witnessed seizure. She received ativan, keppra and was intubated for airway protection. Labs were siginificant for lactic acidosis, low Hgb, leukocytosis and elevated troponin with EKG showing ST depressions in lateral leads and ST elevation in III, but not in contiguous leads. The patient was admitted to the ICU for further management and evaluation.    Nephrology  was consulted for  providing dialysis  while in hospital . Pt's daughter at bedside .   PMHx:   Past Medical History:   Diagnosis Date    Anemia of chronic disease     ASHD (arteriosclerotic heart disease)     B12 deficiency     Cerebral artery occlusion with cerebral infarction ([facility])     CHF (congestive heart failure) ([facility])     Degenerative disc disease, lumbar     Depression with anxiety     DM2 (diabetes mellitus, type 2) ([facility])     Dyslipidemia     GERD (gastroesophageal reflux disease)     Heart failure with reduced ejection fraction ([facility])     Hemodialysis patient ([facility])     
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     CYNTHIA on CPAP     Stage 4 chronic kidney disease (HCC)     Thrombocytopenia (HCC)        Past Surgical History:    Past Surgical History:   Procedure Laterality Date    APPENDECTOMY      CHOLECYSTECTOMY      DENTAL SURGERY      HIP SURGERY Right 07/29/2020    right , Genicular Nerve block performed by Ander Navarro MD at [facility] OR    JOINT REPLACEMENT  1997    Left total hip    OVARY REMOVAL      UPPER GASTROINTESTINAL ENDOSCOPY N/A 12/20/2023    EGD DILATION performed by Evaristo Marie MD at [facility] ENDOSCOPY    UPPER GASTROINTESTINAL ENDOSCOPY  12/20/2023    EGD DILATION SAVORY performed by Evaristo Marie MD at [facility] ENDOSCOPY       Scheduled Medications:   DULoxetine  60 mg Oral Daily    chlorhexidine  15 mL Mouth/Throat BID       Infusions:   norepinephrine 8 mcg/min (06/24/24 1500)    VASOpressin 20 Units in sodium chloride 0.9 % 100 mL infusion Stopped (06/22/24 1518)    phenylephrine (TERE-SYNEPHRINE) 50 mg in sodium chloride 0.9 % 250 mL infusion         PRN Medications:  HYDROmorphone **OR** HYDROmorphone, glycopyrrolate, LORazepam    Allergies:  Patient has no known allergies.    Social History:    reports that she has never smoked. She has never used smokeless tobacco. She reports that she does not drink alcohol and does not use drugs.    Family History:   family history includes Cancer in her mother; Diabetes in her father; Stroke in her father.    Diet:  ADULT TUBE FEEDING; Orogastric; Renal Formula; Continuous; 10; Yes; 10; Q 4 hours; 30; 30; Q 4 hours; Protein; 1 Dose; Daily    Nutrition:-  OG with tube feeds    Review of Systems/Symptom 
minor errors which are inherent in voice recognition technology.** Electronically signed by Dr. Carina Buchanan      LABS:  No results for input(s): \"CKTOTAL\", \"CKMB\", \"CKMBINDEX\", \"TROPONINT\" in the last 72 hours.  CBC:   Lab Results   Component Value Date/Time    WBC 14.1 06/21/2024 12:30 PM    RBC 3.47 06/21/2024 12:30 PM    HGB 9.4 06/21/2024 12:30 PM    HCT 28.8 06/21/2024 12:30 PM    MCV 83.0 06/21/2024 12:30 PM    MCH 27.1 06/21/2024 12:30 PM    MCHC 32.6 06/21/2024 12:30 PM    RDW 17.7 06/10/2024 10:36 AM     06/21/2024 12:30 PM    MPV 12.5 06/21/2024 12:30 PM     BMP:    Lab Results   Component Value Date/Time     06/21/2024 12:30 PM    K 3.9 06/21/2024 12:30 PM    K 3.4 06/20/2024 02:00 AM     06/21/2024 12:30 PM    CO2 24 06/21/2024 12:30 PM    BUN 11 06/21/2024 12:30 PM    CREATININE 0.8 06/21/2024 12:30 PM    CALCIUM 8.0 06/21/2024 12:30 PM    LABGLOM 73 06/21/2024 12:30 PM    LABGLOM 9 08/24/2023 06:07 AM    GLUCOSE 203 06/21/2024 12:30 PM    GLUCOSE 185 06/10/2024 10:36 AM     Hepatic Function Panel:    Lab Results   Component Value Date/Time    ALKPHOS 254 06/21/2024 12:30 PM     06/21/2024 12:30 PM    AST 2,355 06/21/2024 12:30 PM    BILITOT 0.7 06/21/2024 12:30 PM    BILIDIR <0.1 03/19/2024 09:11 AM     Magnesium:    Lab Results   Component Value Date/Time    MG 2.4 06/21/2024 12:30 PM     Warfarin PT/INR:  No components found for: \"PTPATWAR\", \"PTINRWAR\"  HgBA1c:    Lab Results   Component Value Date/Time    LABA1C 10.9 08/02/2023 06:40 AM     FLP:    Lab Results   Component Value Date/Time    TRIG 225 07/24/2020 03:09 PM    HDL 41 07/24/2020 03:09 PM     TSH:    Lab Results   Component Value Date/Time    TSH 0.880 06/20/2024 07:55 AM     BNP:   Lab Results   Component Value Date    BNP 2,828 (H) 03/19/2024       ASSESSMENT:  Acute on chronic HFrEF  Severe and worsening cardiomyopathy   Elevated Troponin   Moderate mitral regurgitation   UTI, Septic shock  Acute respiratory

## 2024-06-24 NOTE — SIGNIFICANT EVENT
Family meeting was held regarding the patient current medical status and prognosis.  Discussed with family including POA that the patient is progressively deteriorating.  She is likely a state of cardiogenic shock with increasing pressor requirements, and the need for continuous dialysis.  She has show no response to painful stimuli off sedation and now is demonstrating cold extremities with loss of pulses.  Discussed with family that the patient is likely to succumb to this given her multiple comorbidities.  They voiced understanding and opted that the patient be transition to comfort care.  Discussed that this means that terminal extubation will need to take place as well as discontinuing any other life preserving measures.  They voiced understanding and are agreeable to proceed.  They requested that we hold off on terminal extubation until family can arrive today at 7-8 PM.  This was acknowledged and will be honored.  Orders were placed reflect comfort care measures.  CODE STATUS was changed to DNR CC.  Will continue pressors for now and hold off on terminal extubate until family is ready.

## 2024-06-24 NOTE — SIGNIFICANT EVENT
Patient decompensating, called GRANT Peter via telephone.  She states her and her mother will be here soon.  Will need to talk about withdrawal of care and comfort measures.     Electronically signed by ELLYN Berumen CNP on 6/24/2024 at 3:21 PM

## 2024-06-25 VITALS
TEMPERATURE: 101.1 F | OXYGEN SATURATION: 100 % | BODY MASS INDEX: 26.76 KG/M2 | WEIGHT: 132.72 LBS | HEIGHT: 59 IN | DIASTOLIC BLOOD PRESSURE: 72 MMHG | SYSTOLIC BLOOD PRESSURE: 132 MMHG

## 2024-06-25 PROCEDURE — 99223 1ST HOSP IP/OBS HIGH 75: CPT | Performed by: STUDENT IN AN ORGANIZED HEALTH CARE EDUCATION/TRAINING PROGRAM

## 2024-06-25 PROCEDURE — 99238 HOSP IP/OBS DSCHRG MGMT 30/<: CPT | Performed by: INTERNAL MEDICINE

## 2024-06-25 NOTE — DISCHARGE SUMMARY
CRITICAL CARE DEATH NOTE      Patient:  Beatriz Lau    Unit/Bed:4D-15/015-A  YOB: 1940  MRN: [id number]   PCP: No primary care provider on file.  Date of Admission: 6/19/2024  Chief Complaint:- Seizures    Cause of death: Cardiopulmonary arrest in the setting of multiorgan failure  Time of death: 0528  Date of death: 6/25/2024    Assessment and Plan:    Acute hypoxic respiratory failure: Secondary to acute CHF and ESRD resulting in volume overload with concomitant PNA.     Combined shock: Septic secondary to UTI E. coli and Staphylococcus aureus PNA with concomitant severe and progressive cardiogenic shock secondary to acute CHF.   Acute on chronic HFpEF, NYHA III   Acute metabolic encephalopathy  UTI  PNA  Elevated troponins  Acute Liver Injury  Thrombocytopenia  HTN  HLD  History of CVA  History of aortic mural thrombus  IDDMII  ESRD  Acute on chronic normocytic anemia  GERD  Gout  CYNTHIA  MDD/FAISAL  Seizures  HAGMA      INITIAL H AND P AND ICU COURSE:  The patient is a 84 year old female with a PMH of HFrEF, HTN, HLD, CVA, IDDMII, ESRD on HD, CYNTHIA on CPAP, GERD,  who presented to [facility] for altered mental status and seizure like activity. Per report the patient was noted to be more altered at nursing facility. She was then brought to the ED for further evaluation. The patient was noted to be hypotensive and hypoxic, followed by witnessed seizure. She received ativan, keppra and was intubated for airway protection. Labs were siginificant for lactic acidosis, low Hgb, leukocytosis and elevated troponin with EKG showing ST depressions in lateral leads and ST elevation in III, but not in contiguous leads. The patient was admitted to the ICU for further management and evaluation.     During hospitalization, nephrology was consulted and CRRT was initiated. The patient has a prior history of infected tunneled dialysis cath that was removed the week prior with a temporary catheter placed. She was

## 2024-06-25 NOTE — CARE COORDINATION
6/20/24, 9:30 AM EDT    DISCHARGE PLANNING EVALUATION    This SW did speak with Carmela from [place], reports that patient is long-term at their facility under Medicaid. Her Medicaid number is [id number]. She is able to return whenever she is ready.     11:58 AM- Left a message for granddaughter Brittani (POA) and asked for a call back.   
6/21/24, 2:17 PM EDT    DISCHARGE ON GOING EVALUATION    [facility] day: 2  Location: -15/015-A Reason for admit: Septic shock (HCC) [A41.9, R65.21]     Procedures:   6/19 intubated  6/19 Arterial line   6/20 Echo: ef 10-15%  6/20 EEG: There is abnormal EEG with diffuse morphing delta and theta slowing suggestive of moderate encephalopathy.   6/20 CRRT initiated.   6/20 IR for temp HD cath  Imaging since last note: none    Barriers to Discharge: Intensivist, nephro following. Sepsis possible UTI or recent infected HD line. Remains on vent with ETT. Remains on CRRT. Amio gtt. Propofol gg. Levo gtt. Bicarb gtt. Vasopressin gtt. IV merrem. IV protonix. Electrolyte replacement.     PCP: No primary care provider on file.  Readmission Risk Score: 18.9    Patient Goals/Plan/Treatment Preferences: Return to [facility] once medically stable, chronic HD there. SW also following.     
6/25/24, 8:08 AM EDT    DISCHARGE PLANNING EVALUATION    SW did make facility aware that patient has passed and will not be returning to facility.   
Case Management Assessment Initial Evaluation    Date/Time of Evaluation: 2024 7:50 AM  Assessment Completed by: Silvia Matos RN    If patient is discharged prior to next notation, then this note serves as note for discharge by case management.    Patient Name: Beatriz Lau                   YOB: 1940  Diagnosis: Septic shock (HCC) [A41.9, R65.21]                   Date / Time: 2024  8:52 AM  Location: [address] [place] [address]     Patient Admission Status: Inpatient   Readmission Risk Low 0-14, Mod 15-19), High > 20: Readmission Risk Score: 18.7    Current PCP: No primary care provider on file.    Additional Case Management Notes: Admitted with respiratory failure, seizure. Remains on vent with ETT. Requiring CRRT. Intensivist and nephro following. Amio gtt. Levo gtt. Mark gtt. Vaso gtt. Bicarb gtt. Keppra, IV merrem, IV vanc, IV protonix.     Procedures:    intubated   Arterial line ordered   Echo ordered    Imagin/19 CT head: No evidence of an acute process. No change from prior.   CXR:   1. The tip of the endotracheal tube is at the level of the edi and needs to  be pulled back. There is an enteric tube and left subclavian catheter in place.  2. Cardiomegaly and probable congestive heart failure.  3. Slightly increased density at both lung bases..  4. Possible small bilateral pleural effusions.   KUB:   1. Left femoral catheter with the tip in the left iliac vein.  2. Postoperative changes involving the left side of the pelvis. Left hip  replacement in place..       Patient Goals/Plan/Treatment Preferences: Beatriz is a long-term SNF resident, complete assessment deferred to ELISEO.           
Wheelchair;Cpap   Potential Assistance Needed Transportation   DME Ordered? No   Potential Assistance Purchasing Medications No   Type of Home Care Services None   Patient expects to be discharged to: Long-term care   Services At/After Discharge   Services At/After Discharge Transport;Long term care;In ambulance    Resource Information Provided? No   Mode of Transport at Discharge BLS   Condition of Participation: Discharge Planning   The Plan for Transition of Care is related to the following treatment goals: Wake up more   Friona of Choice list was provided with basic dialogue that supports the patient's individualized plan of care/goals, treatment preferences, and shares the quality data associated with the providers?  No  (Return to [facility])

## 2024-06-25 NOTE — PROGRESS NOTES
Patient Weaning Progress    The patient's vent settings was able to be weaned this shift.      Ventilator settings that were weaned              [x] Mode   [x] Pressure support weaned   [] Fio2 weaned   [] Peep weaned      Spontaneous weaning trial  was attempted.     due to defined parameters for SBT (spontaneous breathing trial) not being met.     *Results of SBT documented under SBTOUTCOME note.    Reason that defined ventilator parameters for SBT was not met              [] Patient condition requires increased ventilator settings  [] Requires increased sedation   [] Settings not within weaning range   [] SAT not completed   [] Physician orders    The patient was able to tolerate SBT.   RSBI  was 75 with EtCO2 of 33 and SpO2 of 100 on 21 FiO2.  Spontanteous VT was 347 and RR 26 breaths/min.  The patient was on SBT for ongoing minutes     Evac tube was  hooked up with continuous low suction(20-30mmHg)      Cuff was  deflated to determine cuff leak. A leak  was detected.           Unable to get agreement for goals because no family is present and patient cannot respond.     
                                             Patient Weaning Progress    The patient's vent settings was able to be weaned this shift.      Ventilator settings that were weaned              [] Mode   [x] Pressure support weaned   [] Fio2 weaned   [] Peep weaned      Spontaneous weaning trial  was not attempted due to defined parameters for SBT (spontaneous breathing trial) not being met.       Reason that defined ventilator parameters for SBT was not met              [] Patient condition requires increased ventilator settings  [] Requires increased sedation   [x] Settings not within weaning range   [] SAT not completed   [] Physician orders      Evac tube was  hooked up with continuous low suction(20-30mmHg)      Cuff was not  deflated to determine cuff leak. A leak  was not detected.     Unable to get agreement for goals because no family is present and patient cannot respond.     
                                             Patient Weaning Progress    The patient's vent settings was able to be weaned this shift.    Ventilator settings that were weaned              [] Mode   [x] Pressure support weaned   [] Fio2 weaned   [] Peep weaned    Spontaneous weaning trial was not attempted due to defined parameters for SBT (spontaneous breathing trial) not being met.    Reason that defined ventilator parameters for SBT was not met              [x] Patient condition requires increased ventilator settings  [] Requires increased sedation   [] Settings not within weaning range   [] SAT not completed   [] Physician orders    PT transitioning to end-of-life care, comfort measure to be pursued.    Evac tube was hooked up with continuous low suction (20-30mmHg).    Cuff was not deflated to determine cuff leak.    Unable to get agreement for goals because no family is present and patient cannot respond.  
                                             Patient Weaning Progress    The patient's vent settings was not able to be weaned this shift.    Spontaneous weaning trial was not attempted due to defined parameters for SBT (spontaneous breathing trial) not being met.    Reason that defined ventilator parameters for SBT was not met              [x] Patient condition requires increased ventilator settings  [] Requires increased sedation   [] Settings not within weaning range   [] SAT not completed   [] Physician orders    PT transitioning to end-of-life care.    Evac tube was hooked up with continuous low suction (20-30mmHg).    Cuff was not deflated to determine cuff leak.    Unable to get agreement for goals because no family is present and patient cannot respond.  
  CRITICAL CARE PROGRESS NOTE      Patient:  Beatriz Lau    Unit/Bed:4D-15/015-A  YOB: 1940  MRN: [id number]   PCP: No primary care provider on file.  Date of Admission: 6/19/2024  Chief Complaint:- Seizures    Assessment and Plan:    Acute hypoxic respiratory failure:  Intubated for air way protection 6/19.  Acute CHF and ESRD resulting in volume overload with concomitant PNA. S/p bicarb gtt, now on CRRT. Influenza A/B and COVID negative. Maintain lung protective strategies. Continue to wean as tolerated for SpO2 > 90%. Repeat CXR and check ABG if respiratory status worsens.   Combined shock: Septic and cardiogenic shock. SOFA 8. Presented HAGMA.  UTI E. coli-probable ESBL, pneumonia panel Staphylococcus aureus.  Blood and respiratory culture pending.  See below. Remote infected tunneled HD, this subsequently removed.  On meropenem x 7 6/20.  Acute escalation in pressor requirements with POCUS concerning for RHF. VBG SvO2 63%. Echo acute EF drop to 10-15%, RVSP 33 mmHg with global hypokinesis.  On CRRT for volume control.  Cardiology consulted, poor candidate for ischemic workup and advised conservative management.  Continue wean pressors as tolerated for MAP > 60.  Acute on chronic HFpEF, NYHA III:  NICM vs ICM. Last Echo EF 50%, no RWMA or valvular abnormalities 4/2022 . Presented ProBNP > 70 K, CXR w/ cardiomegaly. Home GDMT includes BB and diuretic, no SGLT2i. 2 g sodium and 2 L fluid restriction. Strict I&Os. Monitor daily weights. Repeat Echo.    Acute metabolic encephalopathy: Multifactorial. Presented witnessed seizure activity, see below and HAGMA. History of ESRD, electrolytes WNL, no overt uremia. CT head negative. Blood gases with no significant hypercapnia or hypoxia. Check TSH. UDS ordered. EtOH <0.01. BHB 9.34, no urine ketones. Possibly sepsis. Started Vancomycin and Meropenum x7 for empiric coverage 6/19. Check cultures (respiratory, blood x2, tunneled cath blood x1, urine) and 
  Pharmacy Note - Extended Infusion Beta-Lactam Dose Adjustment    Meropenem 1000 mg q8h extended infusion for treatment of Sepsis of unknown etiology. Per [facility] Extended Infusion Beta-Lactam Policy, meropenem will be changed to 1000 mg loading dose followed by 1000 mg q12h extended infusion     Estimated Creatinine Clearance: Estimated Creatinine Clearance: 9 mL/min (A) (based on SCr of 4.2 mg/dL (HH)).    Dialysis Status, [place], CKD: CRRT    BMI: Body mass index is 28.32 kg/m².    Rationale for Adjustment: Dose adjusted per [facility] Extended Infusion Policy based on renal function and indication. The above medication is renally eliminated and demonstrates time-dependent effects on bacterial eradication. Extended-infusion dosing strategy aims to enhance microbiologic and clinical efficacy.     Pharmacy will monitor renal function daily and adjust dose as necessary.      Please call with any questions.    Thank you,  Cassia Aguilar, PharmD  PGY1 Resident     
 A size 7.5 endotracheal tube was successfully placed using a size 4 blade. The Lot number for the endotracheal tube was [id number]  
1315 Patient received in IR for triple PICC line insertion. This procedure has been fully reviewed with the patient's family and written informed consent has been obtained by ICU staff prior to arrival in IR.  1327 Procedure started with Dr. Sherman.  1335 Procedure completed; patient tolerated well. Dressing to right IJ; no bleeding noted.  1340 Patient on bed; comfort ensured.  1342 Patient taken to 4D via bed.     
2018   Pre extubation meds given per Dr Bermudez   2031  extubation completed at this time Dr bermudez at the bedside  
2109 Patient in SVT. Vagal maneuvers successful. Patient returns to sinus tachycardia rate of 120s.  2115 pt continues to have episodes of SVT Dr Olivares aware  2123 Amio bolus given  2129 ceribell started  2133 amio gtt started  2140 granddaughter notified   2143 Vaso gtt started  2147 propofol gtt started  2150 Granddaughter updated via Dr. Olivares  in waiting room  2200 Dr. Olivares at bedside to place art line  2210 granddaughter updated via house supervisor  2217 Art line placed in left radial artery /52 (69)  2235 granddaughter at bedside and updated with Dr. Olivares  0320 Dr Whittaker called with potassium 3.4 bath 2 kcl  0330.Dr Olivares notified of troponin level and ECG results  0335 Dr Olivares at bedside to perform cardiac ultrasound  0415 change CVVHD bags to 4/2.5  and attempt to pull 50c/hr Per Dr Gomez  0420 change sodium bicarb gtt to 30 cc/hr per Dr Olivares                  
Ceribell placed on patient Date- 6/19/24 Time- 2130  Initial Seizure [place] (%)- 0  Ordering Provider- Dr. Kenroy Olivares  Pertinent Events- None    
Ceribell placed on patient Date- 6/20 Time- 04604  Initial Seizure [place] (%)- 0  Ordering Provider- [place]  Pertinent Events- 0   
Comprehensive Nutrition Assessment    Type and Reason for Visit:  Initial, Consult (TF ordering and management)    Nutrition Recommendations/Plan:   Begin Nepro 10ml/hour increase 10ml every 4h as tolerated to goal of30ml/hour  Bolus 1 2.5ounce liquid protein bottle daily  Additional free H20 per Physician     Malnutrition Assessment:  Malnutrition Status:  At risk for malnutrition (Comment) (06/20/24 6329)    Context:  Acute Illness     Findings of the 6 clinical characteristics of malnutrition:  Energy Intake:  Unable to assess (ECF resident - unable to reach staff to answer po intake pta)  Weight Loss:  No significant weight loss (per EMR - also HD pta)     Body Fat Loss:  No significant body fat loss     Muscle Mass Loss:  No significant muscle mass loss    Fluid Accumulation:  Mild     Strength:  Not Performed    Nutrition Assessment:     Pt. nutritionally compromised AEB NPO d/t intubation 6/19, need for nutrition support.   At risk for further nutrition compromise r/t admit with septic shock, AMS, CHF, UTI, Seizures, acute liver injury  and underlying medical condition anemia, ASHD, CVA, DM - A1c 10.9% 8/2023, GERD, HD.  Resides at [facility].     Nutrition Related Findings:    Pt. Report/Treatments/Miscellaneous: pt. Seen; intubated; unable to reach ECF re: diet pta; spoke with Dr. Nielsen and RN - starting TF today; UO 0ml; HD pta - ? Compliance noted in H&P; CRRT now; CT head shows nothing acute  GI Status: BM x1 6/20  Pertinent Labs: glucose 152  BUN 11  creatinine 1  Na 135  K+ 4  phosphorus 1.3 - replacing - on CRRT  Mg 2.2  ALT 1842  Pertinent Meds: humalog, keppra, protonix, levophed, vasopressin    Wound Type: None       Current Nutrition Intake & Therapies:     Diet NPO  ADULT TUBE FEEDING; Orogastric; Renal Formula; Continuous; 10; Yes; 10; Q 4 hours; 30; 30; Q 4 hours; Protein; 1 Dose; Daily  Current Tube Feeding (TF) Orders:  Feeding Route: Orogastric  Formula: Renal Formula (start 
Does not open eyes to verbal command but does raise eyebrows slt..  No movement to deep pain x4 extremities noted.   Did see slt movement of lt arm with coughing.  1430- PT having hypotension- see MAR for Levophed changes.  1520- Call placed to POA due to change in pt condition.  1540- Family at bedside.  1550- CRRT alarming- filter clotted.  1600- CRRT stopped.  1605- Dr Nielsen discussing hospice  and plans for withdrawal with family.   1720- Hospice RN here to review chart and talk with family.  See note.   1850- Dr Cruz-talking with family at bedside.  
Hospice office called inquiring when staff making visit to speak with family, family had arrived and Dr. Nielsen had talked with family. Earlier in day was updated about consult by Eve Gallardo RN with palliative care, about hospice referral. This had not been called to hospice office over the weekend, staff does not always have assessed to [facility](consults needing called to hospice office as home health consults are).   Noted that ICU staff was waiting to speak with family about plan of withdraw. Dr. Cruz planning on meeting when family had arrived, he had not been updated of consult either.   Noted family not planning on extubation until around 7-8 pm when they are all able to visit and be present. Visit made to floor to speak with family, primary nurse Leandra told nurse that GRANT Drew had left to go do few things prior to extubation. She had just left few minutes prior to nurse visit.   Call placed to hospice manager Heidi Duncan RN to discuss late extubation. She told nurse that unable to accommodate late admission due to staffing and family not present to sign paperwork, at this time with  - on call nurse having prn visits. Hospice nurse to support POA by phone and explain process of seeing how patient does through night and if would survive then transition to inpatient hospice. Let her know that appropriate orders will be on board for extubation with ensuring Pebbles's comfort. This was done with prayer said over patient.   Updated Dr. Nielsen of the above and that Dr. Cruz will be making visit this evening.   
Pharmacy Medication History Note      List of current medications patient is taking is complete.    Source of information: [facility] medication list    Changes made to medication list:  Medications removed (include reason, ex. therapy complete or physician discontinued):  Bumex - not on at ECF  Carvedilol - alternate therapy  Lantus - only on sliding scale insulin at ECF  Linagliptin - not on at ECF  Metolazone - not on at ECF  Prednisone - not on at ECF    Medications added/doses adjusted:  Aspirin 81mg daily  Bisacodyl supp 10mg daily prn  Cyanocobalamin 500mcg daily  Melatonin 3mg nightly  Metoprolol succinate ER 25mg daily  Midodrine 1omg prn hypotension during HD  Mupirocin 2% ointment  to R great toe once daily on Mon and Thurs  Nystatin powder BID to abdominal folds and groin  Mirtazapine 15mg daily    Other notes (ex. Recent course of antibiotics, Coumadin dosing):    Allergies reviewed    Electronically signed by Mary Castro RP on 6/19/2024 at 5:11 PM                                                     
Pt is an 84y.o. female, intubated and sedated, in 4D-15. No family were present;  prayed for pt in the doorway to her room.     06/24/24 0839   Encounter Summary   Encounter Overview/Reason Attempted Encounter;Spiritual/Emotional Needs   Service Provided For Patient   Referral/Consult From Rounding   Support System Family members   Last Encounter  06/24/24  (NR)   Complexity of Encounter Low   Begin Time 0839   End Time  0840   Total Time Calculated 1 min   Spiritual/Emotional needs   Type Spiritual Support   Assessment/Intervention/Outcome   Assessment Unable to assess   Intervention Prayer (assurance of)/[place]   Outcome Did not respond       
Pt is an 84y.o. female, intubated and sedated, in 4D-15; pt granddaughter is sitting bedside. She shared that pt is 'holding her own' and people are continuing to pray for her.  provided active listening, brief conversation on pt and family support, and prayer-met with gratitude by family present.     06/21/24 1213   Encounter Summary   Encounter Overview/Reason Spiritual/Emotional Needs   Service Provided For Patient and family together   Referral/Consult From [facility]   Support System Family members   Last Encounter  06/21/24  (NR)   Complexity of Encounter Low   Begin Time 1213   End Time  1218   Total Time Calculated 5 min   Spiritual/Emotional needs   Type Spiritual Support   Assessment/Intervention/Outcome   Assessment Unable to assess;Calm;Coping;Peaceful   Intervention Active listening;Prayer (assurance of)/[place];Nurtured Hope;Explored/Affirmed feelings, thoughts, concerns   Outcome Expressed Gratitude;Receptive       
Pt terminally extubated at 2030. MD and RN at bedside. Family present in room at time of extubation.  
Renal Progress Note  6/20/2024 10:20 AM  Pt Name: Beatriz Lau  MRN: [id number]  [id number]  YOB: 1940  Admit Date: 6/19/2024  8:52 AM  Date of evaluation: 6/20/2024  Primary Care Physician: No primary care provider on file.   4D-15/015-A       Subjective:     Intubated , sedated   Pt was seen and examined  on CRRT  , she is tolerating the procedure    Diet: No diet orders on file    Medications:   Scheduled Meds:   aspirin  81 mg Oral Daily    allopurinol  100 mg Oral Daily    [Held by provider] amLODIPine  10 mg Oral Daily    atorvastatin  40 mg Oral Nightly    [Held by provider] carvedilol  6.25 mg Oral BID    clopidogrel  75 mg Oral Daily    DULoxetine  60 mg Oral Daily    ferrous sulfate  325 mg Oral Every Other Day    isosorbide mononitrate  30 mg Oral Daily    levETIRAcetam  500 mg IntraVENous Q12H    insulin lispro  0-16 Units SubCUTAneous TID WC    insulin lispro  0-4 Units SubCUTAneous Nightly    vancomycin (VANCOCIN) intermittent dosing (placeholder)   Other RX Placeholder    vancomycin  500 mg IntraVENous Q12H    chlorhexidine  15 mL Mouth/Throat BID    pantoprazole (PROTONIX) 40 mg in sodium chloride (PF) 0.9 % 10 mL injection  40 mg IntraVENous Daily    meropenem  1,000 mg IntraVENous Q12H     Continuous Infusions:   prismaSATE BGK 4/2.5 1,000 mL/hr at 06/20/24 0915    prismaSATE BGK 4/2.5 1,000 mL/hr at 06/20/24 0914    prismaSATE BGK 4/2.5 1,000 mL/hr at 06/20/24 0912    norepinephrine 34 mcg/min (06/20/24 0725)    sodium bicarbonate 150 mEq in dextrose 5 % 1,000 mL infusion 30 mL/hr at 06/20/24 0725    dextrose      VASOpressin 20 Units in sodium chloride 0.9 % 100 mL infusion 0.04 Units/min (06/20/24 0725)    amiodarone Stopped (06/20/24 0345)    propofol 20 mcg/kg/min (06/20/24 0725)    phenylephrine (TERE-SYNEPHRINE) 50 mg in sodium chloride 0.9 % 250 mL infusion         Objective:   Vitals:   /69   Pulse 78   Temp 99.4 °F (37.4 °C) (Rectal)   Resp 17   Wt 64.1 kg (141 
Renal Progress Note  6/21/2024 2:38 PM  Pt Name: Beatriz Lau  MRN: [id number]  [id number]  YOB: 1940  Admit Date: 6/19/2024  8:52 AM  Date of evaluation: 6/21/2024  Primary Care Physician: No primary care provider on file.   4D-15/015-A       Subjective:     Intubated , sedated   Pt was seen and examined  on CRRT  , she is tolerating the procedure  On levophed    Diet: Diet NPO  ADULT TUBE FEEDING; Orogastric; Renal Formula; Continuous; 10; Yes; 10; Q 4 hours; 30; 30; Q 4 hours; Protein; 1 Dose; Daily    Medications:   Scheduled Meds:   aspirin  81 mg Oral Daily    sodium chloride flush  5-40 mL IntraVENous 2 times per day    allopurinol  100 mg Oral Daily    [Held by provider] amLODIPine  10 mg Oral Daily    atorvastatin  40 mg Oral Nightly    [Held by provider] carvedilol  6.25 mg Oral BID    clopidogrel  75 mg Oral Daily    DULoxetine  60 mg Oral Daily    ferrous sulfate  325 mg Oral Every Other Day    isosorbide mononitrate  30 mg Oral Daily    levETIRAcetam  500 mg IntraVENous Q12H    insulin lispro  0-16 Units SubCUTAneous TID WC    insulin lispro  0-4 Units SubCUTAneous Nightly    chlorhexidine  15 mL Mouth/Throat BID    pantoprazole (PROTONIX) 40 mg in sodium chloride (PF) 0.9 % 10 mL injection  40 mg IntraVENous Daily    meropenem  1,000 mg IntraVENous Q12H     Continuous Infusions:   prismaSATE BGK 4/2.5 600 mL/hr at 06/21/24 1141    prismaSATE BGK 4/2.5 600 mL/hr at 06/21/24 1140    prismaSATE BGK 4/2.5 600 mL/hr at 06/21/24 1140    sodium chloride      norepinephrine 10 mcg/min (06/21/24 0708)    sodium bicarbonate 150 mEq in dextrose 5 % 1,000 mL infusion 30 mL/hr at 06/21/24 0708    dextrose      VASOpressin 20 Units in sodium chloride 0.9 % 100 mL infusion 0.01 Units/min (06/21/24 1406)    amiodarone Stopped (06/20/24 0345)    propofol 20 mcg/kg/min (06/21/24 0708)    phenylephrine (TERE-SYNEPHRINE) 50 mg in sodium chloride 0.9 % 250 mL infusion         Objective:   Vitals:   BP (!) 
Renal Progress Note  6/22/2024 3:40 PM  Pt Name: Beatriz Lau  MRN: [id number]  [id number]  YOB: 1940  Admit Date: 6/19/2024  8:52 AM  Date of evaluation: 6/22/2024  Primary Care Physician: No primary care provider on file.   4D-15/015-A       Subjective:         Intubated , sedated   Perm cath malfunctioning  , s/p TPA   PT was seen and examined on SCUF  , she is tolerating the procedure       Hospital course:(chart review) :  This is an 84-year-old female patient who follows with Dr. Solis (cardiology at [facility]) and has a history including hypertension, hyperlipidemia, type 2 diabetes mellitus, history of CVA, CAD, chronic systolic heart failure, ESRD on dialysis, aortic mural thrombus diagnosed per chest CTA 2/3/2024, patient not on oral anticoagulation secondary to chronic thrombocytopenia, and anemia of chronic disease.  She has had multiple hospital admissions over the past few months due to issues such as dialysis catheter infection, Klebsiella pneumonia sepsis, and elevated troponin.  Previously she had been deemed a poor candidate for ischemic workup secondary to mural aortic thrombus and severe thrombocytopenia. She was most recently discharged from [facility]  after admission for altered mental status thought to be secondary to hypoxia and CHF, confusion, and shortness of breath. According to historical documentation, patient has previously declined LifeVest for her reduced ejection fraction and SCD prevention.    On this admission CVVHDF  was initiated for both solute and fluid removal . Echo demonstrated a new EF drop with an EF of 10-15%, global hypokinesis, and RVSP 33 mmHg 6/21.   Cultures resulted positive for E. coli UTI with probable ESBL and Staphylococcus PNA.  Meropenem was continued.     Diet: Diet NPO  ADULT TUBE FEEDING; Orogastric; Renal Formula; Continuous; 10; Yes; 10; Q 4 hours; 30; 30; Q 4 hours; Protein; 1 Dose; Daily    Medications:   Scheduled Meds:   insulin lispro  0-16 
Renal Progress Note  6/23/2024 3:50 PM  Pt Name: Beatriz Lau  MRN: [id number]  [id number]  YOB: 1940  Admit Date: 6/19/2024  8:52 AM  Date of evaluation: 6/23/2024  Primary Care Physician: No primary care provider on file.   4D-15/015-A       Subjective:         Intubated , sedated   Perm cath malfunctioning  , s/p TPA   PT was seen and examined on SCUF  , she is tolerating the procedure       Hospital course:(chart review) :  This is an 84-year-old female patient who follows with Dr. Solis (cardiology at [facility]) and has a history including hypertension, hyperlipidemia, type 2 diabetes mellitus, history of CVA, CAD, chronic systolic heart failure, ESRD on dialysis, aortic mural thrombus diagnosed per chest CTA 2/3/2024, patient not on oral anticoagulation secondary to chronic thrombocytopenia, and anemia of chronic disease.  She has had multiple hospital admissions over the past few months due to issues such as dialysis catheter infection, Klebsiella pneumonia sepsis, and elevated troponin.  Previously she had been deemed a poor candidate for ischemic workup secondary to mural aortic thrombus and severe thrombocytopenia. She was most recently discharged from [facility]  after admission for altered mental status thought to be secondary to hypoxia and CHF, confusion, and shortness of breath. According to historical documentation, patient has previously declined LifeVest for her reduced ejection fraction and SCD prevention.    On this admission CVVHDF  was initiated for both solute and fluid removal . Echo demonstrated a new EF drop with an EF of 10-15%, global hypokinesis, and RVSP 33 mmHg 6/21.   Cultures resulted positive for E. coli UTI with probable ESBL and Staphylococcus PNA.  Meropenem was continued.     Diet: Diet NPO  ADULT TUBE FEEDING; Orogastric; Renal Formula; Continuous; 10; Yes; 10; Q 4 hours; 30; 30; Q 4 hours; Protein; 1 Dose; Daily    Medications:   Scheduled Meds:   micafungin  100 mg 
Renal Progress Note  6/24/2024 8:02 PM  Pt Name: Beatriz Lau  MRN: [id number]  [id number]  YOB: 1940  Admit Date: 6/19/2024  8:52 AM  Date of evaluation: 6/24/2024  Primary Care Physician: No primary care provider on file.   4D-15/015-A       Subjective:         Intubated , sedated   Perm cath malfunctioning  , s/p TPA   PT was seen and examined on SCUF  , she is tolerating the procedure       Hospital course:(chart review) :  This is an 84-year-old female patient who follows with Dr. Solis (cardiology at [facility]) and has a history including hypertension, hyperlipidemia, type 2 diabetes mellitus, history of CVA, CAD, chronic systolic heart failure, ESRD on dialysis, aortic mural thrombus diagnosed per chest CTA 2/3/2024, patient not on oral anticoagulation secondary to chronic thrombocytopenia, and anemia of chronic disease.  She has had multiple hospital admissions over the past few months due to issues such as dialysis catheter infection, Klebsiella pneumonia sepsis, and elevated troponin.  Previously she had been deemed a poor candidate for ischemic workup secondary to mural aortic thrombus and severe thrombocytopenia. She was most recently discharged from [facility]  after admission for altered mental status thought to be secondary to hypoxia and CHF, confusion, and shortness of breath. According to historical documentation, patient has previously declined LifeVest for her reduced ejection fraction and SCD prevention.    On this admission CVVHDF  was initiated for both solute and fluid removal . Echo demonstrated a new EF drop with an EF of 10-15%, global hypokinesis, and RVSP 33 mmHg 6/21.   Cultures resulted positive for E. coli UTI with probable ESBL and Staphylococcus PNA.  Meropenem was continued.     Diet: ADULT TUBE FEEDING; Orogastric; Renal Formula; Continuous; 10; Yes; 10; Q 4 hours; 30; 30; Q 4 hours; Protein; 1 Dose; Daily    Medications:   Scheduled Meds:   LORazepam  1 mg IntraVENous 
[facility]  Notice of Patient Passing      Patient Name- Beatriz Lau   Acct Number- [id number]   Attending Physician- Kurt Alba MD    Admitted on-6/19/2024  8:52 AM     On 6/25/2024 at 0528 patient was found in 4D15 with:   Absence of vital signs.   Absence of neurological response.    Confirmed time of death at 0528.   Physician or On-call Physician notified of time of death- yes    Family present at time of death- yes, daughters and granddaughter   Spiritual care present at time of death- no    Physician was notified and orders were obtained to release the body.   Post-Mortem documentation completed; form printed, signed, and given to admitting.    Glo Dinero RN RN Nursing Supervisor/ Manager  6/25/24   6:01 AM    ________________________________________________________________________          Pt Name: Beatriz Lau   Address: [address] [place] [address]  Phone: [phone] (home)        MRN: [id number]    AGE: 84 y.o.   YOB: 1940       GENDER: female     Primary Care Physician: No primary care provider on file.   Attending Physician Name: Dr. Nielsen    Date of Death: 06/25/24  Time of Death: 0528  Pronounced By: Dr. Nielsen      Emergency Contact:   Name of Family Notified of Death: Fe Lau (Madison Mayo)   Relationship to Patient: Daughter   Phone Number: [phone] ([phone])      Cause of Death: cardiopulmonary arrest    Co-Morbidities:  Patient Active Problem List   Diagnosis    DM2 (diabetes mellitus, type 2) (HCC)    Hypertension, essential    History of hemorrhagic stroke with residual hemiparesis (HCC)    Dyslipidemia    Thrombocytopenia (HCC)    ASHD (arteriosclerotic heart disease)    Acute on chronic respiratory failure with hypoxia (HCC)    Degenerative disc disease, lumbar    Depression with anxiety    Heart failure with reduced ejection fraction (HCC)    History of 
[facility]   Pharmacy Pharmacokinetic Monitoring Service - Vancomycin     Beatriz Lau is a 84 y.o. female starting on vancomycin therapy for sepsis of unknown etiology. Pharmacy consulted by Dr. Nielsen for monitoring and adjustment.    Target Concentration: Dosing based on anticipated concentration < 15 mg/L due to renal impairment/insufficiency    Additional Antimicrobials: ceftriaxone     Pertinent Laboratory Values:   Wt Readings from Last 1 Encounters:   06/19/24 63.6 kg (140 lb 3.2 oz)     Temp Readings from Last 1 Encounters:   06/19/24 97.1 °F (36.2 °C) (Rectal)     Estimated Creatinine Clearance: 9 mL/min (A) (based on SCr of 4.2 mg/dL (HH)).  Recent Labs     06/19/24  1110 06/19/24  1345   CREATININE 4.7* 4.2*   BUN 38* 36*   WBC 17.5* 19.0*     Pertinent Cultures:  Date Source Results   6/19/24 Blood, respi, urine Pending    MRSA Nasal Swab: N/A. Non-respiratory infection.    Plan:  Concentration-guided dosing due to renal impairment/insufficiency  Patient on CRRT  Received vancomycin 1000 mg x 1 dose in the ED. Start vancomycin 500 mg Q12H  Renal labs as indicated   Vancomycin concentration ordered for 6/20/24 @ 1200   Pharmacy will continue to monitor patient and adjust therapy as indicated    Thank you for the consult,  Cassia Aguilar, PharmD  PGY1 Resident     
Verbal order to withdrawal ETT 2cm. ETT now secured 21cm @ lip  
brought to the ED for further evaluation. The patient was noted to be hypotensive and hypoxic, followed by witnessed seizure. She received ativan, keppra and was intubated for airway protection. Labs were siginificant for lactic acidosis, low Hgb, leukocytosis and elevated troponin with EKG showing ST depressions in lateral leads and ST elevation in III, but not in contiguous leads. The patient was admitted to the ICU for further management and evaluation.     During hospitalization, nephrology was consulted and CRRT was initiated. The patient has a prior history of infected tunneled dialysis cath that was removed the week prior with a temporary catheter placed. She was subsequently pan-cultured and antibiotics were started.     6/20/2024 overnight the patients pressor requirements increased. Distal extremities were more notably cold. POCUS was concerning for right ventricular volume overload. Bicarb gtt was discontinued, and after discussion with nephrology decision to pull fluid from CRRT was made. Her troponins have continued to trend up, however EKG is without acute ischemic changes. This morning lactic acidosis is improving, but otherwise labs are significant for acute liver injury.    6/21.  No significant overnight events reported, patient remains febrile and vasopressor support and CRRT.  However vasopressor support significantly improved with.  Weaning off as able.  Will keep 0.02 vasopressin and plus Levophed and wean off as able.  Nephrology following.  Patient is on CRRT.    Past 24 Hr Progress    Past Medical History: Hypertension CKD.  Family History: Mother-colon cancer, father diabetes.  Social History: EMR,.    ROS   As above HPI    Scheduled Meds:   aspirin  81 mg Oral Daily    sodium chloride flush  5-40 mL IntraVENous 2 times per day    allopurinol  100 mg Oral Daily    [Held by provider] amLODIPine  10 mg Oral Daily    atorvastatin  40 mg Oral Nightly    [Held by provider] carvedilol  6.25 mg 
facility. She was then brought to the ED for further evaluation. The patient was noted to be hypotensive and hypoxic, followed by witnessed seizure. She received ativan, keppra and was intubated for airway protection. Labs were siginificant for lactic acidosis, low Hgb, leukocytosis and elevated troponin with EKG showing ST depressions in lateral leads and ST elevation in III, but not in contiguous leads. The patient was admitted to the ICU for further management and evaluation.     During hospitalization, nephrology was consulted and CRRT was initiated. The patient has a prior history of infected tunneled dialysis cath that was removed the week prior with a temporary catheter placed. She was subsequently pan-cultured and antibiotics were started.  6/20 there was increased pressor requirement with distal extremities notably cold.  POCUS was concerning for RH failure and volume overload.  Bicarb gtt. was discontinued, CRRT was allowed to pull.  Troponins continued to trend up with EKG without ischemic changes.  Echo demonstrated a new EF drop with an EF of 10-15%, global hypokinesis, and RVSP 33 mmHg 6/21.  Per chart review the patient was being seen at [facility] and was under consideration for LHC which was delayed due to CKD.  Cardiology was consulted, however given her current medical issues the patient was deemed not an candidate for ischemic evaluation and advised conservative management postextubation with family discussion due to her multiple comorbidities and history of dementia.  Cultures resulted positive for E. coli UTI with probable ESBL and Staphylococcus PNA.  Family meeting to discuss the patients current medical state and prognosis given her multiple co-morbidities. They were informed her heart function, with worsening ESRD would predispose her to recurrent hospitalizations with eventual demise. She remains a poor candidate for ischemic evaluation, and would likely not change her quality of life. Family 
   sodium bicarbonate 150 mEq in dextrose 5 % 1,000 mL infusion 30 mL/hr at 06/20/24 0606    dextrose      VASOpressin 20 Units in sodium chloride 0.9 % 100 mL infusion 0.04 Units/min (06/20/24 0606)    amiodarone Stopped (06/20/24 0345)    propofol 20 mcg/kg/min (06/20/24 0606)    phenylephrine (TERE-SYNEPHRINE) 50 mg in sodium chloride 0.9 % 250 mL infusion         PHYSICAL EXAMINATION:  T: 99.4.  P: 78. RR: 20. B/P: 114/69.  FiO2: 0.40. O2 Sat: 10 percent on ventilator.  I/O: Net +2.4 L  Body mass index is 28.54 kg/m².   GCS:   3  PC: 16/6: TV: 400: RRTotal: 12: Ti:1 sec:     General:   Acute on chronically ill-appearing, moderate acute distress, intubated and sedated  HEENT:  normocephalic and atraumatic.  No scleral icterus. PERR  Neck: supple.  No Thyromegaly.  Lungs: clear to auscultation.  No retractions  Cardiac: RRR.  No JVD.  Abdomen: soft.  Nontender.  Extremities:  No clubbing, cyanosis, or edema x 4.    Vasculature: capillary refill < 3 seconds. Palpable dorsalis pedis pulses.  Skin:  warm and dry.  Psych:  Unable to assess due to sedation and mechanical ventilation  Lymph:  No supraclavicular adenopathy.  Neurologic:  No focal deficit. No seizures.    Data: (All radiographs, tracings, PFTs, and imaging are personally viewed and interpreted unless otherwise noted).   6/20/2024 Na 137, K 3.4, Cl 98, CO2 17, BUN 16, Cr 1.9, eGFR 26. AG 22  6/20/2024 WBC 15.9, Hgb 10.0, HCT 31.6,   6/20/2024 Alb 2.8, Alk phos 184, , AST 2741, total bili 1.0  6/20/2024 LA 7.7  6/20/2024 ->2043->2788->5257  6/20/2024 EKG ST with occasional PVCs, VT 78, Qtc 499  6/20/2024 ABG pH 7.46, pCO2 32, pO2 179, HCO3 23  6/20/2024 VBG pH 7.37, pCO2 42, pO2 34, HCO3 25  Telemetry shows        Seen with multidisciplinary ICU team Yes.  Meets Continued ICU Level Care Criteria:    [x] Yes   [] No - Transfer Planned to listed location:  [] HOSPITALIST CONTACTED-      Case and plan discussed with  
100 mL infusion Stopped (06/22/24 8288)    amiodarone Stopped (06/20/24 0385)    propofol 15 mcg/kg/min (06/24/24 9942)    phenylephrine (TERE-SYNEPHRINE) 50 mg in sodium chloride 0.9 % 250 mL infusion         PHYSICAL EXAMINATION:  T: 99.5.  P: 100. RR: 21. B/P: 119/51.  FiO2: 0.21. O2 Sat: 100% on ventilator.  I/O: Net +-1.9 L  Body mass index is 26.81 kg/m².   GCS:   6  PC: 14/6: TV: 400: RRTotal: 12: Ti:1 sec:     General:   Acute on chronically ill-appearing, moderate acute distress, intubated and sedated  HEENT:  normocephalic and atraumatic.  No scleral icterus. PERR  Neck: supple.  No Thyromegaly.  Lungs: clear to auscultation.  No retractions  Cardiac: RRR.  No JVD.  Abdomen: soft.  Nontender.  Extremities:  No clubbing, cyanosis, or edema x 4.  Cool to touch  Vasculature: capillary refill > 3 seconds. Palpable dorsalis pedis pulses.  Skin:  warm and dry.  Psych:  Unable to assess due to sedation and mechanical ventilation  Lymph:  No supraclavicular adenopathy.  Neurologic:  No focal deficit. No seizures. Not responsive to painful stimuli    Data: (All radiographs, tracings, PFTs, and imaging are personally viewed and interpreted unless otherwise noted).   6/24/2024 Na 137, K3.8,  , CO2 23, BUN 28, CR 1.6, AG 11.0, EGFR 32, glucose 197  6/24/2024 WBC 12.0, Hgb 10.9, HCT 33.7, PLT 48  6/24/2024 Alb 3.2, Alk phos 204, , , Tot Bili 0.8  6/23/2024 CXR Improved aeration bilaterally with residual bibasilar atelectasis  6/20/2024 ->2043->2788->5257  6/20/2024 EKG ST with occasional PVCs, VT 78, Qtc 499  Telemetry shows NSR    Seen with multidisciplinary ICU team Yes.  Meets Continued ICU Level Care Criteria:    [x] Yes   [] No - Transfer Planned to listed location:  [] HOSPITALIST CONTACTED-      Case and plan discussed with Dr. Alba.  Electronically signed by Tommy Nielsen DO  CRITICAL CARE SPECIALIST  Patient seen by me including key components of medical care.  Case discussed with

## 2024-06-26 PROBLEM — N18.6 ESRD ON HEMODIALYSIS (HCC): Status: ACTIVE | Noted: 2024-06-26

## 2024-06-26 PROBLEM — Z51.5 END OF LIFE CARE: Status: ACTIVE | Noted: 2024-06-26

## 2024-06-26 PROBLEM — Z99.2 ESRD ON HEMODIALYSIS (HCC): Status: ACTIVE | Noted: 2024-06-26

## 2024-06-27 LAB
PF4 HEPARIN CMPLX AB SER QL: NORMAL
SEROTONIN RELEASING ASSAY: NORMAL

## 2024-08-20 NOTE — TELEPHONE ENCOUNTER
Called and LVM that will need to wait till Buprenex is cleared by insurance. The skin of the bilateral groins was clipped, prepped and draped in the usual sterile manner. (If not otherwise specified, skin prep was bilateral.)

## (undated) DEVICE — SHEET,DRAPE,3/4,53X77,STERILE: Brand: MEDLINE

## (undated) DEVICE — SYRINGE MED 5ML STD CLR PLAS LUERLOCK TIP N CTRL DISP

## (undated) DEVICE — GAUZE,SPONGE,4"X4",12PLY,STERILE,LF,2'S: Brand: MEDLINE

## (undated) DEVICE — HYPODERMIC SAFETY NEEDLE: Brand: MAGELLAN

## (undated) DEVICE — NEEDLE SPNL 22GA L3.5IN BLK HUB S STL REG WALL FIT STYL W/

## (undated) DEVICE — MARKER,SKIN,WI/RULER AND LABELS: Brand: MEDLINE

## (undated) DEVICE — NEEDLE SYR 18GA L1.5IN RED PLAS HUB S STL BLNT FILL W/O

## (undated) DEVICE — SYRINGE MED 10ML LUERLOCK TIP W/O SFTY DISP

## (undated) DEVICE — TOWEL,OR,DSP,ST,BLUE,STD,4/PK,20PK/CS: Brand: MEDLINE